# Patient Record
Sex: MALE | Race: OTHER | NOT HISPANIC OR LATINO | URBAN - METROPOLITAN AREA
[De-identification: names, ages, dates, MRNs, and addresses within clinical notes are randomized per-mention and may not be internally consistent; named-entity substitution may affect disease eponyms.]

---

## 2018-04-24 ENCOUNTER — EMERGENCY (EMERGENCY)
Facility: HOSPITAL | Age: 23
LOS: 1 days | Discharge: HOME | End: 2018-04-24
Attending: EMERGENCY MEDICINE
Payer: SELF-PAY

## 2018-04-24 VITALS
HEIGHT: 74 IN | DIASTOLIC BLOOD PRESSURE: 90 MMHG | OXYGEN SATURATION: 98 % | WEIGHT: 164.91 LBS | RESPIRATION RATE: 16 BRPM | TEMPERATURE: 98 F | SYSTOLIC BLOOD PRESSURE: 135 MMHG | HEART RATE: 75 BPM

## 2018-04-24 PROCEDURE — 99283 EMERGENCY DEPT VISIT LOW MDM: CPT

## 2018-04-24 PROCEDURE — 99282 EMERGENCY DEPT VISIT SF MDM: CPT

## 2018-04-24 NOTE — ED ADULT NURSE NOTE - OBJECTIVE STATEMENT
pt from home c/o of penile bleeding since AM pt is alert awake oriented x3 pt states " I had an erection this AM saw blood on underwear"

## 2018-04-24 NOTE — ED PROVIDER NOTE - PHYSICAL EXAMINATION
: Uncircumcised. Small superficial laceration at the superficial  frenulum of the glans penis (base of the head) and small superficial laceration to the inferior part of the frenulum, small oozing of blood easily controlled with pressure. No testicular pain.

## 2018-04-24 NOTE — ED PROVIDER NOTE - OBJECTIVE STATEMENT
21 y/o M pt woke up this morning with an erection went to go use the restroom to urinate after urinating he noticed that there was some blood on his boxers. Pt denies any trauma. Pt does admit to masturbating at times, however cannot confirm or deny if he masturbated last night. No testicular pain, no abd pain, no other complaints.  NKDA.

## 2021-02-22 ENCOUNTER — INPATIENT (INPATIENT)
Facility: HOSPITAL | Age: 26
LOS: 11 days | Discharge: HOME | DRG: 683 | End: 2021-03-06
Attending: INTERNAL MEDICINE | Admitting: INTERNAL MEDICINE
Payer: COMMERCIAL

## 2021-02-22 VITALS
RESPIRATION RATE: 18 BRPM | WEIGHT: 160.94 LBS | DIASTOLIC BLOOD PRESSURE: 113 MMHG | TEMPERATURE: 98 F | HEART RATE: 78 BPM | OXYGEN SATURATION: 100 % | HEIGHT: 74 IN | SYSTOLIC BLOOD PRESSURE: 171 MMHG

## 2021-02-22 DIAGNOSIS — I16.1 HYPERTENSIVE EMERGENCY: ICD-10-CM

## 2021-02-22 DIAGNOSIS — N17.9 ACUTE KIDNEY FAILURE, UNSPECIFIED: ICD-10-CM

## 2021-02-22 DIAGNOSIS — Z29.9 ENCOUNTER FOR PROPHYLACTIC MEASURES, UNSPECIFIED: ICD-10-CM

## 2021-02-22 PROBLEM — J45.909 UNSPECIFIED ASTHMA, UNCOMPLICATED: Chronic | Status: ACTIVE | Noted: 2018-04-24

## 2021-02-22 LAB
ALBUMIN SERPL ELPH-MCNC: 3.3 G/DL — LOW (ref 3.5–5)
ALBUMIN SERPL ELPH-MCNC: 3.5 G/DL — SIGNIFICANT CHANGE UP (ref 3.5–5)
ALP SERPL-CCNC: 94 U/L — SIGNIFICANT CHANGE UP (ref 40–120)
ALP SERPL-CCNC: 95 U/L — SIGNIFICANT CHANGE UP (ref 40–120)
ALT FLD-CCNC: 64 U/L DA — HIGH (ref 10–60)
ALT FLD-CCNC: 66 U/L DA — HIGH (ref 10–60)
AMPHET UR-MCNC: NEGATIVE — SIGNIFICANT CHANGE UP
ANION GAP SERPL CALC-SCNC: 10 MMOL/L — SIGNIFICANT CHANGE UP (ref 5–17)
ANION GAP SERPL CALC-SCNC: 11 MMOL/L — SIGNIFICANT CHANGE UP (ref 5–17)
APPEARANCE UR: CLEAR — SIGNIFICANT CHANGE UP
AST SERPL-CCNC: 73 U/L — HIGH (ref 10–40)
AST SERPL-CCNC: 76 U/L — HIGH (ref 10–40)
BACTERIA # UR AUTO: ABNORMAL /HPF
BARBITURATES UR SCN-MCNC: NEGATIVE — SIGNIFICANT CHANGE UP
BASE EXCESS BLDA CALC-SCNC: -8.1 MMOL/L — LOW (ref -2–2)
BASOPHILS # BLD AUTO: 0.03 K/UL — SIGNIFICANT CHANGE UP (ref 0–0.2)
BASOPHILS NFR BLD AUTO: 0.5 % — SIGNIFICANT CHANGE UP (ref 0–2)
BENZODIAZ UR-MCNC: NEGATIVE — SIGNIFICANT CHANGE UP
BILIRUB SERPL-MCNC: 0.3 MG/DL — SIGNIFICANT CHANGE UP (ref 0.2–1.2)
BILIRUB SERPL-MCNC: 0.4 MG/DL — SIGNIFICANT CHANGE UP (ref 0.2–1.2)
BILIRUB UR-MCNC: NEGATIVE — SIGNIFICANT CHANGE UP
BLOOD GAS COMMENTS ARTERIAL: SIGNIFICANT CHANGE UP
BUN SERPL-MCNC: 64 MG/DL — HIGH (ref 7–18)
BUN SERPL-MCNC: 67 MG/DL — HIGH (ref 7–18)
CALCIUM SERPL-MCNC: 8.7 MG/DL — SIGNIFICANT CHANGE UP (ref 8.4–10.5)
CALCIUM SERPL-MCNC: 9 MG/DL — SIGNIFICANT CHANGE UP (ref 8.4–10.5)
CHLORIDE SERPL-SCNC: 111 MMOL/L — HIGH (ref 96–108)
CHLORIDE SERPL-SCNC: 113 MMOL/L — HIGH (ref 96–108)
CHLORIDE UR-SCNC: 58 MMOL/L — SIGNIFICANT CHANGE UP
CK MB BLD-MCNC: 0.1 % — SIGNIFICANT CHANGE UP (ref 0–3.5)
CK MB CFR SERPL CALC: 1.2 NG/ML — SIGNIFICANT CHANGE UP (ref 0–3.6)
CK SERPL-CCNC: 1141 U/L — HIGH (ref 35–232)
CO2 SERPL-SCNC: 17 MMOL/L — LOW (ref 22–31)
CO2 SERPL-SCNC: 19 MMOL/L — LOW (ref 22–31)
COCAINE METAB.OTHER UR-MCNC: NEGATIVE — SIGNIFICANT CHANGE UP
COLOR SPEC: YELLOW — SIGNIFICANT CHANGE UP
COMMENT - URINE: SIGNIFICANT CHANGE UP
CREAT ?TM UR-MCNC: 79 MG/DL — SIGNIFICANT CHANGE UP
CREAT SERPL-MCNC: 5.8 MG/DL — HIGH (ref 0.5–1.3)
CREAT SERPL-MCNC: 6.17 MG/DL — HIGH (ref 0.5–1.3)
DIFF PNL FLD: ABNORMAL
EOSINOPHIL # BLD AUTO: 0.24 K/UL — SIGNIFICANT CHANGE UP (ref 0–0.5)
EOSINOPHIL NFR BLD AUTO: 3.6 % — SIGNIFICANT CHANGE UP (ref 0–6)
EPI CELLS # UR: ABNORMAL /HPF
GLUCOSE SERPL-MCNC: 83 MG/DL — SIGNIFICANT CHANGE UP (ref 70–99)
GLUCOSE SERPL-MCNC: 93 MG/DL — SIGNIFICANT CHANGE UP (ref 70–99)
GLUCOSE UR QL: NEGATIVE — SIGNIFICANT CHANGE UP
HCO3 BLDA-SCNC: 16 MMOL/L — LOW (ref 23–27)
HCT VFR BLD CALC: 32.8 % — LOW (ref 39–50)
HGB BLD-MCNC: 11 G/DL — LOW (ref 13–17)
HIV 1 & 2 AB SERPL IA.RAPID: SIGNIFICANT CHANGE UP
HOROWITZ INDEX BLDA+IHG-RTO: 21 — SIGNIFICANT CHANGE UP
IMM GRANULOCYTES NFR BLD AUTO: 0.2 % — SIGNIFICANT CHANGE UP (ref 0–1.5)
KETONES UR-MCNC: NEGATIVE — SIGNIFICANT CHANGE UP
LEUKOCYTE ESTERASE UR-ACNC: NEGATIVE — SIGNIFICANT CHANGE UP
LYMPHOCYTES # BLD AUTO: 1.98 K/UL — SIGNIFICANT CHANGE UP (ref 1–3.3)
LYMPHOCYTES # BLD AUTO: 30 % — SIGNIFICANT CHANGE UP (ref 13–44)
MCHC RBC-ENTMCNC: 28.4 PG — SIGNIFICANT CHANGE UP (ref 27–34)
MCHC RBC-ENTMCNC: 33.5 GM/DL — SIGNIFICANT CHANGE UP (ref 32–36)
MCV RBC AUTO: 84.8 FL — SIGNIFICANT CHANGE UP (ref 80–100)
METHADONE UR-MCNC: NEGATIVE — SIGNIFICANT CHANGE UP
MONOCYTES # BLD AUTO: 0.33 K/UL — SIGNIFICANT CHANGE UP (ref 0–0.9)
MONOCYTES NFR BLD AUTO: 5 % — SIGNIFICANT CHANGE UP (ref 2–14)
NEUTROPHILS # BLD AUTO: 4 K/UL — SIGNIFICANT CHANGE UP (ref 1.8–7.4)
NEUTROPHILS NFR BLD AUTO: 60.7 % — SIGNIFICANT CHANGE UP (ref 43–77)
NITRITE UR-MCNC: NEGATIVE — SIGNIFICANT CHANGE UP
NRBC # BLD: 0 /100 WBCS — SIGNIFICANT CHANGE UP (ref 0–0)
OPIATES UR-MCNC: NEGATIVE — SIGNIFICANT CHANGE UP
OSMOLALITY UR: 337 MOS/KG — SIGNIFICANT CHANGE UP (ref 50–1200)
PCO2 BLDA: 29 MMHG — LOW (ref 32–46)
PCP SPEC-MCNC: SIGNIFICANT CHANGE UP
PCP UR-MCNC: NEGATIVE — SIGNIFICANT CHANGE UP
PH BLDA: 7.36 — SIGNIFICANT CHANGE UP (ref 7.35–7.45)
PH UR: 5 — SIGNIFICANT CHANGE UP (ref 5–8)
PLATELET # BLD AUTO: 179 K/UL — SIGNIFICANT CHANGE UP (ref 150–400)
PO2 BLDA: 100 MMHG — SIGNIFICANT CHANGE UP (ref 74–108)
POTASSIUM SERPL-MCNC: 4.5 MMOL/L — SIGNIFICANT CHANGE UP (ref 3.5–5.3)
POTASSIUM SERPL-MCNC: 5.1 MMOL/L — SIGNIFICANT CHANGE UP (ref 3.5–5.3)
POTASSIUM SERPL-SCNC: 4.5 MMOL/L — SIGNIFICANT CHANGE UP (ref 3.5–5.3)
POTASSIUM SERPL-SCNC: 5.1 MMOL/L — SIGNIFICANT CHANGE UP (ref 3.5–5.3)
PROT ?TM UR-MCNC: 261 MG/DL — HIGH (ref 0–12)
PROT SERPL-MCNC: 7 G/DL — SIGNIFICANT CHANGE UP (ref 6–8.3)
PROT SERPL-MCNC: 7 G/DL — SIGNIFICANT CHANGE UP (ref 6–8.3)
PROT UR-MCNC: 500 MG/DL
RBC # BLD: 3.87 M/UL — LOW (ref 4.2–5.8)
RBC # FLD: 12.1 % — SIGNIFICANT CHANGE UP (ref 10.3–14.5)
RBC CASTS # UR COMP ASSIST: ABNORMAL /HPF (ref 0–2)
SAO2 % BLDA: 98 % — HIGH (ref 92–96)
SARS-COV-2 RNA SPEC QL NAA+PROBE: SIGNIFICANT CHANGE UP
SODIUM SERPL-SCNC: 140 MMOL/L — SIGNIFICANT CHANGE UP (ref 135–145)
SODIUM SERPL-SCNC: 141 MMOL/L — SIGNIFICANT CHANGE UP (ref 135–145)
SODIUM UR-SCNC: 63 MMOL/L — SIGNIFICANT CHANGE UP
SP GR SPEC: 1.02 — SIGNIFICANT CHANGE UP (ref 1.01–1.02)
THC UR QL: NEGATIVE — SIGNIFICANT CHANGE UP
TROPONIN I SERPL-MCNC: <0.015 NG/ML — SIGNIFICANT CHANGE UP (ref 0–0.04)
UROBILINOGEN FLD QL: NEGATIVE — SIGNIFICANT CHANGE UP
WBC # BLD: 6.59 K/UL — SIGNIFICANT CHANGE UP (ref 3.8–10.5)
WBC # FLD AUTO: 6.59 K/UL — SIGNIFICANT CHANGE UP (ref 3.8–10.5)
WBC UR QL: SIGNIFICANT CHANGE UP /HPF (ref 0–5)

## 2021-02-22 PROCEDURE — 99285 EMERGENCY DEPT VISIT HI MDM: CPT

## 2021-02-22 PROCEDURE — 71045 X-RAY EXAM CHEST 1 VIEW: CPT | Mod: 26

## 2021-02-22 PROCEDURE — 70450 CT HEAD/BRAIN W/O DYE: CPT | Mod: 26

## 2021-02-22 RX ORDER — ACETAMINOPHEN 500 MG
650 TABLET ORAL EVERY 6 HOURS
Refills: 0 | Status: DISCONTINUED | OUTPATIENT
Start: 2021-02-22 | End: 2021-03-06

## 2021-02-22 RX ORDER — ALBUTEROL 90 UG/1
2 AEROSOL, METERED ORAL EVERY 6 HOURS
Refills: 0 | Status: DISCONTINUED | OUTPATIENT
Start: 2021-02-22 | End: 2021-03-06

## 2021-02-22 RX ORDER — HYDRALAZINE HCL 50 MG
25 TABLET ORAL ONCE
Refills: 0 | Status: DISCONTINUED | OUTPATIENT
Start: 2021-02-22 | End: 2021-02-22

## 2021-02-22 RX ORDER — BUDESONIDE AND FORMOTEROL FUMARATE DIHYDRATE 160; 4.5 UG/1; UG/1
2 AEROSOL RESPIRATORY (INHALATION)
Refills: 0 | Status: DISCONTINUED | OUTPATIENT
Start: 2021-02-22 | End: 2021-03-06

## 2021-02-22 RX ORDER — SODIUM CHLORIDE 9 MG/ML
1000 INJECTION, SOLUTION INTRAVENOUS
Refills: 0 | Status: DISCONTINUED | OUTPATIENT
Start: 2021-02-22 | End: 2021-02-25

## 2021-02-22 RX ORDER — ONDANSETRON 8 MG/1
5 TABLET, FILM COATED ORAL EVERY 6 HOURS
Refills: 0 | Status: DISCONTINUED | OUTPATIENT
Start: 2021-02-22 | End: 2021-03-06

## 2021-02-22 RX ORDER — HYDRALAZINE HCL 50 MG
5 TABLET ORAL ONCE
Refills: 0 | Status: COMPLETED | OUTPATIENT
Start: 2021-02-22 | End: 2021-02-22

## 2021-02-22 RX ORDER — AMLODIPINE BESYLATE 2.5 MG/1
5 TABLET ORAL ONCE
Refills: 0 | Status: COMPLETED | OUTPATIENT
Start: 2021-02-22 | End: 2021-02-22

## 2021-02-22 RX ORDER — AMLODIPINE BESYLATE 2.5 MG/1
5 TABLET ORAL DAILY
Refills: 0 | Status: DISCONTINUED | OUTPATIENT
Start: 2021-02-22 | End: 2021-02-24

## 2021-02-22 RX ADMIN — Medication 650 MILLIGRAM(S): at 23:23

## 2021-02-22 RX ADMIN — AMLODIPINE BESYLATE 5 MILLIGRAM(S): 2.5 TABLET ORAL at 17:34

## 2021-02-22 RX ADMIN — ONDANSETRON 5 MILLIGRAM(S): 8 TABLET, FILM COATED ORAL at 23:20

## 2021-02-22 RX ADMIN — Medication 5 MILLIGRAM(S): at 19:36

## 2021-02-22 RX ADMIN — AMLODIPINE BESYLATE 5 MILLIGRAM(S): 2.5 TABLET ORAL at 23:20

## 2021-02-22 RX ADMIN — SODIUM CHLORIDE 60 MILLILITER(S): 9 INJECTION, SOLUTION INTRAVENOUS at 22:49

## 2021-02-22 NOTE — H&P ADULT - PROBLEM SELECTOR PLAN 2
fu urine lytes  fu urine tox   will consult Dr. Massey presented with CIARRA and Cr 6  no presvious bs creatinine   will start on gentle hydration with 60 cc/hr   fu urine lytes  fu urine tox   will consult Dr. Massey presented with CIARRA and Cr 6  no previous bs creatinine   will start on gentle hydration with 60 cc/hr   fu urine lytes  fu urine tox   will consult Dr. Massey

## 2021-02-22 NOTE — ED PROVIDER NOTE - CARE PLAN
Principal Discharge DX:	CIARRA (acute kidney injury)  Secondary Diagnosis:	HTN (hypertension)  Secondary Diagnosis:	Hypertensive emergency

## 2021-02-22 NOTE — H&P ADULT - HISTORY OF PRESENT ILLNESS
24 yo male with PMH of asthma presented to his PCP with complaints of headache, nausea and vomiting. Pt was noted to have elevated sBP of >170 and was advised to come to emergency department. Patient states he started having headaches 5 days ago associated with nausea and NBNB vomiting. Pt denies head trauma, any prior episodes of similar headaches.   Denies diarrhea, abdominal pain, cough, wheezing, joint pain or swelling, fever, chills.   24 yo male with PMH of asthma presented to his PCP with complaints of headache, nausea and vomiting x 5 days. Pt was noted to have elevated sBP of >170 and was advised to come to emergency department. Patient states he started having headaches 5 days ago associated with nausea and 1 day hx of NBNB vomiting. Pt endorses episode of dysuria few days ago which has now resolved. Pt denies head trauma, any prior episodes of similar headaches. No family hx of Polycystic kidney disease, CVA, HTN. Denies diarrhea, abdominal pain, fever, chills, recent viral infection, no blood in urine, no flank pain, no urinary rentention

## 2021-02-22 NOTE — CONSULT NOTE ADULT - SUBJECTIVE AND OBJECTIVE BOX
Time of visit:    CHIEF COMPLAINT: Patient is a 25y old  Male who presents with a chief complaint of headache, nausea/ vomiting (22 Feb 2021 20:11)      HPI:  24 yo male with PMH of asthma presented to his PCP with complaints of headache, nausea and vomiting. Pt was noted to have elevated sBP of >170 and was advised to come to emergency department. Patient states he started having headaches 5 days ago associated with nausea and NBNB vomiting. Pt denies head trauma, any prior episodes of similar headaches.   Denies diarrhea, abdominal pain, cough, wheezing, joint pain or swelling, fever, chills.   (22 Feb 2021 20:11)   Patient seen and examined.     PAST MEDICAL & SURGICAL HISTORY:  Asthma    No significant past surgical history        Allergies    Kiwi (Swelling)  No Known Drug Allergies    Intolerances        MEDICATIONS  (STANDING):  amLODIPine   Tablet 5 milliGRAM(s) Oral daily      MEDICATIONS  (PRN):  acetaminophen   Tablet .. 650 milliGRAM(s) Oral every 6 hours PRN Temp greater or equal to 38C (100.4F), Mild Pain (1 - 3)  ondansetron Injectable 5 milliGRAM(s) IV Push every 6 hours PRN Nausea and/or Vomiting   Medications up to date at time of exam.    Medications up to date at time of exam.    FAMILY HISTORY:      SOCIAL HISTORY  Smoking History: [   ] smoking/smoke exposure, [   ] former smoker  Living Condition: [   ] apartment, [   ] private house  Work History:   Travel History: denies recent travel  Illicit Substance Use: denies  Alcohol Use: denies    REVIEW OF SYSTEMS:    CONSTITUTIONAL:  denies fevers, chills, sweats, weight loss    HEENT:  denies diplopia or blurred vision, sore throat or runny nose.    CARDIOVASCULAR:  denies pressure, squeezing, tightness, or heaviness about the chest; no palpitations.    RESPIRATORY:  denies SOB, cough, ADKINS, wheezing.    GASTROINTESTINAL:  denies abdominal pain, nausea, vomiting or diarrhea.    GENITOURINARY: denies dysuria, frequency or urgency.    NEUROLOGIC:  denies numbness, tingling, seizures or weakness.    PSYCHIATRIC:  denies disorder of thought or mood.    MSK: denies swelling, redness      PHYSICAL EXAMINATION:    GENERAL: The patient is a well-developed, well-nourished, in no apparent distress.     Vital Signs Last 24 Hrs  T(C): 36.7 (22 Feb 2021 20:45), Max: 36.8 (22 Feb 2021 19:31)  T(F): 98 (22 Feb 2021 20:45), Max: 98.3 (22 Feb 2021 19:31)  HR: 90 (22 Feb 2021 20:45) (73 - 90)  BP: 162/105 (22 Feb 2021 20:45) (162/105 - 185/114)  BP(mean): --  RR: 18 (22 Feb 2021 20:45) (18 - 20)  SpO2: 98% (22 Feb 2021 20:45) (98% - 100%)   (if applicable)    Chest Tube (if applicable)    HEENT: Head is normocephalic and atraumatic. Extraocular muscles are intact. Mucous membranes are moist.     NECK: Supple, no palpable adenopathy.    LUNGS: Clear to auscultation, no wheezing, rales, or rhonchi.    HEART: Regular rate and rhythm without murmur.    ABDOMEN: Soft, nontender, and nondistended.  No hepatosplenomegaly is noted.    RENAL: No difficulty voiding, no pelvic pain    EXTREMITIES: Without any cyanosis, clubbing, rash, lesions or edema.    NEUROLOGIC: Awake, alert, oriented, grossly intact    SKIN: Warm, dry, good turgor.      LABS:                        11.0   6.59  )-----------( 179      ( 22 Feb 2021 16:39 )             32.8     02-22    140  |  111<H>  |  67<H>  ----------------------------<  93  5.1   |  19<L>  |  6.17<H>    Ca    9.0      22 Feb 2021 16:39    TPro  7.0  /  Alb  3.3<L>  /  TBili  0.3  /  DBili  x   /  AST  76<H>  /  ALT  66<H>  /  AlkPhos  94  02-22        ABG - ( 22 Feb 2021 21:13 )  pH, Arterial: 7.36  pH, Blood: x     /  pCO2: 29    /  pO2: 100   / HCO3: 16    / Base Excess: -8.1  /  SaO2: 98                CARDIAC MARKERS ( 22 Feb 2021 19:24 )  <0.015 ng/mL / x     / x     / x     / x      CARDIAC MARKERS ( 22 Feb 2021 16:39 )  <0.015 ng/mL / x     / x     / x     / x                    MICROBIOLOGY: (if applicable)    RADIOLOGY & ADDITIONAL STUDIES:  EKG:   CXR:  ECHO:    IMPRESSION: 25y Male PAST MEDICAL & SURGICAL HISTORY:  Asthma    No significant past surgical history     p/w                   RECOMMENDATIONS:   Time of visit:    CHIEF COMPLAINT: Patient is a 25y old  Male who presents with a chief complaint of headache, nausea/ vomiting (22 Feb 2021 20:11)      HPI:  26 yo male with PMH of asthma presented to his PCP with complaints of headache, nausea and vomiting. Pt was noted to have elevated sBP of >170 and was advised to come to emergency department. Patient states he started having headaches 5 days ago associated with nausea and NBNB vomiting. Pt denies head trauma, any prior episodes of similar headaches.   Denies diarrhea, abdominal pain, cough, wheezing, joint pain or swelling, fever, chills.   (22 Feb 2021 20:11)   Patient seen and examined.     PAST MEDICAL & SURGICAL HISTORY:  Asthma    No significant past surgical history        Allergies    Kiwi (Swelling)  No Known Drug Allergies    Intolerances        MEDICATIONS  (STANDING):  amLODIPine   Tablet 5 milliGRAM(s) Oral daily      MEDICATIONS  (PRN):  acetaminophen   Tablet .. 650 milliGRAM(s) Oral every 6 hours PRN Temp greater or equal to 38C (100.4F), Mild Pain (1 - 3)  ondansetron Injectable 5 milliGRAM(s) IV Push every 6 hours PRN Nausea and/or Vomiting   Medications up to date at time of exam.    Medications up to date at time of exam.    FAMILY HISTORY:      SOCIAL HISTORY  Smoking History: [ x  ]  non smoking/smoke exposure, [   ] former smoker  Living Condition: [   ] apartment, [   ] private house  Work History:  import and export   Travel History: denies recent travel  Illicit Substance Use: denies  Alcohol Use: denies    REVIEW OF SYSTEMS:    CONSTITUTIONAL:  denies fevers, chills, sweats, weight loss    HEENT:  denies diplopia or blurred vision, sore throat or runny nose.    CARDIOVASCULAR:  denies pressure, squeezing, tightness, or heaviness about the chest; no palpitations.    RESPIRATORY:  denies SOB, cough, ADKINS, wheezing.    GASTROINTESTINAL:  denies abdominal pain, nausea, vomiting or diarrhea.    GENITOURINARY: denies dysuria, frequency or urgency.    NEUROLOGIC:  denies numbness, tingling, seizures or weakness.    PSYCHIATRIC:  denies disorder of thought or mood.    MSK: denies swelling, redness      PHYSICAL EXAMINATION:    GENERAL: The patient is a well-developed, well-nourished, in no apparent distress.     Vital Signs Last 24 Hrs  T(C): 36.7 (22 Feb 2021 20:45), Max: 36.8 (22 Feb 2021 19:31)  T(F): 98 (22 Feb 2021 20:45), Max: 98.3 (22 Feb 2021 19:31)  HR: 90 (22 Feb 2021 20:45) (73 - 90)  BP: 162/105 (22 Feb 2021 20:45) (162/105 - 185/114)  BP(mean): --  RR: 18 (22 Feb 2021 20:45) (18 - 20)  SpO2: 98% (22 Feb 2021 20:45) (98% - 100%)   (if applicable)    Chest Tube (if applicable)    HEENT: Head is normocephalic and atraumatic. Extraocular muscles are intact. Mucous membranes are moist.     NECK: Supple, no palpable adenopathy.    LUNGS: Clear to auscultation, no wheezing, rales, or rhonchi.    HEART: Regular rate and rhythm without murmur.    ABDOMEN: Soft, nontender, and nondistended.  No hepatosplenomegaly is noted.    RENAL: No difficulty voiding, no pelvic pain    EXTREMITIES: Without any cyanosis, clubbing, rash, lesions or edema.    NEUROLOGIC: Awake, alert, oriented, grossly intact    SKIN: Warm, dry, good turgor.      LABS:                        11.0   6.59  )-----------( 179      ( 22 Feb 2021 16:39 )             32.8     02-22    140  |  111<H>  |  67<H>  ----------------------------<  93  5.1   |  19<L>  |  6.17<H>    Ca    9.0      22 Feb 2021 16:39    TPro  7.0  /  Alb  3.3<L>  /  TBili  0.3  /  DBili  x   /  AST  76<H>  /  ALT  66<H>  /  AlkPhos  94  02-22        ABG - ( 22 Feb 2021 21:13 )  pH, Arterial: 7.36  pH, Blood: x     /  pCO2: 29    /  pO2: 100   / HCO3: 16    / Base Excess: -8.1  /  SaO2: 98                CARDIAC MARKERS ( 22 Feb 2021 19:24 )  <0.015 ng/mL / x     / x     / x     / x      CARDIAC MARKERS ( 22 Feb 2021 16:39 )  <0.015 ng/mL / x     / x     / x     / x                    MICROBIOLOGY: (if applicable)    RADIOLOGY & ADDITIONAL STUDIES:  EKG:   CXR:< from: Xray Chest 1 View- PORTABLE-Routine (Xray Chest 1 View- PORTABLE-Routine in AM.) (02.22.21 @ 23:42) >    EXAM:  XR CHEST PORTABLE ROUTINE 1V                            PROCEDURE DATE:  02/22/2021          INTERPRETATION:  CLINICAL STATEMENT: Chest pain.    TECHNIQUE: AP view of the chest.    COMPARISON: None available.    FINDINGS/  IMPRESSION:  No consolidation or infiltrate.  No pleural effusion.    Heart size within normal limits.              MYRIAM CHAVARRIA MD; Attending Radiologist  This document has been electronically signed. Feb 23 2021  2:15PM    < end of copied text >    CT head :< from: CT Head No Cont (02.22.21 @ 18:52) >    EXAM:  CT BRAIN                            PROCEDURE DATE:  02/22/2021          INTERPRETATION:  CLINICAL INDICATION: Headache. Hypertension.    Technique:    Multiple contiguous axial images were acquired from the skullbase to the vertex without the administration of intravenous contrast.  Coronal and sagittal reformations were made.    COMPARISON: None available at this time.    FINDINGS:    The ventricles and sulci are within normal limits.  There are no areas of abnormal attenuation within the brain parenchyma.  There is no evidence of acute transcortical territorial infarction.  There is no intraparenchymal hematoma, mass effect or midline shift.  No abnormal extra-axial fluid collections are present.    The calvarium is intact.  The visualized intraorbital compartments are unremarkable.  Visualized paranasal sinuses appear free of acute disease.  Mastoid air cells are clear.    IMPRESSION:    No acute hemorrhage or midline shift. If symptoms persist, consider follow-up head CT or MRI, if no contraindications.            JAMIN GUZMAN MD, Attending Radiologist  This document has been electronically signed. Feb 22 2021  6:57PM    < end of copied text >  Ct abd/pelvis:< from: CT Abdomen and Pelvis No Cont (02.23.21 @ 00:23) >    EXAM:  CT ABDOMEN AND PELVIS                            PROCEDURE DATE:  02/23/2021          INTERPRETATION:  CLINICAL INFORMATION: Acute renal failure.    COMPARISON: None.    PROCEDURE:  CT of the Abdomen and Pelvis was performed without intravenous contrast.  Intravenous contrast: None.  Oral contrast: None.  Sagittal and coronal reformats were performed.    FINDINGS:  LOWER CHEST: Within normal limits.    LIVER: Within normal limits.  BILE DUCTS: Normal caliber.  GALLBLADDER: Within normal limits.  SPLEEN: Within normal limits.  PANCREAS: Within normal limits.  ADRENALS: Within normal limits.  KIDNEYS/URETERS: Kidneys are symmetric in size. No right or left hydroureteronephrosis or obstructing urinary tract calculus. No nephrolithiasis.No asymmetric perinephric stranding.    BLADDER: Normally distended.  REPRODUCTIVE ORGANS: Prostate gland and seminal vesicles are unremarkable.    BOWEL: No bowel obstruction. Appendix is normal.  PERITONEUM: No ascites or pneumoperitoneum. No mesenteric lymphadenopathy.  VESSELS: Within normal limits.  RETROPERITONEUM/LYMPH NODES: No lymphadenopathy.  ABDOMINAL WALL: Small fat-containing umbilical hernia.  BONES: Within normal limits.    IMPRESSION:  No evidence of obstructive uropathy.              LARISSA JIMENEZ DO; Attending Radiologist  This document has been electronically signed. Feb 23 2021  2:16AM    < end of copied text >    ECHO:    IMPRESSION: 25y Male PAST MEDICAL & SURGICAL HISTORY:  Asthma    No significant past surgical history     p/w       IMP: This is a 25 yr with asthma ,non smoker presented headaches due to hypertensive crisis and CIARRA. Metabolic acidosis due to CIARRA . Asthma stable       Sugg;  -continue symbicort 80/4.5 bid  -metanephrines  -neph eval pending   -monitor BP  -continue Norvasc  -CIARRA work up

## 2021-02-22 NOTE — H&P ADULT - PROBLEM SELECTOR PLAN 3
IMPROVE VTE Individual Risk Assessment  RISK                                                         Points  [  ] Previous VTE                                      3  [  ] Thrombophilia                                   2  [  ] Lower limb paralysis                         2 (unable to hold up >15 seconds)    [  ] Current Cancer                                  2       (within 6 months)  [  ] Immobilization > 24 hrs                    1  [  ] ICU/CCU stay > 24 hrs                         1  [  ] Age > 60                                              1  started on lovenox for dvt ppx IMPROVE VTE Individual Risk Assessment  RISK                                                         Points  [  ] Previous VTE                                      3  [  ] Thrombophilia                                   2  [  ] Lower limb paralysis                         2 (unable to hold up >15 seconds)    [  ] Current Cancer                                  2       (within 6 months)  [  ] Immobilization > 24 hrs                    1  [  ] ICU/CCU stay > 24 hrs                         1  [  ] Age > 60                                              1  will hold chemo dvt ppx for now Pt noted to have non anion gap metabolic acidosis with respiratory compensation   bicarb 19-> 17   will hold off on bicarb replacement for now   metabolic acidosis likely in the setting of ARF   fu Nephro recommendation

## 2021-02-22 NOTE — H&P ADULT - NSHPSOCIALHISTORY_GEN_ALL_CORE
Alcohol: Denied  Smoking: Denied  Illicit drugs: Denied Alcohol: occasional   Smoking: Denied  Illicit drugs: Denied

## 2021-02-22 NOTE — H&P ADULT - PROBLEM SELECTOR PLAN 1
pt presented with Headaches,N/V pt presented with Headaches,N/V  Pt noted to habe sbp >180 on admission  s/p amlodipine 5 mg and hydralazine in ED   cw amlodipine   will send work up for renin/ aldosterone.   Fu urine studies   fu CT abdomen to r/o any obstructive pathology vs Polycystic kidney disease vs mass  monitor Bp closely pt presented with Headaches,N/V  CTH negative   Ddx primary vs secondary HTN   Pt noted to have sbp >180 on admission  s/p amlodipine 5 mg and hydralazine in ED   cw amlodipine for now, increase dose as needed   will send work up for renin/ aldosterone in am   fu 5 HIAA, metanephrines in urine   Fu urine studies   fu CT abdomen to r/o any obstructive pathology vs Polycystic kidney disease vs mass  monitor Bp closely  Nephro Dr. Massey pt presented with Headaches,N/V  CTH negative   Ddx primary vs secondary HTN   Pt noted to have sbp >180 on admission  s/p amlodipine 5 mg and hydralazine in ED   cw amlodipine for now, increase dose as needed   will send work up for renin/ aldosterone in am   fu 5 HIAA, VMA, metanephrines in urine   fu Anca studies, christopher to r/o vasculitis   Fu urine studies, urine eosinphils   fu CT abdomen to r/o any obstructive pathology vs Polycystic kidney disease vs mass  monitor Bp closely  Nephro Dr. Massey

## 2021-02-22 NOTE — ED ADULT NURSE NOTE - NS PRO PASSIVE SMOKE EXP
Detail Level: Detailed Quality 431: Preventive Care And Screening: Unhealthy Alcohol Use - Screening: Patient screened for unhealthy alcohol use using a single question and scores less than 2 times per year Quality 130: Documentation Of Current Medications In The Medical Record: Current Medications Documented Quality 226: Preventive Care And Screening: Tobacco Use: Screening And Cessation Intervention: Patient screened for tobacco use and is an ex/non-smoker No

## 2021-02-22 NOTE — ED PROVIDER NOTE - NS ED ROS FT
Pt denies fevers, chills  chest pain, palpitations  shortness of breath, orthopnea  abdominal pain, melena,   dysuria, hematuria   numbness, weakness, saddle anesthesia  rash  enlarged lymph nodes

## 2021-02-22 NOTE — ED PROVIDER NOTE - OBJECTIVE STATEMENT
26 yo male with PMH of asthma presented to his PCP with headache, nausea and vomiting x 5 days. Pt was noted to have elevated sBP of >170 and was sent to the ER.  He denies No headache confusion, amnesia, coumadin, aspirin, plavix, trauma, dysphagia, diplopia, dysarthria, weakness, numbness, tingling, prior episodes of similar headaches. No family hx of Polycystic kidney disease, CVA, HTN. Denies diarrhea, abdominal pain, fever, chills, recent viral infection, no blood in urine, no flank pain, no urinary rentention

## 2021-02-22 NOTE — H&P ADULT - NSHPPHYSICALEXAM_GEN_ALL_CORE
Vital Signs Last 24 Hrs  T(C): 36.8 (22 Feb 2021 19:31), Max: 36.8 (22 Feb 2021 19:31)  T(F): 98.3 (22 Feb 2021 19:31), Max: 98.3 (22 Feb 2021 19:31)  HR: 74 (22 Feb 2021 19:31) (73 - 78)  BP: 169/112 (22 Feb 2021 19:31) (163/110 - 185/114)  BP(mean): --  RR: 20 (22 Feb 2021 19:31) (18 - 20)  SpO2: 100% (22 Feb 2021 19:31) (100% - 100%)    GENERAL: NAD, lying in bed comfortably  HEAD:  Atraumatic, Normocephalic  EYES: EOMI, PERRLA, conjunctiva and sclera clear  ENT: Moist mucous membranes  NECK: Supple, No JVD  CHEST/LUNG: Clear to auscultation bilaterally; No rales, rhonchi, wheezing, or rubs.  HEART: Regular rate and rhythm; S1+ S2+  ABDOMEN: Bowel sounds present; Soft, Nontender, Nondistended. No hepatomegaly  EXTREMITIES:  2+ Peripheral Pulses, brisk capillary refill. No clubbing, cyanosis, or edema  NERVOUS SYSTEM:  Alert & Oriented , speech clear. No deficits   MSK: FROM all 4 extremities, full and equal strength  SKIN: No rashes or lesions

## 2021-02-22 NOTE — ED PROVIDER NOTE - PHYSICAL EXAMINATION
Gabo:   Vitals normal   mild distress  Awake Alert oriented to person, place, situation,   Normocephalic, atraumatic, neck supple   lungs clear bilaterally  heart s1s rrr,  Abdomen soft, nontender, nondistended  No LE swelling    No rash  Neuro exam grossly intact, no weakness, numbness,

## 2021-02-22 NOTE — ED ADULT NURSE NOTE - OBJECTIVE STATEMENT
arrived ambulatory with c/o high blood pressure, headache dizziness x days, no chest pain, SOB, extremities weakness or numbness, noted

## 2021-02-22 NOTE — H&P ADULT - ASSESSMENT
24 yo male with PMH of asthma presented to his PCP with complaints of headache, nausea and vomiting. Pt was noted to have elevated sBP of >170 and was advised to come to emergency department.

## 2021-02-22 NOTE — H&P ADULT - PROBLEM SELECTOR PLAN 6
IMPROVE VTE Individual Risk Assessment  RISK                                                         Points  [  ] Previous VTE                                      3  [  ] Thrombophilia                                   2  [  ] Lower limb paralysis                         2 (unable to hold up >15 seconds)    [  ] Current Cancer                                  2       (within 6 months)  [  ] Immobilization > 24 hrs                    1  [  ] ICU/CCU stay > 24 hrs                         1  [  ] Age > 60                                              1  will hold chemo dvt ppx for now

## 2021-02-23 DIAGNOSIS — J45.909 UNSPECIFIED ASTHMA, UNCOMPLICATED: ICD-10-CM

## 2021-02-23 DIAGNOSIS — R79.89 OTHER SPECIFIED ABNORMAL FINDINGS OF BLOOD CHEMISTRY: ICD-10-CM

## 2021-02-23 DIAGNOSIS — E87.2 ACIDOSIS: ICD-10-CM

## 2021-02-23 LAB
A1C WITH ESTIMATED AVERAGE GLUCOSE RESULT: 5.4 % — SIGNIFICANT CHANGE UP (ref 4–5.6)
ALBUMIN SERPL ELPH-MCNC: 3.4 G/DL — LOW (ref 3.5–5)
ALP SERPL-CCNC: 92 U/L — SIGNIFICANT CHANGE UP (ref 40–120)
ALT FLD-CCNC: 60 U/L DA — SIGNIFICANT CHANGE UP (ref 10–60)
ANA TITR SER: NEGATIVE — SIGNIFICANT CHANGE UP
ANION GAP SERPL CALC-SCNC: 11 MMOL/L — SIGNIFICANT CHANGE UP (ref 5–17)
APTT BLD: 34.3 SEC — SIGNIFICANT CHANGE UP (ref 27.5–35.5)
AST SERPL-CCNC: 56 U/L — HIGH (ref 10–40)
BILIRUB SERPL-MCNC: 0.5 MG/DL — SIGNIFICANT CHANGE UP (ref 0.2–1.2)
BUN SERPL-MCNC: 61 MG/DL — HIGH (ref 7–18)
CALCIUM SERPL-MCNC: 9.3 MG/DL — SIGNIFICANT CHANGE UP (ref 8.4–10.5)
CHLORIDE SERPL-SCNC: 106 MMOL/L — SIGNIFICANT CHANGE UP (ref 96–108)
CHOLEST SERPL-MCNC: 320 MG/DL — HIGH
CK SERPL-CCNC: 683 U/L — HIGH (ref 35–232)
CO2 SERPL-SCNC: 19 MMOL/L — LOW (ref 22–31)
CREAT SERPL-MCNC: 6 MG/DL — HIGH (ref 0.5–1.3)
EOSINOPHIL NFR URNS MANUAL: NEGATIVE — SIGNIFICANT CHANGE UP
ERYTHROCYTE [SEDIMENTATION RATE] IN BLOOD: 25 MM/HR — HIGH (ref 0–15)
ESTIMATED AVERAGE GLUCOSE: 108 MG/DL — SIGNIFICANT CHANGE UP (ref 68–114)
FERRITIN SERPL-MCNC: 489 NG/ML — HIGH (ref 30–400)
FOLATE SERPL-MCNC: 8 NG/ML — SIGNIFICANT CHANGE UP
GLUCOSE SERPL-MCNC: 94 MG/DL — SIGNIFICANT CHANGE UP (ref 70–99)
HAV IGM SER-ACNC: SIGNIFICANT CHANGE UP
HBV CORE IGM SER-ACNC: SIGNIFICANT CHANGE UP
HBV SURFACE AG SER-ACNC: SIGNIFICANT CHANGE UP
HCT VFR BLD CALC: 32.8 % — LOW (ref 39–50)
HCV AB S/CO SERPL IA: 0.1 S/CO — SIGNIFICANT CHANGE UP (ref 0–0.99)
HCV AB SERPL-IMP: SIGNIFICANT CHANGE UP
HDLC SERPL-MCNC: 54 MG/DL — SIGNIFICANT CHANGE UP
HGB BLD-MCNC: 11.2 G/DL — LOW (ref 13–17)
INR BLD: 1.1 RATIO — SIGNIFICANT CHANGE UP (ref 0.88–1.16)
IRON SATN MFR SERPL: 30 % — SIGNIFICANT CHANGE UP (ref 20–55)
IRON SATN MFR SERPL: 76 UG/DL — SIGNIFICANT CHANGE UP (ref 65–170)
LIPID PNL WITH DIRECT LDL SERPL: 239 MG/DL — HIGH
MAGNESIUM SERPL-MCNC: 2.3 MG/DL — SIGNIFICANT CHANGE UP (ref 1.6–2.6)
MCHC RBC-ENTMCNC: 29 PG — SIGNIFICANT CHANGE UP (ref 27–34)
MCHC RBC-ENTMCNC: 34.1 GM/DL — SIGNIFICANT CHANGE UP (ref 32–36)
MCV RBC AUTO: 85 FL — SIGNIFICANT CHANGE UP (ref 80–100)
NON HDL CHOLESTEROL: 266 MG/DL — HIGH
NRBC # BLD: 0 /100 WBCS — SIGNIFICANT CHANGE UP (ref 0–0)
PHOSPHATE SERPL-MCNC: 4.7 MG/DL — HIGH (ref 2.5–4.5)
PLATELET # BLD AUTO: 205 K/UL — SIGNIFICANT CHANGE UP (ref 150–400)
POTASSIUM SERPL-MCNC: 5 MMOL/L — SIGNIFICANT CHANGE UP (ref 3.5–5.3)
POTASSIUM SERPL-SCNC: 5 MMOL/L — SIGNIFICANT CHANGE UP (ref 3.5–5.3)
PROT SERPL-MCNC: 7 G/DL — SIGNIFICANT CHANGE UP (ref 6–8.3)
PROTHROM AB SERPL-ACNC: 13 SEC — SIGNIFICANT CHANGE UP (ref 10.6–13.6)
RBC # BLD: 3.86 M/UL — LOW (ref 4.2–5.8)
RBC # BLD: 3.86 M/UL — LOW (ref 4.2–5.8)
RBC # FLD: 12.4 % — SIGNIFICANT CHANGE UP (ref 10.3–14.5)
RETICS #: 45.9 K/UL — SIGNIFICANT CHANGE UP (ref 25–125)
RETICS/RBC NFR: 1.2 % — SIGNIFICANT CHANGE UP (ref 0.5–2.5)
SARS-COV-2 IGG SERPL QL IA: NEGATIVE — SIGNIFICANT CHANGE UP
SARS-COV-2 IGM SERPL IA-ACNC: 0.16 INDEX — SIGNIFICANT CHANGE UP
SODIUM SERPL-SCNC: 136 MMOL/L — SIGNIFICANT CHANGE UP (ref 135–145)
TIBC SERPL-MCNC: 255 UG/DL — SIGNIFICANT CHANGE UP (ref 250–450)
TRANSFERRIN SERPL-MCNC: 198 MG/DL — LOW (ref 200–360)
TRIGL SERPL-MCNC: 134 MG/DL — SIGNIFICANT CHANGE UP
TROPONIN I SERPL-MCNC: <0.015 NG/ML — SIGNIFICANT CHANGE UP (ref 0–0.04)
TSH SERPL-MCNC: 2.62 UU/ML — SIGNIFICANT CHANGE UP (ref 0.34–4.82)
UIBC SERPL-MCNC: 179 UG/DL — SIGNIFICANT CHANGE UP (ref 110–370)
VIT B12 SERPL-MCNC: 469 PG/ML — SIGNIFICANT CHANGE UP (ref 232–1245)
WBC # BLD: 7.54 K/UL — SIGNIFICANT CHANGE UP (ref 3.8–10.5)
WBC # FLD AUTO: 7.54 K/UL — SIGNIFICANT CHANGE UP (ref 3.8–10.5)

## 2021-02-23 PROCEDURE — 74176 CT ABD & PELVIS W/O CONTRAST: CPT | Mod: 26

## 2021-02-23 RX ORDER — LIDOCAINE 4 G/100G
5 CREAM TOPICAL ONCE
Refills: 0 | Status: DISCONTINUED | OUTPATIENT
Start: 2021-02-23 | End: 2021-02-23

## 2021-02-23 RX ORDER — ACETAMINOPHEN 500 MG
1000 TABLET ORAL ONCE
Refills: 0 | Status: COMPLETED | OUTPATIENT
Start: 2021-02-23 | End: 2021-02-23

## 2021-02-23 RX ORDER — ATORVASTATIN CALCIUM 80 MG/1
40 TABLET, FILM COATED ORAL AT BEDTIME
Refills: 0 | Status: DISCONTINUED | OUTPATIENT
Start: 2021-02-23 | End: 2021-03-06

## 2021-02-23 RX ADMIN — ONDANSETRON 5 MILLIGRAM(S): 8 TABLET, FILM COATED ORAL at 11:40

## 2021-02-23 RX ADMIN — BUDESONIDE AND FORMOTEROL FUMARATE DIHYDRATE 2 PUFF(S): 160; 4.5 AEROSOL RESPIRATORY (INHALATION) at 22:08

## 2021-02-23 RX ADMIN — AMLODIPINE BESYLATE 5 MILLIGRAM(S): 2.5 TABLET ORAL at 05:47

## 2021-02-23 RX ADMIN — ATORVASTATIN CALCIUM 40 MILLIGRAM(S): 80 TABLET, FILM COATED ORAL at 22:10

## 2021-02-23 RX ADMIN — Medication 400 MILLIGRAM(S): at 04:06

## 2021-02-23 RX ADMIN — BUDESONIDE AND FORMOTEROL FUMARATE DIHYDRATE 2 PUFF(S): 160; 4.5 AEROSOL RESPIRATORY (INHALATION) at 11:41

## 2021-02-23 RX ADMIN — SODIUM CHLORIDE 60 MILLILITER(S): 9 INJECTION, SOLUTION INTRAVENOUS at 12:28

## 2021-02-23 RX ADMIN — SODIUM CHLORIDE 60 MILLILITER(S): 9 INJECTION, SOLUTION INTRAVENOUS at 18:04

## 2021-02-23 NOTE — PROGRESS NOTE ADULT - SUBJECTIVE AND OBJECTIVE BOX
Time of Visit:  Patient seen and examined.     MEDICATIONS  (STANDING):  amLODIPine   Tablet 5 milliGRAM(s) Oral daily  atorvastatin 40 milliGRAM(s) Oral at bedtime  budesonide  80 MICROgram(s)/formoterol 4.5 MICROgram(s) Inhaler 2 Puff(s) Inhalation two times a day  lactated ringers. 1000 milliLiter(s) (60 mL/Hr) IV Continuous <Continuous>      MEDICATIONS  (PRN):  acetaminophen   Tablet .. 650 milliGRAM(s) Oral every 6 hours PRN Temp greater or equal to 38C (100.4F), Mild Pain (1 - 3)  ALBUTerol    90 MICROgram(s) HFA Inhaler 2 Puff(s) Inhalation every 6 hours PRN Bronchospasm  ondansetron Injectable 5 milliGRAM(s) IV Push every 6 hours PRN Nausea and/or Vomiting       Medications up to date at time of exam.      PHYSICAL EXAMINATION:  Patient has no new complaints.  GENERAL: The patient is a well-developed, well-nourished, in no apparent distress.     Vital Signs Last 24 Hrs  T(C): 36.9 (2021 15:28), Max: 37.2 (2021 04:30)  T(F): 98.4 (2021 15:28), Max: 98.9 (2021 04:30)  HR: 83 (2021 15:28) (60 - 95)  BP: 148/87 (2021 15:28) (130/63 - 185/114)  BP(mean): --  RR: 18 (2021 15:28) (16 - 20)  SpO2: 100% (2021 15:28) (96% - 100%)   (if applicable)    Chest Tube (if applicable)    HEENT: Head is normocephalic and atraumatic. Extraocular muscles are intact. Mucous membranes are moist.     NECK: Supple, no palpable adenopathy.    LUNGS: Clear to auscultation, no wheezing, rales, or rhonchi.    HEART: Regular rate and rhythm without murmur.    ABDOMEN: Soft, nontender, and nondistended.  No hepatosplenomegaly is noted.    : No painful voiding, no pelvic pain    EXTREMITIES: Without any cyanosis, clubbing, rash, lesions or edema.    NEUROLOGIC: Awake, alert, oriented, grossly intact    SKIN: Warm, dry, good turgor.      LABS:                        11.2   7.54  )-----------( 205      ( 2021 09:13 )             32.8         136  |  106  |  61<H>  ----------------------------<  94  5.0   |  19<L>  |  6.00<H>    Ca    9.3      2021 09:13  Phos  4.7       Mg     2.3         TPro  7.0  /  Alb  3.4<L>  /  TBili  0.5  /  DBili  x   /  AST  56<H>  /  ALT  60  /  AlkPhos  92      PT/INR - ( 2021 09:13 )   PT: 13.0 sec;   INR: 1.10 ratio         PTT - ( 2021 09:13 )  PTT:34.3 sec  Urinalysis Basic - ( 2021 23:27 )    Color: Yellow / Appearance: Clear / S.020 / pH: x  Gluc: x / Ketone: Negative  / Bili: Negative / Urobili: Negative   Blood: x / Protein: 500 mg/dL / Nitrite: Negative   Leuk Esterase: Negative / RBC: 5-10 /HPF / WBC 0-2 /HPF   Sq Epi: x / Non Sq Epi: Occasional /HPF / Bacteria: Trace /HPF      ABG - ( 2021 21:13 )  pH, Arterial: 7.36  pH, Blood: x     /  pCO2: 29    /  pO2: 100   / HCO3: 16    / Base Excess: -8.1  /  SaO2: 98                CARDIAC MARKERS ( 2021 09:13 )  <0.015 ng/mL / x     / 683 U/L / x     / x      CARDIAC MARKERS ( 2021 21:45 )  <0.015 ng/mL / x     / 1141 U/L / x     / 1.2 ng/mL  CARDIAC MARKERS ( 2021 19:24 )  <0.015 ng/mL / x     / x     / x     / x      CARDIAC MARKERS ( 2021 16:39 )  <0.015 ng/mL / x     / x     / x     / x                    MICROBIOLOGY: (if applicable)    RADIOLOGY & ADDITIONAL STUDIES:  EKG:   CXR:  ECHO:    IMPRESSION: 25y Male PAST MEDICAL & SURGICAL HISTORY:  Asthma    No significant past surgical history     p/w         IMP: This is a 25 yr with asthma ,non smoker presented headaches due to hypertensive crisis and CIARRA. Metabolic acidosis due to CIARRA . Asthma stable       Sugg;  -continue symbicort 80/4.5 bid  -metanephrines/ VMA   -neph eval  -Renal sonso  -monitor BP, better   -continue Norvasc  -CIARRA work up  DVT/ GI prophy    d/c with atttend

## 2021-02-23 NOTE — PROGRESS NOTE ADULT - ASSESSMENT
26 yo male with PMH of asthma presented to his PCP with complaints of headache, nausea and vomiting. Pt was noted to have elevated sBP of >170 and was advised to come to emergency department.

## 2021-02-23 NOTE — CONSULT NOTE ADULT - SUBJECTIVE AND OBJECTIVE BOX
PATIENT SEEN AND EXAMINED; FULL NOTE TO FOLLOW [facility] NEPHROLOGY- CONSULTATION NOTE    Patient is a 24yo Male with Asthma on Dupixent injections q 2kweeks p/w HA, n/v x 5 days. Pt sent in by his PMD due to elevated SBP >170. Pt was a/w CIARRA and hypertensive emergency. Nephrology consulted for Elevated serum creatinine.    Pt denies any h/o kidney disease. Pt states he saw his PMD, Dr. Li last year and was never told about kidney disease or elevated BP. Pt is unsure if he had blood work performed at that time. Pt denies any family h/o kidney disease. Pt states he took Advil 2 tabs yesterday but denies any h/o chronic NSAID use.   Patient denies any  recent CT with contrast, hepatitis or blood transfusions. Pt c/o dysuria over the weekend but has now resolved. He denies any hematuria but c/o foamy urine x 1 year. Pt denies smoking or illicit drug use. Pt drinks ETOH socially. Pt denies any herbal medications. Only recent changes in medications is starting Dupixent in Nov q 2weeks for Asthma. Pt states he saw a Dentist in  and was prescribed antibiotics in  x 3 days (unsure of name of abx). Pt denies any rashes or weight loss. Pt c/o of intermittent headaches since  but denies any h/o chronic HA or diaphoretic episodes. Pt states his HA was associated with nasuea and only yesterday did he have 2 episodes of vomiting. Denies any diarrhea or abd pain.  Pt denies any recent illness.       PAST MEDICAL & SURGICAL HISTORY:  Asthma    No significant past surgical history      Kiwi (Swelling)  No Known Drug Allergies    Home Medications Reviewed  Hospital Medications:   MEDICATIONS  (STANDING):  amLODIPine   Tablet 5 milliGRAM(s) Oral daily  atorvastatin 40 milliGRAM(s) Oral at bedtime  budesonide  80 MICROgram(s)/formoterol 4.5 MICROgram(s) Inhaler 2 Puff(s) Inhalation two times a day  lactated ringers. 1000 milliLiter(s) (60 mL/Hr) IV Continuous <Continuous>    SOCIAL HISTORY:  Denies drug use, or ,Smoking, +social ETOH use  FAMILY HISTORY:      REVIEW OF SYSTEMS:  Gen: no changes in weight +HA  HEENT: no rhinorrhea  Neck: no sore throat  Cards: no chest pain  Resp: no dyspnea  GI: +nausea with vomiting; no resolved. Denies diarrhea  : no dysuria or hematuria +foamy urine  Vascular: no LE edema  Derm: no rashes  Neuro: no numbness/tingling  All other review of systems is negative unless indicated above.    VITALS:  T(F): 98.4 (21 @ 15:28), Max: 98.9 (21 @ 04:30)  HR: 83 (21 @ 15:28)  BP: 148/87 (21 @ 15:28)  RR: 18 (21 @ 15:28)  SpO2: 100% (21 @ 15:28)  Wt(kg): --     @ 07:01  -   @ 07:00  --------------------------------------------------------  IN: 480 mL / OUT: 350 mL / NET: 130 mL     @ 07:01  -   @ 20:04  --------------------------------------------------------  IN: 720 mL / OUT: 0 mL / NET: 720 mL      Height (cm): 188 ( @ 16:07)  Weight (kg): 73 ( @ 16:07)  BMI (kg/m2): 20.7 ( @ 16:07)  BSA (m2): 1.98 ( @ 16:07)    PHYSICAL EXAM:  Gen: NAD, calm  HEENT: MMM  Neck: no JVD  Cards: RRR, +S1/S2, no M/G/R  Resp: CTA B/L  GI: soft, NT/ND, NABS  : no CVA tenderness  Extremities: no LE edema B/L  Derm: no rashes  Neuro: non-focal    LABS:      136  |  106  |  61<H>  ----------------------------<  94  5.0   |  19<L>  |  6.00<H>    Ca    9.3      2021 09:13  Phos  4.7       Mg     2.3         TPro  7.0  /  Alb  3.4<L>  /  TBili  0.5  /  DBili      /  AST  56<H>  /  ALT  60  /  AlkPhos  92      Creatinine Trend: 6.00 <--, 5.80 <--, 6.17 <--                        11.2   7.54  )-----------( 205      ( 2021 09:13 )             32.8     Urine Studies:  Urinalysis Basic - ( 2021 23:27 )    Color: Yellow / Appearance: Clear / S.020 / pH:   Gluc:  / Ketone: Negative  / Bili: Negative / Urobili: Negative   Blood:  / Protein: 500 mg/dL / Nitrite: Negative   Leuk Esterase: Negative / RBC: 5-10 /HPF / WBC 0-2 /HPF   Sq Epi:  / Non Sq Epi: Occasional /HPF / Bacteria: Trace /HPF      Chloride, Random Urine: 58 mmol/L ( @ 21:47)  Sodium, Random Urine: 63 mmol/L ( @ 21:47)  Osmolality, Random Urine: 337 mos/kg ( @ 21:47)  Creatinine, Random Urine: 79 mg/dL ( @ 21:47)    RADIOLOGY & ADDITIONAL STUDIES:        < from: CT Head No Cont (21 @ 18:52) >    EXAM:  CT BRAIN                            PROCEDURE DATE:  2021        < end of copied text >    < from: CT Head No Cont (21 @ 18:52) >    IMPRESSION:    No acute hemorrhage or midline shift. If symptoms persist, consider follow-up head CT or MRI, if no contraindications.        < end of copied text >    < from: CT Abdomen and Pelvis No Cont (21 @ 00:23) >    EXAM:  CT ABDOMEN AND PELVIS                            PROCEDURE DATE:  2021          < end of copied text >    < from: CT Abdomen and Pelvis No Cont (21 @ 00:23) >  KIDNEYS/URETERS: Kidneys are symmetric in size. No right or left hydroureteronephrosis or obstructing urinary tract calculus. No nephrolithiasis.No asymmetric perinephric stranding.    BLADDER: Normally distended.    < end of copied text >    < from: CT Abdomen and Pelvis No Cont (21 @ 00:23) >  IMPRESSION:  No evidence of obstructive uropathy.    < end of copied text >

## 2021-02-23 NOTE — PROGRESS NOTE ADULT - PROBLEM SELECTOR PLAN 1
pt presented with Headaches, Pt noted to have sbp >180 on admission  s/p amlodipine 5 mg and hydralazine in ED   CTH negative   f/u 5 HIAA, VMA, metanephrines in urine, renin/ aldosterone  f/u ANCA studies, ELMA to r/o vasculitis   f/u urine studies, urine eosinophils   CT abdomen showed no findings of obstructive uropathy or other findings.  monitor BP closely  Nephro Dr. Massey followed up

## 2021-02-23 NOTE — PROGRESS NOTE ADULT - PROBLEM SELECTOR PLAN 3
Pt noted to have non anion gap metabolic acidosis with respiratory compensation   bicarb 19-> 17   will hold off on bicarb replacement for now   metabolic acidosis likely in the setting of ARF   fu Nephro recommendation

## 2021-02-23 NOTE — CONSULT NOTE ADULT - ASSESSMENT
Patient is a 24yo Male with Asthma on Dupixent injections q 2kweeks p/w HA, n/v x 5 days. Pt sent in by his PMD due to elevated SBP >170. Pt was a/w CIARRA and hypertensive emergency. Nephrology consulted for Elevated serum creatinine.    1. CIARRA  2. CKD unspecified  3. Hypertensive emergency  4. Proteinuria  5. Metabolic Acidosis.  Patient is a 24yo Male with Asthma on Dupixent injections q 2kweeks p/w HA, n/v x 5 days. Pt sent in by his PMD due to elevated SBP >170. Pt was a/w CIARRA and hypertensive emergency. Nephrology consulted for Elevated serum creatinine.    1. CIARRA- ?GN vs. uncontrolled HTN. UA with 500 protein and mod blood; rbc 5-10. Pt on Dupixent- no urinary eos, peripheral eos or rash. Spot UPr/Cr 3.3- nephrotic range proteinuria without nephrotic syndrome (albumin 3.4 with no edema). Recc to repeat spot UPr/Cr in 2-3 days once BP better controlled.   CK 1141, should not cause significant CIARRA. Will send remaining serological w/u for completeness (thus far neg HIV, neg HepBsAg, and neg Hep C ab).   Discussed the need for kidney biopsy to determine etiology of kidney disease if serological w/u neg. Discussed risk/ benefits/ alt of kidney biopsy. Pt undecided at this time. Strict I/Os. Avoid nephrotoxins/ NSAIDs/ RCA. Monitor BMP.    2. CKD unspecified- previous SCr 1.29 in 2018 with proteinuria. Will send serological w/u. Check PTHi in am. Avoid nephrotoxins    3. Hypertensive emergency- BP in /114; CT head neg. Avoid rapid reduction in BP. Allow -160s. c/w Amlodipine 5mg PO qd and low salt diet.   Secondary w/u pending: UTox neg. Sebas and Renin level pending.   Low suspicion of pheo- CT with normal adrenals. w/u pending.   Check TTE. Pt will need Renal US with dopplers as an outpt. Monitor BP    4. Metabolic Acidosis-low serum CO2 in the setting of renal failure. Check serum lactate and VBG ph. Will consider sodium bicarb tabs based on results. Monitor ph/Co2

## 2021-02-23 NOTE — PROGRESS NOTE ADULT - PROBLEM SELECTOR PLAN 2
presented with CIARRA and Cr 6  baseline creatinine is 1.8  c/w gentle hydration @ 60 cc/hr   f/u urine lytes  Urine tox was negative  Nephro Dr. Massey followed up

## 2021-02-23 NOTE — PROGRESS NOTE ADULT - SUBJECTIVE AND OBJECTIVE BOX
PGY-1 Progress Note discussed with attending    PAGER #: [[phone]] TILL 5:00 PM  PLEASE CONTACT ON CALL TEAM:  - On Call Team (Please refer to Prasanna) FROM 5:00 PM - 8:30PM  - Nightfloat Team FROM 8:30 -7:30 AM    INTERVAL HPI/OVERNIGHT EVENTS: No adverse events overnight. Blood pressures controlled. CTAP showed no obstructive uropathy. Nephro Dr. Massey consulted.    REVIEW OF SYSTEMS:  CONSTITUTIONAL: No fever, weight loss, or fatigue  RESPIRATORY: No cough, wheezing, chills or hemoptysis; No shortness of breath  CARDIOVASCULAR: No chest pain, palpitations, dizziness, or leg swelling  GASTROINTESTINAL: No abdominal pain. No nausea, vomiting, or hematemesis; No diarrhea or constipation. No melena or hematochezia.  GENITOURINARY: No dysuria or hematuria, urinary frequency  NEUROLOGICAL: No headaches, memory loss, loss of strength, numbness, or tremors  SKIN: No itching, burning, rashes, or lesions     Vital Signs Last 24 Hrs  T(C): 36.4 (23 Feb 2021 11:44), Max: 37.2 (23 Feb 2021 04:30)  T(F): 97.6 (23 Feb 2021 11:44), Max: 98.9 (23 Feb 2021 04:30)  HR: 79 (23 Feb 2021 11:44) (60 - 95)  BP: 156/85 (23 Feb 2021 11:44) (130/63 - 185/114)  BP(mean): --  RR: 18 (23 Feb 2021 11:44) (16 - 20)  SpO2: 100% (23 Feb 2021 11:44) (96% - 100%)    PHYSICAL EXAMINATION:  GENERAL: NAD, well built  HEAD:  Atraumatic, Normocephalic  EYES:  conjunctiva and sclera clear  NECK: Supple, No JVD, Normal thyroid  CHEST/LUNG: Clear to auscultation. Clear to percussion bilaterally; No rales, rhonchi, wheezing, or rubs  HEART: Regular rate and rhythm; No murmurs, rubs, or gallops  ABDOMEN: Soft, Nontender, Nondistended; Bowel sounds present  NERVOUS SYSTEM:  Alert & Oriented X3,    EXTREMITIES:  2+ Peripheral Pulses, No clubbing, cyanosis, or edema  SKIN: warm dry                          11.2   7.54  )-----------( 205      ( 23 Feb 2021 09:13 )             32.8     02-23    136  |  106  |  61<H>  ----------------------------<  94  5.0   |  19<L>  |  6.00<H>    Ca    9.3      23 Feb 2021 09:13  Phos  4.7     02-23  Mg     2.3     02-23    TPro  7.0  /  Alb  3.4<L>  /  TBili  0.5  /  DBili  x   /  AST  56<H>  /  ALT  60  /  AlkPhos  92  02-23    LIVER FUNCTIONS - ( 23 Feb 2021 09:13 )  Alb: 3.4 g/dL / Pro: 7.0 g/dL / ALK PHOS: 92 U/L / ALT: 60 U/L DA / AST: 56 U/L / GGT: x           CARDIAC MARKERS ( 23 Feb 2021 09:13 )  <0.015 ng/mL / x     / 683 U/L / x     / x      CARDIAC MARKERS ( 22 Feb 2021 21:45 )  <0.015 ng/mL / x     / 1141 U/L / x     / 1.2 ng/mL  CARDIAC MARKERS ( 22 Feb 2021 19:24 )  <0.015 ng/mL / x     / x     / x     / x      CARDIAC MARKERS ( 22 Feb 2021 16:39 )  <0.015 ng/mL / x     / x     / x     / x          PT/INR - ( 23 Feb 2021 09:13 )   PT: 13.0 sec;   INR: 1.10 ratio         PTT - ( 23 Feb 2021 09:13 )  PTT:34.3 sec    CAPILLARY BLOOD GLUCOSE      RADIOLOGY & ADDITIONAL TESTS:

## 2021-02-24 LAB
ALBUMIN SERPL ELPH-MCNC: 2.9 G/DL — LOW (ref 3.5–5)
ALDOST SERPL-MCNC: 27.2 NG/DL — HIGH
ALP SERPL-CCNC: 79 U/L — SIGNIFICANT CHANGE UP (ref 40–120)
ALT FLD-CCNC: 44 U/L DA — SIGNIFICANT CHANGE UP (ref 10–60)
ANION GAP SERPL CALC-SCNC: 8 MMOL/L — SIGNIFICANT CHANGE UP (ref 5–17)
ASO AB SER QL: 42 IU/ML — SIGNIFICANT CHANGE UP (ref 0–199)
AST SERPL-CCNC: 31 U/L — SIGNIFICANT CHANGE UP (ref 10–40)
AUTO DIFF PNL BLD: NEGATIVE — SIGNIFICANT CHANGE UP
BASE EXCESS BLDV CALC-SCNC: -5.4 MMOL/L — LOW (ref -2–2)
BILIRUB SERPL-MCNC: 0.4 MG/DL — SIGNIFICANT CHANGE UP (ref 0.2–1.2)
BLOOD GAS COMMENTS, VENOUS: SIGNIFICANT CHANGE UP
BUN SERPL-MCNC: 56 MG/DL — HIGH (ref 7–18)
C-ANCA SER-ACNC: NEGATIVE — SIGNIFICANT CHANGE UP
CALCIUM SERPL-MCNC: 8.8 MG/DL — SIGNIFICANT CHANGE UP (ref 8.4–10.5)
CALCIUM SERPL-MCNC: 8.9 MG/DL — SIGNIFICANT CHANGE UP (ref 8.4–10.5)
CHLORIDE SERPL-SCNC: 108 MMOL/L — SIGNIFICANT CHANGE UP (ref 96–108)
CO2 SERPL-SCNC: 22 MMOL/L — SIGNIFICANT CHANGE UP (ref 22–31)
CREAT SERPL-MCNC: 5.82 MG/DL — HIGH (ref 0.5–1.3)
CULTURE RESULTS: SIGNIFICANT CHANGE UP
DSDNA AB SER-ACNC: <12 IU/ML — SIGNIFICANT CHANGE UP
GLUCOSE SERPL-MCNC: 90 MG/DL — SIGNIFICANT CHANGE UP (ref 70–99)
HCO3 BLDV-SCNC: 20 MMOL/L — LOW (ref 21–29)
HCT VFR BLD CALC: 29.3 % — LOW (ref 39–50)
HGB BLD-MCNC: 9.9 G/DL — LOW (ref 13–17)
HOROWITZ INDEX BLDV+IHG-RTO: 21 — SIGNIFICANT CHANGE UP
INTERPRETATION SERPL IFE-IMP: SIGNIFICANT CHANGE UP
LACTATE SERPL-SCNC: 0.3 MMOL/L — LOW (ref 0.7–2)
MAGNESIUM SERPL-MCNC: 2.2 MG/DL — SIGNIFICANT CHANGE UP (ref 1.6–2.6)
MCHC RBC-ENTMCNC: 28.2 PG — SIGNIFICANT CHANGE UP (ref 27–34)
MCHC RBC-ENTMCNC: 33.8 GM/DL — SIGNIFICANT CHANGE UP (ref 32–36)
MCV RBC AUTO: 83.5 FL — SIGNIFICANT CHANGE UP (ref 80–100)
MPO AB + PR3 PNL SER: SIGNIFICANT CHANGE UP
NRBC # BLD: 0 /100 WBCS — SIGNIFICANT CHANGE UP (ref 0–0)
P-ANCA SER-ACNC: NEGATIVE — SIGNIFICANT CHANGE UP
PCO2 BLDV: 42 MMHG — SIGNIFICANT CHANGE UP (ref 35–50)
PH BLDV: 7.31 — LOW (ref 7.35–7.45)
PHOSPHATE SERPL-MCNC: 5.3 MG/DL — HIGH (ref 2.5–4.5)
PLATELET # BLD AUTO: 179 K/UL — SIGNIFICANT CHANGE UP (ref 150–400)
PO2 BLDV: 38 MMHG — SIGNIFICANT CHANGE UP (ref 25–45)
POTASSIUM SERPL-MCNC: 4.6 MMOL/L — SIGNIFICANT CHANGE UP (ref 3.5–5.3)
POTASSIUM SERPL-SCNC: 4.6 MMOL/L — SIGNIFICANT CHANGE UP (ref 3.5–5.3)
PROT SERPL-MCNC: 6 G/DL — SIGNIFICANT CHANGE UP (ref 6–8.3)
PTH-INTACT FLD-MCNC: 99 PG/ML — HIGH (ref 15–65)
RBC # BLD: 3.51 M/UL — LOW (ref 4.2–5.8)
RBC # FLD: 12 % — SIGNIFICANT CHANGE UP (ref 10.3–14.5)
RENIN DIRECT, PLASMA: 4.9 PG/ML — SIGNIFICANT CHANGE UP
SAO2 % BLDV: 68 % — SIGNIFICANT CHANGE UP (ref 67–88)
SODIUM SERPL-SCNC: 138 MMOL/L — SIGNIFICANT CHANGE UP (ref 135–145)
SPECIMEN SOURCE: SIGNIFICANT CHANGE UP
T PALLIDUM AB TITR SER: NEGATIVE — SIGNIFICANT CHANGE UP
WBC # BLD: 6.57 K/UL — SIGNIFICANT CHANGE UP (ref 3.8–10.5)
WBC # FLD AUTO: 6.57 K/UL — SIGNIFICANT CHANGE UP (ref 3.8–10.5)

## 2021-02-24 PROCEDURE — 76775 US EXAM ABDO BACK WALL LIM: CPT | Mod: 26

## 2021-02-24 RX ORDER — AMLODIPINE BESYLATE 2.5 MG/1
10 TABLET ORAL DAILY
Refills: 0 | Status: DISCONTINUED | OUTPATIENT
Start: 2021-02-25 | End: 2021-03-02

## 2021-02-24 RX ORDER — AMLODIPINE BESYLATE 2.5 MG/1
5 TABLET ORAL ONCE
Refills: 0 | Status: COMPLETED | OUTPATIENT
Start: 2021-02-24 | End: 2021-02-24

## 2021-02-24 RX ORDER — LIDOCAINE 4 G/100G
1 CREAM TOPICAL DAILY
Refills: 0 | Status: DISCONTINUED | OUTPATIENT
Start: 2021-02-24 | End: 2021-03-06

## 2021-02-24 RX ORDER — HYDRALAZINE HCL 50 MG
2.5 TABLET ORAL ONCE
Refills: 0 | Status: COMPLETED | OUTPATIENT
Start: 2021-02-24 | End: 2021-02-24

## 2021-02-24 RX ADMIN — BUDESONIDE AND FORMOTEROL FUMARATE DIHYDRATE 2 PUFF(S): 160; 4.5 AEROSOL RESPIRATORY (INHALATION) at 20:49

## 2021-02-24 RX ADMIN — Medication 650 MILLIGRAM(S): at 20:47

## 2021-02-24 RX ADMIN — AMLODIPINE BESYLATE 5 MILLIGRAM(S): 2.5 TABLET ORAL at 15:11

## 2021-02-24 RX ADMIN — Medication 2.5 MILLIGRAM(S): at 22:00

## 2021-02-24 RX ADMIN — LIDOCAINE 1 PATCH: 4 CREAM TOPICAL at 20:48

## 2021-02-24 RX ADMIN — ATORVASTATIN CALCIUM 40 MILLIGRAM(S): 80 TABLET, FILM COATED ORAL at 20:47

## 2021-02-24 RX ADMIN — BUDESONIDE AND FORMOTEROL FUMARATE DIHYDRATE 2 PUFF(S): 160; 4.5 AEROSOL RESPIRATORY (INHALATION) at 11:46

## 2021-02-24 RX ADMIN — AMLODIPINE BESYLATE 5 MILLIGRAM(S): 2.5 TABLET ORAL at 05:36

## 2021-02-24 NOTE — PROGRESS NOTE ADULT - SUBJECTIVE AND OBJECTIVE BOX
Time of Visit:  Patient seen and examined.     MEDICATIONS  (STANDING):  atorvastatin 40 milliGRAM(s) Oral at bedtime  budesonide  80 MICROgram(s)/formoterol 4.5 MICROgram(s) Inhaler 2 Puff(s) Inhalation two times a day  lactated ringers. 1000 milliLiter(s) (60 mL/Hr) IV Continuous <Continuous>  lidocaine   Patch 1 Patch Transdermal daily      MEDICATIONS  (PRN):  acetaminophen   Tablet .. 650 milliGRAM(s) Oral every 6 hours PRN Temp greater or equal to 38C (100.4F), Mild Pain (1 - 3)  ALBUTerol    90 MICROgram(s) HFA Inhaler 2 Puff(s) Inhalation every 6 hours PRN Bronchospasm  ondansetron Injectable 5 milliGRAM(s) IV Push every 6 hours PRN Nausea and/or Vomiting       Medications up to date at time of exam.      PHYSICAL EXAMINATION:  Patient has no new complaints.  GENERAL: The patient is a well-developed, well-nourished, in no apparent distress.     Vital Signs Last 24 Hrs  T(C): 36.9 (2021 20:15), Max: 37 (2021 11:12)  T(F): 98.4 (2021 20:15), Max: 98.6 (2021 11:12)  HR: 82 (2021 20:15) (70 - 85)  BP: 164/101 (2021 20:15) (143/79 - 164/101)  BP(mean): --  RR: 19 (2021 20:15) (16 - 19)  SpO2: 100% (2021 20:15) (100% - 100%)   (if applicable)    Chest Tube (if applicable)    HEENT: Head is normocephalic and atraumatic. Extraocular muscles are intact. Mucous membranes are moist.     NECK: Supple, no palpable adenopathy.    LUNGS: Clear to auscultation, no wheezing, rales, or rhonchi.    HEART: Regular rate and rhythm without murmur.    ABDOMEN: Soft, nontender, and nondistended.  No hepatosplenomegaly is noted.    : No painful voiding, no pelvic pain    EXTREMITIES: Without any cyanosis, clubbing, rash, lesions or edema.    NEUROLOGIC: Awake, alert, oriented, grossly intact    SKIN: Warm, dry, good turgor.      LABS:                        9.9    6.57  )-----------( 179      ( 2021 08:18 )             29.3         138  |  108  |  56<H>  ----------------------------<  90  4.6   |  22  |  5.82<H>    Ca    8.8      2021 08:18  Phos  5.3       Mg     2.2         TPro  6.0  /  Alb  2.9<L>  /  TBili  0.4  /  DBili  x   /  AST  31  /  ALT  44  /  AlkPhos  79      PT/INR - ( 2021 09:13 )   PT: 13.0 sec;   INR: 1.10 ratio         PTT - ( 2021 09:13 )  PTT:34.3 sec  Urinalysis Basic - ( 2021 23:27 )    Color: Yellow / Appearance: Clear / S.020 / pH: x  Gluc: x / Ketone: Negative  / Bili: Negative / Urobili: Negative   Blood: x / Protein: 500 mg/dL / Nitrite: Negative   Leuk Esterase: Negative / RBC: 5-10 /HPF / WBC 0-2 /HPF   Sq Epi: x / Non Sq Epi: Occasional /HPF / Bacteria: Trace /HPF        CARDIAC MARKERS ( 2021 09:13 )  <0.015 ng/mL / x     / 683 U/L / x     / x              Lactate, Blood: 0.3 mmol/L (21 @ 08:18)        MICROBIOLOGY: (if applicable)    RADIOLOGY & ADDITIONAL STUDIES:  EKG:   CXR:  ECHO:    IMPRESSION: 25y Male PAST MEDICAL & SURGICAL HISTORY:  Asthma    No significant past surgical history     p/w           IMP: This is a 25 yr with asthma ,non smoker presented headaches due to hypertensive crisis and CIARRA. Metabolic acidosis due to CIARRA . Asthma stable       Sugg;  -continue symbicort 80/4.5 bid  -metanephrines/ VMA   -neph noted   -Renal sonso  -will need kidney bx  -f/u all serological makers   -monitor BP, better   -continue Norvasc  -CIARRA work up  DVT/ GI prophy    d/c with Neph attend     d/c with atttend

## 2021-02-24 NOTE — PROGRESS NOTE ADULT - SUBJECTIVE AND OBJECTIVE BOX
PGY-1 Progress Note discussed with attending    PAGER #: [[phone]] TILL 5:00 PM  PLEASE CONTACT ON CALL TEAM:  - On Call Team (Please refer to Prasanna) FROM 5:00 PM - 8:30PM  - Nightfloat Team FROM 8:30 -7:30 AM    CHIEF COMPLAINT & BRIEF HOSPITAL COURSE:  26 yo male with PMH of asthma presented to his PCP with complaints of headache, nausea and vomiting x 5 days. Pt was noted to have elevated sBP of >170 and was advised to come to emergency department. Patient states he started having headaches 5 days ago associated with nausea and 1 day hx of NBNB vomiting. Pt endorses episode of dysuria few days ago which has now resolved. Pt denies head trauma, any prior episodes of similar headaches. No family hx of Polycystic kidney disease, CVA, HTN. Denies diarrhea, abdominal pain, fever, chills, recent viral infection, no blood in urine, no flank pain, no urinary rentention    INTERVAL HPI/OVERNIGHT EVENTS: No acute events overnight. The patient states his headache is improving.     MEDICATIONS:  acetaminophen   Tablet .. 650 milliGRAM(s) Oral every 6 hours PRN  ALBUTerol    90 MICROgram(s) HFA Inhaler 2 Puff(s) Inhalation every 6 hours PRN  amLODIPine   Tablet 5 milliGRAM(s) Oral daily  atorvastatin 40 milliGRAM(s) Oral at bedtime  budesonide  80 MICROgram(s)/formoterol 4.5 MICROgram(s) Inhaler 2 Puff(s) Inhalation two times a day  lactated ringers. 1000 milliLiter(s) IV Continuous <Continuous>  ondansetron Injectable 5 milliGRAM(s) IV Push every 6 hours PRN      REVIEW OF SYSTEMS:  CONSTITUTIONAL: No fever, weight loss, or fatigue  RESPIRATORY: No cough, wheezing, chills or hemoptysis; No shortness of breath  CARDIOVASCULAR: No chest pain, palpitations, dizziness, or leg swelling  GASTROINTESTINAL: No abdominal pain. No nausea, vomiting, or hematemesis; No diarrhea or constipation. No melena or hematochezia.  GENITOURINARY: No dysuria or hematuria, urinary frequency  NEUROLOGICAL: No headaches, memory loss, loss of strength, numbness, or tremors  SKIN: No itching, burning, rashes, or lesions     Vital Signs Last 24 Hrs  T(C): 37 (24 Feb 2021 11:12), Max: 37 (24 Feb 2021 11:12)  T(F): 98.6 (24 Feb 2021 11:12), Max: 98.6 (24 Feb 2021 11:12)  HR: 71 (24 Feb 2021 11:12) (70 - 83)  BP: 151/93 (24 Feb 2021 11:12) (143/79 - 165/93)  BP(mean): --  RR: 17 (24 Feb 2021 11:12) (16 - 18)  SpO2: 100% (24 Feb 2021 11:12) (100% - 100%)    PHYSICAL EXAMINATION:  GENERAL: NAD, well built  HEAD:  Atraumatic, Normocephalic  EYES:  conjunctiva and sclera clear  NECK: Supple, No JVD, Normal thyroid  CHEST/LUNG: Clear to auscultation. Clear to percussion bilaterally; No rales, rhonchi, wheezing, or rubs  HEART: Regular rate and rhythm; No murmurs, rubs, or gallops  ABDOMEN: Soft, Nontender, Nondistended; Bowel sounds present  NERVOUS SYSTEM:  Alert & Oriented X3,    EXTREMITIES:  2+ Peripheral Pulses, No clubbing, cyanosis, or edema  SKIN: warm dry                          9.9    6.57  )-----------( 179      ( 24 Feb 2021 08:18 )             29.3     02-24    138  |  108  |  56<H>  ----------------------------<  90  4.6   |  22  |  5.82<H>    Ca    8.8      24 Feb 2021 08:18  Phos  5.3     02-24  Mg     2.2     02-24    TPro  6.0  /  Alb  2.9<L>  /  TBili  0.4  /  DBili  x   /  AST  31  /  ALT  44  /  AlkPhos  79  02-24    LIVER FUNCTIONS - ( 24 Feb 2021 08:18 )  Alb: 2.9 g/dL / Pro: 6.0 g/dL / ALK PHOS: 79 U/L / ALT: 44 U/L DA / AST: 31 U/L / GGT: x           CARDIAC MARKERS ( 23 Feb 2021 09:13 )  <0.015 ng/mL / x     / 683 U/L / x     / x      CARDIAC MARKERS ( 22 Feb 2021 21:45 )  <0.015 ng/mL / x     / 1141 U/L / x     / 1.2 ng/mL  CARDIAC MARKERS ( 22 Feb 2021 19:24 )  <0.015 ng/mL / x     / x     / x     / x      CARDIAC MARKERS ( 22 Feb 2021 16:39 )  <0.015 ng/mL / x     / x     / x     / x          PT/INR - ( 23 Feb 2021 09:13 )   PT: 13.0 sec;   INR: 1.10 ratio         PTT - ( 23 Feb 2021 09:13 )  PTT:34.3 sec    CAPILLARY BLOOD GLUCOSE      RADIOLOGY & ADDITIONAL TESTS:                   PGY-1 Progress Note discussed with attending    PAGER #: [[phone]] TILL 5:00 PM  PLEASE CONTACT ON CALL TEAM:  - On Call Team (Please refer to Prasanna) FROM 5:00 PM - 8:30PM  - Nightfloat Team FROM 8:30 -7:30 AM    CHIEF COMPLAINT & BRIEF HOSPITAL COURSE:  24 yo male with PMH of asthma presented to his PCP with complaints of headache, nausea and vomiting x 5 days. Pt was noted to have elevated sBP of >170 and was advised to come to emergency department. Patient states he started having headaches 5 days ago associated with nausea and 1 day hx of NBNB vomiting. Pt endorses episode of dysuria few days ago which has now resolved. Pt denies head trauma, any prior episodes of similar headaches. No family hx of Polycystic kidney disease, CVA, HTN. Denies diarrhea, abdominal pain, fever, chills, recent viral infection, no blood in urine, no flank pain, no urinary rentention. In the ED the patients BP was 185/114. He was given amlodipine and hydralazine. He was noted to have a creatinine of 6.    INTERVAL HPI/OVERNIGHT EVENTS: No acute events overnight. The patient states his headache is improving.     MEDICATIONS:  acetaminophen   Tablet .. 650 milliGRAM(s) Oral every 6 hours PRN  ALBUTerol    90 MICROgram(s) HFA Inhaler 2 Puff(s) Inhalation every 6 hours PRN  amLODIPine   Tablet 5 milliGRAM(s) Oral daily  atorvastatin 40 milliGRAM(s) Oral at bedtime  budesonide  80 MICROgram(s)/formoterol 4.5 MICROgram(s) Inhaler 2 Puff(s) Inhalation two times a day  lactated ringers. 1000 milliLiter(s) IV Continuous <Continuous>  ondansetron Injectable 5 milliGRAM(s) IV Push every 6 hours PRN      REVIEW OF SYSTEMS:  CONSTITUTIONAL: No fever, weight loss, or fatigue  RESPIRATORY: No cough, wheezing, chills or hemoptysis; No shortness of breath  CARDIOVASCULAR: No chest pain, palpitations, dizziness, or leg swelling  GASTROINTESTINAL: No abdominal pain. No nausea, vomiting, or hematemesis; No diarrhea or constipation. No melena or hematochezia.  GENITOURINARY: No dysuria or hematuria, urinary frequency  NEUROLOGICAL: No memory loss, loss of strength, numbness, or tremors. Positive headaches  SKIN: No itching, burning, rashes, or lesions     Vital Signs Last 24 Hrs  T(C): 37 (24 Feb 2021 11:12), Max: 37 (24 Feb 2021 11:12)  T(F): 98.6 (24 Feb 2021 11:12), Max: 98.6 (24 Feb 2021 11:12)  HR: 71 (24 Feb 2021 11:12) (70 - 83)  BP: 151/93 (24 Feb 2021 11:12) (143/79 - 165/93)  BP(mean): --  RR: 17 (24 Feb 2021 11:12) (16 - 18)  SpO2: 100% (24 Feb 2021 11:12) (100% - 100%)    PHYSICAL EXAMINATION:  GENERAL: NAD, well built  HEAD:  Atraumatic, Normocephalic  EYES:  conjunctiva and sclera clear  NECK: Supple, No JVD, Normal thyroid  CHEST/LUNG: Clear to auscultation. Clear to percussion bilaterally; No rales, rhonchi, wheezing, or rubs  HEART: Regular rate and rhythm; No murmurs, rubs, or gallops  ABDOMEN: Soft, Nontender, Nondistended; Bowel sounds present  NERVOUS SYSTEM:  Alert & Oriented X3,    EXTREMITIES:  2+ Peripheral Pulses, No clubbing, cyanosis, or edema  SKIN: warm dry                          9.9    6.57  )-----------( 179      ( 24 Feb 2021 08:18 )             29.3     02-24    138  |  108  |  56<H>  ----------------------------<  90  4.6   |  22  |  5.82<H>    Ca    8.8      24 Feb 2021 08:18  Phos  5.3     02-24  Mg     2.2     02-24    TPro  6.0  /  Alb  2.9<L>  /  TBili  0.4  /  DBili  x   /  AST  31  /  ALT  44  /  AlkPhos  79  02-24    LIVER FUNCTIONS - ( 24 Feb 2021 08:18 )  Alb: 2.9 g/dL / Pro: 6.0 g/dL / ALK PHOS: 79 U/L / ALT: 44 U/L DA / AST: 31 U/L / GGT: x           CARDIAC MARKERS ( 23 Feb 2021 09:13 )  <0.015 ng/mL / x     / 683 U/L / x     / x      CARDIAC MARKERS ( 22 Feb 2021 21:45 )  <0.015 ng/mL / x     / 1141 U/L / x     / 1.2 ng/mL  CARDIAC MARKERS ( 22 Feb 2021 19:24 )  <0.015 ng/mL / x     / x     / x     / x      CARDIAC MARKERS ( 22 Feb 2021 16:39 )  <0.015 ng/mL / x     / x     / x     / x          PT/INR - ( 23 Feb 2021 09:13 )   PT: 13.0 sec;   INR: 1.10 ratio         PTT - ( 23 Feb 2021 09:13 )  PTT:34.3 sec    CAPILLARY BLOOD GLUCOSE      RADIOLOGY & ADDITIONAL TESTS:

## 2021-02-24 NOTE — PROGRESS NOTE ADULT - PROBLEM SELECTOR PLAN 1
Pt presented with Headaches, Pt noted to have sbp >180 on admission  S/p amlodipine 5 mg and hydralazine in ED   CTH negative   C/w Amlodipine 5mg PO qd and low salt diet.   Monitor BP closely  Nephro Dr. Massey following, appreciate recommendations

## 2021-02-24 NOTE — PROGRESS NOTE ADULT - PROBLEM SELECTOR PLAN 3
Pt noted to have non anion gap metabolic acidosis with respiratory compensation   bicarb 19-> 17   Will hold off on bicarb replacement for now   Metabolic acidosis likely in the setting of ARF   Lactate: 0.3  pH on VB.31  Follow up Nephro recommendation

## 2021-02-24 NOTE — PROGRESS NOTE ADULT - PROBLEM SELECTOR PLAN 2
Presented with CIARRA and Cr 6  Baseline creatinine is 1.8  C/w gentle hydration @ 60 cc/hr   Urine protein/creatinine: 3.3, nephrotic range proteinuria without nephrotic syndrome, will repeat spot UPro/Cre once BP stabilizes   CT abdomen showed no findings of obstructive uropathy or other findings.  FeNa: 3.3%  Urine tox was negative  Urine eosinophils negative  F/u 5 HIAA, VMA, metanephrines in urine, renin/ aldosterone  F/u ANCA studies r/o vasculitis   ELMA and dsDNA negative  Nephro Dr. Massey following

## 2021-02-24 NOTE — PROGRESS NOTE ADULT - SUBJECTIVE AND OBJECTIVE BOX
Patient denies chest pain or shortness of breath.   Review of systems otherwise (-)  	  MEDICATIONS:  MEDICATIONS  (STANDING):  amLODIPine   Tablet 5 milliGRAM(s) Oral daily  atorvastatin 40 milliGRAM(s) Oral at bedtime  budesonide  80 MICROgram(s)/formoterol 4.5 MICROgram(s) Inhaler 2 Puff(s) Inhalation two times a day  lactated ringers. 1000 milliLiter(s) (60 mL/Hr) IV Continuous <Continuous>      LABS:	 	    CARDIAC MARKERS:  CARDIAC MARKERS ( 23 Feb 2021 09:13 )  <0.015 ng/mL / x     / 683 U/L / x     / x      CARDIAC MARKERS ( 22 Feb 2021 21:45 )  <0.015 ng/mL / x     / 1141 U/L / x     / 1.2 ng/mL  CARDIAC MARKERS ( 22 Feb 2021 19:24 )  <0.015 ng/mL / x     / x     / x     / x      CARDIAC MARKERS ( 22 Feb 2021 16:39 )  <0.015 ng/mL / x     / x     / x     / x                                    9.9    6.57  )-----------( 179      ( 24 Feb 2021 08:18 )             29.3     Hemoglobin: 9.9 g/dL (02-24 @ 08:18)  Hemoglobin: 11.2 g/dL (02-23 @ 09:13)  Hemoglobin: 11.0 g/dL (02-22 @ 16:39)      02-24    138  |  108  |  56<H>  ----------------------------<  90  4.6   |  22  |  5.82<H>    Ca    8.8      24 Feb 2021 08:18  Phos  5.3     02-24  Mg     2.2     02-24    TPro  6.0  /  Alb  2.9<L>  /  TBili  0.4  /  DBili  x   /  AST  31  /  ALT  44  /  AlkPhos  79  02-24    Creatinine Trend: 5.82<--, 6.00<--, 5.80<--, 6.17<--      PHYSICAL EXAM:  T(C): 37 (02-24-21 @ 11:12), Max: 37 (02-24-21 @ 11:12)  HR: 71 (02-24-21 @ 11:12) (70 - 83)  BP: 151/93 (02-24-21 @ 11:12) (143/79 - 165/93)  RR: 17 (02-24-21 @ 11:12) (16 - 18)  SpO2: 100% (02-24-21 @ 11:12) (100% - 100%)  Wt(kg): --  I&O's Summary    23 Feb 2021 07:01  -  24 Feb 2021 07:00  --------------------------------------------------------  IN: 720 mL / OUT: 0 mL / NET: 720 mL      Height (cm): 188 (02-23 @ 16:07)  Weight (kg): 73 (02-23 @ 16:07)  BMI (kg/m2): 20.7 (02-23 @ 16:07)  BSA (m2): 1.98 (02-23 @ 16:07)    HEENT:  (-)icterus (-)pallor  CV: N S1 S2 1/6 SHAGUFTA (+)2 Pulses B/l  Resp:  Clear to ausculatation B/L, normal effort  GI: (+) BS Soft, NT, ND  Lymph:  (-)Edema, (-)obvious lymphadenopathy  Skin: Warm to touch, Normal turgor  Psych: Appropriate mood and affect      TELEMETRY: 	  sinus        ASSESSMENT/PLAN: 	25y  Male PMH of asthma presented to his PCP with complaints of headache, nausea and vomiting x 5 days found with hypertnesive urgency and CIARRA.    - Started on Norvasc.  Unclear if the increased BP is a result of the renal failure or the cause  -  Check Serum renin and tim levels  - 24 hour urine collection for urine catecholamines and metanephrines  - TSH within normal Limits  - echo  - renal eval appreicated    Yehuda Peres MD, [facility]  BEEPER [phone]

## 2021-02-24 NOTE — PROGRESS NOTE ADULT - ATTENDING COMMENTS
[facility] NEPHROLOGY  Justin Mckenzie M.D.  Chad Reich D.O.  Beth Massey M.D.  Shayy Navarro, MSN, ANP-C  [phone]    [address] [place] [address]

## 2021-02-24 NOTE — PROGRESS NOTE ADULT - SUBJECTIVE AND OBJECTIVE BOX
[facility] NEPHROLOGY- PROGRESS NOTE    Patient is a 24yo Male with Asthma on Dupixent injections q 2kweeks p/w HA, n/v x 5 days. Pt sent in by his PMD due to elevated SBP >170. Pt was a/w CIARRA and hypertensive emergency. Nephrology consulted for Elevated serum creatinine.    Hospital Medications: Medications reviewed.  REVIEW OF SYSTEMS:  CONSTITUTIONAL: No fevers or chills or HA  RESPIRATORY: No shortness of breath  CARDIOVASCULAR: No chest pain.  GASTROINTESTINAL: No nausea, vomiting, diarrhea or abdominal pain.   VASCULAR: No bilateral lower extremity edema.     VITALS:  T(F): 98.6 (21 @ 11:12), Max: 98.6 (21 @ 11:12)  HR: 71 (21 @ 11:12)  BP: 151/93 (21 @ 11:12)  RR: 17 (21 @ 11:12)  SpO2: 100% (21 @ 11:12)  Wt(kg): --  Height (cm): 188 ( @ 16:07)  Weight (kg): 73 ( @ 16:07)  BMI (kg/m2): 20.7 ( @ 16:07)  BSA (m2): 1.98 ( @ 16:07)     @ 07:01  -   @ 07:00  --------------------------------------------------------  IN: 720 mL / OUT: 0 mL / NET: 720 mL      PHYSICAL EXAM:  Constitutional: NAD  Neurological: Awake and Alert  HEENT: anicteric sclera,   Respiratory: CTAB, no wheezes, rales or rhonchi  Cardiovascular: S1, S2, RRR  Gastrointestinal: BS+, soft, NT/ND  Extremities: No peripheral edema    LABS:      138  |  108  |  56<H>  ----------------------------<  90  4.6   |  22  |  5.82<H>    Ca    8.8      2021 08:18  Phos  5.3     02-  Mg     2.2     -24    TPro  6.0  /  Alb  2.9<L>  /  TBili  0.4  /  DBili      /  AST  31  /  ALT  44  /  AlkPhos  79  -    Creatinine Trend: 5.82 <--, 6.00 <--, 5.80 <--, 6.17 <--                        9.9    6.57  )-----------( 179      ( 2021 08:18 )             29.3     Urine Studies:  Urinalysis Basic - ( 2021 23:27 )    Color: Yellow / Appearance: Clear / S.020 / pH:   Gluc:  / Ketone: Negative  / Bili: Negative / Urobili: Negative   Blood:  / Protein: 500 mg/dL / Nitrite: Negative   Leuk Esterase: Negative / RBC: 5-10 /HPF / WBC 0-2 /HPF   Sq Epi:  / Non Sq Epi: Occasional /HPF / Bacteria: Trace /HPF      Chloride, Random Urine: 58 mmol/L ( @ 21:47)  Sodium, Random Urine: 63 mmol/L ( @ 21:47)  Osmolality, Random Urine: 337 mos/kg ( @ 21:47)  Creatinine, Random Urine: 79 mg/dL ( @ 21:47)    RADIOLOGY & ADDITIONAL STUDIES:    < from: US Renal (21 @ 11:46) >    EXAM:  US KIDNEY(S)                            PROCEDURE DATE:  2021          < end of copied text >  < from: US Renal (21 @ 11:46) >  FINDINGS:    Rightkidney: 9.0 cm. No renal mass, hydronephrosis or calculi. The right kidney is hyperechoic.    Left kidney: 10.3 cm. No renal mass, hydronephrosis or calculi. The left kidney is hyperechoic.    Urinary bladder: Underdistended.    IMPRESSION:    No hydronephrosis.    Both kidneys are hyperechoic which clinical correlation with intrinsic medical renal disease is recommended.      < end of copied text >

## 2021-02-24 NOTE — PROGRESS NOTE ADULT - ASSESSMENT
Patient is a 24yo Male with Asthma on Dupixent injections q 2kweeks p/w HA, n/v x 5 days. Pt sent in by his PMD due to elevated SBP >170. Pt was a/w CIARRA and hypertensive emergency. Nephrology consulted for Elevated serum creatinine.    1. CIARRA- ?GN vs. uncontrolled HTN. UA with 500 protein and mod blood; rbc 5-10. Pt on Dupixent- no urinary eos, peripheral eos or rash. Spot UPr/Cr 3.3- nephrotic range proteinuria without nephrotic syndrome (albumin 3.4 with no edema). Recc to repeat spot UPr/Cr in 1-2 days once BP better controlled. Renal function stable- no need for dialysis at this time.   CK 1141, should not cause significant CIARRA. Serological w/u pending; will f/u (thus far Neg ASO, neg ANCA, neg ELMA, neg dsDNA, neg HIV, neg HepBsAg, and neg Hep C ab).   Discussed the need for kidney biopsy to determine etiology of kidney disease if serological w/u neg. Discussed risk/ benefits/ alt of kidney biopsy- pt agreeable. Strict I/Os. Avoid nephrotoxins/ NSAIDs/ RCA. Monitor BMP.    2. CKD unspecified- previous SCr 1.29 in 2018 with proteinuria. See above.  PTHi pending Avoid nephrotoxins    3. Hypertensive emergency- BP in /114; CT head neg. Avoid rapid reduction in BP. BP remains elevated; will increase to Amlodipine 10mg PO qd. c/w low salt diet.   Secondary w/u pending: UTox neg. Sebas 27.2 ng/dl; Renin 4.9 pg/ml (0.49ng/dl): Sebas/ Renin 55.5; recc Endo consult to r/o primary hyperaldo  Low suspicion of pheo- CT with normal adrenals. w/u pending.   Check TTE. Pt will need Renal US with dopplers as an outpt. Monitor BP    4. Metabolic Acidosis-in the setting of renal failure. Serum CO2 wnl. Serum lactate wnl and VBG ph 7.31. No need for sodium bicarb tabs at this time. Monitor ph/Co2

## 2021-02-25 ENCOUNTER — TRANSCRIPTION ENCOUNTER (OUTPATIENT)
Age: 26
End: 2021-02-25

## 2021-02-25 LAB
ALBUMIN SERPL ELPH-MCNC: 3.1 G/DL — LOW (ref 3.5–5)
ALP SERPL-CCNC: 83 U/L — SIGNIFICANT CHANGE UP (ref 40–120)
ALT FLD-CCNC: 42 U/L DA — SIGNIFICANT CHANGE UP (ref 10–60)
ANION GAP SERPL CALC-SCNC: 7 MMOL/L — SIGNIFICANT CHANGE UP (ref 5–17)
APPEARANCE UR: CLEAR — SIGNIFICANT CHANGE UP
AST SERPL-CCNC: 22 U/L — SIGNIFICANT CHANGE UP (ref 10–40)
BACTERIA # UR AUTO: ABNORMAL /HPF
BILIRUB SERPL-MCNC: 0.4 MG/DL — SIGNIFICANT CHANGE UP (ref 0.2–1.2)
BILIRUB UR-MCNC: NEGATIVE — SIGNIFICANT CHANGE UP
BUN SERPL-MCNC: 50 MG/DL — HIGH (ref 7–18)
C3 SERPL-MCNC: 87 MG/DL — SIGNIFICANT CHANGE UP (ref 81–157)
C4 SERPL-MCNC: 34 MG/DL — SIGNIFICANT CHANGE UP (ref 13–39)
CALCIUM SERPL-MCNC: 8.7 MG/DL — SIGNIFICANT CHANGE UP (ref 8.4–10.5)
CHLORIDE SERPL-SCNC: 108 MMOL/L — SIGNIFICANT CHANGE UP (ref 96–108)
CK SERPL-CCNC: 155 U/L — SIGNIFICANT CHANGE UP (ref 35–232)
CO2 SERPL-SCNC: 23 MMOL/L — SIGNIFICANT CHANGE UP (ref 22–31)
COLOR SPEC: YELLOW — SIGNIFICANT CHANGE UP
CREAT SERPL-MCNC: 5.54 MG/DL — HIGH (ref 0.5–1.3)
DIFF PNL FLD: ABNORMAL
EPI CELLS # UR: SIGNIFICANT CHANGE UP /HPF
GLUCOSE SERPL-MCNC: 117 MG/DL — HIGH (ref 70–99)
GLUCOSE UR QL: NEGATIVE — SIGNIFICANT CHANGE UP
HCT VFR BLD CALC: 32.2 % — LOW (ref 39–50)
HGB BLD-MCNC: 10.9 G/DL — LOW (ref 13–17)
HYALINE CASTS # UR AUTO: ABNORMAL /LPF
KAPPA LC SER QL IFE: 7.38 MG/DL — HIGH (ref 0.33–1.94)
KAPPA/LAMBDA FREE LIGHT CHAIN RATIO, SERUM: 1.7 RATIO — HIGH (ref 0.26–1.65)
KETONES UR-MCNC: NEGATIVE — SIGNIFICANT CHANGE UP
LAMBDA LC SER QL IFE: 4.33 MG/DL — HIGH (ref 0.57–2.63)
LEUKOCYTE ESTERASE UR-ACNC: NEGATIVE — SIGNIFICANT CHANGE UP
MAGNESIUM SERPL-MCNC: 1.9 MG/DL — SIGNIFICANT CHANGE UP (ref 1.6–2.6)
MCHC RBC-ENTMCNC: 28.6 PG — SIGNIFICANT CHANGE UP (ref 27–34)
MCHC RBC-ENTMCNC: 33.9 GM/DL — SIGNIFICANT CHANGE UP (ref 32–36)
MCV RBC AUTO: 84.5 FL — SIGNIFICANT CHANGE UP (ref 80–100)
NITRITE UR-MCNC: NEGATIVE — SIGNIFICANT CHANGE UP
NRBC # BLD: 0 /100 WBCS — SIGNIFICANT CHANGE UP (ref 0–0)
PH UR: 5 — SIGNIFICANT CHANGE UP (ref 5–8)
PHOSPHATE SERPL-MCNC: 4.9 MG/DL — HIGH (ref 2.5–4.5)
PLATELET # BLD AUTO: 188 K/UL — SIGNIFICANT CHANGE UP (ref 150–400)
POTASSIUM SERPL-MCNC: 4.4 MMOL/L — SIGNIFICANT CHANGE UP (ref 3.5–5.3)
POTASSIUM SERPL-SCNC: 4.4 MMOL/L — SIGNIFICANT CHANGE UP (ref 3.5–5.3)
PROT SERPL-MCNC: 6.4 G/DL — SIGNIFICANT CHANGE UP (ref 6–8.3)
PROT UR-MCNC: 500 MG/DL
RBC # BLD: 3.81 M/UL — LOW (ref 4.2–5.8)
RBC # FLD: 12.2 % — SIGNIFICANT CHANGE UP (ref 10.3–14.5)
RBC CASTS # UR COMP ASSIST: ABNORMAL /HPF (ref 0–2)
SODIUM SERPL-SCNC: 138 MMOL/L — SIGNIFICANT CHANGE UP (ref 135–145)
SP GR SPEC: 1.01 — SIGNIFICANT CHANGE UP (ref 1.01–1.02)
UROBILINOGEN FLD QL: NEGATIVE — SIGNIFICANT CHANGE UP
WBC # BLD: 5.8 K/UL — SIGNIFICANT CHANGE UP (ref 3.8–10.5)
WBC # FLD AUTO: 5.8 K/UL — SIGNIFICANT CHANGE UP (ref 3.8–10.5)
WBC UR QL: SIGNIFICANT CHANGE UP /HPF (ref 0–5)

## 2021-02-25 RX ORDER — ACETAMINOPHEN 500 MG
650 TABLET ORAL ONCE
Refills: 0 | Status: DISCONTINUED | OUTPATIENT
Start: 2021-02-25 | End: 2021-02-25

## 2021-02-25 RX ORDER — SODIUM CHLORIDE 9 MG/ML
1000 INJECTION INTRAMUSCULAR; INTRAVENOUS; SUBCUTANEOUS
Refills: 0 | Status: DISCONTINUED | OUTPATIENT
Start: 2021-02-25 | End: 2021-02-28

## 2021-02-25 RX ORDER — HYDRALAZINE HCL 50 MG
10 TABLET ORAL THREE TIMES A DAY
Refills: 0 | Status: DISCONTINUED | OUTPATIENT
Start: 2021-02-25 | End: 2021-02-26

## 2021-02-25 RX ORDER — ACETAMINOPHEN 500 MG
1000 TABLET ORAL ONCE
Refills: 0 | Status: COMPLETED | OUTPATIENT
Start: 2021-02-25 | End: 2021-02-25

## 2021-02-25 RX ADMIN — AMLODIPINE BESYLATE 10 MILLIGRAM(S): 2.5 TABLET ORAL at 06:06

## 2021-02-25 RX ADMIN — Medication 10 MILLIGRAM(S): at 13:59

## 2021-02-25 RX ADMIN — Medication 400 MILLIGRAM(S): at 17:43

## 2021-02-25 RX ADMIN — ATORVASTATIN CALCIUM 40 MILLIGRAM(S): 80 TABLET, FILM COATED ORAL at 20:50

## 2021-02-25 RX ADMIN — Medication 10 MILLIGRAM(S): at 20:50

## 2021-02-25 RX ADMIN — Medication 650 MILLIGRAM(S): at 11:48

## 2021-02-25 RX ADMIN — BUDESONIDE AND FORMOTEROL FUMARATE DIHYDRATE 2 PUFF(S): 160; 4.5 AEROSOL RESPIRATORY (INHALATION) at 20:50

## 2021-02-25 RX ADMIN — SODIUM CHLORIDE 85 MILLILITER(S): 9 INJECTION INTRAMUSCULAR; INTRAVENOUS; SUBCUTANEOUS at 17:43

## 2021-02-25 RX ADMIN — BUDESONIDE AND FORMOTEROL FUMARATE DIHYDRATE 2 PUFF(S): 160; 4.5 AEROSOL RESPIRATORY (INHALATION) at 11:48

## 2021-02-25 RX ADMIN — LIDOCAINE 1 PATCH: 4 CREAM TOPICAL at 11:48

## 2021-02-25 NOTE — PROGRESS NOTE ADULT - PROBLEM SELECTOR PLAN 4
mildly elevated LFTs   hepatitis panel negative  LFTs trending down, monitor  CT abdomen and pelvis showed no acute findings.

## 2021-02-25 NOTE — PROGRESS NOTE ADULT - ASSESSMENT
26 yo male with PMH of asthma presented to his PCP with complaints of headache, nausea and vomiting. Pt was noted to have elevated sBP of >170 and was advised to come to emergency department. The patient was admitted for acute hypertensive emergency and CIARRA in the setting of Cr 6.

## 2021-02-25 NOTE — PROGRESS NOTE ADULT - PROBLEM SELECTOR PLAN 1
Pt presented with Headaches, Pt noted to have sbp >180 on admission  S/p amlodipine 5 mg and hydralazine in ED   CTH negative   C/w Amlodipine 5mg PO qd and low salt diet.   Monitor BP closely  /93 this AM   Sebas elevated 27.2 ng/dl; Renin 4.9 pg/ml (0.49ng/dl): Sebas/ Renin 55.5  Nephro Dr. Massey following, appreciate recommendations Pt presented with Headaches, Pt noted to have sbp >180 on admission  S/p amlodipine 5 mg and hydralazine in ED   CTH negative   C/w Amlodipine 5mg PO qd and low salt diet.   Monitor BP closely  /93 this AM   Sebas elevated 27.2 ng/dl; Renin 4.9 pg/ml (0.49ng/dl): Sebas/ Renin 55.5  Endocrine consulted: repeat renin and aldosterone for aldosterone renin ratio  CT adrenal glands to r/o adrenal pathology  Nephro Dr. Massey following, appreciate recommendations

## 2021-02-25 NOTE — PROGRESS NOTE ADULT - SUBJECTIVE AND OBJECTIVE BOX
[facility] NEPHROLOGY- PROGRESS NOTE    Patient is a 26yo Male with Asthma on Dupixent injections q 2kweeks p/w HA, n/v x 5 days. Pt sent in by his PMD due to elevated SBP >170. Pt was a/w CIARRA and hypertensive emergency. Nephrology consulted for Elevated serum creatinine.    Hospital Medications: Medications reviewed.  REVIEW OF SYSTEMS:  CONSTITUTIONAL: No fevers or chills or HA  RESPIRATORY: No shortness of breath  CARDIOVASCULAR: No chest pain.  GASTROINTESTINAL: No nausea, vomiting, diarrhea or abdominal pain.   VASCULAR: No bilateral lower extremity edema.     VITALS:  T(F): 97.4 (21 @ 11:11), Max: 98.4 (21 @ 20:15)  HR: 74 (21 @ 11:11)  BP: 152/80 (21 @ 11:11)  RR: 16 (21 @ 11:11)  SpO2: 100% (21 @ 11:11)  Wt(kg): --    PHYSICAL EXAM:  Constitutional: NAD  Neurological: Awake and Alert  HEENT: anicteric sclera,   Respiratory: CTAB, no wheezes, rales or rhonchi  Cardiovascular: S1, S2, RRR  Gastrointestinal: BS+, soft, NT/ND  Extremities: No peripheral edema      LABS:      138  |  108  |  50<H>  ----------------------------<  117<H>  4.4   |  23  |  5.54<H>    Ca    8.7      2021 09:16  Phos  4.9       Mg     1.9         TPro  6.4  /  Alb  3.1<L>  /  TBili  0.4  /  DBili      /  AST  22  /  ALT  42  /  AlkPhos  83      Creatinine Trend: 5.54 <--, 5.82 <--, 6.00 <--, 5.80 <--, 6.17 <--                        10.9   5.80  )-----------( 188      ( 2021 09:16 )             32.2     Urine Studies:  Urinalysis Basic - ( 2021 23:27 )    Color: Yellow / Appearance: Clear / S.020 / pH:   Gluc:  / Ketone: Negative  / Bili: Negative / Urobili: Negative   Blood:  / Protein: 500 mg/dL / Nitrite: Negative   Leuk Esterase: Negative / RBC: 5-10 /HPF / WBC 0-2 /HPF   Sq Epi:  / Non Sq Epi: Occasional /HPF / Bacteria: Trace /HPF      Chloride, Random Urine: 58 mmol/L ( @ 21:47)  Sodium, Random Urine: 63 mmol/L ( @ 21:47)  Osmolality, Random Urine: 337 mos/kg ( @ 21:47)  Creatinine, Random Urine: 79 mg/dL ( @ 21:47)

## 2021-02-25 NOTE — PROGRESS NOTE ADULT - SUBJECTIVE AND OBJECTIVE BOX
Patient denies chest pain or shortness of breath.   Review of systems otherwise (-)  	  acetaminophen   Tablet .. 650 milliGRAM(s) Oral every 6 hours PRN  ALBUTerol    90 MICROgram(s) HFA Inhaler 2 Puff(s) Inhalation every 6 hours PRN  amLODIPine   Tablet 10 milliGRAM(s) Oral daily  atorvastatin 40 milliGRAM(s) Oral at bedtime  budesonide  80 MICROgram(s)/formoterol 4.5 MICROgram(s) Inhaler 2 Puff(s) Inhalation two times a day  hydrALAZINE 10 milliGRAM(s) Oral three times a day  lactated ringers. 1000 milliLiter(s) IV Continuous <Continuous>  lidocaine   Patch 1 Patch Transdermal daily  ondansetron Injectable 5 milliGRAM(s) IV Push every 6 hours PRN                            10.9   5.80  )-----------( 188      ( 25 Feb 2021 09:16 )             32.2       Hemoglobin: 10.9 g/dL (02-25 @ 09:16)  Hemoglobin: 9.9 g/dL (02-24 @ 08:18)  Hemoglobin: 11.2 g/dL (02-23 @ 09:13)  Hemoglobin: 11.0 g/dL (02-22 @ 16:39)      02-25    138  |  108  |  50<H>  ----------------------------<  117<H>  4.4   |  23  |  5.54<H>    Ca    8.7      25 Feb 2021 09:16  Phos  4.9     02-25  Mg     1.9     02-25    TPro  6.4  /  Alb  3.1<L>  /  TBili  0.4  /  DBili  x   /  AST  22  /  ALT  42  /  AlkPhos  83  02-25    Creatinine Trend: 5.54<--, 5.82<--, 6.00<--, 5.80<--, 6.17<--    COAGS:     CARDIAC MARKERS ( 25 Feb 2021 09:16 )  x     / x     / 155 U/L / x     / x      CARDIAC MARKERS ( 23 Feb 2021 09:13 )  <0.015 ng/mL / x     / 683 U/L / x     / x      CARDIAC MARKERS ( 22 Feb 2021 21:45 )  <0.015 ng/mL / x     / 1141 U/L / x     / 1.2 ng/mL  CARDIAC MARKERS ( 22 Feb 2021 19:24 )  <0.015 ng/mL / x     / x     / x     / x      CARDIAC MARKERS ( 22 Feb 2021 16:39 )  <0.015 ng/mL / x     / x     / x     / x            T(C): 36.3 (02-25-21 @ 11:11), Max: 36.9 (02-24-21 @ 15:14)  HR: 74 (02-25-21 @ 11:11) (70 - 85)  BP: 152/80 (02-25-21 @ 11:11) (137/93 - 168/89)  RR: 16 (02-25-21 @ 11:11) (16 - 19)  SpO2: 100% (02-25-21 @ 11:11) (97% - 100%)  Wt(kg): --    I&O's Summary        Height (cm): 188 (02-23 @ 16:07)  Weight (kg): 73 (02-23 @ 16:07)  BMI (kg/m2): 20.7 (02-23 @ 16:07)  BSA (m2): 1.98 (02-23 @ 16:07)    HEENT:  (-)icterus (-)pallor  CV: N S1 S2 1/6 SHAGUFTA (+)2 Pulses B/l  Resp:  Clear to ausculatation B/L, normal effort  GI: (+) BS Soft, NT, ND  Lymph:  (-)Edema, (-)obvious lymphadenopathy  Skin: Warm to touch, Normal turgor  Psych: Appropriate mood and affect      TELEMETRY: 	  sinus, sinus tach, ?PAT        ASSESSMENT/PLAN: 	25y  Male PMH of asthma presented to his PCP with complaints of headache, nausea and vomiting x 5 days found with hypertnesive urgency and CIARRA.    - Started on Norvasc.  Suspect renal disease causing the hypertension  -  Check Serum renin and tim levels  - 24 hour urine collection for urine catecholamines and metanephrine  - TSH within normal Limits  - echo with no significant structural heart disease  - renal f/u may need renal biopsy      Yehuda Peres MD, [facility]  BEEPER [phone]       Patient denies chest pain or shortness of breath.   Review of systems otherwise (-)  	  acetaminophen   Tablet .. 650 milliGRAM(s) Oral every 6 hours PRN  ALBUTerol    90 MICROgram(s) HFA Inhaler 2 Puff(s) Inhalation every 6 hours PRN  amLODIPine   Tablet 10 milliGRAM(s) Oral daily  atorvastatin 40 milliGRAM(s) Oral at bedtime  budesonide  80 MICROgram(s)/formoterol 4.5 MICROgram(s) Inhaler 2 Puff(s) Inhalation two times a day  hydrALAZINE 10 milliGRAM(s) Oral three times a day  lactated ringers. 1000 milliLiter(s) IV Continuous <Continuous>  lidocaine   Patch 1 Patch Transdermal daily  ondansetron Injectable 5 milliGRAM(s) IV Push every 6 hours PRN                            10.9   5.80  )-----------( 188      ( 25 Feb 2021 09:16 )             32.2       Hemoglobin: 10.9 g/dL (02-25 @ 09:16)  Hemoglobin: 9.9 g/dL (02-24 @ 08:18)  Hemoglobin: 11.2 g/dL (02-23 @ 09:13)  Hemoglobin: 11.0 g/dL (02-22 @ 16:39)      02-25    138  |  108  |  50<H>  ----------------------------<  117<H>  4.4   |  23  |  5.54<H>    Ca    8.7      25 Feb 2021 09:16  Phos  4.9     02-25  Mg     1.9     02-25    TPro  6.4  /  Alb  3.1<L>  /  TBili  0.4  /  DBili  x   /  AST  22  /  ALT  42  /  AlkPhos  83  02-25    Creatinine Trend: 5.54<--, 5.82<--, 6.00<--, 5.80<--, 6.17<--    COAGS:     CARDIAC MARKERS ( 25 Feb 2021 09:16 )  x     / x     / 155 U/L / x     / x      CARDIAC MARKERS ( 23 Feb 2021 09:13 )  <0.015 ng/mL / x     / 683 U/L / x     / x      CARDIAC MARKERS ( 22 Feb 2021 21:45 )  <0.015 ng/mL / x     / 1141 U/L / x     / 1.2 ng/mL  CARDIAC MARKERS ( 22 Feb 2021 19:24 )  <0.015 ng/mL / x     / x     / x     / x      CARDIAC MARKERS ( 22 Feb 2021 16:39 )  <0.015 ng/mL / x     / x     / x     / x            T(C): 36.3 (02-25-21 @ 11:11), Max: 36.9 (02-24-21 @ 15:14)  HR: 74 (02-25-21 @ 11:11) (70 - 85)  BP: 152/80 (02-25-21 @ 11:11) (137/93 - 168/89)  RR: 16 (02-25-21 @ 11:11) (16 - 19)  SpO2: 100% (02-25-21 @ 11:11) (97% - 100%)  Wt(kg): --    I&O's Summary        Height (cm): 188 (02-23 @ 16:07)  Weight (kg): 73 (02-23 @ 16:07)  BMI (kg/m2): 20.7 (02-23 @ 16:07)  BSA (m2): 1.98 (02-23 @ 16:07)    HEENT:  (-)icterus (-)pallor  CV: N S1 S2 1/6 SHAGUFTA (+)2 Pulses B/l  Resp:  Clear to ausculatation B/L, normal effort  GI: (+) BS Soft, NT, ND  Lymph:  (-)Edema, (-)obvious lymphadenopathy  Skin: Warm to touch, Normal turgor  Psych: Appropriate mood and affect      TELEMETRY: 	  sinus, sinus tach, ?PAT        ASSESSMENT/PLAN: 	25y  Male PMH of asthma presented to his PCP with complaints of headache, nausea and vomiting x 5 days found with hypertnesive urgency and CIARRA.    - Started on Norvasc.  Suspect renal disease causing the hypertension  -  Check Serum renin and tim levels  - 24 hour urine collection for urine catecholamines and metanephrine  - TSH within normal Limits  - echo with no significant structural heart disease  - renal f/u may need renal biopsy  - ? episodes of PAT, EP eval Dr. Swan called      Yehuad Peres MD, [facility]  BEEPER [phone]

## 2021-02-25 NOTE — CONSULT NOTE ADULT - ASSESSMENT
26 yo male with h/o asthma, admitted with headaches, nausea, vomiting x 5 days, also noted to have elevated SBP >170 and sent to ED    endocrinology consulted for uncontrolled HTN,  ? secondary HTN    HTN  uncontrolled  serum metanephrines pending  aldosterone- 27 (ULN 23 ng/dl) direct renin: 4.9 pg/ml  TSH wnl   urine tox neg  urine metanephrine pending  CT abdomen/pelvis: without evident adrenal abnormality    recommendations:  - repeat aldosterone and obtain plasma renin activity to calculate ARR (aldosterone/renin ratio)  aldosterone: direct renin ratio: 5.55 (tim in ng/dl, direct renin in ng/L)  can obtain CT adrenal gland to look for adrenal pathology   follow up urine metanephrines, VMA, 5HIAA  can obtain serum metanephrine      CIARRA  serum creatinine 5.82  urine tox neg  hyperechoic kidneys on renal ultrasound  nephrology on board  may need kidney biopsy  immunologic work up pending    elevated LFTs  hep panel neg  downtrending, improved      Discussed with primary team  Please call Endocrine- [phone]- Dr Lexy Mclain as needed         26 yo male with h/o asthma, admitted with headaches, nausea, vomiting x 5 days, also noted to have elevated SBP >170 and sent to ED    endocrinology consulted for uncontrolled HTN,  ? secondary HTN    HTN  uncontrolled  serum metanephrines pending  aldosterone- 27 (ULN 23 ng/dl) direct renin: 4.9 pg/ml  TSH wnl   urine tox neg  urine metanephrine pending  CT abdomen/pelvis: without evident adrenal abnormality    recommendations:  - repeat aldosterone and obtain plasma renin activity to calculate ARR (aldosterone/renin ratio)  aldosterone: direct renin ratio: 5.55 (tim in ng/dl, direct renin in ng/L)  can obtain CT adrenal gland to look for adrenal pathology   follow up urine metanephrines  however since renin level is not suppressed/low, less likely from primary aldosteronoma  can obtain serum metanephrine  nephrology on board    CIARRA  serum creatinine 6>>5.8>5.5  urine tox neg  hyperechoic kidneys on renal ultrasound  nephrology on board  may need kidney biopsy  immunologic work up pending    elevated LFTs  hep panel neg  downtrending, improved      Discussed with primary team  Please call Endocrine- [phone]- Dr Lexy Mclain as needed         26 yo male with h/o asthma, admitted with headaches, nausea, vomiting x 5 days, also noted to have elevated SBP >170 and sent to ED    endocrinology consulted for uncontrolled HTN,  ? secondary HTN    HTN  uncontrolled  serum metanephrines pending  aldosterone- 27 (ULN 23 ng/dl) direct renin: 4.9 pg/ml  TSH wnl   urine tox neg  urine metanephrine pending  CT abdomen/pelvis: without evident adrenal abnormality    recommendations:  - repeat aldosterone and obtain plasma renin activity to calculate ARR (aldosterone/renin ratio)  aldosterone: direct renin ratio: 5.55 (tim in ng/dl, direct renin in ng/L)  can obtain CT adrenal gland to look for adrenal pathology   follow up urine metanephrines  however since renin level is not suppressed/low, less likely from primary aldosteronoma  nephrology on board    CIARRA  serum creatinine 6>>5.8>5.5  urine tox neg  hyperechoic kidneys on renal ultrasound  nephrology on board  may need kidney biopsy  immunologic work up pending    elevated LFTs  hep panel neg  downtrending, improved      Discussed with primary team  Please call Endocrine- - Dr Lexy Mclain as needed         26 yo male with h/o asthma, admitted with headaches, nausea, vomiting x 5 days, also noted to have elevated SBP >170 and sent to ED    endocrinology consulted for uncontrolled HTN,  ? secondary HTN    HTN  uncontrolled  serum metanephrines pending  aldosterone- 27 (ULN 23 ng/dl) direct renin: 4.9 pg/ml  TSH wnl   urine tox neg  urine metanephrine pending  CT abdomen/pelvis: without evident adrenal abnormality    recommendations:  - repeat aldosterone and obtain plasma renin activity to calculate ARR (aldosterone/renin ratio)  aldosterone: direct renin ratio: 5.55 (tim in ng/dl, direct renin in ng/L)  can obtain CT adrenal gland to look for adrenal pathology  follow up urine metanephrines  however since renin level is not suppressed/low, without hypokalemia, less likely from primary aldosteronoma  nephrology on board    CIARRA  serum creatinine 6>>5.8>5.5  urine tox neg  hyperechoic kidneys on renal ultrasound  nephrology on board  may need kidney biopsy  immunologic work up pending    elevated LFTs  hep panel neg  downtrending, improved      Discussed with patient and primary team  Please call Endocrine- - Dr Lexy Mclain as needed         24 yo male with h/o asthma, admitted with headaches, nausea, vomiting x 5 days, also noted to have elevated SBP >170 and sent to ED    endocrinology consulted for uncontrolled HTN,  ? secondary HTN    HTN  uncontrolled  aldosterone- 27 (ULN 23 ng/dl) direct renin: 4.9 pg/ml  TSH wnl   urine tox neg  urine metanephrine pending  CT abdomen/pelvis: without evident adrenal abnormality    recommendations:  - repeat aldosterone and obtain plasma renin activity to calculate ARR (aldosterone/renin ratio)  aldosterone: direct renin ratio: 5.55 (tim in ng/dl, direct renin in ng/L)  can obtain CT adrenal gland to look for adrenal pathology  follow up urine metanephrines  however since renin level is not suppressed/low, without hypokalemia, less likely from primary aldosteronoma  nephrology on board    CIARRA  serum creatinine 6>>5.8>5.5  urine tox neg  hyperechoic kidneys on renal ultrasound  nephrology on board  may need kidney biopsy  immunologic work up pending    elevated LFTs  hep panel neg  downtrending, improved      Discussed with patient and primary team  Please call Endocrine- - Dr Lexy Mclain as needed

## 2021-02-25 NOTE — CONSULT NOTE ADULT - SUBJECTIVE AND OBJECTIVE BOX
Patient is a 25y old  Male who presents with a chief complaint of headache, nausea/ vomiting (25 Feb 2021 07:31)      HPI:  24 yo male with PMH of asthma presented to his PCP with complaints of headache, nausea and vomiting x 5 days. Pt was noted to have elevated sBP of >170 and was advised to come to emergency department. Patient states he started having headaches 5 days ago associated with nausea and 1 day hx of NBNB vomiting. Pt endorses episode of dysuria few days ago which has now resolved. Pt denies head trauma, any prior episodes of similar headaches. No family hx of Polycystic kidney disease, CVA, HTN. Denies diarrhea, abdominal pain, fever, chills, recent viral infection, no blood in urine, no flank pain, no urinary rentention   Endocrinology consulted for uncontrolled HTN, ?secondary HTN,   no h/o episodic palpitations, abdominal pain, headaches  current symptoms since 1-2 weeks prior to admit  no family h/o kidney disease      PAST MEDICAL & SURGICAL HISTORY:  Asthma    No significant past surgical history           MEDICATIONS  (STANDING):  amLODIPine   Tablet 10 milliGRAM(s) Oral daily  atorvastatin 40 milliGRAM(s) Oral at bedtime  budesonide  80 MICROgram(s)/formoterol 4.5 MICROgram(s) Inhaler 2 Puff(s) Inhalation two times a day  lactated ringers. 1000 milliLiter(s) (60 mL/Hr) IV Continuous <Continuous>  lidocaine   Patch 1 Patch Transdermal daily    MEDICATIONS  (PRN):  acetaminophen   Tablet .. 650 milliGRAM(s) Oral every 6 hours PRN Temp greater or equal to 38C (100.4F), Mild Pain (1 - 3)  ALBUTerol    90 MICROgram(s) HFA Inhaler 2 Puff(s) Inhalation every 6 hours PRN Bronchospasm  ondansetron Injectable 5 milliGRAM(s) IV Push every 6 hours PRN Nausea and/or Vomiting      FAMILY HISTORY:      SOCIAL HISTORY:      REVIEW OF SYSTEMS:  CONSTITUTIONAL: No fever, weight loss, or fatigue  EYES: No eye pain, visual disturbances, or discharge  ENT:  No difficulty hearing, tinnitus, vertigo; No sinus or throat pain  NECK: No pain or stiffness  RESPIRATORY: No cough, wheezing, chills or hemoptysis; No Shortness of Breath  CARDIOVASCULAR: No chest pain, palpitations, passing out, dizziness, or leg swelling  GASTROINTESTINAL: No abdominal or epigastric pain. No nausea, vomiting, or hematemesis; No diarrhea or constipation. No melena or hematochezia.  GENITOURINARY: No dysuria, frequency, hematuria, or incontinence  NEUROLOGICAL: No headaches, memory loss, loss of strength, numbness, or tremors  SKIN: No itching, burning, rashes, or lesions   LYMPH Nodes: No enlarged glands  ENDOCRINE: No heat or cold intolerance; No hair loss  MUSCULOSKELETAL: No joint pain or swelling; No muscle, back, or extremity pain  PSYCHIATRIC: No depression, anxiety, mood swings, or difficulty sleeping  HEME/LYMPH: No easy bruising, or bleeding gums  ALLERGY AND IMMUNOLOGIC: No hives or eczema	        Vital Signs Last 24 Hrs  T(C): 36.3 (25 Feb 2021 07:33), Max: 37 (24 Feb 2021 11:12)  T(F): 97.3 (25 Feb 2021 07:33), Max: 98.6 (24 Feb 2021 11:12)  HR: 73 (25 Feb 2021 07:33) (70 - 85)  BP: 147/102 (25 Feb 2021 07:33) (137/93 - 168/89)  BP(mean): --  RR: 16 (25 Feb 2021 07:33) (16 - 19)  SpO2: 100% (25 Feb 2021 07:33) (97% - 100%)      Constitutional:    NC/AT:    HEENT:    Neck:  No JVD    Respiratory:  Clear without rales or rhonchi    Cardiovascular:  RR without murmur    Gastrointestinal: Soft     Extremities: without cyanosis    Neurological:  non focal        LABS:                        9.9    6.57  )-----------( 179      ( 24 Feb 2021 08:18 )             29.3     02-24    138  |  108  |  56<H>  ----------------------------<  90  4.6   |  22  |  5.82<H>    Ca    8.8      24 Feb 2021 08:18  Phos  5.3     02-24  Mg     2.2     02-24    TPro  6.0  /  Alb  2.9<L>  /  TBili  0.4  /  DBili  x   /  AST  31  /  ALT  44  /  AlkPhos  79  02-24    CARDIAC MARKERS ( 23 Feb 2021 09:13 )  <0.015 ng/mL / x     / 683 U/L / x     / x          PT/INR - ( 23 Feb 2021 09:13 )   PT: 13.0 sec;   INR: 1.10 ratio         PTT - ( 23 Feb 2021 09:13 )  PTT:34.3 sec    CAPILLARY BLOOD GLUCOSE          RADIOLOGY & ADDITIONAL STUDIES:    EXAM: CT ABDOMEN AND PELVIS      PROCEDURE DATE: 02/23/2021        INTERPRETATION: CLINICAL INFORMATION: Acute renal failure.    COMPARISON: None.    PROCEDURE:  CT of the Abdomen and Pelvis was performed without intravenous contrast.  Intravenous contrast: None.  Oral contrast: None.  Sagittal and coronal reformats were performed.    FINDINGS:  LOWER CHEST: Within normal limits.    LIVER: Within normal limits.  BILE DUCTS: Normal caliber.  GALLBLADDER: Within normal limits.  SPLEEN: Within normal limits.  PANCREAS: Within normal limits.  ADRENALS: Within normal limits.  KIDNEYS/URETERS: Kidneys are symmetric in size. No right or left hydroureteronephrosis or obstructing urinary tract calculus. No nephrolithiasis. No asymmetric perinephric stranding.    BLADDER: Normally distended.  REPRODUCTIVE ORGANS: Prostate gland and seminal vesicles are unremarkable.    BOWEL: No bowel obstruction. Appendix is normal.  PERITONEUM: No ascites or pneumoperitoneum. No mesenteric lymphadenopathy.  VESSELS: Within normal limits.  RETROPERITONEUM/LYMPH NODES: No lymphadenopathy.  ABDOMINAL WALL: Small fat-containing umbilical hernia.  BONES: Within normal limits.    IMPRESSION:  No evidence of obstructive uropathy.

## 2021-02-25 NOTE — PROGRESS NOTE ADULT - SUBJECTIVE AND OBJECTIVE BOX
PGY-1 Progress Note discussed with attending    PAGER #: [[phone]] TILL 5:00 PM  PLEASE CONTACT ON CALL TEAM:  - On Call Team (Please refer to Prasanna) FROM 5:00 PM - 8:30PM  - Nightfloat Team FROM 8:30 -7:30 AM    CHIEF COMPLAINT & BRIEF HOSPITAL COURSE:  26 yo male with PMH of asthma presented to his PCP with complaints of headache, nausea and vomiting x 5 days. Pt was noted to have elevated sBP of >170 and was advised to come to emergency department. Patient states he started having headaches 5 days ago associated with nausea and 1 day hx of NBNB vomiting. Pt endorses episode of dysuria few days ago which has now resolved. Pt denies head trauma, any prior episodes of similar headaches. No family hx of Polycystic kidney disease, CVA, HTN. Denies diarrhea, abdominal pain, fever, chills, recent viral infection, no blood in urine, no flank pain, no urinary rentention. In the ED the patients BP was 185/114. He was given amlodipine and hydralazine. He was continued on amlodipine and his BP was better controlled. He was noted to have a creatinine of 6. UA showed moderate blood, 500 protein. FeNa: 3.3%. CT A/P was negative for any obstructive uropathy or acute findings.     INTERVAL HPI/OVERNIGHT EVENTS: No acute events overnight. Continuing with renal workup. Thus far ELMA, ANCA, dsDNA, ASO, Treponema Pallidum ab and Hepatitis panel all negative. Immunofixation negative for monoclonal bands. Sebas/renin 55.5. F/u urine metanephrines/VMA. Renal US showed bilateral hyperechoic kidneys. Nephro Dr. Massey following.     MEDICATIONS:  acetaminophen   Tablet .. 650 milliGRAM(s) Oral every 6 hours PRN  ALBUTerol    90 MICROgram(s) HFA Inhaler 2 Puff(s) Inhalation every 6 hours PRN  amLODIPine   Tablet 10 milliGRAM(s) Oral daily  atorvastatin 40 milliGRAM(s) Oral at bedtime  budesonide  80 MICROgram(s)/formoterol 4.5 MICROgram(s) Inhaler 2 Puff(s) Inhalation two times a day  lactated ringers. 1000 milliLiter(s) IV Continuous <Continuous>  lidocaine   Patch 1 Patch Transdermal daily  ondansetron Injectable 5 milliGRAM(s) IV Push every 6 hours PRN      REVIEW OF SYSTEMS:  CONSTITUTIONAL: No fever, weight loss, or fatigue  RESPIRATORY: No cough, wheezing, chills or hemoptysis; No shortness of breath  CARDIOVASCULAR: No chest pain, palpitations, dizziness, or leg swelling  GASTROINTESTINAL: No abdominal pain. No nausea, vomiting, or hematemesis; No diarrhea or constipation. No melena or hematochezia.  GENITOURINARY: No dysuria or hematuria, urinary frequency  NEUROLOGICAL: No memory loss, loss of strength, numbness, or tremors. positive headaches.  SKIN: No itching, burning, rashes, or lesions     Vital Signs Last 24 Hrs  T(C): 36.2 (25 Feb 2021 04:29), Max: 37 (24 Feb 2021 11:12)  T(F): 97.1 (25 Feb 2021 04:29), Max: 98.6 (24 Feb 2021 11:12)  HR: 73 (25 Feb 2021 04:29) (70 - 85)  BP: 137/93 (25 Feb 2021 04:29) (137/93 - 164/101)  BP(mean): --  RR: 17 (25 Feb 2021 04:29) (17 - 19)  SpO2: 97% (25 Feb 2021 04:29) (97% - 100%)    PHYSICAL EXAMINATION:  GENERAL: NAD, well built  HEAD:  Atraumatic, Normocephalic  EYES:  conjunctiva and sclera clear  NECK: Supple, No JVD, Normal thyroid  CHEST/LUNG: Clear to auscultation. Clear to percussion bilaterally; No rales, rhonchi, wheezing, or rubs  HEART: Regular rate and rhythm; No murmurs, rubs, or gallops  ABDOMEN: Soft, Nontender, Nondistended; Bowel sounds present  NERVOUS SYSTEM:  Alert & Oriented X3,    EXTREMITIES:  2+ Peripheral Pulses, No clubbing, cyanosis, or edema  SKIN: warm dry                          9.9    6.57  )-----------( 179      ( 24 Feb 2021 08:18 )             29.3     02-24    138  |  108  |  56<H>  ----------------------------<  90  4.6   |  22  |  5.82<H>    Ca    8.8      24 Feb 2021 08:18  Phos  5.3     02-24  Mg     2.2     02-24    TPro  6.0  /  Alb  2.9<L>  /  TBili  0.4  /  DBili  x   /  AST  31  /  ALT  44  /  AlkPhos  79  02-24    LIVER FUNCTIONS - ( 24 Feb 2021 08:18 )  Alb: 2.9 g/dL / Pro: 6.0 g/dL / ALK PHOS: 79 U/L / ALT: 44 U/L DA / AST: 31 U/L / GGT: x           CARDIAC MARKERS ( 23 Feb 2021 09:13 )  <0.015 ng/mL / x     / 683 U/L / x     / x          PT/INR - ( 23 Feb 2021 09:13 )   PT: 13.0 sec;   INR: 1.10 ratio         PTT - ( 23 Feb 2021 09:13 )  PTT:34.3 sec    CAPILLARY BLOOD GLUCOSE      RADIOLOGY & ADDITIONAL TESTS:                   PGY-1 Progress Note discussed with attending    PAGER #: [[phone]] TILL 5:00 PM  PLEASE CONTACT ON CALL TEAM:  - On Call Team (Please refer to Prasanna) FROM 5:00 PM - 8:30PM  - Nightfloat Team FROM 8:30 -7:30 AM    CHIEF COMPLAINT & BRIEF HOSPITAL COURSE:  24 yo male with PMH of asthma presented to his PCP with complaints of headache, nausea and vomiting x 5 days. Pt was noted to have elevated sBP of >170 and was advised to come to emergency department. Patient states he started having headaches 5 days ago associated with nausea and 1 day hx of NBNB vomiting. Pt endorses episode of dysuria few days ago which has now resolved. Pt denies head trauma, any prior episodes of similar headaches. No family hx of Polycystic kidney disease, CVA, HTN. Denies diarrhea, abdominal pain, fever, chills, recent viral infection, no blood in urine, no flank pain, no urinary rentention. In the ED the patients BP was 185/114. He was given amlodipine and hydralazine. He was continued on amlodipine and his BP was better controlled. He was noted to have a creatinine of 6. UA showed moderate blood, 500 protein. FeNa: 3.3%. CT A/P was negative for any obstructive uropathy or acute findings.     INTERVAL HPI/OVERNIGHT EVENTS: No acute events overnight. Continuing with renal workup. Thus far ELMA, ANCA, dsDNA, ASO, Treponema Pallidum ab and Hepatitis panel all negative. Immunofixation negative for monoclonal bands. Sebas/renin 55.5. F/u urine metanephrines/VMA. Renal US showed bilateral hyperechoic kidneys. Nephro Dr. Massey following.     MEDICATIONS:  acetaminophen   Tablet .. 650 milliGRAM(s) Oral every 6 hours PRN  ALBUTerol    90 MICROgram(s) HFA Inhaler 2 Puff(s) Inhalation every 6 hours PRN  amLODIPine   Tablet 10 milliGRAM(s) Oral daily  atorvastatin 40 milliGRAM(s) Oral at bedtime  budesonide  80 MICROgram(s)/formoterol 4.5 MICROgram(s) Inhaler 2 Puff(s) Inhalation two times a day  lactated ringers. 1000 milliLiter(s) IV Continuous <Continuous>  lidocaine   Patch 1 Patch Transdermal daily  ondansetron Injectable 5 milliGRAM(s) IV Push every 6 hours PRN      REVIEW OF SYSTEMS:  CONSTITUTIONAL: No fever, weight loss, or fatigue  RESPIRATORY: No cough, wheezing, chills or hemoptysis; No shortness of breath  CARDIOVASCULAR: No chest pain, palpitations, dizziness, or leg swelling  GASTROINTESTINAL: No abdominal pain. No nausea, vomiting, or hematemesis; No diarrhea or constipation. No melena or hematochezia.  GENITOURINARY: No dysuria or hematuria, urinary frequency  NEUROLOGICAL: No memory loss, loss of strength, numbness, or tremors. Positive headaches.  SKIN: No itching, burning, rashes, or lesions     Vital Signs Last 24 Hrs  T(C): 36.2 (25 Feb 2021 04:29), Max: 37 (24 Feb 2021 11:12)  T(F): 97.1 (25 Feb 2021 04:29), Max: 98.6 (24 Feb 2021 11:12)  HR: 73 (25 Feb 2021 04:29) (70 - 85)  BP: 137/93 (25 Feb 2021 04:29) (137/93 - 164/101)  BP(mean): --  RR: 17 (25 Feb 2021 04:29) (17 - 19)  SpO2: 97% (25 Feb 2021 04:29) (97% - 100%)    PHYSICAL EXAMINATION:  GENERAL: NAD, well built  HEAD:  Atraumatic, Normocephalic  EYES:  conjunctiva and sclera clear  NECK: Supple, No JVD, Normal thyroid  CHEST/LUNG: Clear to auscultation. Clear to percussion bilaterally; No rales, rhonchi, wheezing, or rubs  HEART: Regular rate and rhythm; No murmurs, rubs, or gallops  ABDOMEN: Soft, Nontender, Nondistended; Bowel sounds present  NERVOUS SYSTEM:  Alert & Oriented X3,    EXTREMITIES:  2+ Peripheral Pulses, No clubbing, cyanosis, or edema  SKIN: warm dry                          9.9    6.57  )-----------( 179      ( 24 Feb 2021 08:18 )             29.3     02-24    138  |  108  |  56<H>  ----------------------------<  90  4.6   |  22  |  5.82<H>    Ca    8.8      24 Feb 2021 08:18  Phos  5.3     02-24  Mg     2.2     02-24    TPro  6.0  /  Alb  2.9<L>  /  TBili  0.4  /  DBili  x   /  AST  31  /  ALT  44  /  AlkPhos  79  02-24    LIVER FUNCTIONS - ( 24 Feb 2021 08:18 )  Alb: 2.9 g/dL / Pro: 6.0 g/dL / ALK PHOS: 79 U/L / ALT: 44 U/L DA / AST: 31 U/L / GGT: x           CARDIAC MARKERS ( 23 Feb 2021 09:13 )  <0.015 ng/mL / x     / 683 U/L / x     / x          PT/INR - ( 23 Feb 2021 09:13 )   PT: 13.0 sec;   INR: 1.10 ratio         PTT - ( 23 Feb 2021 09:13 )  PTT:34.3 sec    CAPILLARY BLOOD GLUCOSE      RADIOLOGY & ADDITIONAL TESTS:

## 2021-02-25 NOTE — PROGRESS NOTE ADULT - PROBLEM SELECTOR PLAN 2
Presented with CIARRA and Cr 6  Baseline creatinine is 1.8  C/w gentle hydration @ 60 cc/hr   Urine protein/creatinine: 3.3, nephrotic range proteinuria without nephrotic syndrome, will repeat spot UPro/Cre once BP stabilizes   CT abdomen showed no findings of obstructive uropathy or other findings.  Renal US showed hyperechoic kidneys bilaterally   FeNa: 3.3%  Urine tox was negative  Urine eosinophils negative  F/u 5 HIAA, VMA, metanephrines in urine to r/o pheochromocytoma  ELMA, dsDNA, ANCA, ELMA, ASO, Treponema Pallidum ab negative  Immunofixation negative for monoclonal bands   Nephro Dr. Massey following, will f/u recommendations

## 2021-02-25 NOTE — DISCHARGE NOTE NURSING/CASE MANAGEMENT/SOCIAL WORK - PATIENT PORTAL LINK FT
You can access the FollowMyHealth Patient Portal offered by [facility] by registering at the following website: [email or web address]. By joining [employer]’s FollowMyHealth portal, you will also be able to view your health information using other applications (apps) compatible with our system.

## 2021-02-25 NOTE — PROGRESS NOTE ADULT - ASSESSMENT
Patient is a 26yo Male with Asthma on Dupixent injections q 2kweeks p/w HA, n/v x 5 days. Pt sent in by his PMD due to elevated SBP >170. Pt was a/w CIARRA and hypertensive emergency. Nephrology consulted for Elevated serum creatinine.    1. CIARRA- ?GN vs. CIARRA on CKD due to uncontrolled HTN. UA with 500 protein and mod blood; rbc 5-10. Pt on Dupixent- no urinary eos, peripheral eos or rash. Spot UPr/Cr 3.3- nephrotic range proteinuria without nephrotic syndrome (albumin 3.4 with no edema). Recc to repeat spot UPr/Cr once BP better controlled. Renal function slowly improving- no need for dialysis at this time.   CK 1141, should not cause significant CIARRA. Serological w/u pending; will f/u (thus far Normal C3, C4, SIFE neg, K/L 1.7, Syphillis neg,  Neg ASO, neg ANCA, neg ELMA, neg dsDNA, neg HIV, neg HepBsAg, and neg Hep C ab).   Discussed the need for kidney biopsy to determine etiology of kidney disease if serological w/u neg. Discussed risk/ benefits/ alt of kidney biopsy- pt agreeable; will plan for possible kidney biopsy on 3/1 (if renal function does not continue to improve); will discuss with IR.  Strict I/Os. Avoid nephrotoxins/ NSAIDs/ RCA. Monitor BMP.    2. CKD unspecified- previous SCr 1.29 in 2018 with proteinuria. See above.  PTHi pending Avoid nephrotoxins    3. Hypertensive emergency- BP in /114; CT head neg. BP remains elevated. c/w Amlodipine 10mg PO qd. Will add Hydralazine 10mg PO tid, titrate as needed. c/w low salt diet.   Secondary w/u pending: UTox neg. Sebas 27.2 ng/dl; Renin 4.9 pg/ml (0.49ng/dl) Endo following. Low suspicion of pheo- CT with normal adrenals. w/u pending. TTE: mild MR, mild LVH.  Pt will need Renal US with dopplers as an outpt. Monitor BP  4. Metabolic Acidosis-in the setting of renal failure. Serum CO2 wnl. Serum lactate wnl and VBG ph 7.31. No need for sodium bicarb tabs at this time. Monitor ph/Co2

## 2021-02-25 NOTE — CONSULT NOTE ADULT - SUBJECTIVE AND OBJECTIVE BOX
EP Attending    HISTORY OF PRESENT ILLNESS: HPI:  24 yo male with PMH of asthma presented to his PCP with complaints of headache, nausea and vomiting x 5 days. Pt was noted to have elevated sBP of >170 and was advised to come to emergency department. Patient states he started having headaches 5 days ago associated with nausea and 1 day hx of NBNB vomiting. Pt endorses episode of dysuria few days ago which has now resolved. Pt denies head trauma, any prior episodes of similar headaches. No family hx of Polycystic kidney disease, CVA, HTN. Denies diarrhea, abdominal pain, fever, chills, recent viral infection, no blood in urine, no flank pain, no urinary rentention (22 Feb 2021 20:11)    States he has prior history of lightheadedness when getting up too quickly, and has fainted in a hot kitchen before, but no symptoms related to exercise intolerance.  He is not aware of his heartbeat intermittently going faster in the bed and is not experiencing palpitations or chest discomfort.  No personal or family history of arrhythmia.  Has Asthma, diagnosed by a pulmonologist > 1 yr ago, and has been better/stable on Symbicort.  A 10 pt ROS is otherwise negative.      PAST MEDICAL & SURGICAL HISTORY:  Asthma    No significant past surgical history    MEDICATIONS  (STANDING):  amLODIPine   Tablet 10 milliGRAM(s) Oral daily  atorvastatin 40 milliGRAM(s) Oral at bedtime  budesonide  80 MICROgram(s)/formoterol 4.5 MICROgram(s) Inhaler 2 Puff(s) Inhalation two times a day  hydrALAZINE 10 milliGRAM(s) Oral three times a day  lactated ringers. 1000 milliLiter(s) (60 mL/Hr) IV Continuous <Continuous>  lidocaine   Patch 1 Patch Transdermal daily    Allergies    Kiwi (Swelling)  No Known Drug Allergies    Intolerances    FAMILY HISTORY:    Non-contributary for premature coronary disease or sudden cardiac death    SOCIAL HISTORY:    [x ] Non-smoker  [ ] Smoker  [ ] Alcohol    PHYSICAL EXAM:  T(C): 36.3 (02-25-21 @ 11:11), Max: 36.9 (02-24-21 @ 15:14)  HR: 74 (02-25-21 @ 11:11) (70 - 85)  BP: 152/80 (02-25-21 @ 11:11) (137/93 - 168/89)  RR: 16 (02-25-21 @ 11:11) (16 - 19)  SpO2: 100% (02-25-21 @ 11:11) (97% - 100%)  Wt(kg): --    General: Well nourished, no acute distress, alert and oriented x 3  Head: normocephalic, no trauma  Neck: no JVD, no bruit, supple, not enlarged  CV: S1S2, no S3, regular rate, rhythm is SINUS, no murmurs.    Lungs: clear BL, no rales or wheezes  Abdomen: bowel sounds +, soft, nontender, nondistended  Extremities: no clubbing, cyanosis or edema  Neuro: Moves all 4 extremities, sensation intact x 4 extremities  Skin: warm and moist, normal turgor  Psych: Mood and affect are appropriate for circumstances  MSK: normal range of motion and strength x4 extremities.      TELEMETRY: NSR, sinus tachy, episodes of PAT up to 130bpm	    ECG: NSR  Echo: EF 55%, mild LVH	  	  LABS:	 	                          10.9   5.80  )-----------( 188      ( 25 Feb 2021 09:16 )             32.2     02-25    138  |  108  |  50<H>  ----------------------------<  117<H>  4.4   |  23  |  5.54<H>    Ca    8.7      25 Feb 2021 09:16  Phos  4.9     02-25  Mg     1.9     02-25    TPro  6.4  /  Alb  3.1<L>  /  TBili  0.4  /  DBili  x   /  AST  22  /  ALT  42  /  AlkPhos  83  02-25    ASSESSMENT/PLAN: 	25y Male with hypertensive urgency, nephrotic-range proteinuria and newly diagnosed acute renal failure.  Having asymptomatic episodes of SVT on telemetry that are of short duration and self-terminating.      Maintain K 4-4.5, Mg 2.  Monitor on telemetry.  Does not need beta blockers at this point, and I would generally avoid these in a young man due to potential for sexual dysfunction at significant doses.  If he has more frequent episodes, consider starting Diltiazem (instead of Amlodipine) as an antihypertensive and AV tana blocker to suppress SVT.  Will follow.  If no sustained arrhythmia on telemetry, recommend ambulatory cardiac monitoring.    Enzo Swan M.D.  Cardiac Electrophysiology    office [phone]  pager [phone]

## 2021-02-25 NOTE — PROGRESS NOTE ADULT - ATTENDING COMMENTS
[facility] NEPHROLOGY  Justin Mckenzie M.D.  Chad Reich D.O.  Beth Massey M.D.  Shayy Navarro, MSN, ANP-C  [phone]    [address] [place] [address]

## 2021-02-25 NOTE — PROGRESS NOTE ADULT - PROBLEM SELECTOR PLAN 3
Pt noted to have non anion gap metabolic acidosis with respiratory compensation   bicarb 19-> 17 on admission   Metabolic acidosis likely in the setting of ARF   Lactate: 0.3  pH on VB.31 ()  Will hold off on bicarb replacement for now   Continue to monitor pH/CO2  Follow up Nephro recommendation

## 2021-02-25 NOTE — PROGRESS NOTE ADULT - SUBJECTIVE AND OBJECTIVE BOX
Time of Visit:  Patient seen and examined.     MEDICATIONS  (STANDING):  amLODIPine   Tablet 10 milliGRAM(s) Oral daily  atorvastatin 40 milliGRAM(s) Oral at bedtime  budesonide  80 MICROgram(s)/formoterol 4.5 MICROgram(s) Inhaler 2 Puff(s) Inhalation two times a day  hydrALAZINE 10 milliGRAM(s) Oral three times a day  lidocaine   Patch 1 Patch Transdermal daily  sodium chloride 0.9%. 1000 milliLiter(s) (85 mL/Hr) IV Continuous <Continuous>      MEDICATIONS  (PRN):  acetaminophen   Tablet .. 650 milliGRAM(s) Oral every 6 hours PRN Temp greater or equal to 38C (100.4F), Mild Pain (1 - 3)  ALBUTerol    90 MICROgram(s) HFA Inhaler 2 Puff(s) Inhalation every 6 hours PRN Bronchospasm  ondansetron Injectable 5 milliGRAM(s) IV Push every 6 hours PRN Nausea and/or Vomiting       Medications up to date at time of exam.      PHYSICAL EXAMINATION:  Patient has no new complaints.  GENERAL: The patient is a well-developed, well-nourished, in no apparent distress.     Vital Signs Last 24 Hrs  T(C): 36.4 (2021 19:35), Max: 36.5 (2021 15:32)  T(F): 97.6 (2021 19:35), Max: 97.7 (2021 15:32)  HR: 74 (2021 19:35) (70 - 89)  BP: 155/97 (2021 19:35) (137/93 - 168/89)  BP(mean): --  RR: 17 (2021 19:35) (16 - 18)  SpO2: 100% (2021 19:35) (97% - 100%)   (if applicable)    Chest Tube (if applicable)    HEENT: Head is normocephalic and atraumatic. Extraocular muscles are intact. Mucous membranes are moist.     NECK: Supple, no palpable adenopathy.    LUNGS: Clear to auscultation, no wheezing, rales, or rhonchi.    HEART: Regular rate and rhythm without murmur.    ABDOMEN: Soft, nontender, and nondistended.  No hepatosplenomegaly is noted.    : No painful voiding, no pelvic pain    EXTREMITIES: Without any cyanosis, clubbing, rash, lesions or edema.    NEUROLOGIC: Awake, alert, oriented, grossly intact    SKIN: Warm, dry, good turgor.      LABS:                        10.9   5.80  )-----------( 188      ( 2021 09:16 )             32.2         138  |  108  |  50<H>  ----------------------------<  117<H>  4.4   |  23  |  5.54<H>    Ca    8.7      2021 09:16  Phos  4.9       Mg     1.9         TPro  6.4  /  Alb  3.1<L>  /  TBili  0.4  /  DBili  x   /  AST  22  /  ALT  42  /  AlkPhos  83        Urinalysis Basic - ( 2021 20:34 )    Color: Yellow / Appearance: Clear / S.010 / pH: x  Gluc: x / Ketone: Negative  / Bili: Negative / Urobili: Negative   Blood: x / Protein: 500 mg/dL / Nitrite: Negative   Leuk Esterase: Negative / RBC: x / WBC x   Sq Epi: x / Non Sq Epi: x / Bacteria: x        CARDIAC MARKERS ( 2021 09:16 )  x     / x     / 155 U/L / x     / x                    MICROBIOLOGY: (if applicable)    RADIOLOGY & ADDITIONAL STUDIES:  EKG:   CXR:  ECHO:< from: US Renal (21 @ 11:46) >    EXAM:  US KIDNEY(S)                            PROCEDURE DATE:  2021          INTERPRETATION:  CLINICAL INFORMATION: Acute kidney injury    COMPARISON: None available.    TECHNIQUE: Sonography of the kidneys and bladder.    FINDINGS:    Rightkidney: 9.0 cm. No renal mass, hydronephrosis or calculi. The right kidney is hyperechoic.    Left kidney: 10.3 cm. No renal mass, hydronephrosis or calculi. The left kidney is hyperechoic.    Urinary bladder: Underdistended.    IMPRESSION:    No hydronephrosis.    Both kidneys are hyperechoic which clinical correlation with intrinsic medical renal disease is recommended.                  ANTWON LINARES MD; Attending Radiologist  This document has been electronically signed. 2021  2:09PM    < end of copied text >      IMPRESSION: 25y Male PAST MEDICAL & SURGICAL HISTORY:  Asthma    No significant past surgical history     p/w       IMP: This is a 25 yr with asthma ,non smoker presented headaches due to hypertensive crisis and CIARRA. Metabolic acidosis due to CIARRA . Asthma stable       Sugg;  -continue symbicort 80/4.5 bid  -metanephrines/ VMA pending  -neph noted   -Renal sono noted as above   -will need kidney bx  -f/u all serological makers   -monitor BP, better   -continue Norvasc  -CIARRA work up  DVT/ GI prophy

## 2021-02-26 DIAGNOSIS — I47.1 SUPRAVENTRICULAR TACHYCARDIA: ICD-10-CM

## 2021-02-26 LAB
5OH-INDOLEACETATE UR-MCNC: 2.2 MG/G CREAT — SIGNIFICANT CHANGE UP
ALBUMIN SERPL ELPH-MCNC: 3.2 G/DL — LOW (ref 3.5–5)
ALDOST SERPL-MCNC: 11.3 NG/DL — SIGNIFICANT CHANGE UP
ALP SERPL-CCNC: 92 U/L — SIGNIFICANT CHANGE UP (ref 40–120)
ALT FLD-CCNC: 43 U/L DA — SIGNIFICANT CHANGE UP (ref 10–60)
ANION GAP SERPL CALC-SCNC: 9 MMOL/L — SIGNIFICANT CHANGE UP (ref 5–17)
AST SERPL-CCNC: 23 U/L — SIGNIFICANT CHANGE UP (ref 10–40)
BILIRUB SERPL-MCNC: 0.4 MG/DL — SIGNIFICANT CHANGE UP (ref 0.2–1.2)
BUN SERPL-MCNC: 48 MG/DL — HIGH (ref 7–18)
CALCIUM SERPL-MCNC: 9.5 MG/DL — SIGNIFICANT CHANGE UP (ref 8.4–10.5)
CHLORIDE SERPL-SCNC: 108 MMOL/L — SIGNIFICANT CHANGE UP (ref 96–108)
CO2 SERPL-SCNC: 22 MMOL/L — SIGNIFICANT CHANGE UP (ref 22–31)
CREAT ?TM UR-MCNC: 57 MG/DL — SIGNIFICANT CHANGE UP
CREAT SERPL-MCNC: 5.48 MG/DL — HIGH (ref 0.5–1.3)
CREATININE, RNDM UR: 78 MG/DL — SIGNIFICANT CHANGE UP (ref 20–320)
GBM IGG SER-ACNC: 5 — SIGNIFICANT CHANGE UP (ref 0–20)
GLUCOSE SERPL-MCNC: 91 MG/DL — SIGNIFICANT CHANGE UP (ref 70–99)
HCT VFR BLD CALC: 36.8 % — LOW (ref 39–50)
HGB BLD-MCNC: 12.1 G/DL — LOW (ref 13–17)
MAGNESIUM SERPL-MCNC: 2.1 MG/DL — SIGNIFICANT CHANGE UP (ref 1.6–2.6)
MCHC RBC-ENTMCNC: 28.3 PG — SIGNIFICANT CHANGE UP (ref 27–34)
MCHC RBC-ENTMCNC: 32.9 GM/DL — SIGNIFICANT CHANGE UP (ref 32–36)
MCV RBC AUTO: 86 FL — SIGNIFICANT CHANGE UP (ref 80–100)
NRBC # BLD: 0 /100 WBCS — SIGNIFICANT CHANGE UP (ref 0–0)
PHOSPHATE SERPL-MCNC: 5 MG/DL — HIGH (ref 2.5–4.5)
PLATELET # BLD AUTO: 255 K/UL — SIGNIFICANT CHANGE UP (ref 150–400)
POTASSIUM SERPL-MCNC: 4.7 MMOL/L — SIGNIFICANT CHANGE UP (ref 3.5–5.3)
POTASSIUM SERPL-SCNC: 4.7 MMOL/L — SIGNIFICANT CHANGE UP (ref 3.5–5.3)
PROT ?TM UR-MCNC: 218 MG/DL — HIGH (ref 0–12)
PROT SERPL-MCNC: 7 G/DL — SIGNIFICANT CHANGE UP (ref 6–8.3)
RBC # BLD: 4.28 M/UL — SIGNIFICANT CHANGE UP (ref 4.2–5.8)
RBC # FLD: 12.2 % — SIGNIFICANT CHANGE UP (ref 10.3–14.5)
SODIUM SERPL-SCNC: 139 MMOL/L — SIGNIFICANT CHANGE UP (ref 135–145)
WBC # BLD: 8.65 K/UL — SIGNIFICANT CHANGE UP (ref 3.8–10.5)
WBC # FLD AUTO: 8.65 K/UL — SIGNIFICANT CHANGE UP (ref 3.8–10.5)

## 2021-02-26 RX ORDER — HYDRALAZINE HCL 50 MG
25 TABLET ORAL THREE TIMES A DAY
Refills: 0 | Status: DISCONTINUED | OUTPATIENT
Start: 2021-02-26 | End: 2021-03-06

## 2021-02-26 RX ORDER — HYDRALAZINE HCL 50 MG
2.5 TABLET ORAL ONCE
Refills: 0 | Status: COMPLETED | OUTPATIENT
Start: 2021-02-26 | End: 2021-02-26

## 2021-02-26 RX ADMIN — LIDOCAINE 1 PATCH: 4 CREAM TOPICAL at 00:35

## 2021-02-26 RX ADMIN — BUDESONIDE AND FORMOTEROL FUMARATE DIHYDRATE 2 PUFF(S): 160; 4.5 AEROSOL RESPIRATORY (INHALATION) at 21:11

## 2021-02-26 RX ADMIN — LIDOCAINE 1 PATCH: 4 CREAM TOPICAL at 00:36

## 2021-02-26 RX ADMIN — Medication 25 MILLIGRAM(S): at 21:11

## 2021-02-26 RX ADMIN — LIDOCAINE 1 PATCH: 4 CREAM TOPICAL at 12:43

## 2021-02-26 RX ADMIN — Medication 25 MILLIGRAM(S): at 17:27

## 2021-02-26 RX ADMIN — Medication 650 MILLIGRAM(S): at 12:43

## 2021-02-26 RX ADMIN — AMLODIPINE BESYLATE 10 MILLIGRAM(S): 2.5 TABLET ORAL at 06:26

## 2021-02-26 RX ADMIN — ATORVASTATIN CALCIUM 40 MILLIGRAM(S): 80 TABLET, FILM COATED ORAL at 21:11

## 2021-02-26 RX ADMIN — Medication 650 MILLIGRAM(S): at 22:40

## 2021-02-26 RX ADMIN — Medication 2.5 MILLIGRAM(S): at 00:40

## 2021-02-26 RX ADMIN — Medication 10 MILLIGRAM(S): at 06:26

## 2021-02-26 NOTE — PROGRESS NOTE ADULT - ATTENDING COMMENTS
[facility] NEPHROLOGY  Justin Mckenzie M.D.  Chad Reich D.O.  Beth Massey M.D.  Shayy Navarro, MSN, ANP-C  [phone]    [address] [place] [address]

## 2021-02-26 NOTE — PROGRESS NOTE ADULT - SUBJECTIVE AND OBJECTIVE BOX
[facility] NEPHROLOGY- PROGRESS NOTE    Patient is a 24yo Male with Asthma on Dupixent injections q 2kweeks p/w HA, n/v x 5 days. Pt sent in by his PMD due to elevated SBP >170. Pt was a/w CIARRA and hypertensive emergency. Nephrology consulted for Elevated serum creatinine.    Hospital Medications: Medications reviewed.  REVIEW OF SYSTEMS:  CONSTITUTIONAL: No fevers or chills +HA  RESPIRATORY: No shortness of breath  CARDIOVASCULAR: No chest pain.  GASTROINTESTINAL: No nausea, vomiting, diarrhea or abdominal pain.   VASCULAR: No bilateral lower extremity edema.     VITALS:  T(F): 97.7 (21 @ 11:28), Max: 97.9 (21 @ 23:20)  HR: 86 (21 @ 11:28)  BP: 140/80 (21 @ 11:28)  RR: 18 (21 @ 11:28)  SpO2: 100% (21 @ 11:28)  Wt(kg): --     @ 07:01  -   @ 12:56  --------------------------------------------------------  IN: 250 mL / OUT: 0 mL / NET: 250 mL      PHYSICAL EXAM:  Constitutional: NAD  Neurological: Awake and Alert  HEENT: anicteric sclera,   Respiratory: CTAB, no wheezes, rales or rhonchi  Cardiovascular: S1, S2, RRR  Gastrointestinal: BS+, soft, NT/ND  Extremities: No peripheral edema      LABS:      139  |  108  |  48<H>  ----------------------------<  91  4.7   |  22  |  5.48<H>    Ca    9.5      2021 08:51  Phos  5.0       Mg     2.1         TPro  7.0  /  Alb  3.2<L>  /  TBili  0.4  /  DBili      /  AST  23  /  ALT  43  /  AlkPhos  92      Creatinine Trend: 5.48 <--, 5.54 <--, 5.82 <--, 6.00 <--, 5.80 <--, 6.17 <--                        12.1   8.65  )-----------( 255      ( 2021 08:51 )             36.8     Urine Studies:  Urinalysis Basic - ( 2021 20:34 )    Color: Yellow / Appearance: Clear / S.010 / pH:   Gluc:  / Ketone: Negative  / Bili: Negative / Urobili: Negative   Blood:  / Protein: 500 mg/dL / Nitrite: Negative   Leuk Esterase: Negative / RBC: 2-5 /HPF / WBC 0-2 /HPF   Sq Epi:  / Non Sq Epi: Few /HPF / Bacteria: Few /HPF      Chloride, Random Urine: 58 mmol/L ( @ 21:47)  Sodium, Random Urine: 63 mmol/L ( @ 21:47)  Osmolality, Random Urine: 337 mos/kg ( @ 21:47)  Creatinine, Random Urine: 79 mg/dL ( @ 21:47)     [facility] NEPHROLOGY- PROGRESS NOTE    Patient is a 24yo Male with Asthma on Dupixent injections q 2kweeks p/w HA, n/v x 5 days. Pt sent in by his PMD due to elevated SBP >170. Pt was a/w CIARRA and hypertensive emergency. Nephrology consulted for Elevated serum creatinine.    Hospital Medications: Medications reviewed.  REVIEW OF SYSTEMS:  CONSTITUTIONAL: No fevers or chills +HA, +dysuria o/n but now resolved  RESPIRATORY: No shortness of breath  CARDIOVASCULAR: No chest pain.  GASTROINTESTINAL: No nausea, vomiting, diarrhea or abdominal pain.   VASCULAR: No bilateral lower extremity edema.     VITALS:  T(F): 97.7 (21 @ 11:28), Max: 97.9 (21 @ 23:20)  HR: 86 (21 @ 11:28)  BP: 140/80 (21 @ 11:28)  RR: 18 (21 @ 11:28)  SpO2: 100% (21 @ 11:28)  Wt(kg): --     @ 07:01  -   @ 12:56  --------------------------------------------------------  IN: 250 mL / OUT: 0 mL / NET: 250 mL      PHYSICAL EXAM:  Constitutional: NAD  Neurological: Awake and Alert  HEENT: anicteric sclera,   Respiratory: CTAB, no wheezes, rales or rhonchi  Cardiovascular: S1, S2, RRR  Gastrointestinal: BS+, soft, NT/ND  Extremities: No peripheral edema      LABS:      139  |  108  |  48<H>  ----------------------------<  91  4.7   |  22  |  5.48<H>    Ca    9.5      2021 08:51  Phos  5.0       Mg     2.1         TPro  7.0  /  Alb  3.2<L>  /  TBili  0.4  /  DBili      /  AST  23  /  ALT  43  /  AlkPhos  92      Creatinine Trend: 5.48 <--, 5.54 <--, 5.82 <--, 6.00 <--, 5.80 <--, 6.17 <--                        12.1   8.65  )-----------( 255      ( 2021 08:51 )             36.8     Urine Studies:  Urinalysis Basic - ( 2021 20:34 )    Color: Yellow / Appearance: Clear / S.010 / pH:   Gluc:  / Ketone: Negative  / Bili: Negative / Urobili: Negative   Blood:  / Protein: 500 mg/dL / Nitrite: Negative   Leuk Esterase: Negative / RBC: 2-5 /HPF / WBC 0-2 /HPF   Sq Epi:  / Non Sq Epi: Few /HPF / Bacteria: Few /HPF      Chloride, Random Urine: 58 mmol/L ( @ 21:47)  Sodium, Random Urine: 63 mmol/L ( @ 21:47)  Osmolality, Random Urine: 337 mos/kg ( @ 21:47)  Creatinine, Random Urine: 79 mg/dL ( @ 21:47)

## 2021-02-26 NOTE — PROGRESS NOTE ADULT - SUBJECTIVE AND OBJECTIVE BOX
Time of Visit:  Patient seen and examined.     MEDICATIONS  (STANDING):  amLODIPine   Tablet 10 milliGRAM(s) Oral daily  atorvastatin 40 milliGRAM(s) Oral at bedtime  budesonide  80 MICROgram(s)/formoterol 4.5 MICROgram(s) Inhaler 2 Puff(s) Inhalation two times a day  hydrALAZINE 25 milliGRAM(s) Oral three times a day  lidocaine   Patch 1 Patch Transdermal daily  sodium chloride 0.9%. 1000 milliLiter(s) (85 mL/Hr) IV Continuous <Continuous>      MEDICATIONS  (PRN):  acetaminophen   Tablet .. 650 milliGRAM(s) Oral every 6 hours PRN Temp greater or equal to 38C (100.4F), Mild Pain (1 - 3)  ALBUTerol    90 MICROgram(s) HFA Inhaler 2 Puff(s) Inhalation every 6 hours PRN Bronchospasm  ondansetron Injectable 5 milliGRAM(s) IV Push every 6 hours PRN Nausea and/or Vomiting       Medications up to date at time of exam.      PHYSICAL EXAMINATION:  Patient has no new complaints.  GENERAL: The patient is a well-developed, well-nourished, in no apparent distress.     Vital Signs Last 24 Hrs  T(C): 36.3 (2021 19:45), Max: 36.6 (2021 23:20)  T(F): 97.4 (2021 19:45), Max: 97.9 (2021 23:20)  HR: 85 (2021 19:45) (67 - 86)  BP: 148/99 (2021 19:45) (137/94 - 159/107)  BP(mean): --  RR: 17 (2021 19:45) (16 - 18)  SpO2: 100% (2021 19:45) (99% - 100%)   (if applicable)    Chest Tube (if applicable)    HEENT: Head is normocephalic and atraumatic. Extraocular muscles are intact. Mucous membranes are moist.     NECK: Supple, no palpable adenopathy.    LUNGS: Clear to auscultation, no wheezing, rales, or rhonchi.    HEART: Regular rate and rhythm without murmur.    ABDOMEN: Soft, nontender, and nondistended.  No hepatosplenomegaly is noted.    : No painful voiding, no pelvic pain    EXTREMITIES: Without any cyanosis, clubbing, rash, lesions or edema.    NEUROLOGIC: Awake, alert, oriented, grossly intact    SKIN: Warm, dry, good turgor.      LABS:                        12.1   8.65  )-----------( 255      ( 2021 08:51 )             36.8         139  |  108  |  48<H>  ----------------------------<  91  4.7   |  22  |  5.48<H>    Ca    9.5      2021 08:51  Phos  5.0       Mg     2.1         TPro  7.0  /  Alb  3.2<L>  /  TBili  0.4  /  DBili  x   /  AST  23  /  ALT  43  /  AlkPhos  92        Urinalysis Basic - ( 2021 20:34 )    Color: Yellow / Appearance: Clear / S.010 / pH: x  Gluc: x / Ketone: Negative  / Bili: Negative / Urobili: Negative   Blood: x / Protein: 500 mg/dL / Nitrite: Negative   Leuk Esterase: Negative / RBC: 2-5 /HPF / WBC 0-2 /HPF   Sq Epi: x / Non Sq Epi: Few /HPF / Bacteria: Few /HPF        CARDIAC MARKERS ( 2021 09:16 )  x     / x     / 155 U/L / x     / x                    MICROBIOLOGY: (if applicable)    RADIOLOGY & ADDITIONAL STUDIES:  EKG:   CXR:  ECHO:    IMPRESSION: 25y Male PAST MEDICAL & SURGICAL HISTORY:  Asthma    No significant past surgical history     p/w             IMP: This is a 25 yr with asthma ,non smoker presented headaches due to hypertensive crisis and CIARRA. Metabolic acidosis due to CIARRA . Asthma stable       Sugg;  -BP still uncontrol   -adjust BP meds  -for kidney bx 3/1  -continue symbicort 80/4.5 bid  -metanephrines/ VMA pending  -neph noted   -Renal sono noted as above   -will need kidney bx  -f/u all serological makers   -monitor BP, better   -continue Norvasc  -CIARRA work up  DVT/ GI prophy

## 2021-02-26 NOTE — PROGRESS NOTE ADULT - PROBLEM SELECTOR PLAN 4
mildly elevated LFTs   hepatitis panel negative  LFTs trending down, monitor  CT abdomen and pelvis showed no acute findings. Mildly elevated LFTs   Hepatitis panel negative  LFTs normalized AST/ALT (23/43)  CT abdomen and pelvis showed no acute findings.

## 2021-02-26 NOTE — PROGRESS NOTE ADULT - ASSESSMENT
Patient is a 26yo Male with Asthma on Dupixent injections q 2kweeks p/w HA, n/v x 5 days. Pt sent in by his PMD due to elevated SBP >170. Pt was a/w CIARRA and hypertensive emergency. Nephrology consulted for Elevated serum creatinine.    1. CIARRA- ?GN vs. CIARRA on CKD due to uncontrolled HTN. UA with 500 protein and mod blood; rbc 5-10. Pt on Dupixent- no urinary eos, peripheral eos or rash. Spot UPr/Cr 3.3- nephrotic range proteinuria without nephrotic syndrome (albumin 3.4 with no edema). Recc to repeat spot UPr/Cr. Renal function slowly improving- no need for dialysis at this time.   CK 1141, should not cause significant CIARRA. Serological w/u pending; will f/u (thus far Normal C3, C4, SIFE neg, K/L 1.7, Syphillis neg,  Neg ASO, neg ANCA, neg ELMA, neg dsDNA, neg HIV, neg HepBsAg, and neg Hep C ab).   Discussed the need for kidney biopsy to determine etiology of kidney disease if serological w/u neg. Discussed risk/ benefits/ alt of kidney biopsy- pt agreeable; will plan for possible kidney biopsy on 3/1 (discussed with IR Lise LAU). Will keep NPO after midnight on 2/28. Check Coags on 3/1 and give DDAVP 22 mcg IV x1 30 mins prior to IR procedure. Strict I/Os. Avoid nephrotoxins/ NSAIDs/ RCA. Monitor BMP.    2. CKD unspecified- previous SCr 1.29 in 2018 with proteinuria. See above.  PTHi 99 elevated. Recc low phos diet. Avoid nephrotoxins    3. Hypertensive emergency- BP in /114; CT head neg. BP remains elevated. c/w Amlodipine 10mg PO qd. Will increase Hydralazine to 25mg PO tid, titrate as needed. c/w low salt diet.   Secondary w/u pending: UTox neg. Sebas 27.2 ng/dl; Renin 4.9 pg/ml (0.49ng/dl) Endo following. Low suspicion of pheo- CT with normal adrenals. w/u pending. TTE: mild MR, mild LVH.  Pt will need Renal US with dopplers as an outpt. Monitor BP  4. Metabolic Acidosis-in the setting of renal failure. Serum CO2 wnl. Serum lactate wnl and VBG ph 7.31. No need for sodium bicarb tabs at this time. Monitor ph/Co2 Patient is a 26yo Male with Asthma on Dupixent injections q 2kweeks p/w HA, n/v x 5 days. Pt sent in by his PMD due to elevated SBP >170. Pt was a/w CIARRA and hypertensive emergency. Nephrology consulted for Elevated serum creatinine.    1. CIARRA- ?GN vs. CIARRA on CKD due to uncontrolled HTN. UA with 500 protein and mod blood; rbc 5-10. Pt on Dupixent- no urinary eos, peripheral eos or rash. Spot UPr/Cr 3.3- nephrotic range proteinuria without nephrotic syndrome (albumin 3.4 with no edema). Recc to repeat spot UPr/Cr. Renal function slowly improving- no need for dialysis at this time.   CK 1141, should not cause significant CIARRA. Serological w/u neg except PLAR-2 pending will f/u (thus far Normal C3, C4, SIFE neg, K/L 1.7, Syphillis neg,  Neg ASO, neg ANCA, neg ELMA, neg dsDNA, neg HIV, neg HepBsAg, and neg Hep C ab and neg antiGBM).   Discussed the need for kidney biopsy to determine etiology of kidney disease if serological w/u neg. Discussed risk/ benefits/ alt of kidney biopsy- pt agreeable; will plan for possible kidney biopsy on 3/1 (discussed with IR Lise LAU). Will keep NPO after midnight on 2/28. Check Coags on 3/1 and give DDAVP 22 mcg IV x1 30 mins prior to IR procedure. Strict I/Os. Avoid nephrotoxins/ NSAIDs/ RCA. Monitor BMP.    2. CKD unspecified- previous SCr 1.29 in 2018 with proteinuria. See above.  PTHi 99 elevated. Recc low phos diet. Avoid nephrotoxins    3. Hypertensive emergency- BP in /114; CT head neg. BP remains elevated. c/w Amlodipine 10mg PO qd. Will increase Hydralazine to 25mg PO tid, titrate as needed. c/w low salt diet.   Secondary w/u pending: UTox neg. Sebas 27.2 ng/dl; Renin 4.9 pg/ml (0.49ng/dl) Endo following. Low suspicion of pheo- CT with normal adrenals. w/u pending. TTE: mild MR, mild LVH.  Pt will need Renal US with dopplers as an outpt. Monitor BP  4. Metabolic Acidosis-in the setting of renal failure. Serum CO2 wnl. Serum lactate wnl and VBG ph 7.31. No need for sodium bicarb tabs at this time. Monitor ph/Co2

## 2021-02-26 NOTE — PROGRESS NOTE ADULT - PROBLEM SELECTOR PLAN 6
IMPROVE VTE Individual Risk Assessment  RISK                                                         Points  [  ] Previous VTE                                      3  [  ] Thrombophilia                                   2  [  ] Lower limb paralysis                         2 (unable to hold up >15 seconds)    [  ] Current Cancer                                  2       (within 6 months)  [  ] Immobilization > 24 hrs                    1  [  ] ICU/CCU stay > 24 hrs                         1  [  ] Age > 60                                              1  will hold chemo dvt ppx for now Continue with home medications

## 2021-02-26 NOTE — PROGRESS NOTE ADULT - PROBLEM SELECTOR PLAN 1
Pt presented with Headaches, Pt noted to have sbp >180 on admission  S/p amlodipine 5 mg and hydralazine in ED   CTH negative   C/w Amlodipine 5mg PO qd and low salt diet.   Started Hydralazine 10mg PO tid, titrate as needed.  Monitor BP closely  /96 this AM   Sebas elevated 27.2 ng/dl; Renin 4.9 pg/ml (0.49ng/dl): Sebas/ Renin 55.5  Endocrine consulted: repeat renin and aldosterone for aldosterone renin ratio  Nephro Dr. Massey following, appreciate recommendations Pt presented with Headaches, Pt noted to have sbp >180 on admission  S/p amlodipine 5 mg and hydralazine in ED   CTH negative   C/w Amlodipine 10mg PO qd and low salt diet.   Started Hydralazine 10mg PO tid, titrate as needed.  Monitor BP closely  /95 this AM   Sebas elevated 27.2 ng/dl; Renin 4.9 pg/ml (0.49ng/dl): Sebas/ Renin 55.5  Endocrine consulted  Nephro Dr. Massey following, appreciate recommendations Pt presented with Headaches, Pt noted to have sbp >180 on admission  S/p amlodipine 5 mg and hydralazine in ED   CTH negative   C/w Amlodipine 10mg PO qd and low salt diet.   Started Hydralazine 25mg PO tid, titrate as needed.  Monitor BP closely  /95 this AM   Sebas elevated 27.2 ng/dl; Renin 4.9 pg/ml (0.49ng/dl): Sebas/ Renin 55.5  TTE: mild MR, mild LVH   Endocrine consulted  Nephro Dr. Massey following, appreciate recommendations

## 2021-02-26 NOTE — PROGRESS NOTE ADULT - SUBJECTIVE AND OBJECTIVE BOX
Patient denies chest pain or shortness of breath.   Review of systems otherwise (-)  	  acetaminophen   Tablet .. 650 milliGRAM(s) Oral every 6 hours PRN  ALBUTerol    90 MICROgram(s) HFA Inhaler 2 Puff(s) Inhalation every 6 hours PRN  amLODIPine   Tablet 10 milliGRAM(s) Oral daily  atorvastatin 40 milliGRAM(s) Oral at bedtime  budesonide  80 MICROgram(s)/formoterol 4.5 MICROgram(s) Inhaler 2 Puff(s) Inhalation two times a day  hydrALAZINE 25 milliGRAM(s) Oral three times a day  lidocaine   Patch 1 Patch Transdermal daily  ondansetron Injectable 5 milliGRAM(s) IV Push every 6 hours PRN  sodium chloride 0.9%. 1000 milliLiter(s) IV Continuous <Continuous>                            12.1   8.65  )-----------( 255      ( 26 Feb 2021 08:51 )             36.8       Hemoglobin: 12.1 g/dL (02-26 @ 08:51)  Hemoglobin: 10.9 g/dL (02-25 @ 09:16)  Hemoglobin: 9.9 g/dL (02-24 @ 08:18)  Hemoglobin: 11.2 g/dL (02-23 @ 09:13)  Hemoglobin: 11.0 g/dL (02-22 @ 16:39)      02-26    139  |  108  |  48<H>  ----------------------------<  91  4.7   |  22  |  5.48<H>    Ca    9.5      26 Feb 2021 08:51  Phos  5.0     02-26  Mg     2.1     02-26    TPro  7.0  /  Alb  3.2<L>  /  TBili  0.4  /  DBili  x   /  AST  23  /  ALT  43  /  AlkPhos  92  02-26    Creatinine Trend: 5.48<--, 5.54<--, 5.82<--, 6.00<--, 5.80<--, 6.17<--    COAGS:     CARDIAC MARKERS ( 25 Feb 2021 09:16 )  x     / x     / 155 U/L / x     / x            T(C): 36.3 (02-26-21 @ 07:47), Max: 36.6 (02-25-21 @ 23:20)  HR: 85 (02-26-21 @ 07:47) (67 - 89)  BP: 153/95 (02-26-21 @ 07:47) (142/95 - 159/107)  RR: 18 (02-26-21 @ 07:47) (16 - 18)  SpO2: 100% (02-26-21 @ 07:47) (99% - 100%)  Wt(kg): --    I&O's Summary      HEENT:  (-)icterus (-)pallor  CV: N S1 S2 1/6 SHAGUFTA (+)2 Pulses B/l  Resp:  Clear to ausculatation B/L, normal effort  GI: (+) BS Soft, NT, ND  Lymph:  (-)Edema, (-)obvious lymphadenopathy  Skin: Warm to touch, Normal turgor  Psych: Appropriate mood and affect      TELEMETRY: 	  sinus, sinus tach, brief PAT        ASSESSMENT/PLAN: 	25y  Male PMH of asthma presented to his PCP with complaints of headache, nausea and vomiting x 5 days found with hypertnesive urgency and CIARRA.    - Started on Norvasc.    -  Check Serum renin and tim levels  - 24 hour urine collection for urine catecholamines and metanephrine  - TSH within normal Limits  - echo with no significant structural heart disease  - renal f/u for biopsy next week  - EP ofelia mclain will consider switching to moon Peres MD, [facility]  BEEPER [phone]

## 2021-02-26 NOTE — PROGRESS NOTE ADULT - SUBJECTIVE AND OBJECTIVE BOX
PGY-1 Progress Note discussed with attending    PAGER #: [[phone]] TILL 5:00 PM  PLEASE CONTACT ON CALL TEAM:  - On Call Team (Please refer to Prasanna) FROM 5:00 PM - 8:30PM  - Nightfloat Team FROM 8:30 -7:30 AM    CHIEF COMPLAINT & BRIEF HOSPITAL COURSE:  26 yo male with PMH of asthma presented to his PCP with complaints of headache, nausea and vomiting x 5 days. Pt was noted to have elevated sBP of >170 and was advised to come to emergency department. Patient states he started having headaches 5 days ago associated with nausea and 1 day hx of NBNB vomiting. Pt endorses episode of dysuria few days ago which has now resolved. Pt denies head trauma, any prior episodes of similar headaches. No family hx of Polycystic kidney disease, CVA, HTN. Denies diarrhea, abdominal pain, fever, chills, recent viral infection, no blood in urine, no flank pain, no urinary rentention. In the ED the patients BP was 185/114. He was given amlodipine and hydralazine. He was continued on amlodipine and his BP was better controlled. He was noted to have a creatinine of 6. UA showed moderate blood, 500 protein. FeNa: 3.3%. ELMA, ANCA, dsDNA, ASO, Treponema Pallidum ab and Hepatitis panel all negative. Immunofixation negative for monoclonal bands. CT A/P was negative for any obstructive uropathy or acute findings.  Renal US showed bilateral hyperechoic kidneys.    INTERVAL HPI/OVERNIGHT EVENTS: No acute events overnight. Continuing with renal workup. Thus far ELMA, ANCA, dsDNA, ASO, Treponema Pallidum ab and Hepatitis panel all negative. Immunofixation negative for monoclonal bands. Sebas/renin 55.5. F/u urine metanephrines/VMA. Nephro Dr. Massey following.     MEDICATIONS:  acetaminophen   Tablet .. 650 milliGRAM(s) Oral every 6 hours PRN  ALBUTerol    90 MICROgram(s) HFA Inhaler 2 Puff(s) Inhalation every 6 hours PRN  amLODIPine   Tablet 10 milliGRAM(s) Oral daily  atorvastatin 40 milliGRAM(s) Oral at bedtime  budesonide  80 MICROgram(s)/formoterol 4.5 MICROgram(s) Inhaler 2 Puff(s) Inhalation two times a day  hydrALAZINE 10 milliGRAM(s) Oral three times a day  lidocaine   Patch 1 Patch Transdermal daily  ondansetron Injectable 5 milliGRAM(s) IV Push every 6 hours PRN  sodium chloride 0.9%. 1000 milliLiter(s) IV Continuous <Continuous>      REVIEW OF SYSTEMS:  CONSTITUTIONAL: No fever, weight loss, or fatigue  RESPIRATORY: No cough, wheezing, chills or hemoptysis; No shortness of breath  CARDIOVASCULAR: No chest pain, palpitations, dizziness, or leg swelling  GASTROINTESTINAL: No abdominal pain. No nausea, vomiting, or hematemesis; No diarrhea or constipation. No melena or hematochezia.  GENITOURINARY: No dysuria or hematuria, urinary frequency  NEUROLOGICAL: No headaches, memory loss, loss of strength, numbness, or tremors  SKIN: No itching, burning, rashes, or lesions     Vital Signs Last 24 Hrs  T(C): 36.3 (26 Feb 2021 05:02), Max: 36.6 (25 Feb 2021 23:20)  T(F): 97.4 (26 Feb 2021 05:02), Max: 97.9 (25 Feb 2021 23:20)  HR: 77 (26 Feb 2021 05:02) (67 - 89)  BP: 144/96 (26 Feb 2021 05:02) (142/95 - 159/107)  BP(mean): --  RR: 16 (26 Feb 2021 05:02) (16 - 18)  SpO2: 100% (26 Feb 2021 05:02) (99% - 100%)    PHYSICAL EXAMINATION:  GENERAL: NAD, well built  HEAD:  Atraumatic, Normocephalic  EYES:  conjunctiva and sclera clear  NECK: Supple, No JVD, Normal thyroid  CHEST/LUNG: Clear to auscultation. Clear to percussion bilaterally; No rales, rhonchi, wheezing, or rubs  HEART: Regular rate and rhythm; No murmurs, rubs, or gallops  ABDOMEN: Soft, Nontender, Nondistended; Bowel sounds present  NERVOUS SYSTEM:  Alert & Oriented X3,    EXTREMITIES:  2+ Peripheral Pulses, No clubbing, cyanosis, or edema  SKIN: warm dry                          10.9   5.80  )-----------( 188      ( 25 Feb 2021 09:16 )             32.2     02-25    138  |  108  |  50<H>  ----------------------------<  117<H>  4.4   |  23  |  5.54<H>    Ca    8.7      25 Feb 2021 09:16  Phos  4.9     02-25  Mg     1.9     02-25    TPro  6.4  /  Alb  3.1<L>  /  TBili  0.4  /  DBili  x   /  AST  22  /  ALT  42  /  AlkPhos  83  02-25    LIVER FUNCTIONS - ( 25 Feb 2021 09:16 )  Alb: 3.1 g/dL / Pro: 6.4 g/dL / ALK PHOS: 83 U/L / ALT: 42 U/L DA / AST: 22 U/L / GGT: x           CARDIAC MARKERS ( 25 Feb 2021 09:16 )  x     / x     / 155 U/L / x     / x              CAPILLARY BLOOD GLUCOSE      RADIOLOGY & ADDITIONAL TESTS:                   PGY-1 Progress Note discussed with attending    PAGER #: [[phone]] TILL 5:00 PM  PLEASE CONTACT ON CALL TEAM:  - On Call Team (Please refer to Prasanna) FROM 5:00 PM - 8:30PM  - Nightfloat Team FROM 8:30 -7:30 AM    CHIEF COMPLAINT & BRIEF HOSPITAL COURSE:  26 yo male with PMH of asthma presented to his PCP with complaints of headache, nausea and vomiting x 5 days. Pt was noted to have elevated sBP of >170 and was advised to come to emergency department. Patient states he started having headaches 5 days ago associated with nausea and 1 day hx of NBNB vomiting. Pt endorses episode of dysuria few days ago which has now resolved. Pt denies head trauma, any prior episodes of similar headaches. No family hx of Polycystic kidney disease, CVA, HTN. Denies diarrhea, abdominal pain, fever, chills, recent viral infection, no blood in urine, no flank pain, no urinary rentention. In the ED the patients BP was 185/114. He was given amlodipine and hydralazine. He was continued on amlodipine and his BP was better controlled. He was noted to have a creatinine of 6. UA showed moderate blood, 500 protein. FeNa: 3.3%. Normal C3, C4, SIFE neg, K/L 1.7, ELMA, ANCA, dsDNA, ASO, Treponema Pallidum ab and Hepatitis panel all negative. Immunofixation negative for monoclonal bands. CT A/P was negative for any obstructive uropathy or acute findings.  Renal US showed bilateral hyperechoic kidneys.    INTERVAL HPI/OVERNIGHT EVENTS: No acute events overnight. Thus far, normal C3, C4, SIFE neg, K/L 1.7, Syphillis neg, neg ASO, neg ANCA, neg ELMA, neg dsDNA, neg HIV, neg HepBsAg, and neg Hep C ab. Nephro Dr. Massey following.     MEDICATIONS:  acetaminophen   Tablet .. 650 milliGRAM(s) Oral every 6 hours PRN  ALBUTerol    90 MICROgram(s) HFA Inhaler 2 Puff(s) Inhalation every 6 hours PRN  amLODIPine   Tablet 10 milliGRAM(s) Oral daily  atorvastatin 40 milliGRAM(s) Oral at bedtime  budesonide  80 MICROgram(s)/formoterol 4.5 MICROgram(s) Inhaler 2 Puff(s) Inhalation two times a day  hydrALAZINE 10 milliGRAM(s) Oral three times a day  lidocaine   Patch 1 Patch Transdermal daily  ondansetron Injectable 5 milliGRAM(s) IV Push every 6 hours PRN  sodium chloride 0.9%. 1000 milliLiter(s) IV Continuous <Continuous>      REVIEW OF SYSTEMS:  CONSTITUTIONAL: No fever, weight loss, or fatigue  RESPIRATORY: No cough, wheezing, chills or hemoptysis; No shortness of breath  CARDIOVASCULAR: No chest pain, palpitations, dizziness, or leg swelling  GASTROINTESTINAL: No abdominal pain. No nausea, vomiting, or hematemesis; No diarrhea or constipation. No melena or hematochezia.  GENITOURINARY: No dysuria or hematuria, urinary frequency  NEUROLOGICAL: No headaches, memory loss, loss of strength, numbness, or tremors  SKIN: No itching, burning, rashes, or lesions     Vital Signs Last 24 Hrs  T(C): 36.3 (26 Feb 2021 05:02), Max: 36.6 (25 Feb 2021 23:20)  T(F): 97.4 (26 Feb 2021 05:02), Max: 97.9 (25 Feb 2021 23:20)  HR: 77 (26 Feb 2021 05:02) (67 - 89)  BP: 144/96 (26 Feb 2021 05:02) (142/95 - 159/107)  BP(mean): --  RR: 16 (26 Feb 2021 05:02) (16 - 18)  SpO2: 100% (26 Feb 2021 05:02) (99% - 100%)    PHYSICAL EXAMINATION:  GENERAL: NAD, well built  HEAD:  Atraumatic, Normocephalic  EYES:  conjunctiva and sclera clear  NECK: Supple, No JVD, Normal thyroid  CHEST/LUNG: Clear to auscultation. Clear to percussion bilaterally; No rales, rhonchi, wheezing, or rubs  HEART: Regular rate and rhythm; No murmurs, rubs, or gallops  ABDOMEN: Soft, Nontender, Nondistended; Bowel sounds present  NERVOUS SYSTEM:  Alert & Oriented X3,    EXTREMITIES:  2+ Peripheral Pulses, No clubbing, cyanosis, or edema  SKIN: warm dry                          10.9   5.80  )-----------( 188      ( 25 Feb 2021 09:16 )             32.2     02-25    138  |  108  |  50<H>  ----------------------------<  117<H>  4.4   |  23  |  5.54<H>    Ca    8.7      25 Feb 2021 09:16  Phos  4.9     02-25  Mg     1.9     02-25    TPro  6.4  /  Alb  3.1<L>  /  TBili  0.4  /  DBili  x   /  AST  22  /  ALT  42  /  AlkPhos  83  02-25    LIVER FUNCTIONS - ( 25 Feb 2021 09:16 )  Alb: 3.1 g/dL / Pro: 6.4 g/dL / ALK PHOS: 83 U/L / ALT: 42 U/L DA / AST: 22 U/L / GGT: x           CARDIAC MARKERS ( 25 Feb 2021 09:16 )  x     / x     / 155 U/L / x     / x              CAPILLARY BLOOD GLUCOSE      RADIOLOGY & ADDITIONAL TESTS:                   PGY-1 Progress Note discussed with attending    PAGER #: [[phone]] TILL 5:00 PM  PLEASE CONTACT ON CALL TEAM:  - On Call Team (Please refer to Prasanna) FROM 5:00 PM - 8:30PM  - Nightfloat Team FROM 8:30 -7:30 AM    CHIEF COMPLAINT & BRIEF HOSPITAL COURSE:  24 yo male with PMH of asthma presented to his PCP with complaints of headache, nausea and vomiting x 5 days. Pt was noted to have elevated sBP of >170 and was advised to come to emergency department. Patient states he started having headaches 5 days ago associated with nausea and 1 day hx of NBNB vomiting. Pt endorses episode of dysuria few days ago which has now resolved. Pt denies head trauma, any prior episodes of similar headaches. No family hx of Polycystic kidney disease, CVA, HTN. Denies diarrhea, abdominal pain, fever, chills, recent viral infection, no blood in urine, no flank pain, no urinary rentention. In the ED the patients BP was 185/114. He was given amlodipine and hydralazine. He was continued on amlodipine and his BP was better controlled. He was noted to have a creatinine of 6. UA showed moderate blood, 500 protein. FeNa: 3.3%. Normal C3, C4, SIFE neg, K/L 1.7, ELMA, ANCA, dsDNA, ASO, Treponema Pallidum ab and Hepatitis panel all negative. Immunofixation negative for monoclonal bands. CT A/P was negative for any obstructive uropathy or acute findings.  Renal US showed bilateral hyperechoic kidneys.    INTERVAL HPI/OVERNIGHT EVENTS: No acute events overnight. Patient complaining of headache this morning which was improved with tylenol. Thus far, normal C3, C4, SIFE neg, K/L 1.7, Syphillis neg, neg ASO, neg ANCA, neg ELMA, neg dsDNA, neg HIV, neg HepBsAg, and neg Hep C ab. Nephro Dr. Massey following.     MEDICATIONS:  acetaminophen   Tablet .. 650 milliGRAM(s) Oral every 6 hours PRN  ALBUTerol    90 MICROgram(s) HFA Inhaler 2 Puff(s) Inhalation every 6 hours PRN  amLODIPine   Tablet 10 milliGRAM(s) Oral daily  atorvastatin 40 milliGRAM(s) Oral at bedtime  budesonide  80 MICROgram(s)/formoterol 4.5 MICROgram(s) Inhaler 2 Puff(s) Inhalation two times a day  hydrALAZINE 10 milliGRAM(s) Oral three times a day  lidocaine   Patch 1 Patch Transdermal daily  ondansetron Injectable 5 milliGRAM(s) IV Push every 6 hours PRN  sodium chloride 0.9%. 1000 milliLiter(s) IV Continuous <Continuous>      REVIEW OF SYSTEMS:  CONSTITUTIONAL: No fever, weight loss, or fatigue  RESPIRATORY: No cough, wheezing, chills or hemoptysis; No shortness of breath  CARDIOVASCULAR: No chest pain, palpitations, dizziness, or leg swelling  GASTROINTESTINAL: No abdominal pain. No nausea, vomiting, or hematemesis; No diarrhea or constipation. No melena or hematochezia.  GENITOURINARY: No dysuria or hematuria, urinary frequency  NEUROLOGICAL: No memory loss, loss of strength, numbness, or tremors. Positive headaches  SKIN: No itching, burning, rashes, or lesions     Vital Signs Last 24 Hrs  T(C): 36.3 (26 Feb 2021 05:02), Max: 36.6 (25 Feb 2021 23:20)  T(F): 97.4 (26 Feb 2021 05:02), Max: 97.9 (25 Feb 2021 23:20)  HR: 77 (26 Feb 2021 05:02) (67 - 89)  BP: 144/96 (26 Feb 2021 05:02) (142/95 - 159/107)  BP(mean): --  RR: 16 (26 Feb 2021 05:02) (16 - 18)  SpO2: 100% (26 Feb 2021 05:02) (99% - 100%)    PHYSICAL EXAMINATION:  GENERAL: NAD, well built  HEAD:  Atraumatic, Normocephalic  EYES:  conjunctiva and sclera clear  NECK: Supple, No JVD, Normal thyroid  CHEST/LUNG: Clear to auscultation. Clear to percussion bilaterally; No rales, rhonchi, wheezing, or rubs  HEART: Regular rate and rhythm; No murmurs, rubs, or gallops  ABDOMEN: Soft, Nontender, Nondistended; Bowel sounds present  NERVOUS SYSTEM:  Alert & Oriented X3,    EXTREMITIES:  2+ Peripheral Pulses, No clubbing, cyanosis, or edema  SKIN: warm dry                          10.9   5.80  )-----------( 188      ( 25 Feb 2021 09:16 )             32.2     02-25    138  |  108  |  50<H>  ----------------------------<  117<H>  4.4   |  23  |  5.54<H>    Ca    8.7      25 Feb 2021 09:16  Phos  4.9     02-25  Mg     1.9     02-25    TPro  6.4  /  Alb  3.1<L>  /  TBili  0.4  /  DBili  x   /  AST  22  /  ALT  42  /  AlkPhos  83  02-25    LIVER FUNCTIONS - ( 25 Feb 2021 09:16 )  Alb: 3.1 g/dL / Pro: 6.4 g/dL / ALK PHOS: 83 U/L / ALT: 42 U/L DA / AST: 22 U/L / GGT: x           CARDIAC MARKERS ( 25 Feb 2021 09:16 )  x     / x     / 155 U/L / x     / x              CAPILLARY BLOOD GLUCOSE      RADIOLOGY & ADDITIONAL TESTS:

## 2021-02-26 NOTE — PROGRESS NOTE ADULT - SUBJECTIVE AND OBJECTIVE BOX
EP Attending    HISTORY OF PRESENT ILLNESS: HPI:  24 yo male with PMH of asthma presented to his PCP with complaints of headache, nausea and vomiting x 5 days. Pt was noted to have elevated sBP of >170 and was advised to come to emergency department. Patient states he started having headaches 5 days ago associated with nausea and 1 day hx of NBNB vomiting. Pt endorses episode of dysuria few days ago which has now resolved. Pt denies head trauma, any prior episodes of similar headaches. No family hx of Polycystic kidney disease, CVA, HTN. Denies diarrhea, abdominal pain, fever, chills, recent viral infection, no blood in urine, no flank pain, no urinary rentention (22 Feb 2021 20:11)    States he has prior history of lightheadedness when getting up too quickly, and has fainted in a hot kitchen before, but no symptoms related to exercise intolerance.  He is not aware of his heartbeat intermittently going faster in the bed and is not experiencing palpitations or chest discomfort.  No personal or family history of arrhythmia.  Has Asthma, diagnosed by a pulmonologist > 1 yr ago, and has been better/stable on Symbicort.  A 10 pt ROS is otherwise negative.    2/26- uneventful overight.  remains short of breath on exertion.  this morning, HR got up to ~150bpm while brushing his teeth and walking around the hospital room.  no lightheadednes or palpitations.      acetaminophen   Tablet .. 650 milliGRAM(s) Oral every 6 hours PRN  ALBUTerol    90 MICROgram(s) HFA Inhaler 2 Puff(s) Inhalation every 6 hours PRN  amLODIPine   Tablet 10 milliGRAM(s) Oral daily  atorvastatin 40 milliGRAM(s) Oral at bedtime  budesonide  80 MICROgram(s)/formoterol 4.5 MICROgram(s) Inhaler 2 Puff(s) Inhalation two times a day  hydrALAZINE 25 milliGRAM(s) Oral three times a day  lidocaine   Patch 1 Patch Transdermal daily  ondansetron Injectable 5 milliGRAM(s) IV Push every 6 hours PRN  sodium chloride 0.9%. 1000 milliLiter(s) IV Continuous <Continuous>                            12.1   8.65  )-----------( 255      ( 26 Feb 2021 08:51 )             36.8       02-26    139  |  108  |  48<H>  ----------------------------<  91  4.7   |  22  |  5.48<H>    Ca    9.5      26 Feb 2021 08:51  Phos  5.0     02-26  Mg     2.1     02-26    TPro  7.0  /  Alb  3.2<L>  /  TBili  0.4  /  DBili  x   /  AST  23  /  ALT  43  /  AlkPhos  92  02-26      CARDIAC MARKERS ( 25 Feb 2021 09:16 )  x     / x     / 155 U/L / x     / x        T(C): 36.5 (02-26-21 @ 11:28), Max: 36.6 (02-25-21 @ 23:20)  HR: 86 (02-26-21 @ 11:28) (67 - 89)  BP: 140/80 (02-26-21 @ 11:28) (140/80 - 159/107)  RR: 18 (02-26-21 @ 11:28) (16 - 18)  SpO2: 100% (02-26-21 @ 11:28) (99% - 100%)  Wt(kg): --    I&O's Summary      General: Well nourished, no acute distress, alert and oriented x 3  Head: normocephalic, no trauma  Neck: no JVD, no bruit, supple, not enlarged  CV: S1S2, no S3, regular rate, rhythm is SINUS, no murmurs.    Lungs: clear BL, no rales or wheezes  Abdomen: bowel sounds +, soft, nontender, nondistended  Extremities: no clubbing, cyanosis or edema  Neuro: Moves all 4 extremities, sensation intact x 4 extremities  Skin: warm and moist, normal turgor  Psych: Mood and affect are appropriate for circumstances  MSK: normal range of motion and strength x4 extremities.      TELEMETRY: NSR, sinus tachy, episodes of PAT up to 130bpm.  2/26-  sinus tachy going up to 150bpm with light activity in his room.  no AT.    ECG: NSR  Echo: EF 55%, mild LVH	  	  ASSESSMENT/PLAN: 	25y Male with hypertensive urgency, nephrotic-range proteinuria and newly diagnosed acute renal failure.  Having asymptomatic episodes of SVT on telemetry that are of short duration and self-terminating.      Maintain K 4-4.5, Mg 2.  Monitor on telemetry.  Does not need beta blockers at this point, and I would generally avoid these in a young man due to potential for sexual dysfunction at significant doses.  If he has more frequent episodes, consider starting Diltiazem (instead of Amlodipine) as an antihypertensive and AV tana blocker to suppress SVT.  Will follow.  If no sustained arrhythmia on telemetry, recommend ambulatory cardiac monitoring.    Enzo Swan M.D.  Cardiac Electrophysiology    office [phone]  pager [phone]

## 2021-02-26 NOTE — PROGRESS NOTE ADULT - PROBLEM SELECTOR PLAN 5
continue with home medications Continue with home medications Patient was noted to have short episodes of self terminating SVT on telemetry   Cardiology Dr. Peres and Electrophysiology Dr. Swan consulted  Do not recommend beta blocker at this time due possible side effects  If SVT persists will consider switching amlodipine to Diltiazem.  Pt is asymptomatic

## 2021-02-26 NOTE — PROGRESS NOTE ADULT - PROBLEM SELECTOR PLAN 2
Presented with CIARRA and Cr 6  Baseline creatinine is 1.8  C/w gentle hydration @ 60 cc/hr   Urine protein/creatinine: 3.3, nephrotic range proteinuria without nephrotic syndrome, will repeat spot UPro/Cre once BP stabilizes   CT abdomen showed no findings of obstructive uropathy or other findings.  Renal US showed hyperechoic kidneys bilaterally   FeNa: 3.3%  Urine tox was negative  Urine eosinophils negative  F/u 5 VMA, metanephrines in urine to r/o pheochromocytoma  Normal C3, C4, SIFE neg, K/L 1.7, Syphillis neg, neg ASO, neg ANCA, neg ELMA, neg dsDNA, neg HIV, neg HepBsAg, and neg Hep C ab  Immunofixation negative for monoclonal bands   Renal function slowly improving- no need for dialysis at this time.  Per Dr. Massey, plan for possible kidney biopsy on 3/1  Nephro Dr. Massey following, will f/u recommendations Presented with CIARRA and Cr 6  Baseline creatinine is 1.8  C/w gentle hydration   Urine protein/creatinine: 3.3, nephrotic range proteinuria without nephrotic syndrome, will repeat spot UPro/Cre once BP stabilizes   CT abdomen showed no findings of obstructive uropathy or other findings.  Renal US showed hyperechoic kidneys bilaterally   FeNa: 3.3%  Urine tox was negative  Urine eosinophils negative  F/u 5 VMA, metanephrines in urine to r/o pheochromocytoma  Normal C3, C4, SIFE neg, K/L 1.7, Syphillis neg, neg ASO, neg ANCA, neg ELMA, neg dsDNA, neg HIV, neg HepBsAg, and neg Hep C ab  Immunofixation negative for monoclonal bands   Renal function slowly improving- no need for dialysis at this time.  Per Dr. Massey, plan for possible kidney biopsy on 3/1  Nephro Dr. Massey following, will f/u recommendations Presented with CIARRA and Cr 6  Baseline creatinine is 1.8  C/w gentle hydration   Urine protein/creatinine: 3.3, nephrotic range proteinuria without nephrotic syndrome, will repeat spot UPro/Cre once BP stabilizes   CT abdomen showed no findings of obstructive uropathy or other findings.  Renal US showed hyperechoic kidneys bilaterally   FeNa: 3.3%  Urine tox was negative  Urine eosinophils negative  F/u 5 VMA, metanephrines in urine to r/o pheochromocytoma  Normal C3, C4, SIFE neg, K/L 1.7, Syphillis neg, neg ASO, neg ANCA, neg ELMA, neg dsDNA, neg HIV, neg HepBsAg, and neg Hep C ab  Immunofixation negative for monoclonal bands   YASH Kappa 7.38 (elevated) YASH Lambda 4.33 (elevated)  Renal function slowly improving- no need for dialysis at this time.  Plan for kidney biopsy on 3/1. (Will keep NPO after midnight on 2/28. Check Coags on 3/1 and give DDAVP 22 mcg IV x1 30 mins prior to IR procedure).  Nephro Dr. Massey following, appreciate recommendations

## 2021-02-26 NOTE — PROGRESS NOTE ADULT - PROBLEM SELECTOR PLAN 3
Pt noted to have non anion gap metabolic acidosis with respiratory compensation   bicarb 19-> 17 on admission   Metabolic acidosis likely in the setting of ARF   Lactate: 0.3  pH on VB.31 ()  Will hold off on bicarb replacement for now   Continue to monitor pH/CO2  Follow up Nephro recommendation Pt noted to have non anion gap metabolic acidosis with respiratory compensation   bicarb 19-> 17 on admission   Bicarb now 22 (2/)  Metabolic acidosis likely in the setting of ARF   Lactate: 0.3  pH on VB.31 (2)  Will hold off on bicarb replacement for now   Continue to monitor pH/CO2  Follow up Nephro recommendation

## 2021-02-27 LAB
ANION GAP SERPL CALC-SCNC: 11 MMOL/L — SIGNIFICANT CHANGE UP (ref 5–17)
BUN SERPL-MCNC: 49 MG/DL — HIGH (ref 7–18)
CALCIUM SERPL-MCNC: 9.6 MG/DL — SIGNIFICANT CHANGE UP (ref 8.4–10.5)
CHLORIDE SERPL-SCNC: 107 MMOL/L — SIGNIFICANT CHANGE UP (ref 96–108)
CO2 SERPL-SCNC: 19 MMOL/L — LOW (ref 22–31)
CREAT SERPL-MCNC: 5.35 MG/DL — HIGH (ref 0.5–1.3)
CULTURE RESULTS: SIGNIFICANT CHANGE UP
GLUCOSE SERPL-MCNC: 104 MG/DL — HIGH (ref 70–99)
HCT VFR BLD CALC: 33.8 % — LOW (ref 39–50)
HGB BLD-MCNC: 11.3 G/DL — LOW (ref 13–17)
MAGNESIUM SERPL-MCNC: 1.9 MG/DL — SIGNIFICANT CHANGE UP (ref 1.6–2.6)
MCHC RBC-ENTMCNC: 28 PG — SIGNIFICANT CHANGE UP (ref 27–34)
MCHC RBC-ENTMCNC: 33.4 GM/DL — SIGNIFICANT CHANGE UP (ref 32–36)
MCV RBC AUTO: 83.7 FL — SIGNIFICANT CHANGE UP (ref 80–100)
METANEPH UR-MCNC: SIGNIFICANT CHANGE UP
METANEPH UR-MCNC: SIGNIFICANT CHANGE UP
MPO AB + PR3 PNL SER: SIGNIFICANT CHANGE UP
NRBC # BLD: 0 /100 WBCS — SIGNIFICANT CHANGE UP (ref 0–0)
PHOSPHATE SERPL-MCNC: 4.9 MG/DL — HIGH (ref 2.5–4.5)
PLATELET # BLD AUTO: 223 K/UL — SIGNIFICANT CHANGE UP (ref 150–400)
POTASSIUM SERPL-MCNC: 5.1 MMOL/L — SIGNIFICANT CHANGE UP (ref 3.5–5.3)
POTASSIUM SERPL-SCNC: 5.1 MMOL/L — SIGNIFICANT CHANGE UP (ref 3.5–5.3)
RBC # BLD: 4.04 M/UL — LOW (ref 4.2–5.8)
RBC # FLD: 12 % — SIGNIFICANT CHANGE UP (ref 10.3–14.5)
SODIUM SERPL-SCNC: 137 MMOL/L — SIGNIFICANT CHANGE UP (ref 135–145)
SPECIMEN SOURCE: SIGNIFICANT CHANGE UP
WBC # BLD: 8.76 K/UL — SIGNIFICANT CHANGE UP (ref 3.8–10.5)
WBC # FLD AUTO: 8.76 K/UL — SIGNIFICANT CHANGE UP (ref 3.8–10.5)

## 2021-02-27 RX ORDER — OXYCODONE AND ACETAMINOPHEN 5; 325 MG/1; MG/1
2 TABLET ORAL ONCE
Refills: 0 | Status: DISCONTINUED | OUTPATIENT
Start: 2021-02-27 | End: 2021-02-27

## 2021-02-27 RX ORDER — ONDANSETRON 8 MG/1
4 TABLET, FILM COATED ORAL ONCE
Refills: 0 | Status: COMPLETED | OUTPATIENT
Start: 2021-02-27 | End: 2021-02-27

## 2021-02-27 RX ADMIN — ATORVASTATIN CALCIUM 40 MILLIGRAM(S): 80 TABLET, FILM COATED ORAL at 21:16

## 2021-02-27 RX ADMIN — ONDANSETRON 5 MILLIGRAM(S): 8 TABLET, FILM COATED ORAL at 21:34

## 2021-02-27 RX ADMIN — Medication 650 MILLIGRAM(S): at 06:16

## 2021-02-27 RX ADMIN — Medication 25 MILLIGRAM(S): at 14:07

## 2021-02-27 RX ADMIN — BUDESONIDE AND FORMOTEROL FUMARATE DIHYDRATE 2 PUFF(S): 160; 4.5 AEROSOL RESPIRATORY (INHALATION) at 11:40

## 2021-02-27 RX ADMIN — ONDANSETRON 5 MILLIGRAM(S): 8 TABLET, FILM COATED ORAL at 06:31

## 2021-02-27 RX ADMIN — LIDOCAINE 1 PATCH: 4 CREAM TOPICAL at 11:41

## 2021-02-27 RX ADMIN — LIDOCAINE 1 PATCH: 4 CREAM TOPICAL at 23:05

## 2021-02-27 RX ADMIN — OXYCODONE AND ACETAMINOPHEN 2 TABLET(S): 5; 325 TABLET ORAL at 01:54

## 2021-02-27 RX ADMIN — Medication 650 MILLIGRAM(S): at 21:16

## 2021-02-27 RX ADMIN — Medication 25 MILLIGRAM(S): at 06:11

## 2021-02-27 RX ADMIN — LIDOCAINE 1 PATCH: 4 CREAM TOPICAL at 19:11

## 2021-02-27 RX ADMIN — LIDOCAINE 1 PATCH: 4 CREAM TOPICAL at 01:53

## 2021-02-27 RX ADMIN — AMLODIPINE BESYLATE 10 MILLIGRAM(S): 2.5 TABLET ORAL at 06:11

## 2021-02-27 RX ADMIN — ONDANSETRON 4 MILLIGRAM(S): 8 TABLET, FILM COATED ORAL at 02:03

## 2021-02-27 RX ADMIN — Medication 25 MILLIGRAM(S): at 21:16

## 2021-02-27 RX ADMIN — BUDESONIDE AND FORMOTEROL FUMARATE DIHYDRATE 2 PUFF(S): 160; 4.5 AEROSOL RESPIRATORY (INHALATION) at 21:16

## 2021-02-27 NOTE — PROGRESS NOTE ADULT - SUBJECTIVE AND OBJECTIVE BOX
EP     HISTORY OF PRESENT ILLNESS: HPI:  24 yo male with PMH of asthma presented to his PCP with complaints of headache, nausea and vomiting x 5 days. Pt was noted to have elevated sBP of >170 and was advised to come to emergency department. Patient states he started having headaches 5 days ago associated with nausea and 1 day hx of NBNB vomiting. Pt endorses episode of dysuria few days ago which has now resolved. Pt denies head trauma, any prior episodes of similar headaches. No family hx of Polycystic kidney disease, CVA, HTN. Denies diarrhea, abdominal pain, fever, chills, recent viral infection, no blood in urine, no flank pain, no urinary rentention (22 Feb 2021 20:11)    States he has prior history of lightheadedness when getting up too quickly, and has fainted in a hot kitchen before, but no symptoms related to exercise intolerance.  He is not aware of his heartbeat intermittently going faster in the bed and is not experiencing palpitations or chest discomfort.  No personal or family history of arrhythmia.  Has Asthma, diagnosed by a pulmonologist > 1 yr ago, and has been better/stable on Symbicort.  A 10 pt ROS is otherwise negative.    2/26- uneventful overight.  remains short of breath on exertion.  this morning, HR got up to ~150bpm while brushing his teeth and walking around the hospital room.  no lightheadednes or palpitations.      2/27 Tele with -130 , no chest pain or palpitation            acetaminophen   Tablet .. 650 milliGRAM(s) Oral every 6 hours PRN  ALBUTerol    90 MICROgram(s) HFA Inhaler 2 Puff(s) Inhalation every 6 hours PRN  amLODIPine   Tablet 10 milliGRAM(s) Oral daily  atorvastatin 40 milliGRAM(s) Oral at bedtime  budesonide  80 MICROgram(s)/formoterol 4.5 MICROgram(s) Inhaler 2 Puff(s) Inhalation two times a day  hydrALAZINE 25 milliGRAM(s) Oral three times a day  lidocaine   Patch 1 Patch Transdermal daily  ondansetron Injectable 5 milliGRAM(s) IV Push every 6 hours PRN  sodium chloride 0.9%. 1000 milliLiter(s) IV Continuous <Continuous>                            12.1   8.65  )-----------( 255      ( 26 Feb 2021 08:51 )             36.8       Hemoglobin: 12.1 g/dL (02-26 @ 08:51)  Hemoglobin: 10.9 g/dL (02-25 @ 09:16)  Hemoglobin: 9.9 g/dL (02-24 @ 08:18)  Hemoglobin: 11.2 g/dL (02-23 @ 09:13)  Hemoglobin: 11.0 g/dL (02-22 @ 16:39)      02-26    139  |  108  |  48<H>  ----------------------------<  91  4.7   |  22  |  5.48<H>    Ca    9.5      26 Feb 2021 08:51  Phos  5.0     02-26  Mg     2.1     02-26    TPro  7.0  /  Alb  3.2<L>  /  TBili  0.4  /  DBili  x   /  AST  23  /  ALT  43  /  AlkPhos  92  02-26    Creatinine Trend: 5.48<--, 5.54<--, 5.82<--, 6.00<--, 5.80<--, 6.17<--    COAGS:     CARDIAC MARKERS ( 25 Feb 2021 09:16 )  x     / x     / 155 U/L / x     / x            T(C): 36.3 (02-27-21 @ 04:30), Max: 36.5 (02-26-21 @ 11:28)  HR: 100 (02-27-21 @ 04:30) (81 - 101)  BP: 123/68 (02-27-21 @ 04:30) (123/68 - 155/85)  RR: 18 (02-27-21 @ 04:30) (17 - 18)  SpO2: 99% (02-27-21 @ 04:30) (99% - 100%)  Wt(kg): --    I&O's Summary    26 Feb 2021 07:01  -  27 Feb 2021 07:00  --------------------------------------------------------  IN: 750 mL / OUT: 870 mL / NET: -120 mL        General: Well nourished, no acute distress, alert and oriented x 3  Head: normocephalic, no trauma  Neck: no JVD, no bruit, supple, not enlarged  CV: S1S2, no S3, regular rate, rhythm is SINUS, no murmurs.    Lungs: clear BL, no rales or wheezes  Abdomen: bowel sounds +, soft, nontender, nondistended  Extremities: no clubbing, cyanosis or edema  Neuro: Moves all 4 extremities, sensation intact x 4 extremities  Skin: warm and moist, normal turgor  Psych: Mood and affect are appropriate for circumstances  MSK: normal range of motion and strength x4 extremities.      TELEMETRY: NSR, sinus tachy, episodes of PAT up to 130bpm.  2/26-  sinus tachy going up to 150bpm with light activity in his room.  no AT.    ECG: NSR  Echo: EF 55%, mild LVH	  	  ASSESSMENT/PLAN: 	25y Male with hypertensive urgency, nephrotic-range proteinuria and newly diagnosed acute renal failure.  Having asymptomatic episodes of SVT on telemetry that are of short duration and self-terminating.      Maintain K 4-4.5, Mg 2.  Monitor on telemetry.  Does not need beta blockers at this point, and I would generally avoid these in a young man due to potential for sexual dysfunction at significant doses.  If he has more frequent episodes, consider starting Diltiazem (instead of Amlodipine) as an antihypertensive and AV tana blocker to suppress SVT.  Will follow.  If no sustained arrhythmia on telemetry, recommend ambulatory cardiac monitoring.    EP     HISTORY OF PRESENT ILLNESS: HPI:  26 yo male with PMH of asthma presented to his PCP with complaints of headache, nausea and vomiting x 5 days. Pt was noted to have elevated sBP of >170 and was advised to come to emergency department. Patient states he started having headaches 5 days ago associated with nausea and 1 day hx of NBNB vomiting. Pt endorses episode of dysuria few days ago which has now resolved. Pt denies head trauma, any prior episodes of similar headaches. No family hx of Polycystic kidney disease, CVA, HTN. Denies diarrhea, abdominal pain, fever, chills, recent viral infection, no blood in urine, no flank pain, no urinary rentention (22 Feb 2021 20:11)    States he has prior history of lightheadedness when getting up too quickly, and has fainted in a hot kitchen before, but no symptoms related to exercise intolerance.  He is not aware of his heartbeat intermittently going faster in the bed and is not experiencing palpitations or chest discomfort.  No personal or family history of arrhythmia.  Has Asthma, diagnosed by a pulmonologist > 1 yr ago, and has been better/stable on Symbicort.  A 10 pt ROS is otherwise negative.    2/26- uneventful overight.  remains short of breath on exertion.  this morning, HR got up to ~150bpm while brushing his teeth and walking around the hospital room.  no lightheadednes or palpitations.      2/27 Tele with -130 , no chest pain or palpitation   , 3 episode of vomiting overnight          acetaminophen   Tablet .. 650 milliGRAM(s) Oral every 6 hours PRN  ALBUTerol    90 MICROgram(s) HFA Inhaler 2 Puff(s) Inhalation every 6 hours PRN  amLODIPine   Tablet 10 milliGRAM(s) Oral daily  atorvastatin 40 milliGRAM(s) Oral at bedtime  budesonide  80 MICROgram(s)/formoterol 4.5 MICROgram(s) Inhaler 2 Puff(s) Inhalation two times a day  hydrALAZINE 25 milliGRAM(s) Oral three times a day  lidocaine   Patch 1 Patch Transdermal daily  ondansetron Injectable 5 milliGRAM(s) IV Push every 6 hours PRN  sodium chloride 0.9%. 1000 milliLiter(s) IV Continuous <Continuous>                            12.1   8.65  )-----------( 255      ( 26 Feb 2021 08:51 )             36.8       Hemoglobin: 12.1 g/dL (02-26 @ 08:51)  Hemoglobin: 10.9 g/dL (02-25 @ 09:16)  Hemoglobin: 9.9 g/dL (02-24 @ 08:18)  Hemoglobin: 11.2 g/dL (02-23 @ 09:13)  Hemoglobin: 11.0 g/dL (02-22 @ 16:39)      02-26    139  |  108  |  48<H>  ----------------------------<  91  4.7   |  22  |  5.48<H>    Ca    9.5      26 Feb 2021 08:51  Phos  5.0     02-26  Mg     2.1     02-26    TPro  7.0  /  Alb  3.2<L>  /  TBili  0.4  /  DBili  x   /  AST  23  /  ALT  43  /  AlkPhos  92  02-26    Creatinine Trend: 5.48<--, 5.54<--, 5.82<--, 6.00<--, 5.80<--, 6.17<--    COAGS:     CARDIAC MARKERS ( 25 Feb 2021 09:16 )  x     / x     / 155 U/L / x     / x            T(C): 36.3 (02-27-21 @ 04:30), Max: 36.5 (02-26-21 @ 11:28)  HR: 100 (02-27-21 @ 04:30) (81 - 101)  BP: 123/68 (02-27-21 @ 04:30) (123/68 - 155/85)  RR: 18 (02-27-21 @ 04:30) (17 - 18)  SpO2: 99% (02-27-21 @ 04:30) (99% - 100%)  Wt(kg): --    I&O's Summary    26 Feb 2021 07:01  -  27 Feb 2021 07:00  --------------------------------------------------------  IN: 750 mL / OUT: 870 mL / NET: -120 mL        General: Well nourished, no acute distress, alert and oriented x 3  Head: normocephalic, no trauma  Neck: no JVD, no bruit, supple, not enlarged  CV: S1S2, no S3, regular rate, rhythm is SINUS, no murmurs.    Lungs: clear BL, no rales or wheezes  Abdomen: bowel sounds +, soft, nontender, nondistended  Extremities: no clubbing, cyanosis or edema  Neuro: Moves all 4 extremities, sensation intact x 4 extremities  Skin: warm and moist, normal turgor  Psych: Mood and affect are appropriate for circumstances  MSK: normal range of motion and strength x4 extremities.      TELEMETRY: NSR, sinus tachy, episodes of PAT up to 130bpm.  2/26-  sinus tachy going up to 150bpm with light activity in his room.  no AT.    ECG: NSR  Echo: EF 55%, mild LVH	  	  ASSESSMENT/PLAN: 	25y Male with hypertensive urgency, nephrotic-range proteinuria and newly diagnosed acute renal failure.  Having asymptomatic episodes of SVT on telemetry that are of short duration and self-terminating.      Maintain K 4-4.5, Mg 2.  Monitor on telemetry.  Does not need beta blockers at this point, and I would generally avoid these in a young man due to potential for sexual dysfunction at significant doses.  If he has more frequent episodes, consider starting Diltiazem (instead of Amlodipine) as an antihypertensive and AV tana blocker to suppress SVT.  Will follow.  If no sustained arrhythmia on telemetry, recommend ambulatory cardiac monitoring.

## 2021-02-27 NOTE — PROGRESS NOTE ADULT - ASSESSMENT
24 yo male with PMH of asthma presented to his PCP with complaints of headache, nausea and vomiting. Pt was noted to have elevated sBP of >170 and was advised to come to emergency department. The patient was admitted for acute hypertensive emergency and CIARRA in the setting of Cr 6.

## 2021-02-27 NOTE — PROGRESS NOTE ADULT - ATTENDING COMMENTS
[facility] NEPHROLOGY  Justin Mckenzie M.D.  Chad Reich D.O.  Beth Massey M.D.  Shayy Navarro, MSN, ANP-C  [phone]    [address] [place] [address]

## 2021-02-27 NOTE — PROGRESS NOTE ADULT - PROBLEM SELECTOR PLAN 2
Presented with CIARRA and Cr 6  Baseline creatinine is 1.8  C/w gentle hydration   Urine protein/creatinine: 3.3, nephrotic range proteinuria without nephrotic syndrome, will repeat spot UPro/Cre once BP stabilizes   CT abdomen showed no findings of obstructive uropathy or other findings.  Renal US showed hyperechoic kidneys bilaterally   FeNa: 3.3%  Urine tox was negative  Urine eosinophils negative  F/u 5 VMA, metanephrines in urine to r/o pheochromocytoma  Normal C3, C4, SIFE neg, K/L 1.7, Syphillis neg, neg ASO, neg ANCA, neg ELMA, neg dsDNA, neg HIV, neg HepBsAg, and neg Hep C ab  Immunofixation negative for monoclonal bands   YASH Kappa 7.38 (elevated) YASH Lambda 4.33 (elevated)  Renal function slowly improving- no need for dialysis at this time.  Plan for kidney biopsy on 3/1. (Will keep NPO after midnight on 2/28. Check Coags on 3/1 and give DDAVP 22 mcg IV x1 30 mins prior to IR procedure).  Nephro Dr. Massey following, appreciate recommendations

## 2021-02-27 NOTE — PROGRESS NOTE ADULT - SUBJECTIVE AND OBJECTIVE BOX
MARTHA MARTINEZ  MR# [id number]  25yMale        Patient is a 25y old  Male who presents with a chief complaint of headache, nausea/ vomiting (27 Feb 2021 17:48)      INTERVAL HPI/OVERNIGHT EVENTS:  Patient seen and examined at bedside. No notations of chest pain, palpitation, SOB, orthopnea, nausea, vomiting or abdominal pain.    ALLERGIES  Kiwi (Swelling)  No Known Drug Allergies      MEDICATIONS  acetaminophen   Tablet .. 650 milliGRAM(s) Oral every 6 hours PRN Temp greater or equal to 38C (100.4F), Mild Pain (1 - 3)  ALBUTerol    90 MICROgram(s) HFA Inhaler 2 Puff(s) Inhalation every 6 hours PRN Bronchospasm  amLODIPine   Tablet 10 milliGRAM(s) Oral daily  atorvastatin 40 milliGRAM(s) Oral at bedtime  budesonide  80 MICROgram(s)/formoterol 4.5 MICROgram(s) Inhaler 2 Puff(s) Inhalation two times a day  hydrALAZINE 25 milliGRAM(s) Oral three times a day  lidocaine   Patch 1 Patch Transdermal daily  ondansetron Injectable 5 milliGRAM(s) IV Push every 6 hours PRN Nausea and/or Vomiting  sodium chloride 0.9%. 1000 milliLiter(s) IV Continuous <Continuous>              REVIEW OF SYSTEMS:  CONSTITUTIONAL: No fever, weight loss, or fatigue  EYES: No eye pain, visual disturbances, or discharge  ENT:  No difficulty hearing, tinnitus, vertigo; No sinus or throat pain  NECK: No pain or stiffness  RESPIRATORY: No cough, wheezing, chills or hemoptysis; No Shortness of Breath  CARDIOVASCULAR: No chest pain, palpitations, passing out, dizziness, or leg swelling  GASTROINTESTINAL: No abdominal or epigastric pain. No nausea, vomiting, or hematemesis; No diarrhea or constipation. No melena or hematochezia.  GENITOURINARY: No dysuria, frequency, hematuria, or incontinence  NEUROLOGICAL: No headaches, memory loss, loss of strength, numbness, or tremors  SKIN: No itching, burning, rashes, or lesions   LYMPH Nodes: No enlarged glands  ENDOCRINE: No heat or cold intolerance; No hair loss  MUSCULOSKELETAL: No joint pain or swelling; No muscle, back, or extremity pain  PSYCHIATRIC: No depression, anxiety, mood swings, or difficulty sleeping  HEME/LYMPH: No easy bruising, or bleeding gums  ALLERGY AND IMMUNOLOGIC: No hives or eczema	    [ ] All others negative	  [ ] Unable to obtain      T(C): 37 (02-27-21 @ 19:38), Max: 37 (02-27-21 @ 19:38)  T(F): 98.6 (02-27-21 @ 19:38), Max: 98.6 (02-27-21 @ 19:38)  HR: 94 (02-27-21 @ 19:38) (80 - 101)  BP: 155/96 (02-27-21 @ 19:38) (123/68 - 155/96)  RR: 18 (02-27-21 @ 19:38) (17 - 18)  SpO2: 99% (02-27-21 @ 19:38) (99% - 100%)  Wt(kg): --    I&O's Summary    26 Feb 2021 07:01  -  27 Feb 2021 07:00  --------------------------------------------------------  IN: 750 mL / OUT: 870 mL / NET: -120 mL    27 Feb 2021 07:01  -  27 Feb 2021 20:54  --------------------------------------------------------  IN: 0 mL / OUT: 1250 mL / NET: -1250 mL          PHYSICAL EXAM:  A X O x  HEAD:  Atraumatic, Normocephalic  EYES: EOMI, PERRLA, conjunctiva and sclera clear  NECK: Supple, No JVD, Normal thyroid  Resp: CTAB, No crackles, wheezing,   CVS: Regular rate and rhythm; No discernable murmurs, rubs, or gallops  ABD: Soft, Nontender, Nondistended; Bowel sounds present  EXTREMITIES:  2+ Peripheral Pulses, No edema  LYMPH: No dicernable lymphadenopathy noted  GENERAL: NAD, well-groomed, well-developed      LABS:                        11.3   8.76  )-----------( 223      ( 27 Feb 2021 08:21 )             33.8     02-27    137  |  107  |  49<H>  ----------------------------<  104<H>  5.1   |  19<L>  |  5.35<H>    Ca    9.6      27 Feb 2021 08:21  Phos  4.9     02-27  Mg     1.9     02-27    TPro  7.0  /  Alb  3.2<L>  /  TBili  0.4  /  DBili  x   /  AST  23  /  ALT  43  /  AlkPhos  92  02-26        CAPILLARY BLOOD GLUCOSE          Troponins:  ProBNP:  Lipid Profile:   HgA1c:  TSH:           RADIOLOGY & ADDITIONAL TESTS:    Imaging Personally Reviewed:  [ ] YES  [ ] NO      Consultant(s) Notes Reviewed:  [x ] YES  [ ] NO    Care Discussed with Consultants/Other Providers [ x] YES  [ ] NO          PAST MEDICAL & SURGICAL HISTORY:  Asthma    No significant past surgical history          Acute renal failure    Handoff    MEWS Score    Asthma    CIARRA (acute kidney injury)    SVT (supraventricular tachycardia)    Asthma    Elevated LFTs    Metabolic acidemia, unspecified    Prophylactic measure    Acute renal failure (ARF)    Hypertensive emergency    No significant past surgical history    No significant past surgical history    W-SENT FROM [facility]/HIGH BP    90+    Hypertensive emergency    HTN (hypertension)    [id number]

## 2021-02-27 NOTE — PROGRESS NOTE ADULT - ATTENDING COMMENTS
EP ATTENDING    Agree with above. Recommend outpatient event monitoring. No further inpatient EP workup expected.

## 2021-02-27 NOTE — PROGRESS NOTE ADULT - PROBLEM SELECTOR PLAN 3
Pt noted to have non anion gap metabolic acidosis with respiratory compensation   bicarb 19-> 17 on admission   Bicarb now 22 (2/)  Metabolic acidosis likely in the setting of ARF   Lactate: 0.3  pH on VB.31 (2)  Will hold off on bicarb replacement for now   Continue to monitor pH/CO2  Follow up Nephro recommendation

## 2021-02-27 NOTE — PROGRESS NOTE ADULT - ASSESSMENT
Patient is a 26yo Male with Asthma on Dupixent injections q 2kweeks p/w HA, n/v x 5 days. Pt sent in by his PMD due to elevated SBP >170. Pt was a/w CIARRA and hypertensive emergency. Nephrology consulted for Elevated serum creatinine.    1. CIARRA- ?GN (?IgA nephropathy) vs. CIARRA on CKD due to uncontrolled HTN. UA with 500 protein and mod blood; rbc 5-10. Pt on Dupixent- no urinary eos, peripheral eos or rash. Spot UPr/Cr 3.3/ repeated 3.82- nephrotic range proteinuria without nephrotic syndrome (albumin 3.4 with no edema). Renal function slowly improving- no need for dialysis at this time.   CK 1141, should not cause significant CIARRA. Serological w/u neg except PLAR-2 pending will f/u (thus far Normal C3, C4, SIFE neg, K/L 1.7, Syphillis neg,  Neg ASO, neg ANCA, neg ELMA, neg dsDNA, neg HIV, neg HepBsAg, and neg Hep C ab and neg antiGBM).   Discussed the need for kidney biopsy to determine etiology of kidney disease if serological w/u neg. Discussed risk/ benefits/ alt of kidney biopsy- pt agreeable; will plan for possible kidney biopsy on 3/3 (discussed with IR Lise LAU- unable to perform 3/1 due to schedule). Will keep NPO after midnight on 3/2 Check Coags on 3/3 and give DDAVP 22 mcg IV x1 30 mins prior to IR procedure. Strict I/Os. Avoid nephrotoxins/ NSAIDs/ RCA. Monitor BMP.    2. CKD unspecified- previous SCr 1.29 in 2018 with proteinuria. See above.  PTHi 99 elevated. Recc low phos diet. Avoid nephrotoxins    3. Hypertensive emergency- BP in /114; CT head neg. BP improving. c/w Amlodipine 10mg PO qd. c/w Hydralazine 25mg PO tid, titrate as needed. c/w low salt diet.   Secondary w/u pending: UTox neg. Sebas 27.2 ng/dl; Renin 4.9 pg/ml (0.49ng/dl) Endo following. No pheo- CT with normal adrenals. w/u neg. TTE: mild MR, mild LVH.  Pt will need Renal US with dopplers as an outpt. Monitor BP  4. Metabolic Acidosis-in the setting of renal failure. Serum CO2 mildly decreased; if remains low will start sodium bicarb tabs. Serum lactate wnl and VBG ph 7.31.  Monitor ph/Co2

## 2021-02-27 NOTE — PROGRESS NOTE ADULT - PROBLEM SELECTOR PLAN 4
Mildly elevated LFTs   Hepatitis panel negative  LFTs normalized AST/ALT (23/43)  CT abdomen and pelvis showed no acute findings.

## 2021-02-27 NOTE — PROGRESS NOTE ADULT - SUBJECTIVE AND OBJECTIVE BOX
Interval Events:    headache+  BP elevated, improving  creat 6>>5.35  repeat aldosterone 2/25 11.3, previous 27  plasma renin activity pending    Allergies    Kiwi (Swelling)  No Known Drug Allergies    Intolerances      Endocrine/Metabolic Medications:  atorvastatin 40 milliGRAM(s) Oral at bedtime      Vital Signs Last 24 Hrs  T(C): 36.8 (27 Feb 2021 08:19), Max: 36.8 (27 Feb 2021 08:19)  T(F): 98.3 (27 Feb 2021 08:19), Max: 98.3 (27 Feb 2021 08:19)  HR: 86 (27 Feb 2021 08:19) (81 - 101)  BP: 141/78 (27 Feb 2021 08:19) (123/68 - 155/85)  BP(mean): --  RR: 18 (27 Feb 2021 08:19) (17 - 18)  SpO2: 99% (27 Feb 2021 08:19) (99% - 100%)      PHYSICAL EXAM  Constitutional:    NC/AT:    HEENT:    Neck:  No JVD    Respiratory:  Clear without rales or rhonchi    Cardiovascular:  RR without murmur    Gastrointestinal: Soft     Extremities: without cyanosis    Neurological:  non focal      LABS                        11.3   8.76  )-----------( 223      ( 27 Feb 2021 08:21 )             33.8                               137    |  107    |  49                  Calcium: 9.6   / iCa: x      (02-27 @ 08:21)    ----------------------------<  104       Magnesium: 1.9                              5.1     |  19     |  5.35             Phosphorous: 4.9        CAPILLARY BLOOD GLUCOSE            Assesment/plan        26 yo male with h/o asthma, admitted with headaches, nausea, vomiting x 5 days, also noted to have elevated SBP >170 and sent to ED    endocrinology consulted for uncontrolled HTN,  ? secondary HTN    HTN  uncontrolled  aldosterone- 27 (ULN 23 ng/dl) direct renin: 4.9 pg/ml  TSH wnl   urine tox neg  urine metanephrine pending  CT abdomen/pelvis: without evident adrenal abnormality    recommendations:  - plasma renin activity pending  - can send serum metanephrines  f/u 24 urine catecholamines  aldosterone 27.2 nd/dl (ULN 23) on 2/23, direct renin 4.9 pg/ml  repeat aldosterone 11.3 2/25- ?postural changes while inpatient- repeat outpatient  would repeat aldosterone/renin, metanephrines as outpatient  CT adrenal pending above test results    however since renin level is not suppressed/low, without hypokalemia, less likely from primary aldosteronoma  nephrology on board    CIARRA  serum creatinine 6>>5.8>5.5> 5.3  urine tox neg  hyperechoic kidneys on renal ultrasound  nephrology on board  may need kidney biopsy  immunologic work up pending    elevated LFTs  hep panel neg  downtrending, improved      Discussed with patient and primary team  Please call Endocrine- [phone]- Dr Lexy Mclain as needed     Interval Events:    headache+  BP elevated, improving  creat 6>>5.35  repeat aldosterone 2/25 11.3, previous 27  plasma renin activity pending    Allergies    Kiwi (Swelling)  No Known Drug Allergies    Intolerances      Endocrine/Metabolic Medications:  atorvastatin 40 milliGRAM(s) Oral at bedtime      Vital Signs Last 24 Hrs  T(C): 36.8 (27 Feb 2021 08:19), Max: 36.8 (27 Feb 2021 08:19)  T(F): 98.3 (27 Feb 2021 08:19), Max: 98.3 (27 Feb 2021 08:19)  HR: 86 (27 Feb 2021 08:19) (81 - 101)  BP: 141/78 (27 Feb 2021 08:19) (123/68 - 155/85)  BP(mean): --  RR: 18 (27 Feb 2021 08:19) (17 - 18)  SpO2: 99% (27 Feb 2021 08:19) (99% - 100%)      PHYSICAL EXAM  Constitutional:    NC/AT:    HEENT:    Neck:  No JVD    Respiratory:  Clear without rales or rhonchi    Cardiovascular:  RR without murmur    Gastrointestinal: Soft     Extremities: without cyanosis    Neurological:  non focal      LABS                        11.3   8.76  )-----------( 223      ( 27 Feb 2021 08:21 )             33.8                               137    |  107    |  49                  Calcium: 9.6   / iCa: x      (02-27 @ 08:21)    ----------------------------<  104       Magnesium: 1.9                              5.1     |  19     |  5.35             Phosphorous: 4.9        CAPILLARY BLOOD GLUCOSE            Assesment/plan        24 yo male with h/o asthma, admitted with headaches, nausea, vomiting x 5 days, also noted to have elevated SBP >170 and sent to ED    endocrinology consulted for uncontrolled HTN,  ? secondary HTN    HTN  uncontrolled  aldosterone- 27 (ULN 23 ng/dl) direct renin: 4.9 pg/ml  TSH wnl   urine tox neg  urine metanephrine pending  CT abdomen/pelvis: without evident adrenal abnormality    recommendations:  - plasma renin activity pending  - can send serum metanephrines  f/u 24 urine catecholamines  aldosterone 27.2 nd/dl (ULN 23) on 2/23, direct renin 4.9 pg/ml  repeat aldosterone 11.3 2/25- ?postural changes while inpatient- repeat outpatient  would repeat aldosterone/renin, metanephrines as outpatient  - recommend avoiding spironolactone/epleronone , doxazosin till outpatient workup is complete  can use alternate anti HTN- hydralazine, nifedipine, labetalol  CT adrenal pending above test results    however since renin level is not suppressed/low, without hypokalemia, less likely from primary aldosteronoma  nephrology on board    CIARRA  serum creatinine 6>>5.8>5.5> 5.3  urine tox neg  hyperechoic kidneys on renal ultrasound  nephrology on board  may need kidney biopsy  immunologic work up pending    elevated LFTs  hep panel neg  downtrending, improved      Discussed with patient and primary team  Please call Endocrine- [phone]- Dr Lexy Mclain as needed     Interval Events:    headache+  vomiting+  BP elevated, improving  creat 6>>5.35  repeat aldosterone 2/25 11.3, previous 27 (ULN 23)  plasma renin activity pending    Allergies    Kiwi (Swelling)  No Known Drug Allergies    Intolerances      Endocrine/Metabolic Medications:  atorvastatin 40 milliGRAM(s) Oral at bedtime      Vital Signs Last 24 Hrs  T(C): 36.8 (27 Feb 2021 08:19), Max: 36.8 (27 Feb 2021 08:19)  T(F): 98.3 (27 Feb 2021 08:19), Max: 98.3 (27 Feb 2021 08:19)  HR: 86 (27 Feb 2021 08:19) (81 - 101)  BP: 141/78 (27 Feb 2021 08:19) (123/68 - 155/85)  BP(mean): --  RR: 18 (27 Feb 2021 08:19) (17 - 18)  SpO2: 99% (27 Feb 2021 08:19) (99% - 100%)      PHYSICAL EXAM  Constitutional:    NC/AT:    HEENT:    Neck:  No JVD    Respiratory:  Clear without rales or rhonchi    Cardiovascular:  RR without murmur    Gastrointestinal: Soft     Extremities: without cyanosis    Neurological:  non focal      LABS                        11.3   8.76  )-----------( 223      ( 27 Feb 2021 08:21 )             33.8                               137    |  107    |  49                  Calcium: 9.6   / iCa: x      (02-27 @ 08:21)    ----------------------------<  104       Magnesium: 1.9                              5.1     |  19     |  5.35             Phosphorous: 4.9        CAPILLARY BLOOD GLUCOSE            Assesment/plan        24 yo male with h/o asthma, admitted with headaches, nausea, vomiting x 5 days, also noted to have elevated SBP >170 and sent to ED    endocrinology consulted for uncontrolled HTN,  ? secondary HTN    HTN  uncontrolled  aldosterone- 27 (ULN 23 ng/dl) direct renin: 4.9 pg/ml  TSH wnl   urine tox neg  urine metanephrine pending  CT abdomen/pelvis: without evident adrenal abnormality    recommendations:  - plasma renin activity pending  - can send serum metanephrines  f/u 24 urine catecholamines  aldosterone 27.2 nd/dl (ULN 23) on 2/23, direct renin 4.9 pg/ml  repeat aldosterone 11.3 2/25- ?postural changes while inpatient- repeat outpatient  would repeat aldosterone/renin, metanephrines as outpatient  - recommend avoiding spironolactone/epleronone , doxazosin till outpatient workup is complete  can use alternate anti HTN- hydralazine, nifedipine, labetalol  CT adrenal pending above test results    however since renin level is not suppressed/low, without hypokalemia, less likely from primary aldosteronoma  nephrology on board    CIARRA  serum creatinine 6>>5.8>5.5> 5.3  urine tox neg  hyperechoic kidneys on renal ultrasound  nephrology on board  may need kidney biopsy  immunologic work up pending    elevated LFTs  hep panel neg  downtrending, improved      Discussed with patient and primary team  Please call Endocrine- [phone]- Dr Lexy Mclain as needed

## 2021-02-27 NOTE — PROGRESS NOTE ADULT - SUBJECTIVE AND OBJECTIVE BOX
[facility] NEPHROLOGY- PROGRESS NOTE    Patient is a 24yo Male with Asthma on Dupixent injections q 2kweeks p/w HA, n/v x 5 days. Pt sent in by his PMD due to elevated SBP >170. Pt was a/w CIARRA and hypertensive emergency. Nephrology consulted for Elevated serum creatinine.    Hospital Medications: Medications reviewed.  REVIEW OF SYSTEMS:  CONSTITUTIONAL: No fevers or chills +HA with light and sound sensitivity  RESPIRATORY: No shortness of breath  CARDIOVASCULAR: No chest pain.  GASTROINTESTINAL: No diarrhea or abdominal pain. Pt c/w nausea & vomiting,   VASCULAR: No bilateral lower extremity edema.     VITALS:  T(F): 98.1 (21 @ 15:10), Max: 98.4 (21 @ 11:30)  HR: 87 (21 @ 15:10)  BP: 150/89 (21 @ 15:10)  RR: 18 (21 @ 15:10)  SpO2: 100% (21 @ 15:10)  Wt(kg): --     @ 07:01  -   @ 07:00  --------------------------------------------------------  IN: 750 mL / OUT: 870 mL / NET: -120 mL     @ 07:01  -   @ 17:48  --------------------------------------------------------  IN: 0 mL / OUT: 1250 mL / NET: -1250 mL        PHYSICAL EXAM:  Constitutional: NAD  Neurological: Awake and Alert  HEENT: anicteric sclera,   Respiratory: CTAB, no wheezes, rales or rhonchi  Cardiovascular: S1, S2, RRR  Gastrointestinal: BS+, soft, NT/ND  Extremities: No peripheral edema    LABS:      137  |  107  |  49<H>  ----------------------------<  104<H>  5.1   |  19<L>  |  5.35<H>    Ca    9.6      2021 08:21  Phos  4.9       Mg     1.9         TPro  7.0  /  Alb  3.2<L>  /  TBili  0.4  /  DBili      /  AST  23  /  ALT  43  /  AlkPhos  92      Creatinine Trend: 5.35 <--, 5.48 <--, 5.54 <--, 5.82 <--, 6.00 <--, 5.80 <--, 6.17 <--                        11.3   8.76  )-----------( 223      ( 2021 08:21 )             33.8     Urine Studies:  Urinalysis Basic - ( 2021 20:34 )    Color: Yellow / Appearance: Clear / S.010 / pH:   Gluc:  / Ketone: Negative  / Bili: Negative / Urobili: Negative   Blood:  / Protein: 500 mg/dL / Nitrite: Negative   Leuk Esterase: Negative / RBC: 2-5 /HPF / WBC 0-2 /HPF   Sq Epi:  / Non Sq Epi: Few /HPF / Bacteria: Few /HPF      Creatinine, Random Urine: 57 mg/dL ( @ 17:46)  Chloride, Random Urine: 58 mmol/L ( @ 21:47)  Sodium, Random Urine: 63 mmol/L ( @ 21:47)  Osmolality, Random Urine: 337 mos/kg ( @ 21:47)  Creatinine, Random Urine: 79 mg/dL ( @ 21:47)

## 2021-02-27 NOTE — PROGRESS NOTE ADULT - PROBLEM SELECTOR PLAN 5
Patient was noted to have short episodes of self terminating SVT on telemetry   Cardiology Dr. Peres and Electrophysiology Dr. Swan consulted  Do not recommend beta blocker at this time due possible side effects  If SVT persists will consider switching amlodipine to Diltiazem.  Pt is asymptomatic

## 2021-02-27 NOTE — PROGRESS NOTE ADULT - SUBJECTIVE AND OBJECTIVE BOX
Patient denies chest pain or shortness of breath. Complained of HA and vomiting yesterday;  Review of systems otherwise (-)  	    acetaminophen   Tablet .. 650 milliGRAM(s) Oral every 6 hours PRN  ALBUTerol    90 MICROgram(s) HFA Inhaler 2 Puff(s) Inhalation every 6 hours PRN  amLODIPine   Tablet 10 milliGRAM(s) Oral daily  atorvastatin 40 milliGRAM(s) Oral at bedtime  budesonide  80 MICROgram(s)/formoterol 4.5 MICROgram(s) Inhaler 2 Puff(s) Inhalation two times a day  hydrALAZINE 25 milliGRAM(s) Oral three times a day  lidocaine   Patch 1 Patch Transdermal daily  ondansetron Injectable 5 milliGRAM(s) IV Push every 6 hours PRN  sodium chloride 0.9%. 1000 milliLiter(s) IV Continuous <Continuous>                            11.3   8.76  )-----------( 223      ( 27 Feb 2021 08:21 )             33.8       02-27    137  |  107  |  49<H>  ----------------------------<  104<H>  5.1   |  19<L>  |  5.35<H>    Ca    9.6      27 Feb 2021 08:21  Phos  4.9     02-27  Mg     1.9     02-27    TPro  7.0  /  Alb  3.2<L>  /  TBili  0.4  /  DBili  x   /  AST  23  /  ALT  43  /  AlkPhos  92  02-26      CARDIAC MARKERS ( 25 Feb 2021 09:16 )  x     / x     / 155 U/L / x     / x            T(C): 36.8 (02-27-21 @ 08:19), Max: 36.8 (02-27-21 @ 08:19)  HR: 86 (02-27-21 @ 08:19) (81 - 101)  BP: 141/78 (02-27-21 @ 08:19) (123/68 - 155/85)  RR: 18 (02-27-21 @ 08:19) (17 - 18)  SpO2: 99% (02-27-21 @ 08:19) (99% - 100%)  Wt(kg): --    I&O's Summary    26 Feb 2021 07:01  -  27 Feb 2021 07:00  --------------------------------------------------------  IN: 750 mL / OUT: 870 mL / NET: -120 mL    27 Feb 2021 07:01  -  27 Feb 2021 10:55  --------------------------------------------------------  IN: 0 mL / OUT: 900 mL / NET: -900 mL        HEENT:  (-)icterus (-)pallor  CV: N S1 S2 1/6 SHAGUFTA (+)2 Pulses B/l  Resp:  Clear to ausculatation B/L, normal effort  GI: (+) BS Soft, NT, ND  Lymph:  (-)Edema, (-)obvious lymphadenopathy  Skin: Warm to touch, Normal turgor  Psych: Appropriate mood and affect      TELEMETRY: ST    TTE: < from: Transthoracic Echocardiogram (02.24.21 @ 18:49) >  1. Mild mitral regurgitation.  2. Mild concentric left ventricular hypertrophy.  3. Normal left ventricular systolic function.  4. Normal right ventricular size and function.    < end of copied text >          ASSESSMENT/PLAN: 	25y  Male PMH of asthma presented to his PCP with complaints of headache, nausea and vomiting x 5 days found with hypertnesive urgency and CIARRA.    - Started on Norvasc.  BP stable this morning   -  Check Serum renin and tim levels  - 24 hour urine collection for urine catecholamines and metanephrine  - TSH within normal Limits  - echo with no significant structural heart disease and normal LVEF  - renal f/u for biopsy next week  - EP eval appreciated   - workup of prior HA per primary team - primary team notified       Cookie Frederick MD

## 2021-02-27 NOTE — PROGRESS NOTE ADULT - PROBLEM SELECTOR PROBLEM 7
Gera Salazar is a 65 y.o. female is being seen for consultation today at the request of Lucy Art, *  Had episode of bright red spotting that lasted one day.  Prior hysterectomy with BS&O (abd) for endometriosis    Vaginal Bleeding   The patient's primary symptoms include vaginal bleeding. The patient's pertinent negatives include no genital itching, genital lesions, genital odor, genital rash, missed menses, pelvic pain or vaginal discharge. This is a new problem. The current episode started 1 to 4 weeks ago. The problem occurs rarely. The problem has been resolved. The patient is experiencing no pain. She is not pregnant. Associated symptoms include painful intercourse. Pertinent negatives include no abdominal pain, anorexia, back pain, chills, constipation, diarrhea, discolored urine, dysuria, fever, flank pain, frequency, headaches, hematuria, joint pain, joint swelling, nausea, rash, sore throat, urgency or vomiting. The vaginal discharge was normal. The vaginal bleeding is spotting. She has not been passing clots. She has not been passing tissue. Nothing aggravates the symptoms. She has tried nothing for the symptoms. The treatment provided significant relief. She is sexually active. No, her partner does not have an STD. She uses hysterectomy for contraception. She is postmenopausal. Her past medical history is significant for an abdominal surgery, endometriosis and a gynecological surgery. There is no history of a  section, an ectopic pregnancy, herpes simplex, menorrhagia, metrorrhagia, miscarriage, ovarian cysts, perineal abscess, PID, an STD, a terminated pregnancy or vaginosis.       The following portions of the patient's history were reviewed and updated as appropriate: allergies, current medications, past family history, past medical history, past social history, past surgical history and problem list.    Review of Systems   Constitutional: Negative for chills and  fever.   HENT: Negative for sore throat.    Gastrointestinal: Negative for abdominal pain, anorexia, constipation, diarrhea, nausea and vomiting.   Genitourinary: Positive for vaginal bleeding. Negative for dysuria, flank pain, frequency, hematuria, menorrhagia, missed menses, pelvic pain, urgency and vaginal discharge.   Musculoskeletal: Negative for back pain and joint pain.   Skin: Negative for rash.   Neurological: Negative for headaches.   All other systems reviewed and are negative.      Objective   Physical Exam   Constitutional: She is oriented to person, place, and time. She appears well-developed and well-nourished.   HENT:   Head: Normocephalic and atraumatic.   Abdominal: Hernia confirmed negative in the right inguinal area and confirmed negative in the left inguinal area.   Genitourinary: Pelvic exam was performed with patient supine. No labial fusion. There is no rash, tenderness, lesion or injury on the right labia. There is no rash, tenderness, lesion or injury on the left labia.       Lymphadenopathy:        Right: No inguinal adenopathy present.        Left: No inguinal adenopathy present.   Neurological: She is alert and oriented to person, place, and time.   Skin: Skin is warm and dry.   Psychiatric: She has a normal mood and affect. Her behavior is normal. Judgment and thought content normal.   Nursing note and vitals reviewed.        Assessment/Plan   Earline was seen today for transitional care management.    Diagnoses and all orders for this visit:    Vagina bleeding  -     conjugated estrogens (PREMARIN) 0.625 MG/GM vaginal cream; Insert  into the vagina 3 (Three) Times a Week.    Vaginal dryness, menopausal  -     conjugated estrogens (PREMARIN) 0.625 MG/GM vaginal cream; Insert  into the vagina 3 (Three) Times a Week.    Non-smoker        Aldo Cabral MD            Prophylactic measure

## 2021-02-27 NOTE — PROGRESS NOTE ADULT - PROBLEM SELECTOR PLAN 1
Pt presented with Headaches, Pt noted to have sbp >180 on admission  S/p amlodipine 5 mg and hydralazine in ED   CTH negative   C/w Amlodipine 10mg PO qd and low salt diet.   Started Hydralazine 25mg PO tid, titrate as needed.  Monitor BP closely  /95 this AM   Sebas elevated 27.2 ng/dl; Renin 4.9 pg/ml (0.49ng/dl): Sebas/ Renin 55.5  TTE: mild MR, mild LVH   Endocrine consulted  Nephro Dr. Massey following, appreciate recommendations

## 2021-02-27 NOTE — PROGRESS NOTE ADULT - ATTENDING COMMENTS
Going for renal bx on Monday.  Cont on gentle iv hydration until then.  GI/DVT PPX Going for renal bx on 3/3, as serological w/u has been negative thus far.  Cont on gentle iv hydration until then.  GI/DVT PPX

## 2021-02-28 DIAGNOSIS — R51.9 HEADACHE, UNSPECIFIED: ICD-10-CM

## 2021-02-28 DIAGNOSIS — N18.9 CHRONIC KIDNEY DISEASE, UNSPECIFIED: ICD-10-CM

## 2021-02-28 DIAGNOSIS — E78.5 HYPERLIPIDEMIA, UNSPECIFIED: ICD-10-CM

## 2021-02-28 LAB
ANION GAP SERPL CALC-SCNC: 9 MMOL/L — SIGNIFICANT CHANGE UP (ref 5–17)
BUN SERPL-MCNC: 49 MG/DL — HIGH (ref 7–18)
CALCIUM SERPL-MCNC: 9 MG/DL — SIGNIFICANT CHANGE UP (ref 8.4–10.5)
CHLORIDE SERPL-SCNC: 108 MMOL/L — SIGNIFICANT CHANGE UP (ref 96–108)
CO2 SERPL-SCNC: 20 MMOL/L — LOW (ref 22–31)
CREAT SERPL-MCNC: 5.87 MG/DL — HIGH (ref 0.5–1.3)
GLUCOSE SERPL-MCNC: 95 MG/DL — SIGNIFICANT CHANGE UP (ref 70–99)
HCT VFR BLD CALC: 32.8 % — LOW (ref 39–50)
HGB BLD-MCNC: 11.2 G/DL — LOW (ref 13–17)
MAGNESIUM SERPL-MCNC: 2 MG/DL — SIGNIFICANT CHANGE UP (ref 1.6–2.6)
MCHC RBC-ENTMCNC: 28.4 PG — SIGNIFICANT CHANGE UP (ref 27–34)
MCHC RBC-ENTMCNC: 34.1 GM/DL — SIGNIFICANT CHANGE UP (ref 32–36)
MCV RBC AUTO: 83 FL — SIGNIFICANT CHANGE UP (ref 80–100)
NRBC # BLD: 0 /100 WBCS — SIGNIFICANT CHANGE UP (ref 0–0)
PHOSPHATE SERPL-MCNC: 5 MG/DL — HIGH (ref 2.5–4.5)
PLATELET # BLD AUTO: 220 K/UL — SIGNIFICANT CHANGE UP (ref 150–400)
POTASSIUM SERPL-MCNC: 5.3 MMOL/L — SIGNIFICANT CHANGE UP (ref 3.5–5.3)
POTASSIUM SERPL-SCNC: 5.3 MMOL/L — SIGNIFICANT CHANGE UP (ref 3.5–5.3)
RBC # BLD: 3.95 M/UL — LOW (ref 4.2–5.8)
RBC # FLD: 12 % — SIGNIFICANT CHANGE UP (ref 10.3–14.5)
SODIUM SERPL-SCNC: 137 MMOL/L — SIGNIFICANT CHANGE UP (ref 135–145)
WBC # BLD: 8.43 K/UL — SIGNIFICANT CHANGE UP (ref 3.8–10.5)
WBC # FLD AUTO: 8.43 K/UL — SIGNIFICANT CHANGE UP (ref 3.8–10.5)

## 2021-02-28 PROCEDURE — 70450 CT HEAD/BRAIN W/O DYE: CPT | Mod: 26

## 2021-02-28 RX ORDER — HYDROMORPHONE HYDROCHLORIDE 2 MG/ML
0.1 INJECTION INTRAMUSCULAR; INTRAVENOUS; SUBCUTANEOUS EVERY 8 HOURS
Refills: 0 | Status: DISCONTINUED | OUTPATIENT
Start: 2021-02-28 | End: 2021-03-01

## 2021-02-28 RX ORDER — METOCLOPRAMIDE HCL 10 MG
10 TABLET ORAL ONCE
Refills: 0 | Status: COMPLETED | OUTPATIENT
Start: 2021-02-28 | End: 2021-02-28

## 2021-02-28 RX ORDER — SODIUM CHLORIDE 9 MG/ML
1000 INJECTION INTRAMUSCULAR; INTRAVENOUS; SUBCUTANEOUS
Refills: 0 | Status: DISCONTINUED | OUTPATIENT
Start: 2021-02-28 | End: 2021-03-01

## 2021-02-28 RX ORDER — HYDROMORPHONE HYDROCHLORIDE 2 MG/ML
0.1 INJECTION INTRAMUSCULAR; INTRAVENOUS; SUBCUTANEOUS ONCE
Refills: 0 | Status: DISCONTINUED | OUTPATIENT
Start: 2021-02-28 | End: 2021-02-28

## 2021-02-28 RX ORDER — OXYCODONE AND ACETAMINOPHEN 5; 325 MG/1; MG/1
1 TABLET ORAL ONCE
Refills: 0 | Status: DISCONTINUED | OUTPATIENT
Start: 2021-02-28 | End: 2021-02-28

## 2021-02-28 RX ORDER — LANOLIN ALCOHOL/MO/W.PET/CERES
3 CREAM (GRAM) TOPICAL AT BEDTIME
Refills: 0 | Status: COMPLETED | OUTPATIENT
Start: 2021-02-28 | End: 2021-02-28

## 2021-02-28 RX ADMIN — SODIUM CHLORIDE 75 MILLILITER(S): 9 INJECTION INTRAMUSCULAR; INTRAVENOUS; SUBCUTANEOUS at 16:12

## 2021-02-28 RX ADMIN — Medication 10 MILLIGRAM(S): at 12:57

## 2021-02-28 RX ADMIN — Medication 650 MILLIGRAM(S): at 08:30

## 2021-02-28 RX ADMIN — BUDESONIDE AND FORMOTEROL FUMARATE DIHYDRATE 2 PUFF(S): 160; 4.5 AEROSOL RESPIRATORY (INHALATION) at 13:25

## 2021-02-28 RX ADMIN — Medication 25 MILLIGRAM(S): at 20:40

## 2021-02-28 RX ADMIN — ONDANSETRON 5 MILLIGRAM(S): 8 TABLET, FILM COATED ORAL at 05:39

## 2021-02-28 RX ADMIN — Medication 25 MILLIGRAM(S): at 12:57

## 2021-02-28 RX ADMIN — AMLODIPINE BESYLATE 10 MILLIGRAM(S): 2.5 TABLET ORAL at 05:39

## 2021-02-28 RX ADMIN — Medication 3 MILLIGRAM(S): at 21:55

## 2021-02-28 RX ADMIN — OXYCODONE AND ACETAMINOPHEN 1 TABLET(S): 5; 325 TABLET ORAL at 01:10

## 2021-02-28 RX ADMIN — HYDROMORPHONE HYDROCHLORIDE 0.1 MILLIGRAM(S): 2 INJECTION INTRAMUSCULAR; INTRAVENOUS; SUBCUTANEOUS at 09:29

## 2021-02-28 RX ADMIN — BUDESONIDE AND FORMOTEROL FUMARATE DIHYDRATE 2 PUFF(S): 160; 4.5 AEROSOL RESPIRATORY (INHALATION) at 20:41

## 2021-02-28 RX ADMIN — Medication 650 MILLIGRAM(S): at 20:40

## 2021-02-28 RX ADMIN — Medication 25 MILLIGRAM(S): at 05:39

## 2021-02-28 RX ADMIN — ATORVASTATIN CALCIUM 40 MILLIGRAM(S): 80 TABLET, FILM COATED ORAL at 20:40

## 2021-02-28 NOTE — PROGRESS NOTE ADULT - PROBLEM SELECTOR PLAN 6
Continue with home medications Patient was noted to have short episodes of self terminating SVT on telemetry   Cardiology Dr. Peres and Electrophysiology Dr. Swan consulted  Do not recommend beta blocker at this time due possible side effects  If SVT persists will consider switching amlodipine to Diltiazem.  Pt is asymptomatic Mildly elevated LFTs   Hepatitis panel negative  LFTs normalized AST/ALT (23/43)  CT abdomen and pelvis showed no acute findings. Mildly elevated LFTs   Hepatitis panel negative  LFTs normalized  CT abdomen and pelvis showed no acute findings.

## 2021-02-28 NOTE — PROGRESS NOTE ADULT - PROBLEM SELECTOR PLAN 4
Mildly elevated LFTs   Hepatitis panel negative  LFTs normalized AST/ALT (23/43)  CT abdomen and pelvis showed no acute findings. Pt noted to have non anion gap metabolic acidosis with respiratory compensation   bicarb 19-> 17 on admission   Metabolic acidosis likely in the setting of ARF   Lactate: 0.3  pH on VB.31 ()  Bicarb 20 () from 19 (), monitor   Will hold off on bicarb replacement for now   Continue to monitor pH/CO2  Nephro Dr. Massey following appreciate recommendations Patient is complaining of a severe 8-9/10 frontal and bitemporal headache which is causing him to have multiple bouts of NBNB emesis   CTH 2/22 negative   Overnight the patient was given percocet 325mg x 1 and this morning the patient was given Dilaudid 0.1mg x 1.   Was given Zofran overnight without improvement   Give Reglan for nausea  Give Dilaudid 0.1mg PRN for pain control  Obtain repeat CTH for acute worsening of headache despite improved BP control Patient is complaining of a severe 8-9/10 frontal and bitemporal headache which is causing him to have multiple bouts of NBNB emesis   CTH 2/22 negative   Overnight the patient was given percocet 325mg x 1 and this morning the patient was given Dilaudid 0.1mg x 1.   Was given Zofran overnight without improvement   Give Reglan for nausea  Give Dilaudid 0.1mg PRN for pain control  Start IVF 50cc /hr - patient is vomiting due to pain  Obtain repeat CTH for acute worsening of headache despite improved BP control

## 2021-02-28 NOTE — PROGRESS NOTE ADULT - SUBJECTIVE AND OBJECTIVE BOX
Patient denies chest pain or shortness of breath. complains of HA and vomiting;  Review of systems otherwise (-)  	    acetaminophen   Tablet .. 650 milliGRAM(s) Oral every 6 hours PRN  ALBUTerol    90 MICROgram(s) HFA Inhaler 2 Puff(s) Inhalation every 6 hours PRN  amLODIPine   Tablet 10 milliGRAM(s) Oral daily  atorvastatin 40 milliGRAM(s) Oral at bedtime  budesonide  80 MICROgram(s)/formoterol 4.5 MICROgram(s) Inhaler 2 Puff(s) Inhalation two times a day  hydrALAZINE 25 milliGRAM(s) Oral three times a day  lidocaine   Patch 1 Patch Transdermal daily  ondansetron Injectable 5 milliGRAM(s) IV Push every 6 hours PRN  sodium chloride 0.9%. 1000 milliLiter(s) IV Continuous <Continuous>                            11.2   8.43  )-----------( 220      ( 28 Feb 2021 07:48 )             32.8       02-28    137  |  108  |  49<H>  ----------------------------<  95  5.3   |  20<L>  |  5.87<H>    Ca    9.0      28 Feb 2021 07:48  Phos  5.0     02-28  Mg     2.0     02-28              T(C): 36.6 (02-28-21 @ 08:05), Max: 37 (02-27-21 @ 19:38)  HR: 94 (02-28-21 @ 08:05) (82 - 103)  BP: 125/66 (02-28-21 @ 08:05) (125/66 - 161/92)  RR: 18 (02-28-21 @ 08:05) (17 - 18)  SpO2: 97% (02-28-21 @ 08:05) (97% - 100%)  Wt(kg): --    I&O's Summary    27 Feb 2021 07:01  -  28 Feb 2021 07:00  --------------------------------------------------------  IN: 0 mL / OUT: 1600 mL / NET: -1600 mL          HEENT:  (-)icterus (-)pallor  CV: N S1 S2 1/6 SHAGUFTA (+)2 Pulses B/l  Resp:  Clear to ausculatation B/L, normal effort  GI: (+) BS Soft, NT, ND  Lymph:  (-)Edema, (-)obvious lymphadenopathy  Skin: Warm to touch, Normal turgor  Psych: Appropriate mood and affect      TELEMETRY: ST    TTE: < from: Transthoracic Echocardiogram (02.24.21 @ 18:49) >  1. Mild mitral regurgitation.  2. Mild concentric left ventricular hypertrophy.  3. Normal left ventricular systolic function.  4. Normal right ventricular size and function.    < end of copied text >          ASSESSMENT/PLAN: 	25y  Male PMH of asthma presented to his PCP with complaints of headache, nausea and vomiting x 5 days found with hypertnesive urgency and CIARRA.    - Started on Norvasc.  - BP has improved and patient normotensive today   - Check Serum renin and tim levels  - 24 hour urine collection for urine catecholamines and metanephrine  - TSH within normal Limits  - echo with no significant structural heart disease and normal LVEF  - renal f/u for biopsy next week  - EP eval appreciated   - workup of prior HA per primary team - discussed with primary covering resident at length       Cookie Frederick MD

## 2021-02-28 NOTE — CHART NOTE - NSCHARTNOTEFT_GEN_A_CORE
Provider notified for severe frontal, non-radiating, 8/10, headache, that is not responsive to tylenol. No FND noted, and recent CTH showed no signs of acute pathology. Percocet given.

## 2021-02-28 NOTE — PROGRESS NOTE ADULT - PROBLEM SELECTOR PLAN 5
Patient was noted to have short episodes of self terminating SVT on telemetry   Cardiology Dr. Peres and Electrophysiology Dr. Swan consulted  Do not recommend beta blocker at this time due possible side effects  If SVT persists will consider switching amlodipine to Diltiazem.  Pt is asymptomatic Mildly elevated LFTs   Hepatitis panel negative  LFTs normalized AST/ALT (23/43)  CT abdomen and pelvis showed no acute findings. Pt noted to have non anion gap metabolic acidosis with respiratory compensation   bicarb 19-> 17 on admission   Metabolic acidosis likely in the setting of ARF   Lactate: 0.3  pH on VB.31 ()  Bicarb 20 () from 19 (), monitor   Will hold off on bicarb replacement for now   Continue to monitor pH/CO2  Nephro Dr. Massey following appreciate recommendations

## 2021-02-28 NOTE — PROGRESS NOTE ADULT - PROBLEM SELECTOR PLAN 3
Pt noted to have non anion gap metabolic acidosis with respiratory compensation   bicarb 19-> 17 on admission   Bicarb now 22 (2/)  Metabolic acidosis likely in the setting of ARF   Lactate: 0.3  pH on VB.31 (2)  Will hold off on bicarb replacement for now   Continue to monitor pH/CO2  Follow up Nephro recommendation Baseline creatinine 1.29 (2/18) with proteinuria   PTHi elevated 99  Low phos diet

## 2021-02-28 NOTE — PROGRESS NOTE ADULT - ASSESSMENT
Patient is a 26yo Male with Asthma on Dupixent injections q 2kweeks p/w HA, n/v x 5 days. Pt sent in by his PMD due to elevated SBP >170. Pt was a/w CIARRA and hypertensive emergency. Nephrology consulted for Elevated serum creatinine.    1. CIARRA- ?GN (?IgA nephropathy) vs. CIARRA on CKD due to uncontrolled HTN. UA with 500 protein and mod blood; rbc 5-10. Pt on Dupixent- no urinary eos, peripheral eos or rash. Spot UPr/Cr 3.3/ repeated 3.82- nephrotic range proteinuria without nephrotic syndrome (albumin 3.4 with no edema). Renal function remains elevated; pt not eating due to n/v with HA. [facility] consult. Will give short course of IVF.   No need for dialysis at this time.   CK 1141, should not cause significant CIARRA. Serological w/u neg except PLAR-2 pending will f/u (thus far Normal C3, C4, SIFE neg, K/L 1.7, Syphillis neg,  Neg ASO, neg ANCA, neg ELMA, neg dsDNA, neg HIV, neg HepBsAg, and neg Hep C ab and neg antiGBM).   Discussed the need for kidney biopsy to determine etiology of kidney disease if serological w/u neg. Discussed risk/ benefits/ alt of kidney biopsy- pt agreeable; will plan for possible kidney biopsy on 3/3 (discussed with IR Lise LAU- unable to perform 3/1 due to schedule). Will keep NPO after midnight on 3/2 Check Coags on 3/3 and give DDAVP 22 mcg IV x1 30 mins prior to IR procedure. Strict I/Os. Avoid nephrotoxins/ NSAIDs/ RCA. Monitor BMP.    2. CKD unspecified- previous SCr 1.29 in 2018 with proteinuria. See above.  PTHi 99 elevated. Recc Renal diet. Avoid nephrotoxins    3. Hypertensive emergency- BP in /114; CT head neg. BP acceptable. c/w Amlodipine 10mg PO qd. c/w Hydralazine 25mg PO tid, titrate as needed. c/w low salt diet.   Secondary w/u pending: UTox neg. Sebas 27.2 ng/dl; Renin 4.9 pg/ml (0.49ng/dl) Endo following. No pheo- CT with normal adrenals. w/u neg. TTE: mild MR, mild LVH.  Pt will need Renal US with dopplers as an outpt. Monitor BP  4. Metabolic Acidosis-in the setting of renal failure. Serum CO2 mildly decreased; if remains low will start sodium bicarb tabs. Serum lactate wnl and VBG ph 7.31.  Monitor ph/Co2    Pt with persistent HA and now BP well controlled. [facility] consult.   Plan discussed with patient and his brother Jesus via phone.

## 2021-02-28 NOTE — PROGRESS NOTE ADULT - PROBLEM SELECTOR PLAN 1
Pt presented with Headaches, Pt noted to have sbp >180 on admission  S/p amlodipine 5 mg and hydralazine in ED   CTH negative   C/w Amlodipine 10mg PO qd and low salt diet.   Started Hydralazine 25mg PO tid, titrate as needed.  Monitor BP closely  /95 this AM   Sebas elevated 27.2 ng/dl; Renin 4.9 pg/ml (0.49ng/dl): Sebas/ Renin 55.5  TTE: mild MR, mild LVH   Endocrine consulted  Nephro Dr. Massey following, appreciate recommendations Pt presented with Headaches, Pt noted to have sbp >180 on admission  S/p amlodipine 5 mg and hydralazine in ED   CTH negative   C/w Amlodipine 10mg PO qd and low salt diet.   C/w Hydralazine 25mg PO tid, titrate as needed.  Monitor BP closely  /66 this AM   TTE: mild MR, mild LVH   Sebas elevated 27.2 ng/dl; Renin 4.9 pg/ml (0.49ng/dl): Sebas/ Renin 55.5 (2/23)  Repeat Sebas (2/25) 11.3  Endocrine following  Nephro Dr. Massey following, appreciate recommendations Pt presented with Headaches, Pt noted to have sbp >180 on admission  S/p amlodipine 5 mg and hydralazine in ED   CTH (2/22) negative   C/w Amlodipine 10mg PO qd and low salt diet.   C/w Hydralazine 25mg PO tid, titrate as needed.  Monitor BP closely  /66 this AM   TTE: mild MR, mild LVH   Sebas elevated 27.2 ng/dl; Renin 4.9 pg/ml (0.49ng/dl): Sebas/ Renin 55.5 (2/23)  Repeat Sebas (2/25) 11.3  Endocrine following  Nephro Dr. Massey following, appreciate recommendations

## 2021-02-28 NOTE — PROGRESS NOTE ADULT - PROBLEM SELECTOR PLAN 7
IMPROVE VTE Individual Risk Assessment  RISK                                                         Points  [  ] Previous VTE                                      3  [  ] Thrombophilia                                   2  [  ] Lower limb paralysis                         2 (unable to hold up >15 seconds)    [  ] Current Cancer                                  2       (within 6 months)  [  ] Immobilization > 24 hrs                    1  [  ] ICU/CCU stay > 24 hrs                         1  [  ] Age > 60                                              1  will hold chemo dvt ppx for now   Continue with statin Patient was noted to have short episodes of self terminating SVT on telemetry   Cardiology Dr. Peres and Electrophysiology Dr. Swan consulted  Do not recommend beta blocker at this time due possible side effects  If SVT persists will consider switching amlodipine to Diltiazem.  Pt is asymptomatic

## 2021-02-28 NOTE — PROGRESS NOTE ADULT - ATTENDING COMMENTS
[facility] NEPHROLOGY  Justin Mckenzie M.D.  Chad Reich D.O.  Beth Massey M.D.  Shayy Navarro, MSN, ANP-C  [phone]    [address] [place] [address]

## 2021-02-28 NOTE — PROGRESS NOTE ADULT - SUBJECTIVE AND OBJECTIVE BOX
PGY-1 Progress Note discussed with attending    PAGER #: [[phone]] TILL 5:00 PM  PLEASE CONTACT ON CALL TEAM:  - On Call Team (Please refer to Prasanna) FROM 5:00 PM - 8:30PM  - Nightfloat Team FROM 8:30 -7:30 AM    CHIEF COMPLAINT & BRIEF HOSPITAL COURSE:    INTERVAL HPI/OVERNIGHT EVENTS:     REVIEW OF SYSTEMS:  CONSTITUTIONAL: No fever, weight loss, or fatigue  RESPIRATORY: No cough, wheezing, chills or hemoptysis; No shortness of breath  CARDIOVASCULAR: No chest pain, palpitations, dizziness, or leg swelling  GASTROINTESTINAL: No abdominal pain. No nausea, vomiting, or hematemesis; No diarrhea or constipation. No melena or hematochezia.  GENITOURINARY: No dysuria or hematuria, urinary frequency  NEUROLOGICAL: No headaches, memory loss, loss of strength, numbness, or tremors  SKIN: No itching, burning, rashes, or lesions     Vital Signs Last 24 Hrs  T(C): 36.6 (28 Feb 2021 08:05), Max: 37 (27 Feb 2021 19:38)  T(F): 97.8 (28 Feb 2021 08:05), Max: 98.6 (27 Feb 2021 19:38)  HR: 94 (28 Feb 2021 08:05) (80 - 103)  BP: 125/66 (28 Feb 2021 08:05) (125/66 - 161/92)  BP(mean): --  RR: 18 (28 Feb 2021 08:05) (17 - 18)  SpO2: 97% (28 Feb 2021 08:05) (97% - 100%)    PHYSICAL EXAMINATION:  GENERAL: NAD, well built  HEAD:  Atraumatic, Normocephalic  EYES:  conjunctiva and sclera clear  NECK: Supple, No JVD, Normal thyroid  CHEST/LUNG: Clear to auscultation. Clear to percussion bilaterally; No rales, rhonchi, wheezing, or rubs  HEART: Regular rate and rhythm; No murmurs, rubs, or gallops  ABDOMEN: Soft, Nontender, Nondistended; Bowel sounds present  NERVOUS SYSTEM:  Alert & Oriented X3,    EXTREMITIES:  2+ Peripheral Pulses, No clubbing, cyanosis, or edema  SKIN: warm dry                          11.2   8.43  )-----------( 220      ( 28 Feb 2021 07:48 )             32.8     02-27    137  |  107  |  49<H>  ----------------------------<  104<H>  5.1   |  19<L>  |  5.35<H>    Ca    9.6      27 Feb 2021 08:21  Phos  4.9     02-27  Mg     1.9     02-27    TPro  7.0  /  Alb  3.2<L>  /  TBili  0.4  /  DBili  x   /  AST  23  /  ALT  43  /  AlkPhos  92  02-26    LIVER FUNCTIONS - ( 26 Feb 2021 08:51 )  Alb: 3.2 g/dL / Pro: 7.0 g/dL / ALK PHOS: 92 U/L / ALT: 43 U/L DA / AST: 23 U/L / GGT: x             CAPILLARY BLOOD GLUCOSE      RADIOLOGY & ADDITIONAL TESTS: PGY-1 Progress Note discussed with attending    PAGER #: [[phone]] TILL 5:00 PM  PLEASE CONTACT ON CALL TEAM:  - On Call Team (Please refer to Prasanna) FROM 5:00 PM - 8:30PM  - Nightfloat Team FROM 8:30 -7:30 AM    CHIEF COMPLAINT & BRIEF HOSPITAL COURSE:  24 yo male with PMH of asthma presented to his PCP with complaints of headache, nausea and vomiting x 5 days. Pt was noted to have elevated sBP of >170 and was advised to come to emergency department. Patient states he started having headaches 5 days ago associated with nausea and 1 day hx of NBNB vomiting. Pt endorses episode of dysuria few days ago which has now resolved. Pt denies head trauma, any prior episodes of similar headaches. No family hx of Polycystic kidney disease, CVA, HTN. Denies diarrhea, abdominal pain, fever, chills, recent viral infection, no blood in urine, no flank pain, no urinary rentention. In the ED the patients BP was 185/114. He was given amlodipine and hydralazine. He was continued on amlodipine and his BP was better controlled. He was noted to have a creatinine of 6. UA showed moderate blood, 500 protein. FeNa: 3.3%. Normal C3, C4, SIFE neg, K/L 1.7, ELMA, ANCA, dsDNA, ASO, Treponema Pallidum ab and Hepatitis panel all negative. Immunofixation negative for monoclonal bands. CT A/P was negative for any obstructive uropathy or acute findings.  Renal US showed bilateral hyperechoic kidneys.    INTERVAL HPI/OVERNIGHT EVENTS:  This morning patient had a severe frontal 8/10, headache, that was not responsive to tylenol. No FND were noted, and recent CTH showed no signs of acute pathology. Percocet was given. plan for renal biopsy on 3/3.    REVIEW OF SYSTEMS:  CONSTITUTIONAL: No fever, weight loss, or fatigue  RESPIRATORY: No cough, wheezing, chills or hemoptysis; No shortness of breath  CARDIOVASCULAR: No chest pain, palpitations, dizziness, or leg swelling  GASTROINTESTINAL: No abdominal pain. No nausea, vomiting, or hematemesis; No diarrhea or constipation. No melena or hematochezia.  GENITOURINARY: No dysuria or hematuria, urinary frequency  NEUROLOGICAL: No memory loss, loss of strength, numbness, or tremors, positive headaches   SKIN: No itching, burning, rashes, or lesions     Vital Signs Last 24 Hrs  T(C): 36.6 (28 Feb 2021 08:05), Max: 37 (27 Feb 2021 19:38)  T(F): 97.8 (28 Feb 2021 08:05), Max: 98.6 (27 Feb 2021 19:38)  HR: 94 (28 Feb 2021 08:05) (80 - 103)  BP: 125/66 (28 Feb 2021 08:05) (125/66 - 161/92)  BP(mean): --  RR: 18 (28 Feb 2021 08:05) (17 - 18)  SpO2: 97% (28 Feb 2021 08:05) (97% - 100%)    PHYSICAL EXAMINATION:  GENERAL: NAD, well built  HEAD:  Atraumatic, Normocephalic  EYES:  conjunctiva and sclera clear  NECK: Supple, No JVD, Normal thyroid  CHEST/LUNG: Clear to auscultation. Clear to percussion bilaterally; No rales, rhonchi, wheezing, or rubs  HEART: Regular rate and rhythm; No murmurs, rubs, or gallops  ABDOMEN: Soft, Nontender, Nondistended; Bowel sounds present  NERVOUS SYSTEM:  Alert & Oriented X3,    EXTREMITIES:  2+ Peripheral Pulses, No clubbing, cyanosis, or edema  SKIN: warm dry                          11.2   8.43  )-----------( 220      ( 28 Feb 2021 07:48 )             32.8     02-27    137  |  107  |  49<H>  ----------------------------<  104<H>  5.1   |  19<L>  |  5.35<H>    Ca    9.6      27 Feb 2021 08:21  Phos  4.9     02-27  Mg     1.9     02-27    TPro  7.0  /  Alb  3.2<L>  /  TBili  0.4  /  DBili  x   /  AST  23  /  ALT  43  /  AlkPhos  92  02-26    LIVER FUNCTIONS - ( 26 Feb 2021 08:51 )  Alb: 3.2 g/dL / Pro: 7.0 g/dL / ALK PHOS: 92 U/L / ALT: 43 U/L DA / AST: 23 U/L / GGT: x             CAPILLARY BLOOD GLUCOSE      RADIOLOGY & ADDITIONAL TESTS: PGY-1 Progress Note discussed with attending    PAGER #: [[phone]] TILL 5:00 PM  PLEASE CONTACT ON CALL TEAM:  - On Call Team (Please refer to Prasanna) FROM 5:00 PM - 8:30PM  - Nightfloat Team FROM 8:30 -7:30 AM    CHIEF COMPLAINT & BRIEF HOSPITAL COURSE:  24 yo male with PMH of asthma presented to his PCP with complaints of headache, nausea and vomiting x 5 days. Pt was noted to have elevated sBP of >170 and was advised to come to emergency department. Patient states he started having headaches 5 days ago associated with nausea and 1 day hx of NBNB vomiting. Pt endorses episode of dysuria few days ago which has now resolved. Pt denies head trauma, any prior episodes of similar headaches. No family hx of Polycystic kidney disease, CVA, HTN. Denies diarrhea, abdominal pain, fever, chills, recent viral infection, no blood in urine, no flank pain, no urinary rentention. In the ED the patients BP was 185/114. He was given amlodipine and hydralazine. He was continued on amlodipine and his BP was better controlled. He was noted to have a creatinine of 6. UA showed moderate blood, 500 protein. FeNa: 3.3%. Normal C3, C4, SIFE neg, K/L 1.7, ELMA, ANCA, dsDNA, ASO, Treponema Pallidum ab and Hepatitis panel all negative. Immunofixation negative for monoclonal bands. CT A/P was negative for any obstructive uropathy or acute findings.  Renal US showed bilateral hyperechoic kidneys.    INTERVAL HPI/OVERNIGHT EVENTS:  Overnight the patient had a severe frontal 8/10, headache, that was not responsive to tylenol. No FND were noted, and recent CTH showed no signs of acute pathology. Percocet was given.  This morning he states he did have some relief following percocet overnight and was able to sleep for about 4 hours, however the headache had returned and is now a 8-9/10. He also states he had 2-3 episodes of NBNB vomiting this morning from the pain. Patient was given Dilaudid 0.1mg IV x1.     REVIEW OF SYSTEMS:  CONSTITUTIONAL: No fever, weight loss, or fatigue  RESPIRATORY: No cough, wheezing, chills or hemoptysis; No shortness of breath  CARDIOVASCULAR: No chest pain, palpitations, dizziness, or leg swelling  GASTROINTESTINAL: No abdominal pain. No hematemesis; No diarrhea or constipation. No melena or hematochezia. Positive nausea and vomiting from HA  GENITOURINARY: No dysuria or hematuria, urinary frequency  NEUROLOGICAL: No memory loss, loss of strength, numbness, or tremors. Positive headaches   SKIN: No itching, burning, rashes, or lesions     Vital Signs Last 24 Hrs  T(C): 36.6 (28 Feb 2021 08:05), Max: 37 (27 Feb 2021 19:38)  T(F): 97.8 (28 Feb 2021 08:05), Max: 98.6 (27 Feb 2021 19:38)  HR: 94 (28 Feb 2021 08:05) (80 - 103)  BP: 125/66 (28 Feb 2021 08:05) (125/66 - 161/92)  BP(mean): --  RR: 18 (28 Feb 2021 08:05) (17 - 18)  SpO2: 97% (28 Feb 2021 08:05) (97% - 100%)    PHYSICAL EXAMINATION:  GENERAL: NAD, well built  HEAD:  Atraumatic, Normocephalic  EYES:  conjunctiva and sclera clear  NECK: Supple, No JVD, Normal thyroid  CHEST/LUNG: Clear to auscultation. Clear to percussion bilaterally; No rales, rhonchi, wheezing, or rubs  HEART: Regular rate and rhythm; No murmurs, rubs, or gallops  ABDOMEN: Soft, Nontender, Nondistended; Bowel sounds present  NERVOUS SYSTEM:  Alert & Oriented X3,    EXTREMITIES:  2+ Peripheral Pulses, No clubbing, cyanosis, or edema  SKIN: warm dry                          11.2   8.43  )-----------( 220      ( 28 Feb 2021 07:48 )             32.8     02-27    137  |  107  |  49<H>  ----------------------------<  104<H>  5.1   |  19<L>  |  5.35<H>    Ca    9.6      27 Feb 2021 08:21  Phos  4.9     02-27  Mg     1.9     02-27    TPro  7.0  /  Alb  3.2<L>  /  TBili  0.4  /  DBili  x   /  AST  23  /  ALT  43  /  AlkPhos  92  02-26    LIVER FUNCTIONS - ( 26 Feb 2021 08:51 )  Alb: 3.2 g/dL / Pro: 7.0 g/dL / ALK PHOS: 92 U/L / ALT: 43 U/L DA / AST: 23 U/L / GGT: x             CAPILLARY BLOOD GLUCOSE      RADIOLOGY & ADDITIONAL TESTS:

## 2021-02-28 NOTE — PROGRESS NOTE ADULT - PROBLEM SELECTOR PLAN 2
Presented with CIARRA and Cr 6  Baseline creatinine is 1.8  C/w gentle hydration   Urine protein/creatinine: 3.3, nephrotic range proteinuria without nephrotic syndrome, will repeat spot UPro/Cre once BP stabilizes   CT abdomen showed no findings of obstructive uropathy or other findings.  Renal US showed hyperechoic kidneys bilaterally   FeNa: 3.3%  Urine tox was negative  Urine eosinophils negative  F/u 5 VMA, metanephrines in urine to r/o pheochromocytoma  Normal C3, C4, SIFE neg, K/L 1.7, Syphillis neg, neg ASO, neg ANCA, neg ELMA, neg dsDNA, neg HIV, neg HepBsAg, and neg Hep C ab  Immunofixation negative for monoclonal bands   YASH Kappa 7.38 (elevated) YASH Lambda 4.33 (elevated)  Renal function slowly improving- no need for dialysis at this time.  Plan for kidney biopsy on 3/1. (Will keep NPO after midnight on 2/28. Check Coags on 3/1 and give DDAVP 22 mcg IV x1 30 mins prior to IR procedure).  Nephro Dr. Massey following, appreciate recommendations Presented with CIARAR and Cr 6  Baseline creatinine is 1.8  C/w gentle hydration   Urine protein/creatinine: 3.3, nephrotic range proteinuria without nephrotic syndrome, will repeat spot UPro/Cre once BP stabilizes   CT abdomen showed no findings of obstructive uropathy or other findings.  Renal US showed hyperechoic kidneys bilaterally   FeNa: 3.3%  Urine tox was negative  Urine eosinophils negative  No pheochromocytoma- CT a/p showed normal adrenals, w/u negative  Normal C3, C4, SIFE neg, K/L 1.7, Syphillis neg, neg ASO, neg ANCA, neg ELMA, neg dsDNA, neg HIV, neg HepBsAg, and neg Hep C ab  Immunofixation negative for monoclonal bands   YASH Kappa 7.38 (elevated) YASH Lambda 4.33 (elevated)  Pending PLA2R antibody   Creatinine increased from 5.35 > 5.87  Plan for kidney biopsy on 3/3. (Will keep NPO after midnight on 3/2. Check Coags on 3/3 and give DDAVP 22 mcg IV x1 30 mins prior to IR procedure).  Nephro Dr. Massey following, appreciate recommendations Presented with CIARRA and Cr 6  Baseline creatinine is 1.8  Urine protein/creatinine: 3.3, nephrotic range proteinuria without nephrotic syndrome, will repeat spot UPro/Cre once BP stabilizes   CT abdomen showed no findings of obstructive uropathy or other findings.  Renal US showed hyperechoic kidneys bilaterally   FeNa: 3.3%  Urine tox was negative  Urine eosinophils negative  No pheochromocytoma- CT a/p showed normal adrenals, w/u negative  Normal C3, C4, SIFE neg, K/L 1.7, Syphillis neg, neg ASO, neg ANCA, neg ELMA, neg dsDNA, neg HIV, neg HepBsAg, and neg Hep C ab  Immunofixation negative for monoclonal bands   YASH Kappa 7.38 (elevated) YASH Lambda 4.33 (elevated)  Pending PLA2R antibody   Creatinine increased from 5.35 > 5.87  Plan for kidney biopsy on 3/3. (Will keep NPO after midnight on 3/2. Check Coags on 3/3 and give DDAVP 22 mcg IV x1 30 mins prior to IR procedure).  Nephro Dr. Massey following, appreciate recommendations

## 2021-02-28 NOTE — PROGRESS NOTE ADULT - SUBJECTIVE AND OBJECTIVE BOX
[facility] NEPHROLOGY- PROGRESS NOTE    Patient is a 26yo Male with Asthma on Dupixent injections q 2kweeks p/w HA, n/v x 5 days. Pt sent in by his PMD due to elevated SBP >170. Pt was a/w CIARRA and hypertensive emergency. Nephrology consulted for Elevated serum creatinine.    Hospital Medications: Medications reviewed.  REVIEW OF SYSTEMS:  CONSTITUTIONAL: No fevers or chills +HA with light and sound sensitivity  RESPIRATORY: No shortness of breath  CARDIOVASCULAR: No chest pain.  GASTROINTESTINAL: No diarrhea or abdominal pain. Pt c/w nausea & vomiting x 3 episodes; no eating today  VASCULAR: No bilateral lower extremity edema.     VITALS:  T(F): 98.1 (21 @ 13:17), Max: 98.6 (21 @ 19:38)  HR: 95 (21 @ 13:17)  BP: 126/79 (21 @ 13:17)  RR: 18 (21 @ 13:17)  SpO2: 100% (21 @ 13:17)  Wt(kg): --     @ 07:01  -   @ 07:00  --------------------------------------------------------  IN: 0 mL / OUT: 1600 mL / NET: -1600 mL      PHYSICAL EXAM:  Constitutional: NAD  Neurological: Awake and Alert  HEENT: anicteric sclera,   Respiratory: CTAB, no wheezes, rales or rhonchi  Cardiovascular: S1, S2, RRR  Gastrointestinal: BS+, soft, NT/ND  Extremities: No peripheral edema    LABS:      137  |  108  |  49<H>  ----------------------------<  95  5.3   |  20<L>  |  5.87<H>    Ca    9.0      2021 07:48  Phos  5.0       Mg     2.0           Creatinine Trend: 5.87 <--, 5.35 <--, 5.48 <--, 5.54 <--, 5.82 <--, 6.00 <--, 5.80 <--, 6.17 <--                        11.2   8.43  )-----------( 220      ( 2021 07:48 )             32.8     Urine Studies:  Urinalysis Basic - ( 2021 20:34 )    Color: Yellow / Appearance: Clear / S.010 / pH:   Gluc:  / Ketone: Negative  / Bili: Negative / Urobili: Negative   Blood:  / Protein: 500 mg/dL / Nitrite: Negative   Leuk Esterase: Negative / RBC: 2-5 /HPF / WBC 0-2 /HPF   Sq Epi:  / Non Sq Epi: Few /HPF / Bacteria: Few /HPF      Creatinine, Random Urine: 57 mg/dL ( @ 17:46)  Chloride, Random Urine: 58 mmol/L ( @ 21:47)  Sodium, Random Urine: 63 mmol/L ( @ 21:47)  Osmolality, Random Urine: 337 mos/kg ( @ 21:47)  Creatinine, Random Urine: 79 mg/dL ( @ 21:47)

## 2021-03-01 LAB
ANION GAP SERPL CALC-SCNC: 10 MMOL/L — SIGNIFICANT CHANGE UP (ref 5–17)
BUN SERPL-MCNC: 46 MG/DL — HIGH (ref 7–18)
CALCIUM SERPL-MCNC: 9.2 MG/DL — SIGNIFICANT CHANGE UP (ref 8.4–10.5)
CHLORIDE SERPL-SCNC: 107 MMOL/L — SIGNIFICANT CHANGE UP (ref 96–108)
CO2 SERPL-SCNC: 20 MMOL/L — LOW (ref 22–31)
CREAT SERPL-MCNC: 6.14 MG/DL — HIGH (ref 0.5–1.3)
GLUCOSE SERPL-MCNC: 90 MG/DL — SIGNIFICANT CHANGE UP (ref 70–99)
HCT VFR BLD CALC: 32 % — LOW (ref 39–50)
HGB BLD-MCNC: 10.7 G/DL — LOW (ref 13–17)
MAGNESIUM SERPL-MCNC: 2.2 MG/DL — SIGNIFICANT CHANGE UP (ref 1.6–2.6)
MCHC RBC-ENTMCNC: 28.6 PG — SIGNIFICANT CHANGE UP (ref 27–34)
MCHC RBC-ENTMCNC: 33.4 GM/DL — SIGNIFICANT CHANGE UP (ref 32–36)
MCV RBC AUTO: 85.6 FL — SIGNIFICANT CHANGE UP (ref 80–100)
NRBC # BLD: 0 /100 WBCS — SIGNIFICANT CHANGE UP (ref 0–0)
PHOSPHATE SERPL-MCNC: 4.7 MG/DL — HIGH (ref 2.5–4.5)
PLATELET # BLD AUTO: 218 K/UL — SIGNIFICANT CHANGE UP (ref 150–400)
POTASSIUM SERPL-MCNC: 5 MMOL/L — SIGNIFICANT CHANGE UP (ref 3.5–5.3)
POTASSIUM SERPL-SCNC: 5 MMOL/L — SIGNIFICANT CHANGE UP (ref 3.5–5.3)
RBC # BLD: 3.74 M/UL — LOW (ref 4.2–5.8)
RBC # FLD: 12.5 % — SIGNIFICANT CHANGE UP (ref 10.3–14.5)
RENIN PLAS-CCNC: 0.58 NG/ML/HR — SIGNIFICANT CHANGE UP (ref 0.17–5.38)
SODIUM SERPL-SCNC: 137 MMOL/L — SIGNIFICANT CHANGE UP (ref 135–145)
WBC # BLD: 7.83 K/UL — SIGNIFICANT CHANGE UP (ref 3.8–10.5)
WBC # FLD AUTO: 7.83 K/UL — SIGNIFICANT CHANGE UP (ref 3.8–10.5)

## 2021-03-01 PROCEDURE — 99233 SBSQ HOSP IP/OBS HIGH 50: CPT

## 2021-03-01 RX ORDER — SODIUM BICARBONATE 1 MEQ/ML
650 SYRINGE (ML) INTRAVENOUS DAILY
Refills: 0 | Status: DISCONTINUED | OUTPATIENT
Start: 2021-03-01 | End: 2021-03-04

## 2021-03-01 RX ORDER — METOCLOPRAMIDE HCL 10 MG
10 TABLET ORAL EVERY 8 HOURS
Refills: 0 | Status: DISCONTINUED | OUTPATIENT
Start: 2021-03-01 | End: 2021-03-02

## 2021-03-01 RX ORDER — SODIUM CHLORIDE 9 MG/ML
1000 INJECTION INTRAMUSCULAR; INTRAVENOUS; SUBCUTANEOUS
Refills: 0 | Status: DISCONTINUED | OUTPATIENT
Start: 2021-03-01 | End: 2021-03-06

## 2021-03-01 RX ORDER — DIPHENHYDRAMINE HCL 50 MG
25 CAPSULE ORAL EVERY 8 HOURS
Refills: 0 | Status: DISCONTINUED | OUTPATIENT
Start: 2021-03-01 | End: 2021-03-02

## 2021-03-01 RX ORDER — VALPROIC ACID (AS SODIUM SALT) 250 MG/5ML
500 SOLUTION, ORAL ORAL EVERY 8 HOURS
Refills: 0 | Status: DISCONTINUED | OUTPATIENT
Start: 2021-03-01 | End: 2021-03-02

## 2021-03-01 RX ADMIN — Medication 25 MILLIGRAM(S): at 12:25

## 2021-03-01 RX ADMIN — ONDANSETRON 5 MILLIGRAM(S): 8 TABLET, FILM COATED ORAL at 09:32

## 2021-03-01 RX ADMIN — Medication 25 MILLIGRAM(S): at 06:50

## 2021-03-01 RX ADMIN — Medication 27.5 MILLIGRAM(S): at 12:27

## 2021-03-01 RX ADMIN — HYDROMORPHONE HYDROCHLORIDE 0.1 MILLIGRAM(S): 2 INJECTION INTRAMUSCULAR; INTRAVENOUS; SUBCUTANEOUS at 02:24

## 2021-03-01 RX ADMIN — Medication 25 MILLIGRAM(S): at 14:16

## 2021-03-01 RX ADMIN — Medication 10 MILLIGRAM(S): at 23:57

## 2021-03-01 RX ADMIN — Medication 650 MILLIGRAM(S): at 08:42

## 2021-03-01 RX ADMIN — ATORVASTATIN CALCIUM 40 MILLIGRAM(S): 80 TABLET, FILM COATED ORAL at 23:57

## 2021-03-01 RX ADMIN — BUDESONIDE AND FORMOTEROL FUMARATE DIHYDRATE 2 PUFF(S): 160; 4.5 AEROSOL RESPIRATORY (INHALATION) at 23:57

## 2021-03-01 RX ADMIN — Medication 10 MILLIGRAM(S): at 12:27

## 2021-03-01 RX ADMIN — AMLODIPINE BESYLATE 10 MILLIGRAM(S): 2.5 TABLET ORAL at 06:50

## 2021-03-01 RX ADMIN — Medication 25 MILLIGRAM(S): at 23:57

## 2021-03-01 RX ADMIN — Medication 27.5 MILLIGRAM(S): at 23:57

## 2021-03-01 RX ADMIN — BUDESONIDE AND FORMOTEROL FUMARATE DIHYDRATE 2 PUFF(S): 160; 4.5 AEROSOL RESPIRATORY (INHALATION) at 08:42

## 2021-03-01 NOTE — PROGRESS NOTE ADULT - SUBJECTIVE AND OBJECTIVE BOX
Interval Events:    c/o headache  creat uptrending   plasma renin activity pending  planned for renal biopsy 3/3  Allergies    Kiwi (Swelling)  No Known Drug Allergies    Intolerances      Endocrine/Metabolic Medications:  atorvastatin 40 milliGRAM(s) Oral at bedtime      Vital Signs Last 24 Hrs  T(C): 36.7 (01 Mar 2021 11:14), Max: 36.9 (01 Mar 2021 04:30)  T(F): 98.1 (01 Mar 2021 11:14), Max: 98.5 (01 Mar 2021 04:30)  HR: 89 (01 Mar 2021 11:14) (89 - 104)  BP: 129/76 (01 Mar 2021 11:14) (118/75 - 132/69)  BP(mean): --  RR: 16 (01 Mar 2021 11:14) (16 - 18)  SpO2: 98% (01 Mar 2021 11:14) (96% - 99%)      PHYSICAL EXAM    NC/AT:    HEENT:    Neck:  No JVD    Respiratory:  Clear without rales or rhonchi    Cardiovascular:  RR without murmur    Gastrointestinal: Soft     Extremities: without cyanosis    Neurological:  non focal      LABS                        10.7   7.83  )-----------( 218      ( 01 Mar 2021 09:31 )             32.0                               137    |  107    |  46                  Calcium: 9.2   / iCa: x      (03-01 @ 09:31)    ----------------------------<  90        Magnesium: 2.2                              5.0     |  20     |  6.14             Phosphorous: 4.7        CAPILLARY BLOOD GLUCOSE            Assesment/plan        24 yo male with h/o asthma, admitted with headaches, nausea, vomiting x 5 days, also noted to have elevated SBP >170 and sent to ED    endocrinology consulted for uncontrolled HTN,  ? secondary HTN    HTN  uncontrolled  aldosterone- 27 (ULN 23 ng/dl) direct renin: 4.9 pg/ml  TSH wnl   urine tox neg  urine metanephrine pending  CT abdomen/pelvis: without evident adrenal abnormality    recommendations:  - plasma renin activity pending  - serum metanephrines pending  f/u 24 urine catecholamines  aldosterone 27.2 nd/dl (ULN 23) on 2/23, direct renin 4.9 pg/ml  repeat aldosterone 11.3 2/25- ?postural changes while inpatient- repeat outpatient  would repeat aldosterone/renin, metanephrines as outpatient  - recommend avoiding spironolactone/epleronone , doxazosin till outpatient workup is complete  can use alternate anti HTN- hydralazine, nifedipine, labetalol  CT adrenal pending above test results    however since renin level is not suppressed/low, without hypokalemia, less likely from primary aldosteronoma  nephrology on board    CIARRA  serum creatinine uptrending to 6  urine tox neg  hyperechoic kidneys on renal ultrasound  nephrology on board  may need kidney biopsy  immunologic work up pending    elevated LFTs  hep panel neg  downtrending, improved      Discussed with patient and primary team  Please call Endocrine- [phone]- Dr Lexy Mclain as needed

## 2021-03-01 NOTE — CONSULT NOTE ADULT - CONSULT REASON
Elevated serum creatinine.
Tachycardia
asthma
uncontrolled HTN
HTN urgency
HA
Detail Level: Detailed

## 2021-03-01 NOTE — DIETITIAN INITIAL EVALUATION ADULT. - OTHER INFO
pt admitted due to hypertensive emergency, also headaches x5d, vomiting x1d PTA. followed closely by nephro; pt with ARF, no need for dialysis at this time, etiology unclear with kidney biopsy planned for 3/3. on low sodium renal diet. pt visited. reports # with no recent wt loss. pt reports no episode of vomiting today. PO intake variable per pt depending on whether pt exp n/v. flow records 2/24-2/26 % intake noted. states PO <50% over weekend.

## 2021-03-01 NOTE — CONSULT NOTE ADULT - REASON FOR ADMISSION
headache, nausea/ vomiting

## 2021-03-01 NOTE — PROGRESS NOTE ADULT - PROBLEM SELECTOR PLAN 1
Pt presented with Headaches, Pt noted to have sbp >180 on admission  S/p amlodipine 5 mg and hydralazine in ED   CTH (2/22) negative   C/w Amlodipine 10mg PO qd and low salt diet.   C/w Hydralazine 25mg PO tid, titrate as needed.  Monitor BP closely  /77 this AM   TTE: mild MR, mild LVH   Sebas elevated 27.2 ng/dl; Renin 4.9 pg/ml (0.49ng/dl): Sebas/ Renin 55.5 (2/23)  Repeat Sebas (2/25) 11.3  Repeat Renin pending  Endocrine following, recommending serum metanephrine   Nephro Dr. Massey following, appreciate recommendations

## 2021-03-01 NOTE — CONSULT NOTE ADULT - ASSESSMENT
24yo male w/ Migraine  24yo male w/ Migraine     Recommendations:    -Please dc Dilaudid as it may cause rebound HA.       -Please give HA cocktail: Depakote 500mg IV + Reglan 10mg IV + Benadryl 25mg IV all given together q8h.  May repeat this regimen x 3-5d.

## 2021-03-01 NOTE — PROGRESS NOTE ADULT - SUBJECTIVE AND OBJECTIVE BOX
[facility] NEPHROLOGY- PROGRESS NOTE    Patient is a 24yo Male with Asthma on Dupixent injections q 2kweeks p/w HA, n/v x 5 days. Pt sent in by his PMD due to elevated SBP >170. Pt was a/w CIARRA and hypertensive emergency. Nephrology consulted for Elevated serum creatinine.    Hospital Medications: Medications reviewed.  REVIEW OF SYSTEMS:  CONSTITUTIONAL: No fevers or chills; Denies HA today  RESPIRATORY: No shortness of breath  CARDIOVASCULAR: No chest pain.  GASTROINTESTINAL: No diarrhea or abdominal pain or n/v  VASCULAR: No bilateral lower extremity edema.     VITALS:  T(F): 98.2 (21 @ 16:10), Max: 98.5 (21 @ 04:30)  HR: 105 (21 @ 16:10)  BP: 143/74 (21 @ 16:10)  RR: 16 (21 @ 16:10)  SpO2: 100% (21 @ 16:10)  Wt(kg): --     @ 07:  -   @ 07:00  --------------------------------------------------------  IN: 225 mL / OUT: 0 mL / NET: 225 mL     @ 07:01  -   @ 16:55  --------------------------------------------------------  IN: 550 mL / OUT: 1400 mL / NET: -850 mL      Height (cm): 788 ( @ 14:20)  Weight (kg): 63.5 ( @ 14:20)  BMI (kg/m2): 1 ( @ 14:20)  BSA (m2): 5.28 ( @ 14:20)      PHYSICAL EXAM:  Constitutional: NAD  Neurological: Awake and Alert  HEENT: anicteric sclera,   Respiratory: CTAB, no wheezes, rales or rhonchi  Cardiovascular: S1, S2, RRR  Gastrointestinal: BS+, soft, NT/ND  Extremities: No peripheral edema    LABS:      137  |  107  |  46<H>  ----------------------------<  90  5.0   |  20<L>  |  6.14<H>    Ca    9.2      01 Mar 2021 09:31  Phos  4.7       Mg     2.2           Creatinine Trend: 6.14 <--, 5.87 <--, 5.35 <--, 5.48 <--, 5.54 <--, 5.82 <--, 6.00 <--, 5.80 <--                        10.7   7.83  )-----------( 218      ( 01 Mar 2021 09:31 )             32.0     Urine Studies:  Urinalysis Basic - ( 2021 20:34 )    Color: Yellow / Appearance: Clear / S.010 / pH:   Gluc:  / Ketone: Negative  / Bili: Negative / Urobili: Negative   Blood:  / Protein: 500 mg/dL / Nitrite: Negative   Leuk Esterase: Negative / RBC: 2-5 /HPF / WBC 0-2 /HPF   Sq Epi:  / Non Sq Epi: Few /HPF / Bacteria: Few /HPF      Creatinine, Random Urine: 57 mg/dL ( @ 17:46)  Chloride, Random Urine: 58 mmol/L ( @ 21:47)  Sodium, Random Urine: 63 mmol/L ( @ 21:47)  Osmolality, Random Urine: 337 mos/kg ( @ 21:47)  Creatinine, Random Urine: 79 mg/dL ( @ 21:47)

## 2021-03-01 NOTE — PROGRESS NOTE ADULT - SUBJECTIVE AND OBJECTIVE BOX
Patient denies chest pain or shortness of breath. complains of HA and vomiting;  Review of systems otherwise (-)  	    acetaminophen   Tablet .. 650 milliGRAM(s) Oral every 6 hours PRN  ALBUTerol    90 MICROgram(s) HFA Inhaler 2 Puff(s) Inhalation every 6 hours PRN  amLODIPine   Tablet 10 milliGRAM(s) Oral daily  atorvastatin 40 milliGRAM(s) Oral at bedtime  budesonide  80 MICROgram(s)/formoterol 4.5 MICROgram(s) Inhaler 2 Puff(s) Inhalation two times a day  hydrALAZINE 25 milliGRAM(s) Oral three times a day  HYDROmorphone  Injectable 0.1 milliGRAM(s) IV Push every 8 hours PRN  lidocaine   Patch 1 Patch Transdermal daily  ondansetron Injectable 5 milliGRAM(s) IV Push every 6 hours PRN  sodium chloride 0.9%. 1000 milliLiter(s) IV Continuous <Continuous>                            10.7   7.83  )-----------( 218      ( 01 Mar 2021 09:31 )             32.0       Hemoglobin: 10.7 g/dL (03-01 @ 09:31)  Hemoglobin: 11.2 g/dL (02-28 @ 07:48)  Hemoglobin: 11.3 g/dL (02-27 @ 08:21)  Hemoglobin: 12.1 g/dL (02-26 @ 08:51)  Hemoglobin: 10.9 g/dL (02-25 @ 09:16)      03-01    137  |  107  |  46<H>  ----------------------------<  90  5.0   |  20<L>  |  6.14<H>    Ca    9.2      01 Mar 2021 09:31  Phos  4.7     03-01  Mg     2.2     03-01      Creatinine Trend: 6.14<--, 5.87<--, 5.35<--, 5.48<--, 5.54<--, 5.82<--    COAGS:           T(C): 36.8 (03-01-21 @ 07:18), Max: 36.9 (03-01-21 @ 04:30)  HR: 89 (03-01-21 @ 07:18) (89 - 104)  BP: 129/72 (03-01-21 @ 07:18) (118/75 - 132/69)  RR: 16 (03-01-21 @ 07:18) (16 - 18)  SpO2: 98% (03-01-21 @ 07:18) (96% - 100%)  Wt(kg): --    I&O's Summary    28 Feb 2021 07:01  -  01 Mar 2021 07:00  --------------------------------------------------------  IN: 225 mL / OUT: 0 mL / NET: 225 mL    01 Mar 2021 07:01  -  01 Mar 2021 10:52  --------------------------------------------------------  IN: 0 mL / OUT: 1000 mL / NET: -1000 mL    HEENT:  (-)icterus (-)pallor  CV: N S1 S2 1/6 SHAGUFTA (+)2 Pulses B/l  Resp:  Clear to ausculatation B/L, normal effort  GI: (+) BS Soft, NT, ND  Lymph:  (-)Edema, (-)obvious lymphadenopathy  Skin: Warm to touch, Normal turgor  Psych: Appropriate mood and affect      TELEMETRY: ST    TTE: < from: Transthoracic Echocardiogram (02.24.21 @ 18:49) >  1. Mild mitral regurgitation.  2. Mild concentric left ventricular hypertrophy.  3. Normal left ventricular systolic function.  4. Normal right ventricular size and function.    < end of copied text >          ASSESSMENT/PLAN: 	25y  Male PMH of asthma presented to his PCP with complaints of headache, nausea and vomiting x 5 days found with hypertnesive urgency and CIARRA.    - Started on Norvasc.  - BP has improved and patient normotensive today   - Check Serum renin and tim levels  - 24 hour urine collection for urine catecholamines and metanephrine  - TSH within normal Limits  - echo with no significant structural heart disease and normal LVEF  - renal f/u for biopsy wednesday  - EP eval appreciated   - workup of prior HA per primary team     Yehuda Peres MD, FACC  BEEPER [phone]

## 2021-03-01 NOTE — PROGRESS NOTE ADULT - ASSESSMENT
Patient is a 26yo Male with Asthma on Dupixent injections q 2kweeks p/w HA, n/v x 5 days. Pt sent in by his PMD due to elevated SBP >170. Pt was a/w CIARRA and hypertensive emergency. Nephrology consulted for Elevated serum creatinine.    1. CIARRA- ?GN (?IgA nephropathy) vs. CIARRA on CKD due to uncontrolled HTN. UA with 500 protein and mod blood; rbc 5-10. Pt on Dupixent- no urinary eos, peripheral eos or rash. Spot UPr/Cr 3.3/ repeated 3.82- nephrotic range proteinuria without nephrotic syndrome (albumin 3.4 with no edema). Renal function remains elevated. Pt asymptomatic. No need for dialysis at this time. Recc to decrease Reglan to 5mg IV q8hrs.   CK 1141, should not cause significant CIARRA. Serological w/u neg except PLAR-2 pending will f/u (thus far Normal C3, C4, SIFE neg, K/L 1.7, Syphillis neg,  Neg ASO, neg ANCA, neg ELMA, neg dsDNA, neg HIV, neg HepBsAg, and neg Hep C ab and neg antiGBM).   Discussed the need for kidney biopsy to determine etiology of kidney disease if serological w/u neg. Discussed risk/ benefits/ alt of kidney biopsy- pt agreeable; will plan for possible kidney biopsy on 3/3 (discussed with IR Lise LAU- unable to perform 3/1 due to schedule). Will keep NPO after midnight on 3/2 Check Coags on 3/3 and give DDAVP 22 mcg IV x1 30 mins prior to IR procedure. Strict I/Os. Avoid nephrotoxins/ NSAIDs/ RCA. Monitor BMP.    2. CKD unspecified- previous SCr 1.29 in 2018 with proteinuria. See above.  PTHi 99 elevated. c/w Renal diet. Avoid nephrotoxins    3. Hypertensive emergency-resolved. CT head neg. BP acceptable. c/w Amlodipine 10mg PO qd. c/w Hydralazine 25mg PO tid, titrate as needed. c/w low salt diet.   Secondary w/u pending: UTox neg. Sebas 27.2 ng/dl; Renin 4.9 pg/ml (0.49ng/dl) Endo following. No pheo- CT with normal adrenals. w/u neg. TTE: mild MR, mild LVH.  Pt will need Renal US with dopplers as an outpt. Monitor BP  4. Metabolic Acidosis-in the setting of renal failure. Serum CO2 mildly decreased; will add sodium bicarb 650mg PO qd. Serum lactate wnl and VBG ph 7.31.  Monitor ph/Co2

## 2021-03-01 NOTE — PROGRESS NOTE ADULT - PROBLEM SELECTOR PLAN 4
2/27 patient endorsed severe 8-9/10 frontal and bitemporal headache which is causing him to have multiple bouts of NBNB emesis. The patient was given percocet 325mg x 1 and was started on Dilaudid 0.1mg PRN.     CTH 2/22 negative  C/w Dilaudid 0.1mg PRN for pain control  C/w IVF 75cc /hr - patient is vomiting due to pain  Repeat CTH no acute findings  Neurology consulted give Benadryl, Reglan, Depacon

## 2021-03-01 NOTE — PROGRESS NOTE ADULT - ATTENDING COMMENTS
[facility] NEPHROLOGY  Justin Mckenzie M.D.  Chad Reich D.O.  Beth Massey M.D.  Shayy Navarro, MSN, ANP-C  [phone]    [address] [place] [address]

## 2021-03-01 NOTE — PROGRESS NOTE ADULT - PROBLEM SELECTOR PLAN 5
Pt noted to have non anion gap metabolic acidosis with respiratory compensation   bicarb 19-> 17 on admission   Metabolic acidosis likely in the setting of ARF   Lactate: 0.3  pH on VB.31 ()  Bicarb 20 (3/1), monitor   Will hold off on bicarb replacement for now   Continue to monitor pH/CO2  Nephro Dr. Massey following

## 2021-03-01 NOTE — PROGRESS NOTE ADULT - SUBJECTIVE AND OBJECTIVE BOX
PGY-1 Progress Note discussed with attending    PAGER #: [[phone]] TILL 5:00 PM  PLEASE CONTACT ON CALL TEAM:  - On Call Team (Please refer to Prasanna) FROM 5:00 PM - 8:30PM  - Nightfloat Team FROM 8:30 -7:30 AM    CHIEF COMPLAINT & BRIEF HOSPITAL COURSE:  24 yo male with PMH of asthma presented to his PCP with complaints of headache, nausea and vomiting x 5 days. Pt was noted to have elevated sBP of >170 and was advised to come to emergency department. Patient states he started having headaches 5 days ago associated with nausea and 1 day hx of NBNB vomiting. Pt endorses episode of dysuria few days ago which has now resolved. Pt denies head trauma, any prior episodes of similar headaches. No family hx of Polycystic kidney disease, CVA, HTN. Denies diarrhea, abdominal pain, fever, chills, recent viral infection, no blood in urine, no flank pain, no urinary rentention. In the ED the patients BP was 185/114. He was given amlodipine and hydralazine. He was continued on amlodipine and his BP was better controlled. He was noted to have a creatinine of 6. UA showed moderate blood, 500 protein. FeNa: 3.3%. Normal C3, C4, SIFE neg, K/L 1.7, ELMA, ANCA, dsDNA, ASO, Treponema Pallidum ab and Hepatitis panel all negative. Immunofixation negative for monoclonal bands. CT A/P was negative for any obstructive uropathy or acute findings.  Renal US showed bilateral hyperechoic kidneys.     INTERVAL HPI/OVERNIGHT EVENTS: Overnight the patient continued to have headaches for which he received Dilaudid 0.1 mg x 1. This morning the patient continued to complain of a 7/10 frontal and bitemporal headache with an additional episode of NBNB emesis. Neurology was consulted for headache evaluation. Repeat CTH negative.     MEDICATIONS:  acetaminophen   Tablet .. 650 milliGRAM(s) Oral every 6 hours PRN  ALBUTerol    90 MICROgram(s) HFA Inhaler 2 Puff(s) Inhalation every 6 hours PRN  amLODIPine   Tablet 10 milliGRAM(s) Oral daily  atorvastatin 40 milliGRAM(s) Oral at bedtime  budesonide  80 MICROgram(s)/formoterol 4.5 MICROgram(s) Inhaler 2 Puff(s) Inhalation two times a day  hydrALAZINE 25 milliGRAM(s) Oral three times a day  HYDROmorphone  Injectable 0.1 milliGRAM(s) IV Push every 8 hours PRN  lidocaine   Patch 1 Patch Transdermal daily  ondansetron Injectable 5 milliGRAM(s) IV Push every 6 hours PRN  sodium chloride 0.9%. 1000 milliLiter(s) IV Continuous <Continuous>      REVIEW OF SYSTEMS:  CONSTITUTIONAL: No fever, weight loss, or fatigue  RESPIRATORY: No cough, wheezing, chills or hemoptysis; No shortness of breath  CARDIOVASCULAR: No chest pain, palpitations, dizziness, or leg swelling  GASTROINTESTINAL: No abdominal pain. No hematemesis; No diarrhea or constipation. No melena or hematochezia. Positive nausea and vomiting.  GENITOURINARY: No dysuria or hematuria, urinary frequency  NEUROLOGICAL: No memory loss, loss of strength, numbness, or tremors. Positive headaches  SKIN: No itching, burning, rashes, or lesions     Vital Signs Last 24 Hrs  T(C): 36.8 (01 Mar 2021 07:18), Max: 36.9 (01 Mar 2021 04:30)  T(F): 98.3 (01 Mar 2021 07:18), Max: 98.5 (01 Mar 2021 04:30)  HR: 89 (01 Mar 2021 07:18) (89 - 104)  BP: 129/72 (01 Mar 2021 07:18) (118/75 - 132/69)  BP(mean): --  RR: 16 (01 Mar 2021 07:18) (16 - 18)  SpO2: 98% (01 Mar 2021 07:18) (96% - 100%)    PHYSICAL EXAMINATION:  GENERAL: NAD, well built  HEAD:  Atraumatic, Normocephalic  EYES:  conjunctiva and sclera clear  NECK: Supple, No JVD, Normal thyroid  CHEST/LUNG: Clear to auscultation. Clear to percussion bilaterally; No rales, rhonchi, wheezing, or rubs  HEART: Regular rate and rhythm; No murmurs, rubs, or gallops  ABDOMEN: Soft, Nontender, Nondistended; Bowel sounds present  NERVOUS SYSTEM:  Alert & Oriented X3,    EXTREMITIES:  2+ Peripheral Pulses, No clubbing, cyanosis, or edema  SKIN: warm dry                          10.7   7.83  )-----------( 218      ( 01 Mar 2021 09:31 )             32.0     03-01    137  |  107  |  46<H>  ----------------------------<  90  5.0   |  20<L>  |  6.14<H>    Ca    9.2      01 Mar 2021 09:31  Phos  4.7     03-01  Mg     2.2     03-01                CAPILLARY BLOOD GLUCOSE      RADIOLOGY & ADDITIONAL TESTS:

## 2021-03-01 NOTE — DIETITIAN INITIAL EVALUATION ADULT. - PROBLEM SELECTOR PLAN 2
presented with CIARRA and Cr 6  no previous bs creatinine   will start on gentle hydration with 60 cc/hr   fu urine lytes  fu urine tox   will consult Dr. Massey

## 2021-03-01 NOTE — CONSULT NOTE ADULT - ATTENDING COMMENTS
[facility] NEPHROLOGY  Justin Mckenzie M.D.  Chad Reich D.O.  Beth Massey M.D.  Shayy Navarro, MSN, ANP-C  [phone]    [address] [place] [address]
I have seen and examined the patient at the bedside, and I agree with the above history, examination, assessment, and plan. I have counseled the patient about medication course planned to help manage his persistent migraine.

## 2021-03-01 NOTE — PROGRESS NOTE ADULT - SUBJECTIVE AND OBJECTIVE BOX
Time of Visit:  Patient seen and examined.     MEDICATIONS  (STANDING):  amLODIPine   Tablet 10 milliGRAM(s) Oral daily  atorvastatin 40 milliGRAM(s) Oral at bedtime  budesonide  80 MICROgram(s)/formoterol 4.5 MICROgram(s) Inhaler 2 Puff(s) Inhalation two times a day  diphenhydrAMINE   Injectable 25 milliGRAM(s) IV Push every 8 hours  hydrALAZINE 25 milliGRAM(s) Oral three times a day  lidocaine   Patch 1 Patch Transdermal daily  metoclopramide Injectable 10 milliGRAM(s) IV Push every 8 hours  sodium chloride 0.9%. 1000 milliLiter(s) (75 mL/Hr) IV Continuous <Continuous>  valproate sodium IVPB 500 milliGRAM(s) IV Intermittent every 8 hours      MEDICATIONS  (PRN):  acetaminophen   Tablet .. 650 milliGRAM(s) Oral every 6 hours PRN Temp greater or equal to 38C (100.4F), Mild Pain (1 - 3)  ALBUTerol    90 MICROgram(s) HFA Inhaler 2 Puff(s) Inhalation every 6 hours PRN Bronchospasm  ondansetron Injectable 5 milliGRAM(s) IV Push every 6 hours PRN Nausea and/or Vomiting       Medications up to date at time of exam.      PHYSICAL EXAMINATION:  Patient has no new complaints.  GENERAL: The patient is a well-developed, well-nourished, in no apparent distress.     Vital Signs Last 24 Hrs  T(C): 36.8 (01 Mar 2021 16:10), Max: 36.9 (01 Mar 2021 04:30)  T(F): 98.2 (01 Mar 2021 16:10), Max: 98.5 (01 Mar 2021 04:30)  HR: 105 (01 Mar 2021 16:10) (89 - 105)  BP: 143/74 (01 Mar 2021 16:10) (123/73 - 143/74)  BP(mean): --  RR: 16 (01 Mar 2021 16:10) (16 - 18)  SpO2: 100% (01 Mar 2021 16:10) (98% - 100%)   (if applicable)    Chest Tube (if applicable)    HEENT: Head is normocephalic and atraumatic. Extraocular muscles are intact. Mucous membranes are moist.     NECK: Supple, no palpable adenopathy.    LUNGS: Clear to auscultation, no wheezing, rales, or rhonchi.    HEART: Regular rate and rhythm without murmur.    ABDOMEN: Soft, nontender, and nondistended.  No hepatosplenomegaly is noted.    EXTREMITIES: Without any cyanosis, clubbing, rash, lesions or edema.    NEUROLOGIC: Awake, alert, oriented.     SKIN: Warm, dry, good turgor.      LABS:                        10.7   7.83  )-----------( 218      ( 01 Mar 2021 09:31 )             32.0     03-01    137  |  107  |  46<H>  ----------------------------<  90  5.0   |  20<L>  |  6.14<H>    Ca    9.2      01 Mar 2021 09:31  Phos  4.7     03-01  Mg     2.2     03-01    MICROBIOLOGY: (if applicable)    RADIOLOGY & ADDITIONAL STUDIES:  EKG:   CXR:  ECHO:    IMPRESSION: 25y Male PAST MEDICAL & SURGICAL HISTORY:  Asthma    No significant past surgical history     Impression; 26 Y/O Male presented in ED with Acute HTN urgency due to CIARRA. For Pulmonary follow up of his Asthma.    Suggestion:  O2 saturation 98% room air. So far saturating good room air.   Continue PRN Albuterol Q 6 hours.  Continue Budesonide 2 puffs Twice daily.   Pulmonary oral hygiene care.  No Asthma exacerbation on exam.  DVT/ GI prophylactic.    Time of Visit:  Patient seen and examined.     MEDICATIONS  (STANDING):  amLODIPine   Tablet 10 milliGRAM(s) Oral daily  atorvastatin 40 milliGRAM(s) Oral at bedtime  budesonide  80 MICROgram(s)/formoterol 4.5 MICROgram(s) Inhaler 2 Puff(s) Inhalation two times a day  diphenhydrAMINE   Injectable 25 milliGRAM(s) IV Push every 8 hours  hydrALAZINE 25 milliGRAM(s) Oral three times a day  lidocaine   Patch 1 Patch Transdermal daily  metoclopramide Injectable 10 milliGRAM(s) IV Push every 8 hours  sodium chloride 0.9%. 1000 milliLiter(s) (75 mL/Hr) IV Continuous <Continuous>  valproate sodium IVPB 500 milliGRAM(s) IV Intermittent every 8 hours      MEDICATIONS  (PRN):  acetaminophen   Tablet .. 650 milliGRAM(s) Oral every 6 hours PRN Temp greater or equal to 38C (100.4F), Mild Pain (1 - 3)  ALBUTerol    90 MICROgram(s) HFA Inhaler 2 Puff(s) Inhalation every 6 hours PRN Bronchospasm  ondansetron Injectable 5 milliGRAM(s) IV Push every 6 hours PRN Nausea and/or Vomiting       Medications up to date at time of exam.      PHYSICAL EXAMINATION:  Patient has no new complaints.  GENERAL: The patient is a well-developed, well-nourished, in no apparent distress.     Vital Signs Last 24 Hrs  T(C): 36.8 (01 Mar 2021 16:10), Max: 36.9 (01 Mar 2021 04:30)  T(F): 98.2 (01 Mar 2021 16:10), Max: 98.5 (01 Mar 2021 04:30)  HR: 105 (01 Mar 2021 16:10) (89 - 105)  BP: 143/74 (01 Mar 2021 16:10) (123/73 - 143/74)  BP(mean): --  RR: 16 (01 Mar 2021 16:10) (16 - 18)  SpO2: 100% (01 Mar 2021 16:10) (98% - 100%)   (if applicable)    Chest Tube (if applicable)    HEENT: Head is normocephalic and atraumatic. Extraocular muscles are intact. Mucous membranes are moist.     NECK: Supple, no palpable adenopathy.    LUNGS: Clear to auscultation, no wheezing, rales, or rhonchi.    HEART: Regular rate and rhythm without murmur.    ABDOMEN: Soft, nontender, and nondistended.  No hepatosplenomegaly is noted.    EXTREMITIES: Without any cyanosis, clubbing, rash, lesions or edema.    NEUROLOGIC: Awake, alert, oriented.     SKIN: Warm, dry, good turgor.      LABS:                        10.7   7.83  )-----------( 218      ( 01 Mar 2021 09:31 )             32.0     03-01    137  |  107  |  46<H>  ----------------------------<  90  5.0   |  20<L>  |  6.14<H>    Ca    9.2      01 Mar 2021 09:31  Phos  4.7     03-01  Mg     2.2     03-01    MICROBIOLOGY: (if applicable)    RADIOLOGY & ADDITIONAL STUDIES:  EKG:   CXR:  ECHO:    IMPRESSION: 25y Male PAST MEDICAL & SURGICAL HISTORY:  Asthma    No significant past surgical history     Impression; 24 Y/O Male presented in ED with Acute HTN urgency due to CIARRA. For Pulmonary follow up of his Asthma.    Suggestion:  O2 saturation 98% room air. So far saturating good room air.   Continue PRN Albuterol Q 6 hours.  Continue Budesonide 2 puffs Twice daily.   Pulmonary oral hygiene care.  No Asthma exacerbation on exam.  DVT/ GI prophylactic.     Kidney bx pending , now reschedule for 3/3    Agree with above assessment and plan as transcribed.

## 2021-03-01 NOTE — CONSULT NOTE ADULT - SUBJECTIVE AND OBJECTIVE BOX
+++++++++++++++++++++NOTE NOT COMPLETED+++++++++++++++++++++++++++++++++++   +++++++++++++++++++++NOTE NOT COMPLETED+++++++++++++++++++++++++++++++++++    Patient is a 25y old  Male who presents with a chief complaint of headache, nausea/ vomiting (01 Mar 2021 10:50)      HPI:  26 yo male with PMH of asthma presented to his PCP with complaints of headache, nausea and vomiting x 5 days. Pt was noted to have elevated sBP of >170 and was advised to come to emergency department. Patient states he started having headaches 5 days ago associated with nausea and 1 day hx of NBNB vomiting. Pt endorses episode of dysuria few days ago which has now resolved. Pt denies head trauma, any prior episodes of similar headaches. No family hx of Polycystic kidney disease, CVA, HTN. Denies diarrhea, abdominal pain, fever, chills, recent viral infection, no blood in urine, no flank pain, no urinary rentention (22 Feb 2021 20:11)    Reports intermittent, sharp HA.  Reports last night at 9PM, bifrontal HA, throbbing then became sharp, 10/10, not relieved by Tylenol.  Reports he was given "a stronger medication" that made him sleep but as soon as he awoke his HA returned.  Currently has a 6/10, bifrontal HA, and throbbing.  Denies additional symptoms. Reports eating breakfast this AM w/o N&V.      Neurological Review of Systems:  No difficulty with language.  No vision loss or double vision.  No dizziness, vertigo or new hearing loss.  No difficulty with speech or swallowing.  No focal weakness.  No focal sensory changes.  No numbness or tingling in the bilateral lower extremities.  No difficulty with balance.  No difficulty with ambulation.        MEDICATIONS  (STANDING):  amLODIPine   Tablet 10 milliGRAM(s) Oral daily  atorvastatin 40 milliGRAM(s) Oral at bedtime  budesonide  80 MICROgram(s)/formoterol 4.5 MICROgram(s) Inhaler 2 Puff(s) Inhalation two times a day  diphenhydrAMINE   Injectable 25 milliGRAM(s) IV Push every 8 hours  hydrALAZINE 25 milliGRAM(s) Oral three times a day  lidocaine   Patch 1 Patch Transdermal daily  metoclopramide Injectable 10 milliGRAM(s) IV Push every 8 hours  sodium chloride 0.9%. 1000 milliLiter(s) (75 mL/Hr) IV Continuous <Continuous>  valproate sodium IVPB 500 milliGRAM(s) IV Intermittent every 8 hours    MEDICATIONS  (PRN):  acetaminophen   Tablet .. 650 milliGRAM(s) Oral every 6 hours PRN Temp greater or equal to 38C (100.4F), Mild Pain (1 - 3)  ALBUTerol    90 MICROgram(s) HFA Inhaler 2 Puff(s) Inhalation every 6 hours PRN Bronchospasm  HYDROmorphone  Injectable 0.1 milliGRAM(s) IV Push every 8 hours PRN Severe Pain (7 - 10)  ondansetron Injectable 5 milliGRAM(s) IV Push every 6 hours PRN Nausea and/or Vomiting    Allergies    Kiwi (Swelling)  No Known Drug Allergies    Intolerances      PAST MEDICAL & SURGICAL HISTORY:  Asthma    No significant past surgical history      FAMILY HISTORY:    SOCIAL HISTORY: non smoker    Review of Systems:  Constitutional: No generalized weakness. No fevers or chills               Eyes, Ears, Mouth, Throat: No vision loss   Respiratory: No shortness of breath or cough                             Cardiovascular: No chest pain or palpitations  Gastrointestinal: No nausea or vomiting                                         Genitourinary: No urinary incontinence or burning on urination  Musculoskeletal: No joint pain                                                           Dermatologic: No rash  Neurological: as per HPI                                                                      Psychiatric: No behavioral problems  Endocrine: No known hypoglycemia             Hematologic/Lymphatic: No easy bleeding    O:  Vital Signs Last 24 Hrs  T(C): 36.7 (01 Mar 2021 11:14), Max: 36.9 (01 Mar 2021 04:30)  T(F): 98.1 (01 Mar 2021 11:14), Max: 98.5 (01 Mar 2021 04:30)  HR: 89 (01 Mar 2021 11:14) (89 - 104)  BP: 129/76 (01 Mar 2021 11:14) (118/75 - 132/69)  RR: 16 (01 Mar 2021 11:14) (16 - 18)  SpO2: 98% (01 Mar 2021 11:14) (96% - 100%)    General Exam:   General appearance: No acute distress                 Cardiovascular: Pedal dorsalis pulses intact bilaterally    Mental Status: Oriented to self, date and place.  Attention intact.  No dysarthria, aphasia or neglect.  Knowledge intact.  Registration intact.  Short and long term memory grossly intact.      Cranial Nerves: CN I - not tested.  PERRL, EOMI, VFF, no nystagmus or diplopia.  No APD.  Fundi not visualized.  CN V1-3 intact to light touch.  No facial asymmetry.  Hearing intact to finger rub bilaterally.  Tongue, uvula and palate midline.  Sternocleidomastoid and Trapezius intact bilaterally.    Motor:   Tone: Normal                  Strength intact throughout  No pronator drift bilaterally                      No dysmetria on finger-nose-finger or heel-shin-heel  No truncal ataxia.  No resting, postural or action tremor.  No myoclonus.    Sensation: intact to light touch    Deep Tendon Reflexes: 2+ bilateral biceps, triceps, brachioradialis, knee and ankle  Toes flexor bilaterally    Gait: Normal and stable.  Romberg (-).    Other:     LABS:                        10.7   7.83  )-----------( 218      ( 01 Mar 2021 09:31 )             32.0     03-01    137  |  107  |  46<H>  ----------------------------<  90  5.0   |  20<L>  |  6.14<H>    Ca    9.2      01 Mar 2021 09:31  Phos  4.7     03-01  Mg     2.2     03-01              RADIOLOGY & ADDITIONAL STUDIES:  < from: CT Head No Cont (02.28.21 @ 17:02) >    EXAM:  CT BRAIN                            PROCEDURE DATE:  02/28/2021          INTERPRETATION:  CLINICAL INDICATION: Rule out intracranial hemorrhage.    Technique:    Multiple contiguous axial images were acquired from the skullbase to the vertexwithout the administration of intravenous contrast.  Coronal and sagittal reformations were made.    COMPARISON: 2/22/2021 head CT    FINDINGS:    The ventricles and sulci are within normal limits.  There are no areas of abnormal attenuation within the brain parenchyma.  There is no evidence of acute transcortical territorial infarction.  There is no intraparenchymal hematoma, mass effect or midline shift.  No abnormal extra-axial fluid collections are present.    The calvarium is intact.  The visualized intraorbital compartments are unremarkable.  Visualized paranasal sinuses appear free of acute disease.  Mastoid air cells are clear.    IMPRESSION:    No acute hemorrhage or midline shift. If symptoms persist, consider follow-up head CT or MRI, if no contraindications.            JAMIN GUZMAN MD, Attending Radiologist  This document has been electronically signed. Feb 28 2021  5:05PM    < end of copied text >   Patient is a 25y old  Male who presents with a chief complaint of headache, nausea/ vomiting (01 Mar 2021 10:50)      HPI:  26 yo male with PMH of asthma presented to his PCP with complaints of headache, nausea and vomiting x 5 days. Pt was noted to have elevated sBP of >170 and was advised to come to emergency department. Patient states he started having headaches 5 days ago associated with nausea and 1 day hx of NBNB vomiting. Pt endorses episode of dysuria few days ago which has now resolved. Pt denies head trauma, any prior episodes of similar headaches. No family hx of Polycystic kidney disease, CVA, HTN. Denies diarrhea, abdominal pain, fever, chills, recent viral infection, no blood in urine, no flank pain, no urinary rentention (22 Feb 2021 20:11)    Reports last night at 9PM, bifrontal HA, throbbing then became sharp, 10/10, not relieved by Tylenol.  Reports he was given "a stronger medication" that made him sleep but as soon as he awoke his HA returned.  Currently has a, intermittent, 6/10, bifrontal HA, and throbbing.  Denies additional symptoms. Reports eating breakfast this AM w/o N&V.      Neurological Review of Systems:  No difficulty with language.  No vision loss or double vision.  No dizziness, vertigo or new hearing loss.  No difficulty with speech or swallowing.  No focal weakness.  No focal sensory changes.  No numbness or tingling in the bilateral lower extremities.  No difficulty with balance.  No difficulty with ambulation.        MEDICATIONS  (STANDING):  amLODIPine   Tablet 10 milliGRAM(s) Oral daily  atorvastatin 40 milliGRAM(s) Oral at bedtime  budesonide  80 MICROgram(s)/formoterol 4.5 MICROgram(s) Inhaler 2 Puff(s) Inhalation two times a day  diphenhydrAMINE   Injectable 25 milliGRAM(s) IV Push every 8 hours  hydrALAZINE 25 milliGRAM(s) Oral three times a day  lidocaine   Patch 1 Patch Transdermal daily  metoclopramide Injectable 10 milliGRAM(s) IV Push every 8 hours  sodium chloride 0.9%. 1000 milliLiter(s) (75 mL/Hr) IV Continuous <Continuous>  valproate sodium IVPB 500 milliGRAM(s) IV Intermittent every 8 hours    MEDICATIONS  (PRN):  acetaminophen   Tablet .. 650 milliGRAM(s) Oral every 6 hours PRN Temp greater or equal to 38C (100.4F), Mild Pain (1 - 3)  ALBUTerol    90 MICROgram(s) HFA Inhaler 2 Puff(s) Inhalation every 6 hours PRN Bronchospasm  HYDROmorphone  Injectable 0.1 milliGRAM(s) IV Push every 8 hours PRN Severe Pain (7 - 10)  ondansetron Injectable 5 milliGRAM(s) IV Push every 6 hours PRN Nausea and/or Vomiting    Allergies    Kiwi (Swelling)  No Known Drug Allergies    Intolerances      PAST MEDICAL & SURGICAL HISTORY:  Asthma    No significant past surgical history      FAMILY HISTORY:    SOCIAL HISTORY: non smoker    Review of Systems:  Constitutional: No generalized weakness. No fevers or chills               Eyes, Ears, Mouth, Throat: No vision loss   Respiratory: No shortness of breath or cough                             Cardiovascular: No chest pain or palpitations  Gastrointestinal: No nausea or vomiting                                         Genitourinary: No urinary incontinence or burning on urination  Musculoskeletal: No joint pain                                                           Dermatologic: No rash  Neurological: as per HPI                                                                      Psychiatric: No behavioral problems  Endocrine: No known hypoglycemia             Hematologic/Lymphatic: No easy bleeding    O:  Vital Signs Last 24 Hrs  T(C): 36.7 (01 Mar 2021 11:14), Max: 36.9 (01 Mar 2021 04:30)  T(F): 98.1 (01 Mar 2021 11:14), Max: 98.5 (01 Mar 2021 04:30)  HR: 89 (01 Mar 2021 11:14) (89 - 104)  BP: 129/76 (01 Mar 2021 11:14) (118/75 - 132/69)  RR: 16 (01 Mar 2021 11:14) (16 - 18)  SpO2: 98% (01 Mar 2021 11:14) (96% - 100%)    General Exam:   General appearance: No acute distress                 Cardiovascular: Pedal dorsalis pulses intact bilaterally    Mental Status: Oriented to self, date and place.  Attention intact.  No dysarthria, aphasia or neglect.  Knowledge intact.  Registration intact.  Short and long term memory grossly intact.      Cranial Nerves: CN I - not tested.  PERRL, EOMI, VFF, no nystagmus or diplopia.  No APD.  Fundi not visualized.  CN V1-3 intact to light touch.  No facial asymmetry.  Hearing intact to finger rub bilaterally.  Tongue, uvula and palate midline.  Sternocleidomastoid and Trapezius intact bilaterally.    Motor:   Tone: Normal                  Strength intact throughout  No pronator drift bilaterally                      No dysmetria on finger-nose-finger or heel-shin-heel  No truncal ataxia.  No resting, postural or action tremor.  No myoclonus.    Sensation: intact to light touch    Deep Tendon Reflexes: 2+ bilateral biceps, triceps, brachioradialis, knee and ankle  Toes flexor bilaterally    Gait: Normal and stable.  Romberg (-).    Other:     LABS:                        10.7   7.83  )-----------( 218      ( 01 Mar 2021 09:31 )             32.0     03-01    137  |  107  |  46<H>  ----------------------------<  90  5.0   |  20<L>  |  6.14<H>    Ca    9.2      01 Mar 2021 09:31  Phos  4.7     03-01  Mg     2.2     03-01              RADIOLOGY & ADDITIONAL STUDIES:  < from: CT Head No Cont (02.28.21 @ 17:02) >    EXAM:  CT BRAIN                            PROCEDURE DATE:  02/28/2021          INTERPRETATION:  CLINICAL INDICATION: Rule out intracranial hemorrhage.    Technique:    Multiple contiguous axial images were acquired from the skullbase to the vertexwithout the administration of intravenous contrast.  Coronal and sagittal reformations were made.    COMPARISON: 2/22/2021 head CT    FINDINGS:    The ventricles and sulci are within normal limits.  There are no areas of abnormal attenuation within the brain parenchyma.  There is no evidence of acute transcortical territorial infarction.  There is no intraparenchymal hematoma, mass effect or midline shift.  No abnormal extra-axial fluid collections are present.    The calvarium is intact.  The visualized intraorbital compartments are unremarkable.  Visualized paranasal sinuses appear free of acute disease.  Mastoid air cells are clear.    IMPRESSION:    No acute hemorrhage or midline shift. If symptoms persist, consider follow-up head CT or MRI, if no contraindications.            JAMIN GUZMAN MD, Attending Radiologist  This document has been electronically signed. Feb 28 2021  5:05PM    < end of copied text >   Patient is a 25y old  Male who presents with a chief complaint of headache, nausea/ vomiting (01 Mar 2021 10:50)      HPI:  26 yo male with PMH of asthma presented to his PCP with complaints of headache, nausea and vomiting x 5 days. Pt was noted to have elevated sBP of >170 and was advised to come to emergency department. Patient states he started having headaches 5 days ago associated with nausea and 1 day hx of NBNB vomiting. Pt endorses episode of dysuria few days ago which has now resolved. Pt denies head trauma, any prior episodes of similar headaches. No family hx of Polycystic kidney disease, CVA, HTN. Denies diarrhea, abdominal pain, fever, chills, recent viral infection, no blood in urine, no flank pain, no urinary rentention (22 Feb 2021 20:11)    Reports last night at 9PM, bifrontal HA, throbbing then became sharp, 10/10, not relieved by Tylenol.  Reports he was given "a stronger medication" that made him sleep but as soon as he awoke his HA returned.  Currently has a, intermittent, 6/10, bifrontal HA, and throbbing.  Denies additional symptoms. Reports eating breakfast this AM w/o N&V.      Neurological Review of Systems:  No difficulty with language.  No vision loss or double vision.  No dizziness, vertigo or new hearing loss.  No difficulty with speech or swallowing.  No focal weakness.  No focal sensory changes.  No numbness or tingling in the bilateral lower extremities.  No difficulty with balance.  No difficulty with ambulation.        MEDICATIONS  (STANDING):  amLODIPine   Tablet 10 milliGRAM(s) Oral daily  atorvastatin 40 milliGRAM(s) Oral at bedtime  budesonide  80 MICROgram(s)/formoterol 4.5 MICROgram(s) Inhaler 2 Puff(s) Inhalation two times a day  diphenhydrAMINE   Injectable 25 milliGRAM(s) IV Push every 8 hours  hydrALAZINE 25 milliGRAM(s) Oral three times a day  lidocaine   Patch 1 Patch Transdermal daily  metoclopramide Injectable 10 milliGRAM(s) IV Push every 8 hours  sodium chloride 0.9%. 1000 milliLiter(s) (75 mL/Hr) IV Continuous <Continuous>  valproate sodium IVPB 500 milliGRAM(s) IV Intermittent every 8 hours    MEDICATIONS  (PRN):  acetaminophen   Tablet .. 650 milliGRAM(s) Oral every 6 hours PRN Temp greater or equal to 38C (100.4F), Mild Pain (1 - 3)  ALBUTerol    90 MICROgram(s) HFA Inhaler 2 Puff(s) Inhalation every 6 hours PRN Bronchospasm  HYDROmorphone  Injectable 0.1 milliGRAM(s) IV Push every 8 hours PRN Severe Pain (7 - 10)  ondansetron Injectable 5 milliGRAM(s) IV Push every 6 hours PRN Nausea and/or Vomiting    Allergies    Kiwi (Swelling)  No Known Drug Allergies    Intolerances      PAST MEDICAL & SURGICAL HISTORY:  Asthma    No significant past surgical history      FAMILY HISTORY:    SOCIAL HISTORY: non smoker    Review of Systems:  Constitutional: No generalized weakness. No fevers or chills               Eyes, Ears, Mouth, Throat: No vision loss   Respiratory: No shortness of breath or cough                             Cardiovascular: No chest pain or palpitations  Gastrointestinal: No nausea or vomiting                                         Genitourinary: No urinary incontinence or burning on urination  Musculoskeletal: No joint pain                                                           Dermatologic: No rash  Neurological: as per HPI                                                                      Psychiatric: No behavioral problems  Endocrine: No known hypoglycemia             Hematologic/Lymphatic: No easy bleeding      O:  Vital Signs Last 24 Hrs  T(C): 36.7 (01 Mar 2021 11:14), Max: 36.9 (01 Mar 2021 04:30)  T(F): 98.1 (01 Mar 2021 11:14), Max: 98.5 (01 Mar 2021 04:30)  HR: 89 (01 Mar 2021 11:14) (89 - 104)  BP: 129/76 (01 Mar 2021 11:14) (118/75 - 132/69)  RR: 16 (01 Mar 2021 11:14) (16 - 18)  SpO2: 98% (01 Mar 2021 11:14) (96% - 100%)    General Exam:   General appearance: No acute distress                 Cardiovascular: Pedal dorsalis pulses intact bilaterally    Mental Status: Oriented to self, date and place.  Attention intact.  No dysarthria, aphasia or neglect.  Knowledge intact.  Registration intact.  Short and long term memory grossly intact.      Cranial Nerves: CN I - not tested.  PERRL, EOMI, VFF, no nystagmus or diplopia.  No APD.  Fundi not visualized.  CN V1-3 intact to light touch.  No facial asymmetry.  Hearing intact to finger rub bilaterally.  Tongue, uvula and palate midline.  Sternocleidomastoid and Trapezius intact bilaterally.    Motor:   Tone: Normal                  Strength intact throughout  No pronator drift bilaterally                      No dysmetria on finger-nose-finger or heel-shin-heel  No truncal ataxia.  No resting, postural or action tremor.  No myoclonus.    Sensation: intact to light touch x 4    Deep Tendon Reflexes: 2+ bilateral biceps, triceps, brachioradialis, knee and ankle  Toes flexor bilaterally    Gait: Normal and stable.  Romberg (-).        LABS:                        10.7   7.83  )-----------( 218      ( 01 Mar 2021 09:31 )             32.0     03-01    137  |  107  |  46<H>  ----------------------------<  90  5.0   |  20<L>  |  6.14<H>    Ca    9.2      01 Mar 2021 09:31  Phos  4.7     03-01  Mg     2.2     03-01              RADIOLOGY & ADDITIONAL STUDIES:  < from: CT Head No Cont (02.28.21 @ 17:02) >    EXAM:  CT BRAIN                            PROCEDURE DATE:  02/28/2021          INTERPRETATION:  CLINICAL INDICATION: Rule out intracranial hemorrhage.    Technique:    Multiple contiguous axial images were acquired from the skullbase to the vertexwithout the administration of intravenous contrast.  Coronal and sagittal reformations were made.    COMPARISON: 2/22/2021 head CT    FINDINGS:    The ventricles and sulci are within normal limits.  There are no areas of abnormal attenuation within the brain parenchyma.  There is no evidence of acute transcortical territorial infarction.  There is no intraparenchymal hematoma, mass effect or midline shift.  No abnormal extra-axial fluid collections are present.    The calvarium is intact.  The visualized intraorbital compartments are unremarkable.  Visualized paranasal sinuses appear free of acute disease.  Mastoid air cells are clear.    IMPRESSION:    No acute hemorrhage or midline shift. If symptoms persist, consider follow-up head CT or MRI, if no contraindications.            JAMIN GUZMAN MD, Attending Radiologist  This document has been electronically signed. Feb 28 2021  5:05PM    < end of copied text >

## 2021-03-01 NOTE — PROGRESS NOTE ADULT - PROBLEM SELECTOR PLAN 2
Presented with CIARRA and Cr 6  Baseline creatinine is 1.8  Urine protein/creatinine: 3.3, nephrotic range proteinuria without nephrotic syndrome, will repeat spot UPro/Cre once BP stabilizes   CT abdomen showed no findings of obstructive uropathy or other findings.  Renal US showed hyperechoic kidneys bilaterally   FeNa: 3.3%  Urine tox was negative urine eosinophils negative  Urine total protein 218  Pheochromocytoma unlikely- CT a/p showed normal adrenals, urine metanephrine negative, pending serum metanephrine   Normal C3, C4, SIFE neg, K/L 1.7, Syphillis neg, neg ASO, neg ANCA, neg ELMA, neg dsDNA, neg HIV, neg HepBsAg, and neg Hep C ab, neg anti-GBM  Immunofixation negative for monoclonal bands   YASH Kappa 7.38 (elevated) YAHS Lambda 4.33 (elevated)  Pending PLA2R antibody   Plan for kidney biopsy on 3/3. (Will keep NPO after midnight on 3/2. Check Coags on 3/3 and give DDAVP 22 mcg IV x1 30 mins prior to IR procedure).  Creatinine increasing 5.35 > 5.87 > 6.14 (3/1)   Nephro Dr. Massey following, f/u recommendations

## 2021-03-01 NOTE — PROGRESS NOTE ADULT - SUBJECTIVE AND OBJECTIVE BOX
EP     HISTORY OF PRESENT ILLNESS: HPI:  26 yo male with PMH of asthma presented to his PCP with complaints of headache, nausea and vomiting x 5 days. Pt was noted to have elevated sBP of >170 and was advised to come to emergency department. Patient states he started having headaches 5 days ago associated with nausea and 1 day hx of NBNB vomiting. Pt endorses episode of dysuria few days ago which has now resolved. Pt denies head trauma, any prior episodes of similar headaches. No family hx of Polycystic kidney disease, CVA, HTN. Denies diarrhea, abdominal pain, fever, chills, recent viral infection, no blood in urine, no flank pain, no urinary rentention (22 Feb 2021 20:11)    States he has prior history of lightheadedness when getting up too quickly, and has fainted in a hot kitchen before, but no symptoms related to exercise intolerance.  He is not aware of his heartbeat intermittently going faster in the bed and is not experiencing palpitations or chest discomfort.  No personal or family history of arrhythmia.  Has Asthma, diagnosed by a pulmonologist > 1 yr ago, and has been better/stable on Symbicort.  A 10 pt ROS is otherwise negative.    3/1- still has headache and some lightheadedness, but no SOB angina or palpitations.  sinus tachy during ambulation in his room.    acetaminophen   Tablet .. 650 milliGRAM(s) Oral every 6 hours PRN  ALBUTerol    90 MICROgram(s) HFA Inhaler 2 Puff(s) Inhalation every 6 hours PRN  amLODIPine   Tablet 10 milliGRAM(s) Oral daily  atorvastatin 40 milliGRAM(s) Oral at bedtime  budesonide  80 MICROgram(s)/formoterol 4.5 MICROgram(s) Inhaler 2 Puff(s) Inhalation two times a day  diphenhydrAMINE   Injectable 25 milliGRAM(s) IV Push every 8 hours  hydrALAZINE 25 milliGRAM(s) Oral three times a day  HYDROmorphone  Injectable 0.1 milliGRAM(s) IV Push every 8 hours PRN  lidocaine   Patch 1 Patch Transdermal daily  metoclopramide Injectable 10 milliGRAM(s) IV Push every 8 hours  ondansetron Injectable 5 milliGRAM(s) IV Push every 6 hours PRN  sodium chloride 0.9%. 1000 milliLiter(s) IV Continuous <Continuous>  valproate sodium IVPB 500 milliGRAM(s) IV Intermittent every 8 hours                          10.7   7.83  )-----------( 218      ( 01 Mar 2021 09:31 )             32.0       03-01    137  |  107  |  46<H>  ----------------------------<  90  5.0   |  20<L>  |  6.14<H>    Ca    9.2      01 Mar 2021 09:31  Phos  4.7     03-01  Mg     2.2     03-01      T(C): 36.7 (03-01-21 @ 11:14), Max: 36.9 (03-01-21 @ 04:30)  HR: 89 (03-01-21 @ 11:14) (89 - 104)  BP: 129/76 (03-01-21 @ 11:14) (118/75 - 132/69)  RR: 16 (03-01-21 @ 11:14) (16 - 18)  SpO2: 98% (03-01-21 @ 11:14) (96% - 99%)  Wt(kg): --    I&O's Summary    28 Feb 2021 07:01  -  01 Mar 2021 07:00  --------------------------------------------------------  IN: 225 mL / OUT: 0 mL / NET: 225 mL    01 Mar 2021 07:01  -  01 Mar 2021 14:36  --------------------------------------------------------  IN: 0 mL / OUT: 1000 mL / NET: -1000 mL      General: Well nourished, no acute distress, alert and oriented x 3  Head: normocephalic, no trauma  Neck: no JVD, no bruit, supple, not enlarged  CV: S1S2, no S3, regular rate, rhythm is SINUS, no murmurs.    Lungs: clear BL, no rales or wheezes  Abdomen: bowel sounds +, soft, nontender, nondistended  Extremities: no clubbing, cyanosis or edema  Neuro: Moves all 4 extremities, sensation intact x 4 extremities  Skin: warm and moist, normal turgor  Psych: Mood and affect are appropriate for circumstances  MSK: normal range of motion and strength x4 extremities.      TELEMETRY: NSR, sinus tachy, episodes of sinus tachy, 130-150bpm when walking in his room.  ECG: NSR  Echo: EF 55%, mild LVH	  	  ASSESSMENT/PLAN: 	25y Male with hypertensive urgency, nephrotic-range proteinuria and newly diagnosed acute renal failure.  Having asymptomatic episodes of SVT on telemetry that are of short duration and self-terminating.    Monitor on telemetry.  Does not need beta blockers at this point, and I would generally avoid these in a young man due to potential for sexual dysfunction at significant doses.  Awaiting kidney biopsy.  If no sustained arrhythmia on telemetry, recommend ambulatory cardiac monitoring.     Enzo Swan M.D.  Cardiac Electrophysiology  [phone]

## 2021-03-01 NOTE — PROGRESS NOTE ADULT - PROBLEM SELECTOR PLAN 6
Mildly elevated LFTs   Hepatitis panel negative  LFTs normalized  CT abdomen and pelvis showed no acute findings.

## 2021-03-01 NOTE — DIETITIAN INITIAL EVALUATION ADULT. - PERTINENT MEDS FT
MEDICATIONS  (STANDING):  amLODIPine   Tablet 10 milliGRAM(s) Oral daily  atorvastatin 40 milliGRAM(s) Oral at bedtime  budesonide  80 MICROgram(s)/formoterol 4.5 MICROgram(s) Inhaler 2 Puff(s) Inhalation two times a day  diphenhydrAMINE   Injectable 25 milliGRAM(s) IV Push every 8 hours  hydrALAZINE 25 milliGRAM(s) Oral three times a day  lidocaine   Patch 1 Patch Transdermal daily  metoclopramide Injectable 10 milliGRAM(s) IV Push every 8 hours  sodium chloride 0.9%. 1000 milliLiter(s) (75 mL/Hr) IV Continuous <Continuous>  valproate sodium IVPB 500 milliGRAM(s) IV Intermittent every 8 hours    MEDICATIONS  (PRN):  acetaminophen   Tablet .. 650 milliGRAM(s) Oral every 6 hours PRN Temp greater or equal to 38C (100.4F), Mild Pain (1 - 3)  ALBUTerol    90 MICROgram(s) HFA Inhaler 2 Puff(s) Inhalation every 6 hours PRN Bronchospasm  HYDROmorphone  Injectable 0.1 milliGRAM(s) IV Push every 8 hours PRN Severe Pain (7 - 10)  ondansetron Injectable 5 milliGRAM(s) IV Push every 6 hours PRN Nausea and/or Vomiting

## 2021-03-01 NOTE — DIETITIAN INITIAL EVALUATION ADULT. - PROBLEM SELECTOR PLAN 1
pt presented with Headaches,N/V  CTH negative   Ddx primary vs secondary HTN   Pt noted to have sbp >180 on admission  s/p amlodipine 5 mg and hydralazine in ED   cw amlodipine for now, increase dose as needed   will send work up for renin/ aldosterone in am   fu 5 HIAA, VMA, metanephrines in urine   fu Anca studies, christopher to r/o vasculitis   Fu urine studies, urine eosinphils   fu CT abdomen to r/o any obstructive pathology vs Polycystic kidney disease vs mass  monitor Bp closely  Nephro Dr. Massey

## 2021-03-01 NOTE — PROGRESS NOTE ADULT - PROBLEM SELECTOR PLAN 3
Baseline creatinine 1.29 (2/18) with proteinuria   PTHi elevated 99  Low phos diet  phos 4.7, monitor

## 2021-03-02 DIAGNOSIS — E87.5 HYPERKALEMIA: ICD-10-CM

## 2021-03-02 LAB
ANION GAP SERPL CALC-SCNC: 8 MMOL/L — SIGNIFICANT CHANGE UP (ref 5–17)
BUN SERPL-MCNC: 52 MG/DL — HIGH (ref 7–18)
CALCIUM SERPL-MCNC: 8.7 MG/DL — SIGNIFICANT CHANGE UP (ref 8.4–10.5)
CHLORIDE SERPL-SCNC: 109 MMOL/L — HIGH (ref 96–108)
CO2 SERPL-SCNC: 20 MMOL/L — LOW (ref 22–31)
CREAT SERPL-MCNC: 6.54 MG/DL — HIGH (ref 0.5–1.3)
GLUCOSE SERPL-MCNC: 109 MG/DL — HIGH (ref 70–99)
HCT VFR BLD CALC: 32.6 % — LOW (ref 39–50)
HGB BLD-MCNC: 10.6 G/DL — LOW (ref 13–17)
MAGNESIUM SERPL-MCNC: 1.8 MG/DL — SIGNIFICANT CHANGE UP (ref 1.6–2.6)
MCHC RBC-ENTMCNC: 28.3 PG — SIGNIFICANT CHANGE UP (ref 27–34)
MCHC RBC-ENTMCNC: 32.5 GM/DL — SIGNIFICANT CHANGE UP (ref 32–36)
MCV RBC AUTO: 86.9 FL — SIGNIFICANT CHANGE UP (ref 80–100)
NRBC # BLD: 0 /100 WBCS — SIGNIFICANT CHANGE UP (ref 0–0)
PHOSPHATE SERPL-MCNC: 3.8 MG/DL — SIGNIFICANT CHANGE UP (ref 2.5–4.5)
PHOSPHOLIPASE A2 RECEPTOR ELISA: <1.8 RU/ML — SIGNIFICANT CHANGE UP (ref 0–19.9)
PLATELET # BLD AUTO: 209 K/UL — SIGNIFICANT CHANGE UP (ref 150–400)
POTASSIUM SERPL-MCNC: 5.4 MMOL/L — HIGH (ref 3.5–5.3)
POTASSIUM SERPL-SCNC: 5.4 MMOL/L — HIGH (ref 3.5–5.3)
RBC # BLD: 3.75 M/UL — LOW (ref 4.2–5.8)
RBC # FLD: 12.6 % — SIGNIFICANT CHANGE UP (ref 10.3–14.5)
SARS-COV-2 RNA SPEC QL NAA+PROBE: SIGNIFICANT CHANGE UP
SODIUM SERPL-SCNC: 137 MMOL/L — SIGNIFICANT CHANGE UP (ref 135–145)
VMA SERPL-MCNC: SIGNIFICANT CHANGE UP
VMA UR-MCNC: SIGNIFICANT CHANGE UP
WBC # BLD: 7.99 K/UL — SIGNIFICANT CHANGE UP (ref 3.8–10.5)
WBC # FLD AUTO: 7.99 K/UL — SIGNIFICANT CHANGE UP (ref 3.8–10.5)

## 2021-03-02 PROCEDURE — 99233 SBSQ HOSP IP/OBS HIGH 50: CPT

## 2021-03-02 RX ORDER — HYDROMORPHONE HYDROCHLORIDE 2 MG/ML
0.1 INJECTION INTRAMUSCULAR; INTRAVENOUS; SUBCUTANEOUS ONCE
Refills: 0 | Status: DISCONTINUED | OUTPATIENT
Start: 2021-03-02 | End: 2021-03-02

## 2021-03-02 RX ORDER — SODIUM ZIRCONIUM CYCLOSILICATE 10 G/10G
10 POWDER, FOR SUSPENSION ORAL ONCE
Refills: 0 | Status: COMPLETED | OUTPATIENT
Start: 2021-03-02 | End: 2021-03-02

## 2021-03-02 RX ORDER — VALPROIC ACID (AS SODIUM SALT) 250 MG/5ML
500 SOLUTION, ORAL ORAL EVERY 8 HOURS
Refills: 0 | Status: DISCONTINUED | OUTPATIENT
Start: 2021-03-02 | End: 2021-03-03

## 2021-03-02 RX ORDER — DILTIAZEM HCL 120 MG
120 CAPSULE, EXT RELEASE 24 HR ORAL DAILY
Refills: 0 | Status: DISCONTINUED | OUTPATIENT
Start: 2021-03-02 | End: 2021-03-06

## 2021-03-02 RX ORDER — DESMOPRESSIN ACETATE 0.1 MG/1
20 TABLET ORAL ONCE
Refills: 0 | Status: DISCONTINUED | OUTPATIENT
Start: 2021-03-03 | End: 2021-03-04

## 2021-03-02 RX ORDER — DIPHENHYDRAMINE HCL 50 MG
25 CAPSULE ORAL EVERY 8 HOURS
Refills: 0 | Status: DISCONTINUED | OUTPATIENT
Start: 2021-03-02 | End: 2021-03-03

## 2021-03-02 RX ORDER — METOCLOPRAMIDE HCL 10 MG
5 TABLET ORAL EVERY 8 HOURS
Refills: 0 | Status: DISCONTINUED | OUTPATIENT
Start: 2021-03-02 | End: 2021-03-06

## 2021-03-02 RX ADMIN — HYDROMORPHONE HYDROCHLORIDE 0.1 MILLIGRAM(S): 2 INJECTION INTRAMUSCULAR; INTRAVENOUS; SUBCUTANEOUS at 11:49

## 2021-03-02 RX ADMIN — LIDOCAINE 1 PATCH: 4 CREAM TOPICAL at 11:49

## 2021-03-02 RX ADMIN — ATORVASTATIN CALCIUM 40 MILLIGRAM(S): 80 TABLET, FILM COATED ORAL at 21:58

## 2021-03-02 RX ADMIN — Medication 25 MILLIGRAM(S): at 21:58

## 2021-03-02 RX ADMIN — BUDESONIDE AND FORMOTEROL FUMARATE DIHYDRATE 2 PUFF(S): 160; 4.5 AEROSOL RESPIRATORY (INHALATION) at 11:50

## 2021-03-02 RX ADMIN — LIDOCAINE 1 PATCH: 4 CREAM TOPICAL at 23:35

## 2021-03-02 RX ADMIN — BUDESONIDE AND FORMOTEROL FUMARATE DIHYDRATE 2 PUFF(S): 160; 4.5 AEROSOL RESPIRATORY (INHALATION) at 22:17

## 2021-03-02 RX ADMIN — Medication 5 MILLIGRAM(S): at 19:06

## 2021-03-02 RX ADMIN — AMLODIPINE BESYLATE 10 MILLIGRAM(S): 2.5 TABLET ORAL at 06:59

## 2021-03-02 RX ADMIN — Medication 25 MILLIGRAM(S): at 14:51

## 2021-03-02 RX ADMIN — LIDOCAINE 1 PATCH: 4 CREAM TOPICAL at 19:20

## 2021-03-02 RX ADMIN — SODIUM ZIRCONIUM CYCLOSILICATE 10 GRAM(S): 10 POWDER, FOR SUSPENSION ORAL at 15:09

## 2021-03-02 RX ADMIN — Medication 650 MILLIGRAM(S): at 23:35

## 2021-03-02 RX ADMIN — Medication 650 MILLIGRAM(S): at 11:50

## 2021-03-02 RX ADMIN — Medication 25 MILLIGRAM(S): at 19:06

## 2021-03-02 RX ADMIN — Medication 27.5 MILLIGRAM(S): at 19:07

## 2021-03-02 RX ADMIN — Medication 25 MILLIGRAM(S): at 06:59

## 2021-03-02 NOTE — PROGRESS NOTE ADULT - SUBJECTIVE AND OBJECTIVE BOX
Interval Events:    denies complaints  PRA: 0.577  aldosterone: 11.3  serum metanephrines pending    Allergies    Kiwi (Swelling)  No Known Drug Allergies    Intolerances      Endocrine/Metabolic Medications:  atorvastatin 40 milliGRAM(s) Oral at bedtime      Vital Signs Last 24 Hrs  T(C): 36.7 (02 Mar 2021 11:10), Max: 37.1 (01 Mar 2021 23:56)  T(F): 98 (02 Mar 2021 11:10), Max: 98.8 (01 Mar 2021 23:56)  HR: 97 (02 Mar 2021 11:10) (76 - 105)  BP: 132/58 (02 Mar 2021 11:10) (121/74 - 152/96)  BP(mean): --  RR: 16 (02 Mar 2021 11:10) (15 - 18)  SpO2: 98% (02 Mar 2021 11:10) (98% - 100%)      PHYSICAL EXAM  NC/AT:    HEENT:    Neck:  No JVD    Respiratory:  Clear without rales or rhonchi    Cardiovascular:  RR without murmur    Gastrointestinal: Soft     Extremities: without cyanosis    Neurological:  non focal    LABS                        10.6   7.99  )-----------( 209      ( 02 Mar 2021 09:16 )             32.6                               137    |  109    |  52                  Calcium: 8.7   / iCa: x      (03-02 @ 09:16)    ----------------------------<  109       Magnesium: 1.8                              5.4     |  20     |  6.54             Phosphorous: 3.8        CAPILLARY BLOOD GLUCOSE            Assesment/plan      24 yo male with h/o asthma, admitted with headaches, nausea, vomiting x 5 days, also noted to have elevated SBP >170 and sent to ED    endocrinology consulted for uncontrolled HTN,  ? secondary HTN    HTN  uncontrolled  aldosterone- 27 (ULN 23 ng/dl) direct renin: 4.9 pg/ml  TSH wnl   urine tox neg  urine metanephrine pending  CT abdomen/pelvis: without evident adrenal abnormality    recommendations:  - plasma renin activity pending  - serum metanephrines pending  f/u 24 urine catecholamines  aldosterone 27.2 nd/dl (ULN 23) on 2/23, direct renin 4.9 pg/ml  repeat aldosterone 11.3 on 2/25- ?postural changes while inpatient- repeat outpatient  plasma renin activity: 0.577 on 2/23  ARR: 19.58  aldosterone and plasma renin activity done on different days    would repeat aldosterone/renin, metanephrines as outpatient    - recommend avoiding spironolactone/epleronone , doxazosin till outpatient workup is complete  can use alternate anti HTN- hydralazine, nifedipine, labetalol  CT adrenal as outpatient  planned for renal biopsy 3/3  however since renin level is not suppressed/low, without hypokalemia, less likely from primary aldosteronoma  nephrology on board    CIARRA  serum creatinine uptrending to 6  urine tox neg  hyperechoic kidneys on renal ultrasound  nephrology on board  planned for renal biopsy 3/3    elevated LFTs  hep panel neg  downtrending, improved      Discussed with primary team  Please call Endocrine- [phone]- Dr Lexy Mclain as needed     Interval Events:    denies complaints  PRA: 0.577  aldosterone: 11.3  serum metanephrines pending    Allergies    Kiwi (Swelling)  No Known Drug Allergies    Intolerances      Endocrine/Metabolic Medications:  atorvastatin 40 milliGRAM(s) Oral at bedtime      Vital Signs Last 24 Hrs  T(C): 36.7 (02 Mar 2021 11:10), Max: 37.1 (01 Mar 2021 23:56)  T(F): 98 (02 Mar 2021 11:10), Max: 98.8 (01 Mar 2021 23:56)  HR: 97 (02 Mar 2021 11:10) (76 - 105)  BP: 132/58 (02 Mar 2021 11:10) (121/74 - 152/96)  BP(mean): --  RR: 16 (02 Mar 2021 11:10) (15 - 18)  SpO2: 98% (02 Mar 2021 11:10) (98% - 100%)      PHYSICAL EXAM  NC/AT:    HEENT:    Neck:  No JVD    Respiratory:  Clear without rales or rhonchi    Cardiovascular:  RR without murmur    Gastrointestinal: Soft     Extremities: without cyanosis    Neurological:  non focal    LABS                        10.6   7.99  )-----------( 209      ( 02 Mar 2021 09:16 )             32.6                               137    |  109    |  52                  Calcium: 8.7   / iCa: x      (03-02 @ 09:16)    ----------------------------<  109       Magnesium: 1.8                              5.4     |  20     |  6.54             Phosphorous: 3.8        CAPILLARY BLOOD GLUCOSE            Assesment/plan      26 yo male with h/o asthma, admitted with headaches, nausea, vomiting x 5 days, also noted to have elevated SBP >170 and sent to ED    endocrinology consulted for uncontrolled HTN,  ? secondary HTN    HTN  uncontrolled  aldosterone- 27 (ULN 23 ng/dl) direct renin: 4.9 pg/ml  TSH wnl   urine tox neg  urine metanephrine pending  CT abdomen/pelvis: without evident adrenal abnormality    recommendations:  - serum metanephrines pending  f/u 24 urine catecholamines  aldosterone 27.2 nd/dl (ULN 23) on 2/23, direct renin 4.9 pg/ml  repeat aldosterone 11.3 on 2/25- ?postural changes while inpatient- repeat outpatient  plasma renin activity: 0.577 on 2/23    would repeat aldosterone/renin, metanephrines as outpatient    - recommend avoiding spironolactone/epleronone , doxazosin till outpatient workup is complete  can use alternate anti HTN- hydralazine, nifedipine, labetalol  CT adrenal as outpatient  planned for renal biopsy 3/3  however since renin level is not suppressed/low, without hypokalemia, less likely from primary aldosteronoma  repeat aldosterone/renin as outpatient  nephrology on board    CIARRA  serum creatinine uptrending to 6  urine tox neg  hyperechoic kidneys on renal ultrasound  nephrology on board  planned for renal biopsy 3/3    elevated LFTs  hep panel neg  downtrending, improved      Discussed with primary team  Please call Endocrine- [phone]- Dr Lexy Mclain as needed

## 2021-03-02 NOTE — PROGRESS NOTE ADULT - ATTENDING COMMENTS
I counseled the patient about his likely diagnosis, and the further treatment indicated to help control his migraine.

## 2021-03-02 NOTE — PROGRESS NOTE ADULT - ATTENDING COMMENTS
[facility] NEPHROLOGY  Justin Mckenzie M.D.  Chad Reich D.O.  Bteh Massey M.D.  Shayy Navarro, MSN, ANP-C  [phone]    [address] [place] [address]

## 2021-03-02 NOTE — PROGRESS NOTE ADULT - SUBJECTIVE AND OBJECTIVE BOX
PGY-1 Progress Note discussed with attending    PAGER #: [-----] TILL 5:00 PM  PLEASE CONTACT ON CALL TEAM:  - On Call Team (Please refer to Prasanna) FROM 5:00 PM - 8:30PM  - Nightfloat Team FROM 8:30 -7:30 AM    CHIEF COMPLAINT & BRIEF HOSPITAL COURSE:    INTERVAL HPI/OVERNIGHT EVENTS:     MEDICATIONS:  acetaminophen   Tablet .. 650 milliGRAM(s) Oral every 6 hours PRN  ALBUTerol    90 MICROgram(s) HFA Inhaler 2 Puff(s) Inhalation every 6 hours PRN  amLODIPine   Tablet 10 milliGRAM(s) Oral daily  atorvastatin 40 milliGRAM(s) Oral at bedtime  budesonide  80 MICROgram(s)/formoterol 4.5 MICROgram(s) Inhaler 2 Puff(s) Inhalation two times a day  diphenhydrAMINE   Injectable 25 milliGRAM(s) IV Push every 8 hours  hydrALAZINE 25 milliGRAM(s) Oral three times a day  lidocaine   Patch 1 Patch Transdermal daily  metoclopramide Injectable 10 milliGRAM(s) IV Push every 8 hours  ondansetron Injectable 5 milliGRAM(s) IV Push every 6 hours PRN  sodium bicarbonate 650 milliGRAM(s) Oral daily  sodium chloride 0.9%. 1000 milliLiter(s) IV Continuous <Continuous>  valproate sodium IVPB 500 milliGRAM(s) IV Intermittent every 8 hours      REVIEW OF SYSTEMS:  CONSTITUTIONAL: No fever, weight loss, or fatigue  RESPIRATORY: No cough, wheezing, chills or hemoptysis; No shortness of breath  CARDIOVASCULAR: No chest pain, palpitations, dizziness, or leg swelling  GASTROINTESTINAL: No abdominal pain. No nausea, vomiting, or hematemesis; No diarrhea or constipation. No melena or hematochezia.  GENITOURINARY: No dysuria or hematuria, urinary frequency  NEUROLOGICAL: No headaches, memory loss, loss of strength, numbness, or tremors  SKIN: No itching, burning, rashes, or lesions     Vital Signs Last 24 Hrs  T(C): 37.1 (01 Mar 2021 23:56), Max: 37.1 (01 Mar 2021 23:56)  T(F): 98.8 (01 Mar 2021 23:56), Max: 98.8 (01 Mar 2021 23:56)  HR: 76 (02 Mar 2021 04:44) (76 - 105)  BP: 129/70 (02 Mar 2021 04:44) (129/70 - 152/96)  BP(mean): --  RR: 18 (01 Mar 2021 23:56) (15 - 18)  SpO2: 100% (01 Mar 2021 23:56) (98% - 100%)    PHYSICAL EXAMINATION:  GENERAL: NAD, well built  HEAD:  Atraumatic, Normocephalic  EYES:  conjunctiva and sclera clear  NECK: Supple, No JVD, Normal thyroid  CHEST/LUNG: Clear to auscultation. Clear to percussion bilaterally; No rales, rhonchi, wheezing, or rubs  HEART: Regular rate and rhythm; No murmurs, rubs, or gallops  ABDOMEN: Soft, Nontender, Nondistended; Bowel sounds present  NERVOUS SYSTEM:  Alert & Oriented X3,    EXTREMITIES:  2+ Peripheral Pulses, No clubbing, cyanosis, or edema  SKIN: warm dry                          10.7   7.83  )-----------( 218      ( 01 Mar 2021 09:31 )             32.0     03-01    137  |  107  |  46<H>  ----------------------------<  90  5.0   |  20<L>  |  6.14<H>    Ca    9.2      01 Mar 2021 09:31  Phos  4.7     03-01  Mg     2.2     03-01                CAPILLARY BLOOD GLUCOSE      RADIOLOGY & ADDITIONAL TESTS:                   PGY-1 Progress Note discussed with attending    PAGER #: [[phone]] TILL 5:00 PM  PLEASE CONTACT ON CALL TEAM:  - On Call Team (Please refer to Prasanna) FROM 5:00 PM - 8:30PM  - Nightfloat Team FROM 8:30 -7:30 AM    CHIEF COMPLAINT & BRIEF HOSPITAL COURSE:  24 yo male with PMH of asthma presented to his PCP with complaints of headache, nausea and vomiting x 5 days. Pt was noted to have elevated SBP of >170 and was advised to come to emergency department. No family hx of Polycystic kidney disease, CVA, HTN. He was admitted for CIARRA with a creatinine of 6 and hypertensive emergency with a BP of 185/114.   In the ED the patients BP was 185/114. He was given amlodipine and hydralazine. He was continued on amlodipine 5 which was increased to 10 and hydralazine 10 which was increased to 25 with marked improvement in his BP. Cardiology was consulted (Dr. Peres). TTE showed mild MR and mild LVH. He was worked up for causes of secondary hypertension. Urine metanephrine was negative and CT A/P showed no adrenal pathology, making the diagnosis of pheochromocytoma unlikely. Serum Aldosterone and renin were obtained which showed aldosterone of 27.5, direct renin 4.9, renin activity 0.577 with an aldosterone : renin ratio of 47.66. Endocrine was consulted. Repeat aldosterone was 11.3. ? postural change. Endocrine recommended outpatient workup to repeat aldosterone/renin and metanephrine.   For his ARF he was noted to have a creatinine of 6 on admission. UA showed moderate blood, 500 protein. FeNa: 3.3%. Nephrology was consulted (Dr. Massey). Normal C3, C4, SIFE neg, K/L 1.7, ELMA, ANCA, dsDNA, ASO, Treponema Pallidum ab and Hepatitis panel all negative. Immunofixation negative for monoclonal bands. CT A/P was negative for any obstructive uropathy or acute findings.  Renal US showed bilateral hyperechoic kidneys.  For CKD the patient prior records wer eobtained from 2018 which showed a baseline creatinine of 1.29 and a UA with positive blood, protein and hyaline casts. PTHi was elevated at 99. The patient was started on a low phos diet. Nephrology was following.   For the patient headache he was started on Dilaudid 0.1mg due to ARF and persistent 8/10 frontal/bitemporal headaches with Tylenol. CTH on admission was negative. Repeat CTH was done for acute severe headaches which also showed no acute findings. Neurology was consulted. They recommended a cocktail of depakote, reglan, and benadryl Q8 for 3-5 days. Dilaudid was discontinued.   On admission the patient was noted to have multiple runs of asymptomatic SVT. Electrophysiology was consulted, (Dr Swan) and recommended against beta blockers due to potential side effects. The patient remained asymptomatic.     INTERVAL HPI/OVERNIGHT EVENTS: Yesterday the patient was complaining of severe headaches. Neurology recommended cocktail of Depakote, Benadryl and Reglan. This morning the patient has improvement in headache. Overnight the patient had several episodes of SVT up to 135.     MEDICATIONS:  acetaminophen   Tablet .. 650 milliGRAM(s) Oral every 6 hours PRN  ALBUTerol    90 MICROgram(s) HFA Inhaler 2 Puff(s) Inhalation every 6 hours PRN  amLODIPine   Tablet 10 milliGRAM(s) Oral daily  atorvastatin 40 milliGRAM(s) Oral at bedtime  budesonide  80 MICROgram(s)/formoterol 4.5 MICROgram(s) Inhaler 2 Puff(s) Inhalation two times a day  diphenhydrAMINE   Injectable 25 milliGRAM(s) IV Push every 8 hours  hydrALAZINE 25 milliGRAM(s) Oral three times a day  lidocaine   Patch 1 Patch Transdermal daily  metoclopramide Injectable 10 milliGRAM(s) IV Push every 8 hours  ondansetron Injectable 5 milliGRAM(s) IV Push every 6 hours PRN  sodium bicarbonate 650 milliGRAM(s) Oral daily  sodium chloride 0.9%. 1000 milliLiter(s) IV Continuous <Continuous>  valproate sodium IVPB 500 milliGRAM(s) IV Intermittent every 8 hours      REVIEW OF SYSTEMS:  CONSTITUTIONAL: No fever, weight loss, or fatigue  RESPIRATORY: No cough, wheezing, chills or hemoptysis; No shortness of breath  CARDIOVASCULAR: No chest pain, palpitations, dizziness, or leg swelling  GASTROINTESTINAL: No abdominal pain. No nausea, vomiting, or hematemesis; No diarrhea or constipation. No melena or hematochezia.  GENITOURINARY: No dysuria or hematuria, urinary frequency  NEUROLOGICAL: No headaches, memory loss, loss of strength, numbness, or tremors  SKIN: No itching, burning, rashes, or lesions     Vital Signs Last 24 Hrs  T(C): 37.1 (01 Mar 2021 23:56), Max: 37.1 (01 Mar 2021 23:56)  T(F): 98.8 (01 Mar 2021 23:56), Max: 98.8 (01 Mar 2021 23:56)  HR: 76 (02 Mar 2021 04:44) (76 - 105)  BP: 129/70 (02 Mar 2021 04:44) (129/70 - 152/96)  BP(mean): --  RR: 18 (01 Mar 2021 23:56) (15 - 18)  SpO2: 100% (01 Mar 2021 23:56) (98% - 100%)    PHYSICAL EXAMINATION:  GENERAL: NAD, well built  HEAD:  Atraumatic, Normocephalic  EYES:  conjunctiva and sclera clear  NECK: Supple, No JVD, Normal thyroid  CHEST/LUNG: Clear to auscultation. Clear to percussion bilaterally; No rales, rhonchi, wheezing, or rubs  HEART: Regular rate and rhythm; No murmurs, rubs, or gallops  ABDOMEN: Soft, Nontender, Nondistended; Bowel sounds present  NERVOUS SYSTEM:  Alert & Oriented X3,    EXTREMITIES:  2+ Peripheral Pulses, No clubbing, cyanosis, or edema  SKIN: warm dry                          10.7   7.83  )-----------( 218      ( 01 Mar 2021 09:31 )             32.0     03-01    137  |  107  |  46<H>  ----------------------------<  90  5.0   |  20<L>  |  6.14<H>    Ca    9.2      01 Mar 2021 09:31  Phos  4.7     03-01  Mg     2.2     03-01                CAPILLARY BLOOD GLUCOSE      RADIOLOGY & ADDITIONAL TESTS:                   PGY-1 Progress Note discussed with attending    PAGER #: [[phone]] TILL 5:00 PM  PLEASE CONTACT ON CALL TEAM:  - On Call Team (Please refer to Prasanna) FROM 5:00 PM - 8:30PM  - Nightfloat Team FROM 8:30 -7:30 AM    CHIEF COMPLAINT & BRIEF HOSPITAL COURSE:  26 yo male with PMH of asthma presented to his PCP with complaints of headache, nausea and vomiting x 5 days. Pt was noted to have elevated SBP of >170 and was advised to come to emergency department. No family hx of Polycystic kidney disease, CVA, HTN. He was admitted for CIARRA with a creatinine of 6 and hypertensive emergency with a BP of 185/114.   In the ED the patients BP was 185/114. He was given amlodipine and hydralazine. He was continued on amlodipine 5 which was increased to 10 and hydralazine 10 which was increased to 25 with marked improvement in his BP. Cardiology was consulted (Dr. Peres). TTE showed mild MR and mild LVH. He was worked up for causes of secondary hypertension. Urine metanephrine was negative and CT A/P showed no adrenal pathology, making the diagnosis of pheochromocytoma unlikely. Serum Aldosterone and renin were obtained which showed aldosterone of 27.5, direct renin 4.9, renin activity 0.577 with an aldosterone : renin ratio of 47.66. Endocrine was consulted. Repeat aldosterone was 11.3. ? postural change. Endocrine recommended outpatient workup to repeat aldosterone/renin and metanephrine.   For his ARF he was noted to have a creatinine of 6 on admission. UA showed moderate blood, 500 protein. FeNa: 3.3%. Nephrology was consulted (Dr. Massey). Normal C3, C4, SIFE neg, K/L 1.7, ELMA, ANCA, dsDNA, ASO, Treponema Pallidum ab and Hepatitis panel all negative. Immunofixation negative for monoclonal bands. CT A/P was negative for any obstructive uropathy or acute findings.  Renal US showed bilateral hyperechoic kidneys.  For CKD the patient prior records wer eobtained from 2018 which showed a baseline creatinine of 1.29 and a UA with positive blood, protein and hyaline casts. PTHi was elevated at 99. The patient was started on a low phos diet. Nephrology was following.   For the patient headache he was started on Dilaudid 0.1mg due to ARF and persistent 8/10 frontal/bitemporal headaches with Tylenol. CTH on admission was negative. Repeat CTH was done for acute severe headaches which also showed no acute findings. Neurology was consulted. They recommended a cocktail of depakote, reglan, and benadryl Q8 for 3-5 days. Dilaudid was discontinued.   On admission the patient was noted to have multiple runs of asymptomatic SVT. Electrophysiology was consulted, (Dr Swan) and recommended against beta blockers due to potential side effects. The patient remained asymptomatic.     INTERVAL HPI/OVERNIGHT EVENTS: Yesterday the patient was complaining of severe headaches. Neurology recommended cocktail of Depakote, Benadryl and Reglan. This morning the patient states he does not have a headache. He stated that he did not like the cocktail given for his headache overnight. He said it made him feel dizzy and made his arms tingle. Overnight the patient had several episodes of SVT up to 135.     MEDICATIONS:  acetaminophen   Tablet .. 650 milliGRAM(s) Oral every 6 hours PRN  ALBUTerol    90 MICROgram(s) HFA Inhaler 2 Puff(s) Inhalation every 6 hours PRN  amLODIPine   Tablet 10 milliGRAM(s) Oral daily  atorvastatin 40 milliGRAM(s) Oral at bedtime  budesonide  80 MICROgram(s)/formoterol 4.5 MICROgram(s) Inhaler 2 Puff(s) Inhalation two times a day  diphenhydrAMINE   Injectable 25 milliGRAM(s) IV Push every 8 hours  hydrALAZINE 25 milliGRAM(s) Oral three times a day  lidocaine   Patch 1 Patch Transdermal daily  metoclopramide Injectable 10 milliGRAM(s) IV Push every 8 hours  ondansetron Injectable 5 milliGRAM(s) IV Push every 6 hours PRN  sodium bicarbonate 650 milliGRAM(s) Oral daily  sodium chloride 0.9%. 1000 milliLiter(s) IV Continuous <Continuous>  valproate sodium IVPB 500 milliGRAM(s) IV Intermittent every 8 hours      REVIEW OF SYSTEMS:  CONSTITUTIONAL: No fever, weight loss, or fatigue  RESPIRATORY: No cough, wheezing, chills or hemoptysis; No shortness of breath  CARDIOVASCULAR: No chest pain, palpitations, dizziness, or leg swelling  GASTROINTESTINAL: No abdominal pain. No nausea, vomiting, or hematemesis; No diarrhea or constipation. No melena or hematochezia.  GENITOURINARY: No dysuria or hematuria, urinary frequency  NEUROLOGICAL: No headaches, memory loss, loss of strength, numbness, or tremors  SKIN: No itching, burning, rashes, or lesions     Vital Signs Last 24 Hrs  T(C): 37.1 (01 Mar 2021 23:56), Max: 37.1 (01 Mar 2021 23:56)  T(F): 98.8 (01 Mar 2021 23:56), Max: 98.8 (01 Mar 2021 23:56)  HR: 76 (02 Mar 2021 04:44) (76 - 105)  BP: 129/70 (02 Mar 2021 04:44) (129/70 - 152/96)  BP(mean): --  RR: 18 (01 Mar 2021 23:56) (15 - 18)  SpO2: 100% (01 Mar 2021 23:56) (98% - 100%)    PHYSICAL EXAMINATION:  GENERAL: NAD, well built  HEAD:  Atraumatic, Normocephalic  EYES:  conjunctiva and sclera clear  NECK: Supple, No JVD, Normal thyroid  CHEST/LUNG: Clear to auscultation. Clear to percussion bilaterally; No rales, rhonchi, wheezing, or rubs  HEART: Regular rate and rhythm; No murmurs, rubs, or gallops  ABDOMEN: Soft, Nontender, Nondistended; Bowel sounds present  NERVOUS SYSTEM:  Alert & Oriented X3,    EXTREMITIES:  2+ Peripheral Pulses, No clubbing, cyanosis, or edema  SKIN: warm dry                          10.7   7.83  )-----------( 218      ( 01 Mar 2021 09:31 )             32.0     03-01    137  |  107  |  46<H>  ----------------------------<  90  5.0   |  20<L>  |  6.14<H>    Ca    9.2      01 Mar 2021 09:31  Phos  4.7     03-01  Mg     2.2     03-01                CAPILLARY BLOOD GLUCOSE      RADIOLOGY & ADDITIONAL TESTS:                   PGY-1 Progress Note discussed with attending    PAGER #: [[phone]] TILL 5:00 PM  PLEASE CONTACT ON CALL TEAM:  - On Call Team (Please refer to Prasanna) FROM 5:00 PM - 8:30PM  - Nightfloat Team FROM 8:30 -7:30 AM    CHIEF COMPLAINT & BRIEF HOSPITAL COURSE:  26 yo male with PMH of asthma presented to his PCP with complaints of headache, nausea and vomiting x 5 days. Pt was noted to have elevated SBP of >170 and was advised to come to emergency department. No family hx of Polycystic kidney disease, CVA, HTN. He was admitted for CIARRA with a creatinine of 6 and hypertensive emergency with a BP of 185/114.   In the ED the patients BP was 185/114. He was given amlodipine and hydralazine. He was continued on amlodipine 5 which was increased to 10 and hydralazine 10 which was increased to 25 with marked improvement in his BP. Cardiology was consulted (Dr. Peres). TTE showed mild MR and mild LVH. He was worked up for causes of secondary hypertension. Urine metanephrine was negative and CT A/P showed no adrenal pathology, making the diagnosis of pheochromocytoma unlikely. Serum Aldosterone and renin were obtained which showed aldosterone of 27.5, direct renin 4.9, renin activity 0.577 with an aldosterone : renin ratio of 47.66. Endocrine was consulted. Repeat aldosterone was 11.3. ? postural change. Endocrine recommended outpatient workup to repeat aldosterone/renin and metanephrine.   For his ARF he was noted to have a creatinine of 6 on admission. UA showed moderate blood, 500 protein. FeNa: 3.3%. Nephrology was consulted (Dr. Massey). Normal C3, C4, SIFE neg, K/L 1.7, ELMA, ANCA, dsDNA, ASO, Treponema Pallidum ab and Hepatitis panel all negative. Immunofixation negative for monoclonal bands. CT A/P was negative for any obstructive uropathy or acute findings.  Renal US showed bilateral hyperechoic kidneys.  For CKD the patient prior records wer eobtained from 2018 which showed a baseline creatinine of 1.29 and a UA with positive blood, protein and hyaline casts. PTHi was elevated at 99. The patient was started on a low phos diet. Nephrology was following.   For the patient headache he was started on Dilaudid 0.1mg due to ARF and persistent 8/10 frontal/bitemporal headaches with Tylenol. CTH on admission was negative. Repeat CTH was done for acute severe headaches which also showed no acute findings. Neurology was consulted. They recommended a cocktail of depakote, reglan, and benadryl Q8 for 3-5 days. Dilaudid was discontinued.   On admission the patient was noted to have multiple runs of asymptomatic sinus tachycardia. Electrophysiology was consulted, (Dr Swan) and recommended against beta blockers due to potential side effects. .     INTERVAL HPI/OVERNIGHT EVENTS: Yesterday the patient was complaining of severe headaches. Neurology recommended cocktail of Depakote, Benadryl and Reglan. This morning the patient states he does not have a headache. He stated that he did not like the cocktail given for his headache overnight. He said it made him feel dizzy and made his arms tingle. Cocktail made PRN. Overnight the patient had several episodes of sinus tachycardia up to 135. This morning the patient stated his heart felt like it was pounding but he denied SOB, chest pain or dizziness. On the monitor his HR was 122 at that time.     MEDICATIONS:  acetaminophen   Tablet .. 650 milliGRAM(s) Oral every 6 hours PRN  ALBUTerol    90 MICROgram(s) HFA Inhaler 2 Puff(s) Inhalation every 6 hours PRN  amLODIPine   Tablet 10 milliGRAM(s) Oral daily  atorvastatin 40 milliGRAM(s) Oral at bedtime  budesonide  80 MICROgram(s)/formoterol 4.5 MICROgram(s) Inhaler 2 Puff(s) Inhalation two times a day  diphenhydrAMINE   Injectable 25 milliGRAM(s) IV Push every 8 hours  hydrALAZINE 25 milliGRAM(s) Oral three times a day  lidocaine   Patch 1 Patch Transdermal daily  metoclopramide Injectable 10 milliGRAM(s) IV Push every 8 hours  ondansetron Injectable 5 milliGRAM(s) IV Push every 6 hours PRN  sodium bicarbonate 650 milliGRAM(s) Oral daily  sodium chloride 0.9%. 1000 milliLiter(s) IV Continuous <Continuous>  valproate sodium IVPB 500 milliGRAM(s) IV Intermittent every 8 hours      REVIEW OF SYSTEMS:  CONSTITUTIONAL: No fever, weight loss, or fatigue  RESPIRATORY: No cough, wheezing, chills or hemoptysis; No shortness of breath  CARDIOVASCULAR: No chest pain, dizziness, or leg swelling, Positive palpitations and pounding heart beat  GASTROINTESTINAL: No abdominal pain. No nausea, vomiting, or hematemesis; No diarrhea or constipation. No melena or hematochezia.  GENITOURINARY: No dysuria or hematuria, urinary frequency  NEUROLOGICAL: No memory loss, loss of strength, numbness, or tremors. Positive headaches- improved  SKIN: No itching, burning, rashes, or lesions     Vital Signs Last 24 Hrs  T(C): 37.1 (01 Mar 2021 23:56), Max: 37.1 (01 Mar 2021 23:56)  T(F): 98.8 (01 Mar 2021 23:56), Max: 98.8 (01 Mar 2021 23:56)  HR: 76 (02 Mar 2021 04:44) (76 - 105)  BP: 129/70 (02 Mar 2021 04:44) (129/70 - 152/96)  BP(mean): --  RR: 18 (01 Mar 2021 23:56) (15 - 18)  SpO2: 100% (01 Mar 2021 23:56) (98% - 100%)    PHYSICAL EXAMINATION:  GENERAL: NAD, well built  HEAD:  Atraumatic, Normocephalic  EYES:  conjunctiva and sclera clear  NECK: Supple, No JVD, Normal thyroid  CHEST/LUNG: Clear to auscultation. Clear to percussion bilaterally; No rales, rhonchi, wheezing, or rubs  HEART: Regular rate and rhythm; No murmurs, rubs, or gallops  ABDOMEN: Soft, Nontender, Nondistended; Bowel sounds present  NERVOUS SYSTEM:  Alert & Oriented X3,    EXTREMITIES:  2+ Peripheral Pulses, No clubbing, cyanosis, or edema  SKIN: warm dry                          10.7   7.83  )-----------( 218      ( 01 Mar 2021 09:31 )             32.0     03-01    137  |  107  |  46<H>  ----------------------------<  90  5.0   |  20<L>  |  6.14<H>    Ca    9.2      01 Mar 2021 09:31  Phos  4.7     03-01  Mg     2.2     03-01                CAPILLARY BLOOD GLUCOSE      RADIOLOGY & ADDITIONAL TESTS:                   PGY-1 Progress Note discussed with attending    PAGER #: [[phone]] TILL 5:00 PM  PLEASE CONTACT ON CALL TEAM:  - On Call Team (Please refer to Prasanna) FROM 5:00 PM - 8:30PM  - Nightfloat Team FROM 8:30 -7:30 AM    CHIEF COMPLAINT & BRIEF HOSPITAL COURSE:  26 yo male with PMH of asthma presented to his PCP with complaints of headache, nausea and vomiting x 5 days. Pt was noted to have elevated SBP of >170 and was advised to come to emergency department. No family hx of Polycystic kidney disease, CVA, HTN. He was admitted for CIARRA with a creatinine of 6 and hypertensive emergency with a BP of 185/114.   In the ED the patients BP was 185/114. He was given amlodipine and hydralazine. He was continued on amlodipine 5 which was increased to 10 and hydralazine 10 which was increased to 25 with marked improvement in his BP. Cardiology was consulted (Dr. Peres). TTE showed mild MR and mild LVH. He was worked up for causes of secondary hypertension. Urine metanephrine was negative and CT A/P showed no adrenal pathology, making the diagnosis of pheochromocytoma unlikely. Serum Aldosterone and renin were obtained which showed aldosterone of 27.5, direct renin 4.9, renin activity 0.577 with an aldosterone : renin ratio of 47.66. Endocrine was consulted. Repeat aldosterone was 11.3. ? postural change. Endocrine recommended outpatient workup to repeat aldosterone/renin and metanephrine.   For his ARF he was noted to have a creatinine of 6 on admission. UA showed moderate blood, 500 protein. FeNa: 3.3%. Nephrology was consulted (Dr. Massey). Normal C3, C4, SIFE neg, K/L 1.7, ELMA, ANCA, dsDNA, ASO, Treponema Pallidum ab and Hepatitis panel all negative. Immunofixation negative for monoclonal bands. CT A/P was negative for any obstructive uropathy or acute findings.  Renal US showed bilateral hyperechoic kidneys.  For CKD the patient prior records wer eobtained from 2018 which showed a baseline creatinine of 1.29 and a UA with positive blood, protein and hyaline casts. PTHi was elevated at 99. The patient was started on a low phos diet. Nephrology was following.   For the patient headache he was started on Dilaudid 0.1mg due to ARF and persistent 8/10 frontal/bitemporal headaches with Tylenol. CTH on admission was negative. Repeat CTH was done for acute severe headaches which also showed no acute findings. Neurology was consulted. They recommended a cocktail of depakote, reglan, and benadryl Q8 for 3-5 days. Dilaudid was discontinued.   On admission the patient was noted to have multiple runs of asymptomatic sinus tachycardia. Electrophysiology was consulted, (Dr Swan) and recommended against beta blockers due to potential side effects. .     INTERVAL HPI/OVERNIGHT EVENTS: Yesterday the patient was complaining of severe headaches. Neurology recommended cocktail of Depakote, Benadryl and Reglan. This morning the patient states he does not have a headache. He stated that he did not like the cocktail given for his headache overnight. He said it made him feel dizzy and made his arms tingle. Cocktail made PRN. Overnight the patient had several episodes of sinus tachycardia up to 135. This morning the patient stated his heart felt like it was pounding but he denied SOB, chest pain or dizziness. On the monitor his HR was 122 at that time. Cardiology switched Amlodipine to Cardizem.    MEDICATIONS:  acetaminophen   Tablet .. 650 milliGRAM(s) Oral every 6 hours PRN  ALBUTerol    90 MICROgram(s) HFA Inhaler 2 Puff(s) Inhalation every 6 hours PRN  amLODIPine   Tablet 10 milliGRAM(s) Oral daily  atorvastatin 40 milliGRAM(s) Oral at bedtime  budesonide  80 MICROgram(s)/formoterol 4.5 MICROgram(s) Inhaler 2 Puff(s) Inhalation two times a day  diphenhydrAMINE   Injectable 25 milliGRAM(s) IV Push every 8 hours  hydrALAZINE 25 milliGRAM(s) Oral three times a day  lidocaine   Patch 1 Patch Transdermal daily  metoclopramide Injectable 10 milliGRAM(s) IV Push every 8 hours  ondansetron Injectable 5 milliGRAM(s) IV Push every 6 hours PRN  sodium bicarbonate 650 milliGRAM(s) Oral daily  sodium chloride 0.9%. 1000 milliLiter(s) IV Continuous <Continuous>  valproate sodium IVPB 500 milliGRAM(s) IV Intermittent every 8 hours      REVIEW OF SYSTEMS:  CONSTITUTIONAL: No fever, weight loss, or fatigue  RESPIRATORY: No cough, wheezing, chills or hemoptysis; No shortness of breath  CARDIOVASCULAR: No chest pain, dizziness, or leg swelling, Positive palpitations and pounding heart beat  GASTROINTESTINAL: No abdominal pain. No nausea, vomiting, or hematemesis; No diarrhea or constipation. No melena or hematochezia.  GENITOURINARY: No dysuria or hematuria, urinary frequency  NEUROLOGICAL: No memory loss, loss of strength, numbness, or tremors. Positive headaches- improved  SKIN: No itching, burning, rashes, or lesions     Vital Signs Last 24 Hrs  T(C): 37.1 (01 Mar 2021 23:56), Max: 37.1 (01 Mar 2021 23:56)  T(F): 98.8 (01 Mar 2021 23:56), Max: 98.8 (01 Mar 2021 23:56)  HR: 76 (02 Mar 2021 04:44) (76 - 105)  BP: 129/70 (02 Mar 2021 04:44) (129/70 - 152/96)  BP(mean): --  RR: 18 (01 Mar 2021 23:56) (15 - 18)  SpO2: 100% (01 Mar 2021 23:56) (98% - 100%)    PHYSICAL EXAMINATION:  GENERAL: NAD, well built  HEAD:  Atraumatic, Normocephalic  EYES:  conjunctiva and sclera clear  NECK: Supple, No JVD, Normal thyroid  CHEST/LUNG: Clear to auscultation. Clear to percussion bilaterally; No rales, rhonchi, wheezing, or rubs  HEART: Regular rate and rhythm; No murmurs, rubs, or gallops  ABDOMEN: Soft, Nontender, Nondistended; Bowel sounds present  NERVOUS SYSTEM:  Alert & Oriented X3,    EXTREMITIES:  2+ Peripheral Pulses, No clubbing, cyanosis, or edema  SKIN: warm dry                          10.7   7.83  )-----------( 218      ( 01 Mar 2021 09:31 )             32.0     03-01    137  |  107  |  46<H>  ----------------------------<  90  5.0   |  20<L>  |  6.14<H>    Ca    9.2      01 Mar 2021 09:31  Phos  4.7     03-01  Mg     2.2     03-01                CAPILLARY BLOOD GLUCOSE      RADIOLOGY & ADDITIONAL TESTS:

## 2021-03-02 NOTE — PROGRESS NOTE ADULT - ASSESSMENT
Patient is a 26yo Male with Asthma on Dupixent injections q 2kweeks p/w HA, n/v x 5 days. Pt sent in by his PMD due to elevated SBP >170. Pt was a/w CIARRA and hypertensive emergency. Nephrology consulted for Elevated serum creatinine.    1. CIARRA- ?GN (?IgA nephropathy) vs. CIARRA on CKD due to uncontrolled HTN. UA with 500 protein and mod blood; rbc 5-10. Pt on Dupixent- no urinary eos, peripheral eos or rash. Spot UPr/Cr 3.3/ repeated 3.82- nephrotic range proteinuria without nephrotic syndrome (albumin 3.4 with no edema). Renal function remains elevated. Pt asymptomatic. No need for dialysis at this time. Recc to decrease Reglan to 5mg IV q8hrs.   CK 1141, should not cause significant CIARRA. Serological w/u neg except PLAR-2 pending will f/u (thus far Normal C3, C4, SIFE neg, K/L 1.7, Syphillis neg,  Neg ASO, neg ANCA, neg ELMA, neg dsDNA, neg HIV, neg HepBsAg, and neg Hep C ab and neg antiGBM).   Discussed risk/ benefits/ alt of kidney biopsy- pt agreeable; will plan for possible kidney biopsy on 3/3. Will keep NPO after midnight on 3/2 Check Coags on 3/3 and give DDAVP 20 mcg IV x1 30 mins prior to IR procedure. Strict I/Os. Avoid nephrotoxins/ NSAIDs/ RCA. Monitor BMP.    2. CKD unspecified- previous SCr 1.29 in 2018 with proteinuria. See above.  PTHi 99 elevated. c/w Renal diet. Avoid nephrotoxins    3. Hypertensive emergency-resolved. CT head neg. BP acceptable. c/w Amlodipine 10mg PO qd. c/w Hydralazine 25mg PO tid, titrate as needed. c/w low salt diet.   Secondary w/u pending: UTox neg. Sebas 27.2 ng/dl; Renin 4.9 pg/ml (0.49ng/dl) Endo following. No pheo- CT with normal adrenals. w/u neg. TTE: mild MR, mild LVH.  Pt will need Renal US with dopplers as an outpt. Monitor BP  4. Metabolic Acidosis-in the setting of renal failure. Serum CO2 mildly decreased; c/w sodium bicarb 650mg PO qd. Serum lactate wnl and VBG ph 7.31.  Monitor ph/Co2    Mild hyperkalemia- will give Lokelma 10g PO x1. Monitor lytes.

## 2021-03-02 NOTE — PROGRESS NOTE ADULT - SUBJECTIVE AND OBJECTIVE BOX
Patient denies CP, SOB had some palpiations this AM    acetaminophen   Tablet .. 650 milliGRAM(s) Oral every 6 hours PRN  ALBUTerol    90 MICROgram(s) HFA Inhaler 2 Puff(s) Inhalation every 6 hours PRN  amLODIPine   Tablet 10 milliGRAM(s) Oral daily  atorvastatin 40 milliGRAM(s) Oral at bedtime  budesonide  80 MICROgram(s)/formoterol 4.5 MICROgram(s) Inhaler 2 Puff(s) Inhalation two times a day  diphenhydrAMINE   Injectable 25 milliGRAM(s) IV Push every 8 hours  hydrALAZINE 25 milliGRAM(s) Oral three times a day  HYDROmorphone  Injectable 0.1 milliGRAM(s) IV Push once  lidocaine   Patch 1 Patch Transdermal daily  metoclopramide Injectable 10 milliGRAM(s) IV Push every 8 hours  ondansetron Injectable 5 milliGRAM(s) IV Push every 6 hours PRN  sodium bicarbonate 650 milliGRAM(s) Oral daily  sodium chloride 0.9%. 1000 milliLiter(s) IV Continuous <Continuous>  sodium zirconium cyclosilicate 10 Gram(s) Oral once  valproate sodium IVPB 500 milliGRAM(s) IV Intermittent every 8 hours                            10.6   7.99  )-----------( 209      ( 02 Mar 2021 09:16 )             32.6       Hemoglobin: 10.6 g/dL (03-02 @ 09:16)  Hemoglobin: 10.7 g/dL (03-01 @ 09:31)  Hemoglobin: 11.2 g/dL (02-28 @ 07:48)  Hemoglobin: 11.3 g/dL (02-27 @ 08:21)  Hemoglobin: 12.1 g/dL (02-26 @ 08:51)      03-02    137  |  109<H>  |  52<H>  ----------------------------<  109<H>  5.4<H>   |  20<L>  |  6.54<H>    Ca    8.7      02 Mar 2021 09:16  Phos  3.8     03-02  Mg     1.8     03-02      Creatinine Trend: 6.54<--, 6.14<--, 5.87<--, 5.35<--, 5.48<--, 5.54<--    COAGS:           T(C): 36.7 (03-02-21 @ 11:10), Max: 37.1 (03-01-21 @ 23:56)  HR: 97 (03-02-21 @ 11:10) (76 - 105)  BP: 132/58 (03-02-21 @ 11:10) (121/74 - 152/96)  RR: 16 (03-02-21 @ 11:10) (15 - 18)  SpO2: 98% (03-02-21 @ 11:10) (98% - 100%)  Wt(kg): --    I&O's Summary    01 Mar 2021 07:01  -  02 Mar 2021 07:00  --------------------------------------------------------  IN: 1375 mL / OUT: 2500 mL / NET: -1125 mL      HEENT:  (-)icterus (-)pallor  CV: N S1 S2 1/6 SHAGUFTA (+)2 Pulses B/l  Resp:  Clear to ausculatation B/L, normal effort  GI: (+) BS Soft, NT, ND  Lymph:  (-)Edema, (-)obvious lymphadenopathy  Skin: Warm to touch, Normal turgor  Psych: Appropriate mood and affect      TELEMETRY: ST    TTE: < from: Transthoracic Echocardiogram (02.24.21 @ 18:49) >  1. Mild mitral regurgitation.  2. Mild concentric left ventricular hypertrophy.  3. Normal left ventricular systolic function.  4. Normal right ventricular size and function.    < end of copied text >          ASSESSMENT/PLAN: 	25y  Male PMH of asthma presented to his PCP with complaints of headache, nausea and vomiting x 5 days found with hypertnesive urgency and CIARRA.    - since the patient had some palpitations today and has demonstarted some PAT will swithc norvasc to Cardizem  mg P odialy  - BP has improved and patient normotensive today   - Check Serum renin and tim levels  - 24 hour urine collection for urine catecholamines and metanephrine  - TSH within normal Limits  - echo with no significant structural heart disease and normal LVEF  - renal f/u for biopsy wednesday  - EP eval appreciated   - workup of prior HA per primary team     Yehuda Peres MD, [facility]  BEEPER [phone]

## 2021-03-02 NOTE — PROGRESS NOTE ADULT - PROBLEM SELECTOR PLAN 4
2/27 patient endorsed severe 8-9/10 frontal and bitemporal headache which is causing him to have multiple bouts of NBNB emesis. The patient was given percocet 325mg x 1 and was started on Dilaudid 0.1mg PRN.     CTH 2/22 negative  C/w Dilaudid 0.1mg PRN for pain control  C/w IVF 75cc /hr - patient is vomiting due to pain  Repeat CTH no acute findings  Neurology consulted give Benadryl, Reglan, Depacon 2/27 patient endorsed severe 8-9/10 frontal and bitemporal headache which is causing him to have multiple bouts of NBNB emesis. The patient was given percocet 325mg x 1 and was started on Dilaudid 0.1mg PRN.     Repeat CTH no acute findings  Neurology consulted give cocktail of Depakote, Benadryl, Reglan.  D/c Dilaudid Potassium 5.0 > 5.4  F/u nephro    Monitor BMP Mild hyperkalemia- potassium 5.4  Lokelma 10g PO x1.   Monitor electrolytes

## 2021-03-02 NOTE — PROGRESS NOTE ADULT - SUBJECTIVE AND OBJECTIVE BOX
[facility] NEPHROLOGY- PROGRESS NOTE    Patient is a 24yo Male with Asthma on Dupixent injections q 2kweeks p/w HA, n/v x 5 days. Pt sent in by his PMD due to elevated SBP >170. Pt was a/w CIARRA and hypertensive emergency. Nephrology consulted for Elevated serum creatinine.    Hospital Medications: Medications reviewed.  REVIEW OF SYSTEMS:  CONSTITUTIONAL: No fevers or chills; Denies HA today  RESPIRATORY: No shortness of breath  CARDIOVASCULAR: No chest pain.  GASTROINTESTINAL: No diarrhea or abdominal pain or vomiting but c/o nausea  VASCULAR: No bilateral lower extremity edema.     VITALS:  T(F): 98 (21 @ 11:10), Max: 98.8 (21 @ 23:56)  HR: 97 (21 @ 11:10)  BP: 132/58 (21 @ 11:10)  RR: 16 (21 @ 11:10)  SpO2: 98% (21 @ 11:10)  Wt(kg): --     @ 07:01  -   @ 07:00  --------------------------------------------------------  IN: 1375 mL / OUT: 2500 mL / NET: -1125 mL      Height (cm): 788 ( @ 14:20)  Weight (kg): 63.5 ( @ 14:20)  BMI (kg/m2): 1 ( @ 14:20)  BSA (m2): 5.28 ( @ 14:20)      PHYSICAL EXAM:  Constitutional: NAD  Neurological: Awake and Alert  HEENT: anicteric sclera,   Respiratory: CTAB, no wheezes, rales or rhonchi  Cardiovascular: S1, S2, RRR  Gastrointestinal: BS+, soft, NT/ND  Extremities: No peripheral edema    LABS:      137  |  109<H>  |  52<H>  ----------------------------<  109<H>  5.4<H>   |  20<L>  |  6.54<H>    Ca    8.7      02 Mar 2021 09:16  Phos  3.8     03-02  Mg     1.8     03-02      Creatinine Trend: 6.54 <--, 6.14 <--, 5.87 <--, 5.35 <--, 5.48 <--, 5.54 <--, 5.82 <--                        10.6   7.99  )-----------( 209      ( 02 Mar 2021 09:16 )             32.6     Urine Studies:  Urinalysis Basic - ( 2021 20:34 )    Color: Yellow / Appearance: Clear / S.010 / pH:   Gluc:  / Ketone: Negative  / Bili: Negative / Urobili: Negative   Blood:  / Protein: 500 mg/dL / Nitrite: Negative   Leuk Esterase: Negative / RBC: 2-5 /HPF / WBC 0-2 /HPF   Sq Epi:  / Non Sq Epi: Few /HPF / Bacteria: Few /HPF      Creatinine, Random Urine: 57 mg/dL ( @ 17:46)

## 2021-03-02 NOTE — PROGRESS NOTE ADULT - PROBLEM SELECTOR PLAN 6
Mildly elevated LFTs   Hepatitis panel negative  LFTs normalized  CT abdomen and pelvis showed no acute findings. Pt noted to have non anion gap metabolic acidosis with respiratory compensation   bicarb 19-> 17 on admission   Metabolic acidosis likely in the setting of ARF   Lactate: 0.3  pH on VB.31 ()  Bicarb 20 (3/), monitor   Will hold off on bicarb replacement for now   Continue to monitor pH/CO2  Nephro Dr. Massey following Pt noted to have non anion gap metabolic acidosis with respiratory compensation   bicarb 19-> 17 on admission   Metabolic acidosis likely in the setting of ARF   Lactate: 0.3  pH on VB.31 ()  Bicarb 20 (3/), monitor   C/w sodium bicarb 650mg PO qd.  Continue to monitor pH/CO2  Nephro Dr. Massey following

## 2021-03-02 NOTE — PROGRESS NOTE ADULT - PROBLEM SELECTOR PLAN 1
Pt presented with Headaches, Pt noted to have sbp >180 on admission  S/p amlodipine 5 mg and hydralazine in ED   CTH (2/22) negative   C/w Amlodipine 10mg PO qd and low salt diet.   C/w Hydralazine 25mg PO tid, titrate as needed.  Monitor BP closely  /77 this AM   TTE: mild MR, mild LVH   Sebas elevated 27.2 ng/dl; Renin 4.9 pg/ml (0.49ng/dl): Sebas/ Renin 55.5 (2/23)  Repeat Sebas (2/25) 11.3  Repeat Renin pending  Endocrine following, recommending serum metanephrine   Nephro Dr. Massey following, appreciate recommendations Pt presented with Headaches, Pt noted to have sbp >180 on admission  CTH (2/22 and 2/28) negative   C/w Amlodipine 10mg PO qd and low salt diet.   C/w Hydralazine 25mg PO tid, titrate as needed.  Monitor BP closely  /70 this AM   TTE: mild MR, mild LVH   Sebas elevated 27.2 ng/dl; Renin 4.9 pg/ml (0.49ng/dl): Sebas/ Renin 55.5 (2/23)  Repeat Sebas (2/25) 11.3  Repeat Renin and serum metanephrine pending  Cardiology following  Endocrine following, appreciate recommendations, repeat serum also/renin and metanephrine as outpatient. would not start spironolactone/epleronone, doxazosin until outpatient workup is complete   Nephro Dr. Massey following, appreciate recommendations Pt presented with Headaches, Pt noted to have SBP >180 on admission  CTH (2/22 and 2/28) negative   C/w Amlodipine 10mg PO qd and low salt diet.   C/w Hydralazine 25mg PO tid, titrate as needed.  Monitor BP closely  /70 this AM   TTE: mild MR, mild LVH   Sebas elevated 27.2 ng/dl; Renin 4.9 pg/ml (0.49ng/dl): Sebas/ Renin 55.5 (2/23)  Repeat Sebas (2/25) 11.3  Repeat Renin and serum metanephrine pending  Cardiology following  Endocrine following, appreciate recommendations, repeat serum also/renin and metanephrine as outpatient. would not start spironolactone/epleronone, doxazosin until outpatient workup is complete   Nephro Dr. Massey following, appreciate recommendations Pt presented with Headaches, Pt noted to have SBP >180 on admission  CTH (2/22 and 2/28) negative   D/c Amlodipine   Start Cardizem  mg PO daily, Pt with episodes of PAT  C/w Hydralazine 25mg PO tid, titrate as needed.  Monitor BP closely  /70 this AM   TTE: mild MR, mild LVH   Tim elevated 27.2 ng/dl; Renin 4.9 pg/ml (0.49ng/dl): Tim/ Renin 55.5 (2/23)  Repeat Tim (2/25) 11.3  Repeat renin and serum metanephrine pending  Cardiology following  Endocrine following, appreciate recommendations, repeat serum tim/renin and metanephrine as outpatient. would not start spironolactone/epleronone, doxazosin until outpatient workup is complete   Nephro Dr. Massey following, appreciate recommendations

## 2021-03-02 NOTE — PROGRESS NOTE ADULT - PROBLEM SELECTOR PLAN 2
Presented with CIARRA and Cr 6  Baseline creatinine is 1.8  Urine protein/creatinine: 3.3, nephrotic range proteinuria without nephrotic syndrome, will repeat spot UPro/Cre once BP stabilizes   CT abdomen showed no findings of obstructive uropathy or other findings.  Renal US showed hyperechoic kidneys bilaterally   FeNa: 3.3%  Urine tox was negative urine eosinophils negative  Urine total protein 218  Pheochromocytoma unlikely- CT a/p showed normal adrenals, urine metanephrine negative, pending serum metanephrine   Normal C3, C4, SIFE neg, K/L 1.7, Syphillis neg, neg ASO, neg ANCA, neg ELMA, neg dsDNA, neg HIV, neg HepBsAg, and neg Hep C ab, neg anti-GBM  Immunofixation negative for monoclonal bands   YASH Kappa 7.38 (elevated) YASH Lambda 4.33 (elevated)  Pending PLA2R antibody   Plan for kidney biopsy on 3/3. (Will keep NPO after midnight on 3/2. Check Coags on 3/3 and give DDAVP 22 mcg IV x1 30 mins prior to IR procedure).  Creatinine increasing 5.35 > 5.87 > 6.14 (3/1)   Nephro Dr. Massey following, f/u recommendations Presented with CIARRA and Cr 6  Baseline creatinine is 1.29 (2018)  Urine protein/creatinine: 3.3, nephrotic range proteinuria without nephrotic syndrome, will repeat spot UPro/Cre once BP stabilizes   CT abdomen showed no findings of obstructive uropathy or other findings.  Renal US showed hyperechoic kidneys bilaterally   FeNa: 3.3%  Urine tox was negative urine eosinophils negative  Urine total protein 218  Pheochromocytoma unlikely- CT a/p showed normal adrenals, urine metanephrine negative, pending serum metanephrine   Normal C3, C4, SIFE neg, K/L 1.7, Syphillis neg, neg ASO, neg ANCA, neg ELMA, neg dsDNA, neg HIV, neg HepBsAg, and neg Hep C ab, neg anti-GBM  Immunofixation negative for monoclonal bands   YASH Kappa 7.38 (elevated) YASH Lambda 4.33 (elevated)  Pending PLA2R antibody   Plan for kidney biopsy Tomorrow. (NPO after midnight. Check Coags tomorrow and give DDAVP 22 mcg IV x1 30 mins prior to IR procedure.  Creatinine increasing 5.35 > 5.87 > 6.14 (3/1)   Nephro Dr. Massey following, f/u recommendations Presented with CIARRA and Cr 6  Baseline creatinine is 1.29 (2018)  Urine protein/creatinine: 3.3 (2/22)  Repeat urine protein/creatinine: 3.8 (2/26)   CT abdomen showed no findings of obstructive uropathy or other findings.  Renal US showed hyperechoic kidneys bilaterally   FeNa: 3.3%  Urine tox was negative urine eosinophils negative  Urine total protein 218  Pheochromocytoma unlikely- CT a/p showed normal adrenals, urine metanephrine negative, pending serum metanephrine   Normal C3, C4, SIFE neg, K/L 1.7, Syphillis neg, neg ASO, neg ANCA, neg ELMA, neg dsDNA, neg HIV, neg HepBsAg, and neg Hep C ab, neg anti-GBM  Immunofixation negative for monoclonal bands   YASH Kappa 7.38 (elevated) YASH Lambda 4.33 (elevated)  Pending PLA2R antibody   Plan for kidney biopsy Tomorrow. (NPO after midnight. Check Coags tomorrow and give DDAVP 22 mcg IV x1 30 mins prior to IR procedure.  Creatinine increasing 5.35 > 5.87 > 6.14 > 6.54 (3/2)    Nephro Dr. Massey following, f/u recommendations

## 2021-03-02 NOTE — PROGRESS NOTE ADULT - SUBJECTIVE AND OBJECTIVE BOX
EP     HISTORY OF PRESENT ILLNESS: HPI:  26 yo male with PMH of asthma presented to his PCP with complaints of headache, nausea and vomiting x 5 days. Pt was noted to have elevated sBP of >170 and was advised to come to emergency department. Patient states he started having headaches 5 days ago associated with nausea and 1 day hx of NBNB vomiting. Pt endorses episode of dysuria few days ago which has now resolved. Pt denies head trauma, any prior episodes of similar headaches. No family hx of Polycystic kidney disease, CVA, HTN. Denies diarrhea, abdominal pain, fever, chills, recent viral infection, no blood in urine, no flank pain, no urinary rentention (22 Feb 2021 20:11)    States he has prior history of lightheadedness when getting up too quickly, and has fainted in a hot kitchen before, but no symptoms related to exercise intolerance.  He is not aware of his heartbeat intermittently going faster in the bed and is not experiencing palpitations or chest discomfort.  No personal or family history of arrhythmia.  Has Asthma, diagnosed by a pulmonologist > 1 yr ago, and has been better/stable on Symbicort.  A 10 pt ROS is otherwise negative.    3/1- still has headache and some lightheadedness, but no SOB angina or palpitations.  sinus tachy during ambulation in his room.  3/2 - feeling OK, no angina, no shortness of breath.  tachycardic during ambulation in room, but no sudden on/off on telemetry to suggest ectopic rhythm.    acetaminophen   Tablet .. 650 milliGRAM(s) Oral every 6 hours PRN  ALBUTerol    90 MICROgram(s) HFA Inhaler 2 Puff(s) Inhalation every 6 hours PRN  atorvastatin 40 milliGRAM(s) Oral at bedtime  budesonide  80 MICROgram(s)/formoterol 4.5 MICROgram(s) Inhaler 2 Puff(s) Inhalation two times a day  diltiazem    milliGRAM(s) Oral daily  diphenhydrAMINE   Injectable 25 milliGRAM(s) IV Push every 8 hours  hydrALAZINE 25 milliGRAM(s) Oral three times a day  lidocaine   Patch 1 Patch Transdermal daily  metoclopramide Injectable 10 milliGRAM(s) IV Push every 8 hours  ondansetron Injectable 5 milliGRAM(s) IV Push every 6 hours PRN  sodium bicarbonate 650 milliGRAM(s) Oral daily  sodium chloride 0.9%. 1000 milliLiter(s) IV Continuous <Continuous>  sodium zirconium cyclosilicate 10 Gram(s) Oral once  valproate sodium IVPB 500 milliGRAM(s) IV Intermittent every 8 hours                            10.6   7.99  )-----------( 209      ( 02 Mar 2021 09:16 )             32.6       03-02    137  |  109<H>  |  52<H>  ----------------------------<  109<H>  5.4<H>   |  20<L>  |  6.54<H>    Ca    8.7      02 Mar 2021 09:16  Phos  3.8     03-02  Mg     1.8     03-02    T(C): 36.7 (03-02-21 @ 11:10), Max: 37.1 (03-01-21 @ 23:56)  HR: 97 (03-02-21 @ 11:10) (76 - 105)  BP: 132/58 (03-02-21 @ 11:10) (121/74 - 152/96)  RR: 16 (03-02-21 @ 11:10) (15 - 18)  SpO2: 98% (03-02-21 @ 11:10) (98% - 100%)  Wt(kg): --    I&O's Summary    01 Mar 2021 07:01  -  02 Mar 2021 07:00  --------------------------------------------------------  IN: 1375 mL / OUT: 2500 mL / NET: -1125 mL      General: Well nourished, no acute distress, alert and oriented x 3  Head: normocephalic, no trauma  Neck: no JVD, no bruit, supple, not enlarged  CV: S1S2, no S3, regular rate, rhythm is SINUS, no murmurs.    Lungs: clear BL, no rales or wheezes  Abdomen: bowel sounds +, soft, nontender, nondistended  Extremities: no clubbing, cyanosis or edema  Neuro: Moves all 4 extremities, sensation intact x 4 extremities  Skin: warm and moist, normal turgor  Psych: Mood and affect are appropriate for circumstances  MSK: normal range of motion and strength x4 extremities.    TELEMETRY: NSR, sinus tachy, episodes of sinus tachy, 130-150bpm when walking in his room.  ECG: NSR  Echo: EF 55%, mild LVH	  	  ASSESSMENT/PLAN: 	25y Male with hypertensive urgency, nephrotic-range proteinuria and newly diagnosed acute renal failure.  Having asymptomatic episodes of SVT on telemetry that are of short duration and self-terminating.    Monitor on telemetry.  Does not need beta blockers at this point, and I would generally avoid these in a young man due to potential for sexual dysfunction at significant doses.  Awaiting kidney biopsy tomorrow.  If no sustained arrhythmia on telemetry, recommend ambulatory cardiac monitoring.     Enzo Swan M.D.  Cardiac Electrophysiology  [phone]

## 2021-03-02 NOTE — PROGRESS NOTE ADULT - PROBLEM SELECTOR PLAN 7
Patient was noted to have short episodes of self terminating SVT on telemetry   Cardiology Dr. Peres and Electrophysiology Dr. Swan consulted  Do not recommend beta blocker at this time due possible side effects  If SVT persists will consider switching amlodipine to Diltiazem.  Pt is asymptomatic Patient was noted to have short episodes of self terminating SVT on telemetry   Cardiology Dr. Peres and Electrophysiology Dr. Swan consulted  Do not recommend beta blocker at this time due possible side effects  If SVT persists will consider switching amlodipine to Diltiazem.  F/u Cardio recommendations Patient was noted to have short episodes of self terminating sinus tachycardia on telemetry   Do not recommend beta blocker at this time due possible side effects  Pt with episodes of paroxysmal atrial tachycardia today   Switched Amlodipine 10mg daily to Cardizem  mg PO daily  Cardiology Dr. Peres following  Electrophysiology Dr. Swan following

## 2021-03-02 NOTE — PROGRESS NOTE ADULT - PROBLEM SELECTOR PLAN 5
Pt noted to have non anion gap metabolic acidosis with respiratory compensation   bicarb 19-> 17 on admission   Metabolic acidosis likely in the setting of ARF   Lactate: 0.3  pH on VB.31 ()  Bicarb 20 (3/1), monitor   Will hold off on bicarb replacement for now   Continue to monitor pH/CO2  Nephro Dr. Massey following 2/27 patient endorsed severe 8-9/10 frontal and bitemporal headache which is causing him to have multiple bouts of NBNB emesis. The patient was given percocet 325mg x 1 and was started on Dilaudid 0.1mg PRN.     Repeat CTH no acute findings  Neurology consulted  Cocktail of Depakote, Benadryl, Reglan PRN (pt complained of dizziness following cocktail, made PRN and not scheduled)  D/c Dilaudid

## 2021-03-02 NOTE — PROGRESS NOTE ADULT - SUBJECTIVE AND OBJECTIVE BOX
Time of Visit:  Patient seen and examined.     MEDICATIONS  (STANDING):  atorvastatin 40 milliGRAM(s) Oral at bedtime  budesonide  80 MICROgram(s)/formoterol 4.5 MICROgram(s) Inhaler 2 Puff(s) Inhalation two times a day  diltiazem    milliGRAM(s) Oral daily  diphenhydrAMINE   Injectable 25 milliGRAM(s) IV Push every 8 hours  hydrALAZINE 25 milliGRAM(s) Oral three times a day  lidocaine   Patch 1 Patch Transdermal daily  sodium bicarbonate 650 milliGRAM(s) Oral daily  sodium chloride 0.9%. 1000 milliLiter(s) (75 mL/Hr) IV Continuous <Continuous>  valproate sodium IVPB 500 milliGRAM(s) IV Intermittent every 8 hours      MEDICATIONS  (PRN):  acetaminophen   Tablet .. 650 milliGRAM(s) Oral every 6 hours PRN Temp greater or equal to 38C (100.4F), Mild Pain (1 - 3)  ALBUTerol    90 MICROgram(s) HFA Inhaler 2 Puff(s) Inhalation every 6 hours PRN Bronchospasm  metoclopramide Injectable 5 milliGRAM(s) IV Push every 8 hours PRN Severe headache  ondansetron Injectable 5 milliGRAM(s) IV Push every 6 hours PRN Nausea and/or Vomiting       Medications up to date at time of exam.      PHYSICAL EXAMINATION:  Patient has no new complaints.  GENERAL: The patient is a well-developed, well-nourished, in no apparent distress.     Vital Signs Last 24 Hrs  T(C): 36.3 (02 Mar 2021 15:39), Max: 37.1 (01 Mar 2021 23:56)  T(F): 97.4 (02 Mar 2021 15:39), Max: 98.8 (01 Mar 2021 23:56)  HR: 86 (02 Mar 2021 15:39) (76 - 97)  BP: 149/83 (02 Mar 2021 15:39) (121/74 - 152/96)  BP(mean): --  RR: 18 (02 Mar 2021 15:39) (15 - 18)  SpO2: 98% (02 Mar 2021 15:39) (98% - 100%)   (if applicable)    Chest Tube (if applicable)    HEENT: Head is normocephalic and atraumatic. Extraocular muscles are intact. Mucous membranes are moist.     NECK: Supple, no palpable adenopathy.    LUNGS: Clear to auscultation, no wheezing, rales, or rhonchi.    HEART: Regular rate and rhythm without murmur.    ABDOMEN: Soft, nontender, and nondistended.  No hepatosplenomegaly is noted.    : No painful voiding, no pelvic pain    EXTREMITIES: Without any cyanosis, clubbing, rash, lesions or edema.    NEUROLOGIC: Awake, alert, oriented, grossly intact    SKIN: Warm, dry, good turgor.      LABS:                        10.6   7.99  )-----------( 209      ( 02 Mar 2021 09:16 )             32.6     03-02    137  |  109<H>  |  52<H>  ----------------------------<  109<H>  5.4<H>   |  20<L>  |  6.54<H>    Ca    8.7      02 Mar 2021 09:16  Phos  3.8     03-02  Mg     1.8     03-02                          MICROBIOLOGY: (if applicable)    RADIOLOGY & ADDITIONAL STUDIES:  EKG:   CXR:  ECHO:    IMPRESSION: 25y Male PAST MEDICAL & SURGICAL HISTORY:  Asthma    No significant past surgical history     p/w           IMP: This is a 25 yr with asthma ,non smoker presented headaches due to hypertensive crisis and CIARRA. Metabolic acidosis due to CIARRA . Asthma stable       Sugg;  -BP still uncontrol   -adjust BP meds  -for kidney bx 3/3  -continue symbicort 80/4.5 bid  -f/u all serological makers   -continue Norvasc  -CIARRA work up  -DVT/ GI prophy

## 2021-03-03 ENCOUNTER — RESULT REVIEW (OUTPATIENT)
Age: 26
End: 2021-03-03

## 2021-03-03 LAB
ANION GAP SERPL CALC-SCNC: 8 MMOL/L — SIGNIFICANT CHANGE UP (ref 5–17)
APTT BLD: 32.9 SEC — SIGNIFICANT CHANGE UP (ref 27.5–35.5)
BLD GP AB SCN SERPL QL: SIGNIFICANT CHANGE UP
BUN SERPL-MCNC: 53 MG/DL — HIGH (ref 7–18)
CALCIUM SERPL-MCNC: 9 MG/DL — SIGNIFICANT CHANGE UP (ref 8.4–10.5)
CHLORIDE SERPL-SCNC: 109 MMOL/L — HIGH (ref 96–108)
CO2 SERPL-SCNC: 22 MMOL/L — SIGNIFICANT CHANGE UP (ref 22–31)
CREAT SERPL-MCNC: 6.7 MG/DL — HIGH (ref 0.5–1.3)
GLUCOSE SERPL-MCNC: 81 MG/DL — SIGNIFICANT CHANGE UP (ref 70–99)
HCT VFR BLD CALC: 31.3 % — LOW (ref 39–50)
HCT VFR BLD CALC: 31.8 % — LOW (ref 39–50)
HGB BLD-MCNC: 10.4 G/DL — LOW (ref 13–17)
HGB BLD-MCNC: 10.6 G/DL — LOW (ref 13–17)
INR BLD: 1.13 RATIO — SIGNIFICANT CHANGE UP (ref 0.88–1.16)
MAGNESIUM SERPL-MCNC: 2.2 MG/DL — SIGNIFICANT CHANGE UP (ref 1.6–2.6)
MCHC RBC-ENTMCNC: 28.2 PG — SIGNIFICANT CHANGE UP (ref 27–34)
MCHC RBC-ENTMCNC: 28.3 PG — SIGNIFICANT CHANGE UP (ref 27–34)
MCHC RBC-ENTMCNC: 33.2 GM/DL — SIGNIFICANT CHANGE UP (ref 32–36)
MCHC RBC-ENTMCNC: 33.3 GM/DL — SIGNIFICANT CHANGE UP (ref 32–36)
MCV RBC AUTO: 84.8 FL — SIGNIFICANT CHANGE UP (ref 80–100)
MCV RBC AUTO: 84.8 FL — SIGNIFICANT CHANGE UP (ref 80–100)
NRBC # BLD: 0 /100 WBCS — SIGNIFICANT CHANGE UP (ref 0–0)
NRBC # BLD: 0 /100 WBCS — SIGNIFICANT CHANGE UP (ref 0–0)
PHOSPHATE SERPL-MCNC: 5.4 MG/DL — HIGH (ref 2.5–4.5)
PLATELET # BLD AUTO: 223 K/UL — SIGNIFICANT CHANGE UP (ref 150–400)
PLATELET # BLD AUTO: 231 K/UL — SIGNIFICANT CHANGE UP (ref 150–400)
POTASSIUM SERPL-MCNC: 4.9 MMOL/L — SIGNIFICANT CHANGE UP (ref 3.5–5.3)
POTASSIUM SERPL-SCNC: 4.9 MMOL/L — SIGNIFICANT CHANGE UP (ref 3.5–5.3)
PROTHROM AB SERPL-ACNC: 13.4 SEC — SIGNIFICANT CHANGE UP (ref 10.6–13.6)
RBC # BLD: 3.69 M/UL — LOW (ref 4.2–5.8)
RBC # BLD: 3.75 M/UL — LOW (ref 4.2–5.8)
RBC # FLD: 12 % — SIGNIFICANT CHANGE UP (ref 10.3–14.5)
RBC # FLD: 12.1 % — SIGNIFICANT CHANGE UP (ref 10.3–14.5)
SODIUM SERPL-SCNC: 139 MMOL/L — SIGNIFICANT CHANGE UP (ref 135–145)
WBC # BLD: 6.57 K/UL — SIGNIFICANT CHANGE UP (ref 3.8–10.5)
WBC # BLD: 7.74 K/UL — SIGNIFICANT CHANGE UP (ref 3.8–10.5)
WBC # FLD AUTO: 6.57 K/UL — SIGNIFICANT CHANGE UP (ref 3.8–10.5)
WBC # FLD AUTO: 7.74 K/UL — SIGNIFICANT CHANGE UP (ref 3.8–10.5)

## 2021-03-03 PROCEDURE — 88348 ELECTRON MICROSCOPY DX: CPT | Mod: 26

## 2021-03-03 PROCEDURE — 99233 SBSQ HOSP IP/OBS HIGH 50: CPT

## 2021-03-03 PROCEDURE — 88313 SPECIAL STAINS GROUP 2: CPT | Mod: 26

## 2021-03-03 PROCEDURE — 50200 RENAL BIOPSY PERQ: CPT | Mod: LT

## 2021-03-03 PROCEDURE — 77012 CT SCAN FOR NEEDLE BIOPSY: CPT | Mod: 26,LT

## 2021-03-03 PROCEDURE — 88350 IMFLUOR EA ADDL 1ANTB STN PX: CPT | Mod: 26

## 2021-03-03 PROCEDURE — 88312 SPECIAL STAINS GROUP 1: CPT | Mod: 26

## 2021-03-03 PROCEDURE — 88346 IMFLUOR 1ST 1ANTB STAIN PX: CPT | Mod: 26

## 2021-03-03 PROCEDURE — 88305 TISSUE EXAM BY PATHOLOGIST: CPT | Mod: 26

## 2021-03-03 RX ORDER — DIVALPROEX SODIUM 500 MG/1
500 TABLET, DELAYED RELEASE ORAL
Refills: 0 | Status: DISCONTINUED | OUTPATIENT
Start: 2021-03-03 | End: 2021-03-06

## 2021-03-03 RX ADMIN — Medication 650 MILLIGRAM(S): at 15:33

## 2021-03-03 RX ADMIN — Medication 25 MILLIGRAM(S): at 15:33

## 2021-03-03 RX ADMIN — Medication 120 MILLIGRAM(S): at 06:38

## 2021-03-03 RX ADMIN — Medication 25 MILLIGRAM(S): at 21:59

## 2021-03-03 RX ADMIN — Medication 650 MILLIGRAM(S): at 20:06

## 2021-03-03 RX ADMIN — DIVALPROEX SODIUM 500 MILLIGRAM(S): 500 TABLET, DELAYED RELEASE ORAL at 21:59

## 2021-03-03 RX ADMIN — ATORVASTATIN CALCIUM 40 MILLIGRAM(S): 80 TABLET, FILM COATED ORAL at 22:00

## 2021-03-03 RX ADMIN — BUDESONIDE AND FORMOTEROL FUMARATE DIHYDRATE 2 PUFF(S): 160; 4.5 AEROSOL RESPIRATORY (INHALATION) at 22:00

## 2021-03-03 RX ADMIN — BUDESONIDE AND FORMOTEROL FUMARATE DIHYDRATE 2 PUFF(S): 160; 4.5 AEROSOL RESPIRATORY (INHALATION) at 15:33

## 2021-03-03 RX ADMIN — Medication 25 MILLIGRAM(S): at 06:38

## 2021-03-03 RX ADMIN — Medication 650 MILLIGRAM(S): at 21:00

## 2021-03-03 NOTE — PROGRESS NOTE ADULT - SUBJECTIVE AND OBJECTIVE BOX
Interventional Radiology Pre-Procedure Note    Procedure: Image guided biopsy of kidney parenchyma    Diagnosis/Indication: Patient is a 25y old Male with renal failure. Parenchymal kidney biopsy was requested.    PAST MEDICAL & SURGICAL HISTORY:  Asthma    No significant past surgical history    Allergies: Kiwi (Swelling)  No Known Drug Allergies    LABS:  CBC Full  -  ( 03 Mar 2021 08:49 )  WBC Count : 6.57 K/uL  RBC Count : 3.75 M/uL  Hemoglobin : 10.6 g/dL  Hematocrit : 31.8 %  Platelet Count - Automated : 231 K/uL  Mean Cell Volume : 84.8 fl  Mean Cell Hemoglobin : 28.3 pg  Mean Cell Hemoglobin Concentration : 33.3 gm/dL    03-03    139  |  109<H>  |  53<H>  ----------------------------<  81  4.9   |  22  |  6.70<H>    Ca    9.0      03 Mar 2021 08:49  Phos  5.4     03-03  Mg     2.2     03-03    PT/INR - ( 03 Mar 2021 08:49 )   PT: 13.4 sec;   INR: 1.13 ratio       PTT - ( 03 Mar 2021 08:49 )  PTT:32.9 sec    Procedure/ risks/ benefits/ alternatives were discussed with the patient, who verbalizes understanding, and witnessed informed consent was obtained.

## 2021-03-03 NOTE — PROGRESS NOTE ADULT - PROBLEM SELECTOR PLAN 4
Mild hyperkalemia- potassium 5.4  Lokelma 10g PO x1.   Monitor electrolytes Mild hyperkalemia (3/2)- potassium 5.4 S/P Lokelma 10g PO x1.   Monitor electrolytes Mild hyperkalemia (3/2)- potassium 5.4 S/P Lokelma 10g PO x1.   Repeat potassium 4.9  Monitor electrolytes

## 2021-03-03 NOTE — PROGRESS NOTE ADULT - SUBJECTIVE AND OBJECTIVE BOX
EP     HISTORY OF PRESENT ILLNESS: HPI:  24 yo male with PMH of asthma presented to his PCP with complaints of headache, nausea and vomiting x 5 days. Pt was noted to have elevated sBP of >170 and was advised to come to emergency department. Patient states he started having headaches 5 days ago associated with nausea and 1 day hx of NBNB vomiting. Pt endorses episode of dysuria few days ago which has now resolved. Pt denies head trauma, any prior episodes of similar headaches. No family hx of Polycystic kidney disease, CVA, HTN. Denies diarrhea, abdominal pain, fever, chills, recent viral infection, no blood in urine, no flank pain, no urinary rentention (22 Feb 2021 20:11)    States he has prior history of lightheadedness when getting up too quickly, and has fainted in a hot kitchen before, but no symptoms related to exercise intolerance.  He is not aware of his heartbeat intermittently going faster in the bed and is not experiencing palpitations or chest discomfort.  No personal or family history of arrhythmia.  Has Asthma, diagnosed by a pulmonologist > 1 yr ago, and has been better/stable on Symbicort.  A 10 pt ROS is otherwise negative.    3/1- still has headache and some lightheadedness, but no SOB angina or palpitations.  sinus tachy during ambulation in his room.  3/2 - feeling OK, no angina, no shortness of breath.  tachycardic during ambulation in room, but no sudden on/off on telemetry to suggest ectopic rhythm.  3/3- uneventful overnight.  no angina, orthopnea or palpitations. awaiting renal biopsy today.    acetaminophen   Tablet .. 650 milliGRAM(s) Oral every 6 hours PRN  ALBUTerol    90 MICROgram(s) HFA Inhaler 2 Puff(s) Inhalation every 6 hours PRN  atorvastatin 40 milliGRAM(s) Oral at bedtime  budesonide  80 MICROgram(s)/formoterol 4.5 MICROgram(s) Inhaler 2 Puff(s) Inhalation two times a day  desmopressin IVPB 20 MICROGram(s) IV Intermittent once  diltiazem    milliGRAM(s) Oral daily  diphenhydrAMINE   Injectable 25 milliGRAM(s) IV Push every 8 hours PRN  hydrALAZINE 25 milliGRAM(s) Oral three times a day  lidocaine   Patch 1 Patch Transdermal daily  metoclopramide Injectable 5 milliGRAM(s) IV Push every 8 hours PRN  ondansetron Injectable 5 milliGRAM(s) IV Push every 6 hours PRN  sodium bicarbonate 650 milliGRAM(s) Oral daily  sodium chloride 0.9%. 1000 milliLiter(s) IV Continuous <Continuous>  valproate sodium IVPB 500 milliGRAM(s) IV Intermittent every 8 hours PRN                            10.6   6.57  )-----------( 231      ( 03 Mar 2021 08:49 )             31.8       03-03    139  |  109<H>  |  53<H>  ----------------------------<  81  4.9   |  22  |  6.70<H>    Ca    9.0      03 Mar 2021 08:49  Phos  5.4     03-03  Mg     2.2     03-03      T(C): 36.4 (03-03-21 @ 07:14), Max: 36.7 (03-02-21 @ 11:10)  HR: 99 (03-03-21 @ 07:14) (86 - 99)  BP: 119/79 (03-03-21 @ 07:14) (116/69 - 149/83)  RR: 16 (03-03-21 @ 07:14) (16 - 18)  SpO2: 98% (03-03-21 @ 07:14) (97% - 99%)  Wt(kg): --    I&O's Summary    02 Mar 2021 07:01  -  03 Mar 2021 07:00  --------------------------------------------------------  IN: 450 mL / OUT: 800 mL / NET: -350 mL    General: Well nourished, no acute distress, alert and oriented x 3  Head: normocephalic, no trauma  Neck: no JVD, no bruit, supple, not enlarged  CV: S1S2, no S3, regular rate, rhythm is SINUS, no murmurs.    Lungs: clear BL, no rales or wheezes  Abdomen: bowel sounds +, soft, nontender, nondistended  Extremities: no clubbing, cyanosis or edema  Neuro: Moves all 4 extremities, sensation intact x 4 extremities  Skin: warm and moist, normal turgor  Psych: Mood and affect are appropriate for circumstances  MSK: normal range of motion and strength x4 extremities.    TELEMETRY: NSR, sinus tachy, episodes of sinus tachy, 130-150bpm when walking in his room.  ECG: NSR  Echo: EF 55%, mild LVH	  	  ASSESSMENT/PLAN: 	25y Male with hypertensive urgency, nephrotic-range proteinuria and newly diagnosed acute renal failure.  Having asymptomatic episodes of SVT on telemetry that are of short duration and self-terminating.    Monitor on telemetry.  Does not need beta blockers at this point, and I would generally avoid these in a young man due to potential for sexual dysfunction at significant doses.  Awaiting kidney biopsy tomorrow.  If no sustained arrhythmia on telemetry, recommend ambulatory cardiac monitoring.     Enzo Swan M.D.  Cardiac Electrophysiology  [phone]

## 2021-03-03 NOTE — PROGRESS NOTE ADULT - PROBLEM SELECTOR PLAN 6
Pt noted to have non anion gap metabolic acidosis with respiratory compensation   bicarb 19-> 17 on admission   Metabolic acidosis likely in the setting of ARF   Lactate: 0.3  pH on VB.31 ()  Bicarb 20 (3/), monitor   C/w sodium bicarb 650mg PO qd.  Continue to monitor pH/CO2  Nephro Dr. Massey following

## 2021-03-03 NOTE — PROGRESS NOTE ADULT - SUBJECTIVE AND OBJECTIVE BOX
Patient denies CP, SOB had some palpiations this AM    acetaminophen   Tablet .. 650 milliGRAM(s) Oral every 6 hours PRN  ALBUTerol    90 MICROgram(s) HFA Inhaler 2 Puff(s) Inhalation every 6 hours PRN  atorvastatin 40 milliGRAM(s) Oral at bedtime  budesonide  80 MICROgram(s)/formoterol 4.5 MICROgram(s) Inhaler 2 Puff(s) Inhalation two times a day  desmopressin IVPB 20 MICROGram(s) IV Intermittent once  diltiazem    milliGRAM(s) Oral daily  diphenhydrAMINE   Injectable 25 milliGRAM(s) IV Push every 8 hours PRN  hydrALAZINE 25 milliGRAM(s) Oral three times a day  lidocaine   Patch 1 Patch Transdermal daily  metoclopramide Injectable 5 milliGRAM(s) IV Push every 8 hours PRN  ondansetron Injectable 5 milliGRAM(s) IV Push every 6 hours PRN  sodium bicarbonate 650 milliGRAM(s) Oral daily  sodium chloride 0.9%. 1000 milliLiter(s) IV Continuous <Continuous>  valproate sodium IVPB 500 milliGRAM(s) IV Intermittent every 8 hours PRN                            10.6   6.57  )-----------( 231      ( 03 Mar 2021 08:49 )             31.8       Hemoglobin: 10.6 g/dL (03-03 @ 08:49)  Hemoglobin: 10.6 g/dL (03-02 @ 09:16)  Hemoglobin: 10.7 g/dL (03-01 @ 09:31)  Hemoglobin: 11.2 g/dL (02-28 @ 07:48)  Hemoglobin: 11.3 g/dL (02-27 @ 08:21)      03-03    139  |  109<H>  |  53<H>  ----------------------------<  81  4.9   |  22  |  6.70<H>    Ca    9.0      03 Mar 2021 08:49  Phos  5.4     03-03  Mg     2.2     03-03      Creatinine Trend: 6.70<--, 6.54<--, 6.14<--, 5.87<--, 5.35<--, 5.48<--    COAGS: PT/INR - ( 03 Mar 2021 08:49 )   PT: 13.4 sec;   INR: 1.13 ratio         PTT - ( 03 Mar 2021 08:49 )  PTT:32.9 sec          T(C): 36.3 (03-03-21 @ 11:00), Max: 36.7 (03-03-21 @ 04:30)  HR: 88 (03-03-21 @ 11:00) (86 - 99)  BP: 159/95 (03-03-21 @ 11:00) (116/69 - 159/95)  RR: 16 (03-03-21 @ 11:00) (16 - 18)  SpO2: 98% (03-03-21 @ 11:00) (97% - 99%)  Wt(kg): --    I&O's Summary    02 Mar 2021 07:01  -  03 Mar 2021 07:00  --------------------------------------------------------  IN: 450 mL / OUT: 800 mL / NET: -350 mL        HEENT:  (-)icterus (-)pallor  CV: N S1 S2 1/6 SHAGUFTA (+)2 Pulses B/l  Resp:  Clear to ausculatation B/L, normal effort  GI: (+) BS Soft, NT, ND  Lymph:  (-)Edema, (-)obvious lymphadenopathy  Skin: Warm to touch, Normal turgor  Psych: Appropriate mood and affect      TELEMETRY: ST    TTE: < from: Transthoracic Echocardiogram (02.24.21 @ 18:49) >  1. Mild mitral regurgitation.  2. Mild concentric left ventricular hypertrophy.  3. Normal left ventricular systolic function.  4. Normal right ventricular size and function.    < end of copied text >          ASSESSMENT/PLAN: 	25y  Male PMH of asthma presented to his PCP with complaints of headache, nausea and vomiting x 5 days found with hypertnesive urgency and CIARRA.    - no further palpitations on cardizem   - BP has improved and patient normotensive today   - Check Serum renin and tim levels  - 24 hour urine collection for urine catecholamines and metanephrine  - TSH within normal Limits  - echo with no significant structural heart disease and normal LVEF  - renal f/u for biopsy today  - EP ofelia Oleaer, MD, [facility]  BEEPER [phone]

## 2021-03-03 NOTE — PROGRESS NOTE ADULT - PROBLEM SELECTOR PLAN 7
Patient was noted to have short episodes of self terminating sinus tachycardia on telemetry   Do not recommend beta blocker at this time due possible side effects  Pt with episodes of paroxysmal atrial tachycardia today   Switched Amlodipine 10mg daily to Cardizem  mg PO daily  Cardiology Dr. Peres following  Electrophysiology Dr. Swan following

## 2021-03-03 NOTE — PROGRESS NOTE ADULT - PROBLEM SELECTOR PLAN 8
SUBJECTIVE:  This 68 year old male comes in for evaluation Of multiple medical problems.    Since the patient was last seen in the office he did undergo surgery to correct an Achilles tendon rupture. His boot is off and he is now ambulating.    Patient continues to take Actos 30 mg daily with Amaryl 2 mg a day and metformin thousand milligrams b.i.d. For treatment of this type 2 diabetes mellitus.    Blood pressures been managed on losartan 100 mg daily.    Lipids controlled on simvastatin 40 mg a day.    Known history of Parkinson's on Sinemet and Mirapex as well as amantadine. Doing well without tremors      PHYSICAL EXAMINATION:  Visit Vitals  /66   Pulse 68   Wt 89.4 kg   BMI 29.11 kg/m²     HEENT:  Eyes: PERRLA. EOM intact. Throat: No obvious inflammation.  LUNGS: Clear to auscultation and percussion.  HEART: Regular rhythm. Normal S1, S2. No murmurs.  ABDOMEN: No tenderness.  EXTREMITIES: No edema.    ASSESSMENT: (E11.9) Type 2 diabetes mellitus without complication, without long-term current use of insulin (CMS/[facility])  (primary encounter diagnosis)  Comment: diabetes under reasonable control with most recent A1c is 7 with a blood glucose of 136  Plan: continue current medication regimen    (I10) Essential hypertension  Comment: blood pressure controlled  Plan: maintain on losartan    (E78.5) Hyperlipidemia, unspecified hyperlipidemia type  Comment: lipid profile reviewed. Cholesterol 173 LDL 75 HDL 83  Plan: Continue simvastatin    (G20) Parkinson's disease (CMS/[facility])  Comment: no obvious tremor. Patient functioning quite well.  Plan: he remains under the care f neurology    (Z23) Need for vaccination  Comment: influenza and Pneumovax vaccine administered today  Plan: PNEUMOCOCCAL POLYSACCHARIDE,ADULT,SQ OR IM         VACCINE, INFLUENZA ENHANCED / HIGH DOSE SPLIT         VIRUS PRES FREE IM VACCINE, CANCELED: INFLUENZA        QUADRIVALENT SPLIT 0.5 ML VACCINE                 Continue with statin

## 2021-03-03 NOTE — PROGRESS NOTE ADULT - PROBLEM SELECTOR PLAN 1
Pt presented with Headaches, Pt noted to have SBP >180 on admission  CTH (2/22 and 2/28) negative   D/c Amlodipine   Start Cardizem  mg PO daily, Pt with episodes of PAT  C/w Hydralazine 25mg PO tid, titrate as needed.  Monitor BP closely  /70 this AM   TTE: mild MR, mild LVH   Tim elevated 27.2 ng/dl; Renin 4.9 pg/ml (0.49ng/dl): Tim/ Renin 55.5 (2/23)  Repeat Tim (2/25) 11.3  Repeat renin and serum metanephrine pending  Cardiology following  Endocrine following, appreciate recommendations, repeat serum tim/renin and metanephrine as outpatient. would not start spironolactone/epleronone, doxazosin until outpatient workup is complete   Nephro Dr. Massey following, appreciate recommendations Pt presented with Headaches, Pt noted to have SBP >180 on admission  CTH (2/22 and 2/28) negative   C/w Cardizem  mg PO daily, Pt with episodes of PAT  C/w Hydralazine 25mg PO tid, titrate as needed.  Monitor BP closely  /69 this AM   TTE: mild MR, mild LVH   Tim elevated 27.2 ng/dl; Renin 4.9 pg/ml (0.49ng/dl): Tim/ Renin 55.5 (2/23)  Repeat Tim (2/25) 11.3  Repeat renin and serum metanephrine pending  Cardiology following  Endocrine following, appreciate recommendations, repeat serum tim/renin and metanephrine as outpatient. would not start spironolactone/epleronone, doxazosin until outpatient workup is complete   Nephro Dr. Massey following, appreciate recommendations

## 2021-03-03 NOTE — PROGRESS NOTE ADULT - PROBLEM SELECTOR PLAN 5
2/27 patient endorsed severe 8-9/10 frontal and bitemporal headache which is causing him to have multiple bouts of NBNB emesis. The patient was given percocet 325mg x 1 and was started on Dilaudid 0.1mg PRN.     Repeat CTH no acute findings  Neurology consulted  Cocktail of Depakote, Benadryl, Reglan PRN (pt complained of dizziness following cocktail, made PRN and not scheduled)  D/c Dilaudid 2/27 patient endorsed severe 8-9/10 frontal and bitemporal headache which is causing him to have multiple bouts of NBNB emesis. The patient was given percocet 325mg x 1 and was started on Dilaudid 0.1mg PRN.     Repeat CTH no acute findings  Neurology consulted  Cocktail of Depakote, Benadryl, Reglan PRN (pt complained of dizziness following cocktail, made PRN and not scheduled)

## 2021-03-03 NOTE — PROGRESS NOTE ADULT - SUBJECTIVE AND OBJECTIVE BOX
NEUROLOGY FOLLOW-UP NOTE    NAME:  MARTHA MARTINEZ      ASSESSMENT:  25 RHM with migraine with status migrainosus      RECOMMENDATIONS:    - Initiate taper of Depakote ER 500mg as follows:       - 500mg PO TID x 5 days, then       - 500mg PO BID x 5 days, then       - 500mg PO BID x 5 days, then stop    May maintain metoclopramide 10mg PO/IV Q6H PRN breakthrough headache/nausea/vomiting    - No additional neuroimaging indicated at this time    - Routine follow-up in Neurology clinic for migraine management - Lynette Rae: [phone]        NOTE TO BE COMPLETED - PLEASE REFER TO ABOVE ONLY AND IGNORE INFORMATION BELOW    ******************************    HPI:  24 yo male with PMH of asthma presented to his PCP with complaints of headache, nausea and vomiting x 5 days. Pt was noted to have elevated sBP of >170 and was advised to come to emergency department. Patient states he started having headaches 5 days ago associated with nausea and 1 day hx of NBNB vomiting. Pt endorses episode of dysuria few days ago which has now resolved. Pt denies head trauma, any prior episodes of similar headaches. No family hx of Polycystic kidney disease, CVA, HTN. Denies diarrhea, abdominal pain, fever, chills, recent viral infection, no blood in urine, no flank pain, no urinary rentention (22 Feb 2021 20:11)      NEURO HPI:      INTERVAL HISTORY:      MEDICATIONS:  acetaminophen   Tablet .. 650 milliGRAM(s) Oral every 6 hours PRN  ALBUTerol    90 MICROgram(s) HFA Inhaler 2 Puff(s) Inhalation every 6 hours PRN  atorvastatin 40 milliGRAM(s) Oral at bedtime  budesonide  80 MICROgram(s)/formoterol 4.5 MICROgram(s) Inhaler 2 Puff(s) Inhalation two times a day  desmopressin IVPB 20 MICROGram(s) IV Intermittent once  diltiazem    milliGRAM(s) Oral daily  diVALproex  milliGRAM(s) Oral <User Schedule>  hydrALAZINE 25 milliGRAM(s) Oral three times a day  lidocaine   Patch 1 Patch Transdermal daily  metoclopramide Injectable 5 milliGRAM(s) IV Push every 8 hours PRN  ondansetron Injectable 5 milliGRAM(s) IV Push every 6 hours PRN  sodium bicarbonate 650 milliGRAM(s) Oral daily  sodium chloride 0.9%. 1000 milliLiter(s) IV Continuous <Continuous>      ALLERGIES:  Kiwi (Swelling)  No Known Drug Allergies      REVIEW OF SYSTEMS:  Fourteen systems reviewed and negative except as in HPI / Interval History.        OBJECTIVE:  Vital Signs Last 24 Hrs  T(C): 36.8 (03 Mar 2021 20:02), Max: 36.9 (03 Mar 2021 15:39)  T(F): 98.2 (03 Mar 2021 20:02), Max: 98.5 (03 Mar 2021 15:39)  HR: 94 (03 Mar 2021 20:02) (83 - 117)  BP: 144/81 (03 Mar 2021 20:02) (116/69 - 159/95)  BP(mean): 97 (03 Mar 2021 15:00) (83 - 103)  RR: 16 (03 Mar 2021 20:02) (11 - 18)  SpO2: 96% (03 Mar 2021 20:02) (96% - 100%)    General Examination:  General: No acute distress  HEENT: Atraumatic, Normocephalic  Respiratory: CTA B/l.  No crackles, rhonchi, or wheezes.  Cardiovascular: RRR.  Normal S1 & S2.  Normal b/l radial and pedal pulses.    Neurological Examination:  General / Mental Status: AAO x 3.  No aphasia or dysarthria.  Naming and repetition intact.  Cranial Nerves: VFF x 4.  PERRL.  EOMI x 2, No nystagmus or diplopia.  B/l V1-V3 equal and intact to light touch and pinprick.  Symmetric facial movement and palate elevation.  B/l hearing equal to finger rub.  5/5 strength with b/l sternocleidomastoid & trapezius.  Midline tongue protrusion, with no atrophy or fasciculations.  Motor: Normal bulk & tone in all four extremities.  5/5 strength throughout all four extremities.  No downward drift, rigidity, spasticity, or tremors in any of the four extremities.  Sensory: Intact to light touch and pinprick in all four extremities.  Negative Romberg.  Reflex: 2+ and symmetric at b/l biceps, triceps, brachioradialis, patellae, and ankles.  Downgoing toes b/l.  Coordination: No dysmetria with b/l finger-to-nose and heel raise tests.  Symmetric rapid alternating movements b/l.  Gait: Normal, narrow-based gait.  No difficulty with tiptoe, heel, and tandem gaits.        LABORATORY VALUES:                          10.4   7.74  )-----------( 223      ( 03 Mar 2021 20:44 )             31.3       03-03    139  |  109<H>  |  53<H>  ----------------------------<  81  4.9   |  22  |  6.70<H>    Ca    9.0      03 Mar 2021 08:49  Phos  5.4     03-03  Mg     2.2     03-03      02-23 Chol 320<H> LDL -- HDL 54 Trig 134    Glucose Trend  03-03-21 @ 08:49   -  -- 81 --  03-02-21 @ 09:16   -  -- 109<H> --  03-01-21 @ 09:31   -  -- 90 --                        NEUROIMAGING:          Please contact the Neurology consult service with any questions.      Shaun Richmond MD   of Neurology  [facility] at [facility]         NEUROLOGY FOLLOW-UP NOTE    NAME:  MARTHA MARTINEZ      ASSESSMENT:  25 RHM with migraine with status migrainosus, with evidence of IgA nephropathy on renal biopsy      RECOMMENDATIONS:    - Initiate taper of Depakote ER 500mg as follows:       - 500mg PO TID x 5 days, then       - 500mg PO BID x 5 days, then       - 500mg PO BID x 5 days, then stop    - May maintain metoclopramide 10mg PO/IV Q6H PRN breakthrough headache/nausea/vomiting    - Patient receiving IV methylprednisolone course for management of IgA nephropathy, which can cover for status migrainosus    - No additional neuroimaging indicated at this time    - Routine follow-up in Neurology clinic for migraine management - Lynette Rae: [phone]        ******************************    HPI:  26 yo male with PMH of asthma presented to his PCP with complaints of headache, nausea and vomiting x 5 days. Pt was noted to have elevated SBP of >170 and was advised to come to emergency department. Patient states he started having headaches 5 days ago associated with nausea and 1 day hx of NB/NB vomiting. Pt endorses episode of dysuria few days ago which has now resolved. Pt denies head trauma, any prior episodes of similar headaches. No family hx of Polycystic kidney disease, CVA, HTN. Denies diarrhea, abdominal pain, fever, chills, recent viral infection, no blood in urine, no flank pain, no urinary rentention (22 Feb 2021 20:11)    NEURO HPI:  25 RHM presenting with 5-day history of headache, associated with nausea and vomiting, without previous history of headache    INTERVAL HISTORY:  The patient's headache intensity has decreased from 6/10 to 3/10 after receiving 1 day's worth of IV Depacon protocol, followed by 1 day's worth of oral Depakote. He is pending a renal biopsy for evaluation of poor renal function.      MEDICATIONS:  acetaminophen   Tablet .. 650 milliGRAM(s) Oral every 6 hours PRN  ALBUTerol    90 MICROgram(s) HFA Inhaler 2 Puff(s) Inhalation every 6 hours PRN  atorvastatin 40 milliGRAM(s) Oral at bedtime  budesonide  80 MICROgram(s)/formoterol 4.5 MICROgram(s) Inhaler 2 Puff(s) Inhalation two times a day  desmopressin IVPB 20 MICROGram(s) IV Intermittent once  diltiazem    milliGRAM(s) Oral daily  diVALproex  milliGRAM(s) Oral <User Schedule>  hydrALAZINE 25 milliGRAM(s) Oral three times a day  lidocaine   Patch 1 Patch Transdermal daily  metoclopramide Injectable 5 milliGRAM(s) IV Push every 8 hours PRN  ondansetron Injectable 5 milliGRAM(s) IV Push every 6 hours PRN  sodium bicarbonate 650 milliGRAM(s) Oral daily  sodium chloride 0.9%. 1000 milliLiter(s) IV Continuous <Continuous>      ALLERGIES:  Kiwi (Swelling)  No Known Drug Allergies      REVIEW OF SYSTEMS:  Fourteen systems reviewed and negative except as in HPI / Interval History.        OBJECTIVE:  Vital Signs Last 24 Hrs  T(C): 36.8 (03 Mar 2021 20:02), Max: 36.9 (03 Mar 2021 15:39)  T(F): 98.2 (03 Mar 2021 20:02), Max: 98.5 (03 Mar 2021 15:39)  HR: 94 (03 Mar 2021 20:02) (83 - 117)  BP: 144/81 (03 Mar 2021 20:02) (116/69 - 159/95)  BP(mean): 97 (03 Mar 2021 15:00) (83 - 103)  RR: 16 (03 Mar 2021 20:02) (11 - 18)  SpO2: 96% (03 Mar 2021 20:02) (96% - 100%)    General Examination:  General: No acute distress  HEENT: Atraumatic, Normocephalic, No tenderness to palpation at scalp  Respiratory: CTA B/l.  No crackles, rhonchi, or wheezes.  Cardiovascular: RRR.  Normal S1 & S2.  Normal b/l radial and pedal pulses.    Neurological Examination:  General / Mental Status: AAO x 3.  No aphasia or dysarthria.  Naming and repetition intact.  Cranial Nerves: VFF x 4.  PERRL.  EOMI x 2, No nystagmus or diplopia.  B/l V1-V3 equal and intact to light touch and pinprick.  Symmetric facial movement and palate elevation.  B/l hearing equal to finger rub.  5/5 strength with b/l sternocleidomastoid & trapezius.  Midline tongue protrusion, with no atrophy or fasciculations.  Motor: Normal bulk & tone in all four extremities.  5/5 strength throughout all four extremities.  No downward drift, rigidity, spasticity, or tremors in any of the four extremities.  Sensory: Intact to light touch and pinprick in all four extremities.  Reflex: 2+ and symmetric at b/l biceps, triceps, brachioradialis, patellae, and ankles.  Downgoing toes b/l.  Coordination: No dysmetria with b/l finger-to-nose and heel raise tests.  Gait and Romberg testing deferred per patient request.        LABORATORY VALUES:                          10.4   7.74  )-----------( 223      ( 03 Mar 2021 20:44 )             31.3       03-03    139  |  109<H>  |  53<H>  ----------------------------<  81  4.9   |  22  |  6.70<H>    Ca    9.0      03 Mar 2021 08:49  Phos  5.4     03-03  Mg     2.2     03-03 02-23 Chol 320<H> LDL -- HDL 54 Trig 134    Glucose Trend  03-03-21 @ 08:49   -  -- 81 --  03-02-21 @ 09:16   -  -- 109<H> --  03-01-21 @ 09:31   -  -- 90 --          NEUROIMAGING:      CT Head x 2 (2/22/21 & 2/28/21):  - No acute intracranial abnormality or interval change between scans    TTE (2/24/21):  - LVEF > 55%  - Mild concentric left ventricular hypertrophy  - Mild mitral and tricuspid regurgitation  - Trace tricuspid regurgitation  - No cardiac embolus or intracardiac shunt    Core Needle Renal Biopsy (3/3/21):  - IgA nephropathy, mesangial and sclerosing form, with features of collapsing glomerulopathy        Please contact the Neurology consult service with any questions.      Shaun Richmond MD   of Neurology  [facility] at [facility]

## 2021-03-03 NOTE — PROGRESS NOTE ADULT - SUBJECTIVE AND OBJECTIVE BOX
PGY-1 Progress Note discussed with attending    PAGER #: [-----] TILL 5:00 PM  PLEASE CONTACT ON CALL TEAM:  - On Call Team (Please refer to Prasanna) FROM 5:00 PM - 8:30PM  - Nightfloat Team FROM 8:30 -7:30 AM    CHIEF COMPLAINT & BRIEF HOSPITAL COURSE:    INTERVAL HPI/OVERNIGHT EVENTS:     MEDICATIONS:  acetaminophen   Tablet .. 650 milliGRAM(s) Oral every 6 hours PRN  ALBUTerol    90 MICROgram(s) HFA Inhaler 2 Puff(s) Inhalation every 6 hours PRN  atorvastatin 40 milliGRAM(s) Oral at bedtime  budesonide  80 MICROgram(s)/formoterol 4.5 MICROgram(s) Inhaler 2 Puff(s) Inhalation two times a day  desmopressin IVPB 20 MICROGram(s) IV Intermittent once  diltiazem    milliGRAM(s) Oral daily  diphenhydrAMINE   Injectable 25 milliGRAM(s) IV Push every 8 hours PRN  hydrALAZINE 25 milliGRAM(s) Oral three times a day  lidocaine   Patch 1 Patch Transdermal daily  metoclopramide Injectable 5 milliGRAM(s) IV Push every 8 hours PRN  ondansetron Injectable 5 milliGRAM(s) IV Push every 6 hours PRN  sodium bicarbonate 650 milliGRAM(s) Oral daily  sodium chloride 0.9%. 1000 milliLiter(s) IV Continuous <Continuous>  valproate sodium IVPB 500 milliGRAM(s) IV Intermittent every 8 hours PRN      REVIEW OF SYSTEMS:  CONSTITUTIONAL: No fever, weight loss, or fatigue  RESPIRATORY: No cough, wheezing, chills or hemoptysis; No shortness of breath  CARDIOVASCULAR: No chest pain, palpitations, dizziness, or leg swelling  GASTROINTESTINAL: No abdominal pain. No nausea, vomiting, or hematemesis; No diarrhea or constipation. No melena or hematochezia.  GENITOURINARY: No dysuria or hematuria, urinary frequency  NEUROLOGICAL: No headaches, memory loss, loss of strength, numbness, or tremors  SKIN: No itching, burning, rashes, or lesions     Vital Signs Last 24 Hrs  T(C): 36.4 (03 Mar 2021 07:14), Max: 36.7 (02 Mar 2021 11:10)  T(F): 97.5 (03 Mar 2021 07:14), Max: 98.1 (03 Mar 2021 04:30)  HR: 99 (03 Mar 2021 07:14) (86 - 99)  BP: 119/79 (03 Mar 2021 07:14) (116/69 - 149/83)  BP(mean): --  RR: 16 (03 Mar 2021 07:14) (16 - 18)  SpO2: 98% (03 Mar 2021 07:14) (97% - 99%)    PHYSICAL EXAMINATION:  GENERAL: NAD, well built  HEAD:  Atraumatic, Normocephalic  EYES:  conjunctiva and sclera clear  NECK: Supple, No JVD, Normal thyroid  CHEST/LUNG: Clear to auscultation. Clear to percussion bilaterally; No rales, rhonchi, wheezing, or rubs  HEART: Regular rate and rhythm; No murmurs, rubs, or gallops  ABDOMEN: Soft, Nontender, Nondistended; Bowel sounds present  NERVOUS SYSTEM:  Alert & Oriented X3,    EXTREMITIES:  2+ Peripheral Pulses, No clubbing, cyanosis, or edema  SKIN: warm dry                          10.6   7.99  )-----------( 209      ( 02 Mar 2021 09:16 )             32.6     03-02    137  |  109<H>  |  52<H>  ----------------------------<  109<H>  5.4<H>   |  20<L>  |  6.54<H>    Ca    8.7      02 Mar 2021 09:16  Phos  3.8     03-02  Mg     1.8     03-02                CAPILLARY BLOOD GLUCOSE      RADIOLOGY & ADDITIONAL TESTS:                   PGY-1 Progress Note discussed with attending    PAGER #: [[phone]] TILL 5:00 PM  PLEASE CONTACT ON CALL TEAM:  - On Call Team (Please refer to Prasanna) FROM 5:00 PM - 8:30PM  - Nightfloat Team FROM 8:30 -7:30 AM    CHIEF COMPLAINT & BRIEF HOSPITAL COURSE:  26 yo male with PMH of asthma presented to his PCP with complaints of headache, nausea and vomiting x 5 days. Pt was noted to have elevated SBP of >170 and was advised to come to emergency department. No family hx of Polycystic kidney disease, CVA, HTN. He was admitted for CIARRA with a creatinine of 6 and hypertensive emergency with a BP of 185/114.   In the ED the patients BP was 185/114. He was given amlodipine and hydralazine. He was continued on amlodipine 5 which was increased to 10 and hydralazine 10 which was increased to 25 with marked improvement in his BP. Cardiology was consulted (Dr. Peres). TTE showed mild MR and mild LVH. He was worked up for causes of secondary hypertension. Urine metanephrine was negative and CT A/P showed no adrenal pathology, making the diagnosis of pheochromocytoma unlikely. Serum Aldosterone and renin were obtained which showed aldosterone of 27.5, direct renin 4.9, renin activity 0.577 with an aldosterone : renin ratio of 47.66. Endocrine was consulted. Repeat aldosterone was 11.3. ? postural change. Endocrine recommended outpatient workup to repeat aldosterone/renin and metanephrine.   For his ARF he was noted to have a creatinine of 6 on admission. UA showed moderate blood, 500 protein. FeNa: 3.3%. Nephrology was consulted (Dr. Massey). Normal C3, C4, SIFE neg, K/L 1.7, ELMA, ANCA, dsDNA, ASO, Treponema Pallidum ab and Hepatitis panel all negative. Immunofixation negative for monoclonal bands. CT A/P was negative for any obstructive uropathy or acute findings.  Renal US showed bilateral hyperechoic kidneys.  For CKD the patient prior records were obtained from 2018 which showed a baseline creatinine of 1.29 and a UA with positive blood, protein and hyaline casts. PTHi was elevated at 99. The patient was started on a low phos diet. Nephrology was following.   For the patient headache he was started on Dilaudid 0.1mg due to ARF and persistent 8/10 frontal/bitemporal headaches with Tylenol. CTH on admission was negative. Repeat CTH was done for acute severe headaches which also showed no acute findings. Neurology was consulted. They recommended a cocktail of depakote, reglan, and benadryl Q8 for 3-5 days as needed. Dilaudid was discontinued.   On admission the patient was noted to have multiple runs of asymptomatic sinus tachycardia. Electrophysiology was consulted, (Dr Swan) and recommended against beta blockers due to potential side effects. The patient was started on Cardizem and Amlodipine was discontinued     INTERVAL HPI/OVERNIGHT EVENTS: No acute events overnight. The patient had no complaints this morning. Plan for renal biopsy today with IR.    MEDICATIONS:  acetaminophen   Tablet .. 650 milliGRAM(s) Oral every 6 hours PRN  ALBUTerol    90 MICROgram(s) HFA Inhaler 2 Puff(s) Inhalation every 6 hours PRN  atorvastatin 40 milliGRAM(s) Oral at bedtime  budesonide  80 MICROgram(s)/formoterol 4.5 MICROgram(s) Inhaler 2 Puff(s) Inhalation two times a day  desmopressin IVPB 20 MICROGram(s) IV Intermittent once  diltiazem    milliGRAM(s) Oral daily  diphenhydrAMINE   Injectable 25 milliGRAM(s) IV Push every 8 hours PRN  hydrALAZINE 25 milliGRAM(s) Oral three times a day  lidocaine   Patch 1 Patch Transdermal daily  metoclopramide Injectable 5 milliGRAM(s) IV Push every 8 hours PRN  ondansetron Injectable 5 milliGRAM(s) IV Push every 6 hours PRN  sodium bicarbonate 650 milliGRAM(s) Oral daily  sodium chloride 0.9%. 1000 milliLiter(s) IV Continuous <Continuous>  valproate sodium IVPB 500 milliGRAM(s) IV Intermittent every 8 hours PRN      REVIEW OF SYSTEMS:  CONSTITUTIONAL: No fever, weight loss, or fatigue  RESPIRATORY: No cough, wheezing, chills or hemoptysis; No shortness of breath  CARDIOVASCULAR: No chest pain, palpitations, dizziness, or leg swelling  GASTROINTESTINAL: No abdominal pain. No nausea, vomiting, or hematemesis; No diarrhea or constipation. No melena or hematochezia.  GENITOURINARY: No dysuria or hematuria, urinary frequency  NEUROLOGICAL: No memory loss, loss of strength, numbness, or tremors. Headaches improved  SKIN: No itching, burning, rashes, or lesions     Vital Signs Last 24 Hrs  T(C): 36.4 (03 Mar 2021 07:14), Max: 36.7 (02 Mar 2021 11:10)  T(F): 97.5 (03 Mar 2021 07:14), Max: 98.1 (03 Mar 2021 04:30)  HR: 99 (03 Mar 2021 07:14) (86 - 99)  BP: 119/79 (03 Mar 2021 07:14) (116/69 - 149/83)  BP(mean): --  RR: 16 (03 Mar 2021 07:14) (16 - 18)  SpO2: 98% (03 Mar 2021 07:14) (97% - 99%)    PHYSICAL EXAMINATION:  GENERAL: NAD, well built  HEAD:  Atraumatic, Normocephalic  EYES:  conjunctiva and sclera clear  NECK: Supple, No JVD, Normal thyroid  CHEST/LUNG: Clear to auscultation. Clear to percussion bilaterally; No rales, rhonchi, wheezing, or rubs  HEART: Regular rate and rhythm; No murmurs, rubs, or gallops  ABDOMEN: Soft, Nontender, Nondistended; Bowel sounds present  NERVOUS SYSTEM:  Alert & Oriented X3,    EXTREMITIES:  2+ Peripheral Pulses, No clubbing, cyanosis, or edema  SKIN: warm dry                          10.6   7.99  )-----------( 209      ( 02 Mar 2021 09:16 )             32.6     03-02    137  |  109<H>  |  52<H>  ----------------------------<  109<H>  5.4<H>   |  20<L>  |  6.54<H>    Ca    8.7      02 Mar 2021 09:16  Phos  3.8     03-02  Mg     1.8     03-02                CAPILLARY BLOOD GLUCOSE      RADIOLOGY & ADDITIONAL TESTS:

## 2021-03-03 NOTE — PROGRESS NOTE ADULT - PROBLEM SELECTOR PLAN 2
Presented with CIARRA and Cr 6  Baseline creatinine is 1.29 (2018)  Urine protein/creatinine: 3.3 (2/22)  Repeat urine protein/creatinine: 3.8 (2/26)   CT abdomen showed no findings of obstructive uropathy or other findings.  Renal US showed hyperechoic kidneys bilaterally   FeNa: 3.3%  Urine tox was negative urine eosinophils negative  Urine total protein 218  Pheochromocytoma unlikely- CT a/p showed normal adrenals, urine metanephrine negative, pending serum metanephrine   Normal C3, C4, SIFE neg, K/L 1.7, Syphillis neg, neg ASO, neg ANCA, neg ELMA, neg dsDNA, neg HIV, neg HepBsAg, and neg Hep C ab, neg anti-GBM  Immunofixation negative for monoclonal bands   YASH Kappa 7.38 (elevated) YASH Lambda 4.33 (elevated)  Pending PLA2R antibody   Plan for kidney biopsy Tomorrow. (NPO after midnight. Check Coags tomorrow and give DDAVP 22 mcg IV x1 30 mins prior to IR procedure.  Creatinine increasing 5.35 > 5.87 > 6.14 > 6.54 (3/2)    Nephro Dr. Massey following, f/u recommendations Presented with CIARRA and Cr 6  Baseline creatinine is 1.29 (2018)  Urine protein/creatinine: 3.3 (2/22)  Repeat urine protein/creatinine: 3.8 (2/26)   CT abdomen showed no findings of obstructive uropathy or other findings.  Renal US showed hyperechoic kidneys bilaterally   FeNa: 3.3%  Urine tox was negative urine eosinophils negative  Pheochromocytoma unlikely- CT a/p showed normal adrenals, urine metanephrine negative, pending serum metanephrine   Normal C3, C4, SIFE neg, K/L 1.7, Syphillis neg, neg ASO, neg ANCA, neg ELMA, neg dsDNA, neg HIV, neg HepBsAg, and neg Hep C ab, neg anti-GBM  Immunofixation negative for monoclonal bands   YASH Kappa 7.38 (elevated) YASH Lambda 4.33 (elevated)  Pending PLA2R antibody   Renal biopsy today with IR.   Give DDAVP 22 mcg IV x1 30 mins prior to IR procedure.  Creatinine increasing 5.35 > 5.87 > 6.14 > 6.54 (3/2)    Nephro Dr. Massey following, f/u recommendations Presented with CIARRA and Cr 6  Baseline creatinine is 1.29 (2018)  Urine protein/creatinine: 3.3 (2/22)  Repeat urine protein/creatinine: 3.8 (2/26)   CT abdomen showed no findings of obstructive uropathy or other findings.  Renal US showed hyperechoic kidneys bilaterally   FeNa: 3.3%  Urine tox was negative urine eosinophils negative  Pheochromocytoma unlikely- CT a/p showed normal adrenals, urine metanephrine negative, pending serum metanephrine   Normal C3, C4, SIFE neg, K/L 1.7, Syphillis neg, neg ASO, neg ANCA, neg ELMA, neg dsDNA, neg HIV, neg HepBsAg, and neg Hep C ab, neg anti-GBM  Immunofixation negative for monoclonal bands   YASH Kappa 7.38 (elevated) YASH Lambda 4.33 (elevated)  Pending PLA2R antibody   Renal biopsy today with IR.   Give DDAVP 22 mcg IV x1 30 mins prior to IR procedure.  Creatinine increasing 5.35 > 5.87 > 6.14 > 6.54 > 6.70 (3/3)    Nephro Dr. Massey following, f/u recommendations

## 2021-03-03 NOTE — PROGRESS NOTE ADULT - SUBJECTIVE AND OBJECTIVE BOX
Time of Visit:  Patient seen and examined.     MEDICATIONS  (STANDING):  atorvastatin 40 milliGRAM(s) Oral at bedtime  budesonide  80 MICROgram(s)/formoterol 4.5 MICROgram(s) Inhaler 2 Puff(s) Inhalation two times a day  desmopressin IVPB 20 MICROGram(s) IV Intermittent once  diltiazem    milliGRAM(s) Oral daily  hydrALAZINE 25 milliGRAM(s) Oral three times a day  lidocaine   Patch 1 Patch Transdermal daily  sodium bicarbonate 650 milliGRAM(s) Oral daily  sodium chloride 0.9%. 1000 milliLiter(s) (75 mL/Hr) IV Continuous <Continuous>      MEDICATIONS  (PRN):  acetaminophen   Tablet .. 650 milliGRAM(s) Oral every 6 hours PRN Temp greater or equal to 38C (100.4F), Mild Pain (1 - 3)  ALBUTerol    90 MICROgram(s) HFA Inhaler 2 Puff(s) Inhalation every 6 hours PRN Bronchospasm  diphenhydrAMINE   Injectable 25 milliGRAM(s) IV Push every 8 hours PRN headache  metoclopramide Injectable 5 milliGRAM(s) IV Push every 8 hours PRN Severe headache  ondansetron Injectable 5 milliGRAM(s) IV Push every 6 hours PRN Nausea and/or Vomiting  valproate sodium IVPB 500 milliGRAM(s) IV Intermittent every 8 hours PRN headache       Medications up to date at time of exam.      PHYSICAL EXAMINATION:  Patient has no new complaints.  GENERAL: The patient is a well-developed, well-nourished, in no apparent distress.     Vital Signs Last 24 Hrs  T(C): 36.6 (03 Mar 2021 15:00), Max: 36.7 (03 Mar 2021 04:30)  T(F): 97.9 (03 Mar 2021 15:00), Max: 98.1 (03 Mar 2021 04:30)  HR: 83 (03 Mar 2021 15:00) (83 - 117)  BP: 148/83 (03 Mar 2021 15:00) (116/69 - 159/95)  BP(mean): 97 (03 Mar 2021 15:00) (83 - 103)  RR: 12 (03 Mar 2021 15:00) (11 - 18)  SpO2: 98% (03 Mar 2021 15:00) (97% - 100%)   (if applicable)    Chest Tube (if applicable)    HEENT: Head is normocephalic and atraumatic. Extraocular muscles are intact. Mucous membranes are moist.     NECK: Supple, no palpable adenopathy.    LUNGS: Clear to auscultation, no wheezing, rales, or rhonchi.    HEART: Regular rate and rhythm without murmur.    ABDOMEN: Soft, nontender, and nondistended.  No hepatosplenomegaly is noted.    : No painful voiding, no pelvic pain    EXTREMITIES: Without any cyanosis, clubbing, rash, lesions or edema.    NEUROLOGIC: Awake, alert, oriented, grossly intact    SKIN: Warm, dry, good turgor.      LABS:                        10.6   6.57  )-----------( 231      ( 03 Mar 2021 08:49 )             31.8     03-03    139  |  109<H>  |  53<H>  ----------------------------<  81  4.9   |  22  |  6.70<H>    Ca    9.0      03 Mar 2021 08:49  Phos  5.4     03-03  Mg     2.2     03-03      PT/INR - ( 03 Mar 2021 08:49 )   PT: 13.4 sec;   INR: 1.13 ratio         PTT - ( 03 Mar 2021 08:49 )  PTT:32.9 sec                    MICROBIOLOGY: (if applicable)    RADIOLOGY & ADDITIONAL STUDIES:  EKG:   CXR:  ECHO:    IMPRESSION: 25y Male PAST MEDICAL & SURGICAL HISTORY:  Asthma    No significant past surgical history     p/w           IMP: This is a 25 yr with asthma ,non smoker presented headaches due to hypertensive crisis and CIARRA. Metabolic acidosis due to CIARRA . Asthma stable       Sugg;  -BP still uncontrol   -adjust BP meds  -for kidney bx 3/3  -continue symbicort 80/4.5 bid  -f/u all serological makers   -continue Norvasc  -CIARRA work up  -DVT/ GI prophy

## 2021-03-03 NOTE — PROGRESS NOTE ADULT - PROBLEM SELECTOR PLAN 3
Baseline creatinine 1.29 (2/18) with proteinuria   PTHi elevated 99  Low phos diet  phos 4.7, monitor Baseline creatinine 1.29 (2/18) with proteinuria   PTHi elevated 99  Low phos diet  phos 3.8, monitor

## 2021-03-04 ENCOUNTER — TRANSCRIPTION ENCOUNTER (OUTPATIENT)
Age: 26
End: 2021-03-04

## 2021-03-04 DIAGNOSIS — I16.1 HYPERTENSIVE EMERGENCY: ICD-10-CM

## 2021-03-04 LAB
ALBUMIN SERPL ELPH-MCNC: 3.2 G/DL — LOW (ref 3.5–5)
ALP SERPL-CCNC: 80 U/L — SIGNIFICANT CHANGE UP (ref 40–120)
ALT FLD-CCNC: 41 U/L DA — SIGNIFICANT CHANGE UP (ref 10–60)
ANION GAP SERPL CALC-SCNC: 11 MMOL/L — SIGNIFICANT CHANGE UP (ref 5–17)
AST SERPL-CCNC: 21 U/L — SIGNIFICANT CHANGE UP (ref 10–40)
BILIRUB SERPL-MCNC: 0.4 MG/DL — SIGNIFICANT CHANGE UP (ref 0.2–1.2)
BUN SERPL-MCNC: 57 MG/DL — HIGH (ref 7–18)
CALCIUM SERPL-MCNC: 8.9 MG/DL — SIGNIFICANT CHANGE UP (ref 8.4–10.5)
CHLORIDE SERPL-SCNC: 105 MMOL/L — SIGNIFICANT CHANGE UP (ref 96–108)
CO2 SERPL-SCNC: 20 MMOL/L — LOW (ref 22–31)
CREAT SERPL-MCNC: 6.61 MG/DL — HIGH (ref 0.5–1.3)
GLUCOSE SERPL-MCNC: 93 MG/DL — SIGNIFICANT CHANGE UP (ref 70–99)
HCT VFR BLD CALC: 30.3 % — LOW (ref 39–50)
HGB BLD-MCNC: 10.1 G/DL — LOW (ref 13–17)
MAGNESIUM SERPL-MCNC: 2.3 MG/DL — SIGNIFICANT CHANGE UP (ref 1.6–2.6)
MCHC RBC-ENTMCNC: 28.5 PG — SIGNIFICANT CHANGE UP (ref 27–34)
MCHC RBC-ENTMCNC: 33.3 GM/DL — SIGNIFICANT CHANGE UP (ref 32–36)
MCV RBC AUTO: 85.4 FL — SIGNIFICANT CHANGE UP (ref 80–100)
NRBC # BLD: 0 /100 WBCS — SIGNIFICANT CHANGE UP (ref 0–0)
PHOSPHATE SERPL-MCNC: 5.7 MG/DL — HIGH (ref 2.5–4.5)
PLATELET # BLD AUTO: 228 K/UL — SIGNIFICANT CHANGE UP (ref 150–400)
POTASSIUM SERPL-MCNC: 4.4 MMOL/L — SIGNIFICANT CHANGE UP (ref 3.5–5.3)
POTASSIUM SERPL-SCNC: 4.4 MMOL/L — SIGNIFICANT CHANGE UP (ref 3.5–5.3)
PROT SERPL-MCNC: 6.6 G/DL — SIGNIFICANT CHANGE UP (ref 6–8.3)
RBC # BLD: 3.55 M/UL — LOW (ref 4.2–5.8)
RBC # FLD: 12.3 % — SIGNIFICANT CHANGE UP (ref 10.3–14.5)
SODIUM SERPL-SCNC: 136 MMOL/L — SIGNIFICANT CHANGE UP (ref 135–145)
WBC # BLD: 7.52 K/UL — SIGNIFICANT CHANGE UP (ref 3.8–10.5)
WBC # FLD AUTO: 7.52 K/UL — SIGNIFICANT CHANGE UP (ref 3.8–10.5)

## 2021-03-04 RX ORDER — ACETAMINOPHEN 500 MG
2 TABLET ORAL
Qty: 10 | Refills: 0
Start: 2021-03-04 | End: 2021-03-08

## 2021-03-04 RX ORDER — HYDRALAZINE HCL 50 MG
1 TABLET ORAL
Qty: 90 | Refills: 0
Start: 2021-03-04 | End: 2021-04-02

## 2021-03-04 RX ORDER — SEVELAMER CARBONATE 2400 MG/1
800 POWDER, FOR SUSPENSION ORAL
Refills: 0 | Status: DISCONTINUED | OUTPATIENT
Start: 2021-03-04 | End: 2021-03-06

## 2021-03-04 RX ORDER — PANTOPRAZOLE SODIUM 20 MG/1
40 TABLET, DELAYED RELEASE ORAL
Refills: 0 | Status: DISCONTINUED | OUTPATIENT
Start: 2021-03-04 | End: 2021-03-06

## 2021-03-04 RX ORDER — ATORVASTATIN CALCIUM 80 MG/1
1 TABLET, FILM COATED ORAL
Qty: 30 | Refills: 0
Start: 2021-03-04 | End: 2021-04-02

## 2021-03-04 RX ORDER — SODIUM BICARBONATE 1 MEQ/ML
650 SYRINGE (ML) INTRAVENOUS
Refills: 0 | Status: DISCONTINUED | OUTPATIENT
Start: 2021-03-04 | End: 2021-03-05

## 2021-03-04 RX ADMIN — PANTOPRAZOLE SODIUM 40 MILLIGRAM(S): 20 TABLET, DELAYED RELEASE ORAL at 17:16

## 2021-03-04 RX ADMIN — Medication 25 MILLIGRAM(S): at 13:15

## 2021-03-04 RX ADMIN — Medication 120 MILLIGRAM(S): at 05:31

## 2021-03-04 RX ADMIN — LIDOCAINE 1 PATCH: 4 CREAM TOPICAL at 11:49

## 2021-03-04 RX ADMIN — Medication 25 MILLIGRAM(S): at 21:49

## 2021-03-04 RX ADMIN — BUDESONIDE AND FORMOTEROL FUMARATE DIHYDRATE 2 PUFF(S): 160; 4.5 AEROSOL RESPIRATORY (INHALATION) at 21:49

## 2021-03-04 RX ADMIN — ATORVASTATIN CALCIUM 40 MILLIGRAM(S): 80 TABLET, FILM COATED ORAL at 21:48

## 2021-03-04 RX ADMIN — DIVALPROEX SODIUM 500 MILLIGRAM(S): 500 TABLET, DELAYED RELEASE ORAL at 21:49

## 2021-03-04 RX ADMIN — Medication 258 MILLIGRAM(S): at 14:51

## 2021-03-04 RX ADMIN — LIDOCAINE 1 PATCH: 4 CREAM TOPICAL at 20:15

## 2021-03-04 RX ADMIN — Medication 650 MILLIGRAM(S): at 11:49

## 2021-03-04 RX ADMIN — DIVALPROEX SODIUM 500 MILLIGRAM(S): 500 TABLET, DELAYED RELEASE ORAL at 05:31

## 2021-03-04 RX ADMIN — DIVALPROEX SODIUM 500 MILLIGRAM(S): 500 TABLET, DELAYED RELEASE ORAL at 11:50

## 2021-03-04 RX ADMIN — BUDESONIDE AND FORMOTEROL FUMARATE DIHYDRATE 2 PUFF(S): 160; 4.5 AEROSOL RESPIRATORY (INHALATION) at 11:50

## 2021-03-04 RX ADMIN — Medication 650 MILLIGRAM(S): at 17:16

## 2021-03-04 RX ADMIN — SEVELAMER CARBONATE 800 MILLIGRAM(S): 2400 POWDER, FOR SUSPENSION ORAL at 17:16

## 2021-03-04 RX ADMIN — Medication 25 MILLIGRAM(S): at 05:31

## 2021-03-04 NOTE — PROGRESS NOTE ADULT - PROBLEM SELECTOR PLAN 4
Mild hyperkalemia (3/2)- potassium 5.4 S/P Lokelma 10g PO x1.   Repeat potassium 4.9  Monitor electrolytes

## 2021-03-04 NOTE — DISCHARGE NOTE PROVIDER - NSDCMRMEDTOKEN_GEN_ALL_CORE_FT
Symbicort 80 mcg-4.5 mcg/inh inhalation aerosol: 2 puff(s) inhaled 2 times a day   dilTIAZem 120 mg/24 hours oral capsule, extended release: 1 cap(s) orally once a day  hydrALAZINE 25 mg oral tablet: 1 tab(s) orally 3 times a day  Lipitor 40 mg oral tablet: 1 tab(s) orally once a day (at bedtime)  predniSONE 20 mg oral tablet: 3 tab(s) orally once a day   Protonix 40 mg oral delayed release tablet: 1 tab(s) orally once a day (before a meal)  Symbicort 80 mcg-4.5 mcg/inh inhalation aerosol: 2 puff(s) inhaled 2 times a day  Tylenol Regular Strength 325 mg oral tablet: 2 tab(s) orally prn, As Needed -Temp greater or equal to 38C (100.4F), Mild Pain (1 - 3) MDD:2000mg   dilTIAZem 120 mg/24 hours oral capsule, extended release: 1 cap(s) orally once a day  hydrALAZINE 25 mg oral tablet: 1 tab(s) orally 3 times a day  Lipitor 40 mg oral tablet: 1 tab(s) orally once a day (at bedtime)  predniSONE 20 mg oral tablet: 3 tab(s) orally once a day   Protonix 40 mg oral delayed release tablet: 1 tab(s) orally once a day (before a meal)  sevelamer carbonate 800 mg oral tablet: 1 tab(s) orally 3 times a day (with meals)  sodium bicarbonate 650 mg oral tablet: 2 tab(s) orally 2 times a day  Symbicort 80 mcg-4.5 mcg/inh inhalation aerosol: 2 puff(s) inhaled 2 times a day  Tylenol Regular Strength 325 mg oral tablet: 2 tab(s) orally prn, As Needed -Temp greater or equal to 38C (100.4F), Mild Pain (1 - 3) MDD:2000mg

## 2021-03-04 NOTE — DISCHARGE NOTE PROVIDER - HOSPITAL COURSE
26 yo male with PMH of asthma presented to his PCP with complaints of headache, nausea and vomiting x 5 days. Pt was noted to have elevated SBP of >170 and was advised to come to emergency department. No family hx of Polycystic kidney disease, CVA, HTN. He was admitted for CIARRA with a creatinine of 6 and hypertensive emergency with a BP of 185/114.     In the ED the patients BP was 185/114. He was given amlodipine and hydralazine. He was continued on amlodipine 5 which was increased to 10 and hydralazine 10 which was increased to 25 with marked improvement in his BP. Amlodipine was switched to Cardizem for episodes of paroxysmal atrial tachycardia. Cardiology was consulted (Dr. Peres). TTE showed mild MR and mild LVH. He was worked up for causes of secondary hypertension. Urine metanephrine and VMA were negative and CT A/P showed no adrenal pathology, making the diagnosis of pheochromocytoma unlikely. Serum Aldosterone and renin were obtained which showed aldosterone of 27.5, direct renin 4.9, renin activity 0.577 with an aldosterone : renin ratio of 47.66. Endocrine was consulted. Repeat aldosterone was 11.3. ? postural change. Endocrine recommended outpatient workup to repeat aldosterone/renin and metanephrine.     For his ARF he was noted to have a creatinine of 6 on admission. Baseline creatinine in 2018 was 1.29. UA showed moderate blood, 500 protein. FeNa: 3.3%. Nephrology was consulted (Dr. Massey). Normal C3, C4, SIFE neg, K/L 1.7, Syphillis neg, neg ASO, neg ANCA, neg ELMA, neg dsDNA, neg HIV, neg HepBsAg, and neg Hep C ab, neg anti-GBM  Immunofixation negative for monoclonal bands, YASH Kappa 7.38 (elevated), YASH Lambda 4.33 (elevated). CT A/P was negative for any obstructive uropathy or acute findings.  Renal US showed bilateral hyperechoic kidneys. The patient underwent a renal biopsy with IR. pending results. During his stay his creatinine remained elevated without signs and symptoms of uremia or fluid overload, hemodialysis was not initiated.    For CKD the patient prior records were obtained from 2018 which showed a baseline creatinine of 1.29 and a UA with positive blood, protein and hyaline casts. PTHi was elevated at 99. The patient was started on a low phos diet. Nephrology was following.     The patient was also noted to have metabolic acidosis secondary to acute renal failure. on admission Bicarb was 19 which trended down to 17 and back up to 20. The patient was started on sodium bicarb 650 mg oral daily. Bicarb remained around 20.    During hospital course the patient complained of severe headaches. He was started on Dilaudid 0.1mg (renally dosed for ARF) for persistent 8/10 frontal/bitemporal headaches with Tylenol. CTH on admission was negative. Repeat CTH was done for acute severe headaches which also showed no acute findings. Neurology was consulted. They recommended a cocktail of Depakote, Reglan, and Benadryl Q8 for 3-5 days as needed. Dilaudid was discontinued. The patient headaches improved.    On admission the patient was noted to have multiple runs of asymptomatic sinus tachycardia. Electrophysiology was consulted, (Dr Swan) and recommended against beta blockers due to potential side effects. The patient was started on Cardizem and Amlodipine was discontinued. The patient denied further palpitations while on Cardizem.      For the patients medical history of asthma, pulmonology was consulted. He continued Albuterol Q6 hours prn and Budesonide /Formoterol 2 puffs twice daily with pulmonary oral hygiene. The patients O2 saturations remained >96% and the patient did not experience any acute asthma exacerbations during his hospital stay. 24 yo male with PMH of asthma presented to his PCP with complaints of headache, nausea and vomiting x 5 days. Pt was noted to have elevated SBP of >170 and was advised to come to emergency department. No family hx of Polycystic kidney disease, CVA, HTN. He was admitted for CIARRA with a creatinine of 6 and hypertensive emergency with a BP of 185/114.     In the ED the patients BP was 185/114. He was given amlodipine and hydralazine. He was continued on amlodipine 5 which was increased to 10 and hydralazine 10 which was increased to 25 with marked improvement in his BP. Amlodipine was switched to Cardizem for episodes of paroxysmal atrial tachycardia. Cardiology was consulted (Dr. Peres). TTE showed mild MR and mild LVH. He was worked up for causes of secondary hypertension. Urine metanephrine and VMA were negative and CT A/P showed no adrenal pathology, making the diagnosis of pheochromocytoma unlikely. Serum Aldosterone and renin were obtained which showed aldosterone of 27.5, direct renin 4.9, renin activity 0.577 with an aldosterone : renin ratio of 47.66. Endocrine was consulted. Repeat aldosterone was 11.3. ? postural change. Endocrine recommended outpatient workup to repeat aldosterone/renin and metanephrine.     For his ARF he was noted to have a creatinine of 6 on admission. Baseline creatinine in 2018 was 1.29. UA showed moderate blood, 500 protein. FeNa: 3.3%. Nephrology was consulted (Dr. Massey). Normal C3, C4, SIFE neg, K/L 1.7, Syphillis neg, neg ASO, neg ANCA, neg ELMA, neg dsDNA, neg HIV, neg HepBsAg, and neg Hep C ab, neg anti-GBM  Immunofixation negative for monoclonal bands, YASH Kappa 7.38 (elevated), YASH Lambda 4.33 (elevated). CT A/P was negative for any obstructive uropathy or acute findings.  Renal US showed bilateral hyperechoic kidneys. The patient underwent a renal biopsy with IR. Prelim results showed IgA Nephropathy, treated with 3 days of solumedrol 1000mg IVPB. Will discharge on prednisone 60g qd. Will f/u as o/p with Dr. Massey. During his stay his creatinine remained elevated without signs and symptoms of uremia or fluid overload, hemodialysis was not initiated.    For CKD the patient prior records were obtained from 2018 which showed a baseline creatinine of 1.29 and a UA with positive blood, protein and hyaline casts. PTHi was elevated at 99. The patient was started on a low phos diet. Nephrology was following.     The patient was also noted to have metabolic acidosis secondary to acute renal failure. on admission Bicarb was 19 which trended down to 17 and back up to 20. The patient was started on sodium bicarb 650 mg oral daily. Bicarb remained around 20.    During hospital course the patient complained of severe headaches. He was started on Dilaudid 0.1mg (renally dosed for ARF) for persistent 8/10 frontal/bitemporal headaches with Tylenol. CTH on admission was negative. Repeat CTH was done for acute severe headaches which also showed no acute findings. Neurology was consulted. They recommended a cocktail of Depakote, Reglan, and Benadryl Q8 for 3-5 days as needed. Dilaudid was discontinued. The patient headaches improved.    On admission the patient was noted to have multiple runs of asymptomatic sinus tachycardia. Electrophysiology was consulted, (Dr Swan) and recommended against beta blockers due to potential side effects. The patient was started on Cardizem and Amlodipine was discontinued. The patient denied further palpitations while on Cardizem.      For the patients medical history of asthma, pulmonology was consulted. He continued Albuterol Q6 hours prn and Budesonide /Formoterol 2 puffs twice daily with pulmonary oral hygiene. The patients O2 saturations remained >96% and the patient did not experience any acute asthma exacerbations during his hospital stay.    Patient is stable for discharge per attending and is advised to follow up with PCP as outpatient  Please refer to patient's complete medical chart with documents for a full hospital course, for this is only a brief summary.

## 2021-03-04 NOTE — DISCHARGE NOTE PROVIDER - NSDCCAREPROVSEEN_GEN_ALL_CORE_FT
Christopher, Sylvain Massey, Beth Peres, Yehuda Mclain, Lexy Swan, Enzo iSms, Ainsley Vaughn, Castillo Daley

## 2021-03-04 NOTE — PROGRESS NOTE ADULT - PROBLEM SELECTOR PLAN 2
Presented with CIARRA and Cr 6  Baseline creatinine is 1.29 (2018)  Urine protein/creatinine: 3.3 (2/22)  Repeat urine protein/creatinine: 3.8 (2/26)   CT abdomen showed no findings of obstructive uropathy or other findings.  Renal US showed hyperechoic kidneys bilaterally   FeNa: 3.3%  Urine tox was negative urine eosinophils negative  Pheochromocytoma unlikely- CT a/p showed normal adrenals, urine metanephrine negative, pending serum metanephrine   Normal C3, C4, SIFE neg, K/L 1.7, Syphillis neg, neg ASO, neg ANCA, neg ELMA, neg dsDNA, neg HIV, neg HepBsAg, and neg Hep C ab, neg anti-GBM  Immunofixation negative for monoclonal bands   YASH Kappa 7.38 (elevated) YASH Lambda 4.33 (elevated)  Pending PLA2R antibody   Renal biopsy today with IR.   Give DDAVP 22 mcg IV x1 30 mins prior to IR procedure.  Creatinine increasing 5.35 > 5.87 > 6.14 > 6.54 > 6.70 (3/3)    Nephro Dr. Massey following, f/u recommendations

## 2021-03-04 NOTE — PROGRESS NOTE ADULT - SUBJECTIVE AND OBJECTIVE BOX
EP     HISTORY OF PRESENT ILLNESS: HPI:  26 yo male with PMH of asthma presented to his PCP with complaints of headache, nausea and vomiting x 5 days. Pt was noted to have elevated sBP of >170 and was advised to come to emergency department. Patient states he started having headaches 5 days ago associated with nausea and 1 day hx of NBNB vomiting. Pt endorses episode of dysuria few days ago which has now resolved. Pt denies head trauma, any prior episodes of similar headaches. No family hx of Polycystic kidney disease, CVA, HTN. Denies diarrhea, abdominal pain, fever, chills, recent viral infection, no blood in urine, no flank pain, no urinary rentention (22 Feb 2021 20:11)    States he has prior history of lightheadedness when getting up too quickly, and has fainted in a hot kitchen before, but no symptoms related to exercise intolerance.  He is not aware of his heartbeat intermittently going faster in the bed and is not experiencing palpitations or chest discomfort.  No personal or family history of arrhythmia.  Has Asthma, diagnosed by a pulmonologist > 1 yr ago, and has been better/stable on Symbicort.  A 10 pt ROS is otherwise negative.    3/1- still has headache and some lightheadedness, but no SOB angina or palpitations.  sinus tachy during ambulation in his room.  3/2 - feeling OK, no angina, no shortness of breath.  tachycardic during ambulation in room, but no sudden on/off on telemetry to suggest ectopic rhythm.  3/3- uneventful overnight.  no angina, orthopnea or palpitations. awaiting renal biopsy today.  3/4- feeling well, resting comfortably.  no new complaints.    acetaminophen   Tablet .. 650 milliGRAM(s) Oral every 6 hours PRN  ALBUTerol    90 MICROgram(s) HFA Inhaler 2 Puff(s) Inhalation every 6 hours PRN  atorvastatin 40 milliGRAM(s) Oral at bedtime  budesonide  80 MICROgram(s)/formoterol 4.5 MICROgram(s) Inhaler 2 Puff(s) Inhalation two times a day  diltiazem    milliGRAM(s) Oral daily  diVALproex  milliGRAM(s) Oral <User Schedule>  hydrALAZINE 25 milliGRAM(s) Oral three times a day  lidocaine   Patch 1 Patch Transdermal daily  methylPREDNISolone sodium succinate IVPB 1000 milliGRAM(s) IV Intermittent daily  metoclopramide Injectable 5 milliGRAM(s) IV Push every 8 hours PRN  ondansetron Injectable 5 milliGRAM(s) IV Push every 6 hours PRN  sodium bicarbonate 650 milliGRAM(s) Oral daily  sodium chloride 0.9%. 1000 milliLiter(s) IV Continuous <Continuous>                            10.1   7.52  )-----------( 228      ( 04 Mar 2021 08:17 )             30.3       03-04    136  |  105  |  57<H>  ----------------------------<  93  4.4   |  20<L>  |  6.61<H>    Ca    8.9      04 Mar 2021 08:17  Phos  5.7     03-04  Mg     2.3     03-04    TPro  6.6  /  Alb  3.2<L>  /  TBili  0.4  /  DBili  x   /  AST  21  /  ALT  41  /  AlkPhos  80  03-04    T(C): 36.6 (03-04-21 @ 11:21), Max: 37.4 (03-03-21 @ 23:37)  HR: 97 (03-04-21 @ 11:21) (83 - 97)  BP: 120/61 (03-04-21 @ 11:21) (120/61 - 149/92)  RR: 16 (03-04-21 @ 11:21) (12 - 18)  SpO2: 98% (03-04-21 @ 11:21) (96% - 100%)  Wt(kg): --    I&O's Summary    03 Mar 2021 07:01  -  04 Mar 2021 07:00  --------------------------------------------------------  IN: 0 mL / OUT: 300 mL / NET: -300 mL      General: Well nourished, no acute distress, alert and oriented x 3  Head: normocephalic, no trauma  Neck: no JVD, no bruit, supple, not enlarged  CV: S1S2, no S3, regular rate, rhythm is SINUS, no murmurs.    Lungs: clear BL, no rales or wheezes  Abdomen: bowel sounds +, soft, nontender, nondistended  Extremities: no clubbing, cyanosis or edema  Neuro: Moves all 4 extremities, sensation intact x 4 extremities  Skin: warm and moist, normal turgor  Psych: Mood and affect are appropriate for circumstances  MSK: normal range of motion and strength x4 extremities.    TELEMETRY: NSR, sinus tachy, episodes of sinus tachy, 130-150bpm when walking in his room.  ECG: NSR  Echo: EF 55%, mild LVH	  	  ASSESSMENT/PLAN: 	25y Male with hypertensive urgency, nephrotic-range proteinuria and newly diagnosed acute renal failure.  Having asymptomatic episodes of SVT on telemetry that are of short duration and self-terminating.    Monitor on telemetry.  Does not need beta blockers at this point, and I would generally avoid these in a young man due to potential for sexual dysfunction at significant doses.  Awaiting kidney biopsy results.  If no sustained arrhythmia on telemetry, recommend ambulatory cardiac monitoring.     Enzo Swan M.D.  Cardiac Electrophysiology  [phone]

## 2021-03-04 NOTE — DISCHARGE NOTE PROVIDER - NSDCCPCAREPLAN_GEN_ALL_CORE_FT
PRINCIPAL DISCHARGE DIAGNOSIS  Diagnosis: CIARRA (acute kidney injury)  Assessment and Plan of Treatment: You presented with headaches nausea and vomiting and on admission you were noted to be in acute renal failure with a creatinine of 6.17. We did a urinalysis which showed moderate blood and 500 protein. Your FeNa was 3.3%, suggesting intrinsic renal disease. Nephrology was consulted (Dr. Massey). We did a full workup which showed normal C3 and C4, SIFE negative, K/L 1.7, Syphillis negative, ASO nagative, ANCA negative, ELMA negative, dsDNA negative, HIV negative, HepBsAg negative, Hep C ab negative, anti-glomerular basement antibody negative. Immunofixation was negative for monoclonal bands, YASH Kappa 7.38 (elevated), YASH Lambda 4.33 (elevated). CT of your abdomen and pelvis was negative for any obstructive uropathy or acute findings.  Renal ultrasound showed bilateral hyperechoic kidneys. We could not determine the exact cause of your renal failure after an extensive workup so you underwent a renal biopsy to assist us in determining the cause and extent of damage. Evaluation of biopsy is still in process. During your stay your creatinine remained elevated without signs and symptoms of uremia or fluid overload. Nephrology did not recommend hemodialysis during your stay.      SECONDARY DISCHARGE DIAGNOSES  Diagnosis: HTN (hypertension)  Assessment and Plan of Treatment: On admission your blood pressure was noted to be high at 185/114. Cardiology was consulted. You were started on Amlodipine and Hydralazine with marked improvement in your blood pressure. Amlodipine was switched to Cardizem for episodes of paroxysmal atrial tachycardia on telemetry monitor and palpitations. Transthoracic echocardiogram showed mild mitral redurgitation and mild left ventricular hypertrophy. You were worked up for causes of hypertension. Your urine levels of metanephrine and VMA were negative and CT abdomen and pelvis showed no adrenal pathology, making the diagnosis of pheochromocytoma unlikely. Serum Aldosterone and renin were obtained which showed aldosterone of 27.2, direct renin 4.9, renin activity 0.577 with an aldosterone : renin ratio of 47.66. Endocrine was consulted for a workup of hyperaldosteronism. Repeat aldosterone was 11.3 down from 27.2. Endocrinology recommended outpatient workup to repeat aldosterone/renin and metanephrine levels.    Diagnosis: Metabolic acidemia, unspecified  Assessment and Plan of Treatment: On admission you were noted to have metabolic acidosis due to acute renal failure. Your bicarbonate level was 19 which trended down to 17 and back up to 20. You were started on sodium bicarbonate 650mg PO daily. Your bicarbonate remained at 20 and did not decrease further. Please follow up with nephrologist Dr. Massey. Please take you medications as prescribed.      Diagnosis: SVT (supraventricular tachycardia)  Assessment and Plan of Treatment: During your hospital stay you were noted to have multiple runs of sinus tachycardia that were initally asymptomatic however later you stated you felt like your heart was beating hard. Electrophysiology was consulted, (Dr Swan) and cardiology was consulted. They recommended against starting a beta blockers due to potential side effects in a young male your age. You were instead started on Cardizem and Amlodipine was discontinued. Your complaints of a pounding heart improved while on Cardizem.         Diagnosis: Headache  Assessment and Plan of Treatment: During your hospital stay you were complaining of severe headaches. Your pain was not improved with Tylenol so you were started on Dilaudid 0.1mg for persistent 8/10 frontal headaches that wrapped around your head. You had a CT head on admission which was negative. we obtained a repeat CT head after your headaches started which also showed no acute findings. Neurology was consulted. Dilaudid was stopped. You were given Depakote, Reglan, and Benadryl every 8 hours as needed for your headaches for 3-5 days and the medications were stopped. Your headaches improved.    Diagnosis: Asthma  Assessment and Plan of Treatment: You presented with a medical history of asthma. Pulmonology was consulted. You continued Albuterol every 6 hours as needed and Budesonide /Formoterol 2 puffs twice daily. Your oxygen saturations remained >96% and you did not experience any acute asthma exacerbations during your hospital stay. Please continue to take your medications as prescribed.     PRINCIPAL DISCHARGE DIAGNOSIS  Diagnosis: Acute renal failure (ARF)  Assessment and Plan of Treatment: You presented with headaches nausea and vomiting and on admission you were noted to be in acute renal failure with a creatinine of 6.17. We did a urinalysis which showed moderate blood and 500 protein. Your FeNa was 3.3%, suggesting intrinsic renal disease. Nephrology was consulted (Dr. Massey). We did a full workup which showed normal C3 and C4, SIFE negative, K/L 1.7, Syphillis negative, ASO nagative, ANCA negative, ELMA negative, dsDNA negative, HIV negative, HepBsAg negative, Hep C ab negative, anti-glomerular basement antibody negative. Immunofixation was negative for monoclonal bands, YASH Kappa 7.38 (elevated), YASH Lambda 4.33 (elevated). CT of your abdomen and pelvis was negative for any obstructive uropathy or acute findings.  Renal ultrasound showed bilateral hyperechoic kidneys. We could not determine the exact cause of your renal failure after an extensive workup so you underwent a renal biopsy to assist us in determining the cause and extent of damage. Evaluation of biopsy is still in process. During your stay your creatinine remained elevated without signs and symptoms of uremia or fluid overload. Nephrology did not recommend hemodialysis during your stay. Kindly follow up with Nephrologist Dr. Massey on 3/16 at 1 pm in her office.      SECONDARY DISCHARGE DIAGNOSES  Diagnosis: Hypertensive emergency  Assessment and Plan of Treatment: On admission your blood pressure was noted to be high at 185/114. Cardiology was consulted. You were started on Amlodipine and Hydralazine with marked improvement in your blood pressure. Amlodipine was switched to Cardizem for episodes of paroxysmal atrial tachycardia on telemetry monitor and palpitations. Transthoracic echocardiogram showed mild mitral redurgitation and mild left ventricular hypertrophy. You were worked up for causes of hypertension. Your urine levels of metanephrine and VMA were negative and CT abdomen and pelvis showed no adrenal pathology, making the diagnosis of pheochromocytoma unlikely. Serum Aldosterone and renin were obtained which showed aldosterone of 27.2, direct renin 4.9, renin activity 0.577 with an aldosterone : renin ratio of 47.66. Endocrine was consulted for a workup of hyperaldosteronism. Repeat aldosterone was 11.3 down from 27.2. Endocrinology recommended outpatient workup to repeat aldosterone/renin and metanephrine levels. Kindly follow up with Endocrinologist Dr. Mclain and your primary care doctor on discharge in a week or two weeks.    Diagnosis: Headache  Assessment and Plan of Treatment: During your hospital stay you were complaining of severe headaches. Your pain was not improved with Tylenol so you were started on Dilaudid 0.1mg for persistent 8/10 frontal headaches that wrapped around your head. You had a CT head on admission which was negative. we obtained a repeat CT head after your headaches started which also showed no acute findings. Neurology was consulted. Dilaudid was stopped. You were given Depakote, Reglan, and Benadryl every 8 hours as needed for your headaches for 3-5 days and the medications were stopped. Your headaches improved.    Diagnosis: SVT (supraventricular tachycardia)  Assessment and Plan of Treatment: During your hospital stay you were noted to have multiple runs of sinus tachycardia that were initally asymptomatic however later you stated you felt like your heart was beating hard. Electrophysiology was consulted, (Dr Swan) and cardiology was consulted. They recommended against starting a beta blockers due to potential side effects in a young male your age. You were instead started on Cardizem and Amlodipine was discontinued. Your complaints of a pounding heart improved while on Cardizem. You will need to take these medications on discharge and follow up with cardiologist Dr. Swan in a week or two weeks.      Diagnosis: Asthma  Assessment and Plan of Treatment: You presented with a medical history of asthma. Pulmonology was consulted. You continued Albuterol every 6 hours as needed and Budesonide /Formoterol 2 puffs twice daily. Your oxygen saturations remained >96% and you did not experience any acute asthma exacerbations during your hospital stay. Please continue to take your medications as prescribed.    Diagnosis: Metabolic acidemia, unspecified  Assessment and Plan of Treatment: On admission you were noted to have metabolic acidosis due to acute renal failure. Your bicarbonate level was 19 which trended down to 17 and back up to 20. You were started on sodium bicarbonate 650mg PO daily. Your bicarbonate remained at 20 and did not decrease further. Please follow up with nephrologist Dr. Massey. Please take you medications as prescribed.       PRINCIPAL DISCHARGE DIAGNOSIS  Diagnosis: Acute renal failure (ARF)  Assessment and Plan of Treatment: You presented with headaches nausea and vomiting and on admission you were noted to be in acute renal failure with a creatinine of 6.17. We did a urinalysis which showed moderate blood and 500 protein. Your FeNa was 3.3%, suggesting intrinsic renal disease. Nephrology was consulted (Dr. Massey). We did a full workup which showed normal C3 and C4, SIFE negative, K/L 1.7, Syphillis negative, ASO nagative, ANCA negative, ELMA negative, dsDNA negative, HIV negative, HepBsAg negative, Hep C ab negative, anti-glomerular basement antibody negative. Immunofixation was negative for monoclonal bands, YASH Kappa 7.38 (elevated), YASH Lambda 4.33 (elevated). CT of your abdomen and pelvis was negative for any obstructive uropathy or acute findings.  Renal ultrasound showed bilateral hyperechoic kidneys. During your stay your creatinine remained elevated without signs and symptoms of uremia or fluid overload. Nephrology did not recommend hemodialysis during your stay. We could not determine the exact cause of your renal failure after an extensive workup so you underwent a renal biopsy to assist us in determining the cause and extent of damage. Preliminary biopsy result showed IgA Nephropathy. You were treated with steroids of 3 days of solumedrol 1000mg and will be discharged on prednisone 60mg daily. Kindly follow up with Nephrologist Dr. Massey on 3/16 at 1 pm in her office.      SECONDARY DISCHARGE DIAGNOSES  Diagnosis: Hypertensive emergency  Assessment and Plan of Treatment: On admission your blood pressure was noted to be high at 185/114. Cardiology was consulted. You were started on Amlodipine and Hydralazine with marked improvement in your blood pressure. Amlodipine was switched to Cardizem for episodes of paroxysmal atrial tachycardia on telemetry monitor and palpitations. Transthoracic echocardiogram showed mild mitral redurgitation and mild left ventricular hypertrophy. You were worked up for causes of hypertension. Your urine levels of metanephrine and VMA were negative and CT abdomen and pelvis showed no adrenal pathology, making the diagnosis of pheochromocytoma unlikely. Serum Aldosterone and renin were obtained which showed aldosterone of 27.2, direct renin 4.9, renin activity 0.577 with an aldosterone : renin ratio of 47.66. Endocrine was consulted for a workup of hyperaldosteronism. Repeat aldosterone was 11.3 down from 27.2. Endocrinology recommended outpatient workup to repeat aldosterone/renin and metanephrine levels. Kindly follow up with Endocrinologist Dr. Mclain and your primary care doctor on discharge in a week or two weeks.    Diagnosis: Headache  Assessment and Plan of Treatment: During your hospital stay you were complaining of severe headaches. Your pain was not improved with Tylenol so you were started on Dilaudid 0.1mg for persistent 8/10 frontal headaches that wrapped around your head. You had a CT head on admission which was negative. we obtained a repeat CT head after your headaches started which also showed no acute findings. Neurology was consulted. Dilaudid was stopped. You were given Depakote, Reglan, and Benadryl every 8 hours as needed for your headaches for 3-5 days and the medications were stopped. Your headaches improved.    Diagnosis: SVT (supraventricular tachycardia)  Assessment and Plan of Treatment: During your hospital stay you were noted to have multiple runs of sinus tachycardia that were initally asymptomatic however later you stated you felt like your heart was beating hard. Electrophysiology was consulted, (Dr Swan) and cardiology was consulted. They recommended against starting a beta blockers due to potential side effects in a young male your age. You were instead started on Cardizem and Amlodipine was discontinued. Your complaints of a pounding heart improved while on Cardizem. You will need to take these medications on discharge and follow up with cardiologist Dr. Swan in a week or two weeks.      Diagnosis: Asthma  Assessment and Plan of Treatment: You presented with a medical history of asthma. Pulmonology was consulted. You continued Albuterol every 6 hours as needed and Budesonide /Formoterol 2 puffs twice daily. Your oxygen saturations remained >96% and you did not experience any acute asthma exacerbations during your hospital stay. Please continue to take your medications as prescribed.    Diagnosis: Metabolic acidemia, unspecified  Assessment and Plan of Treatment: On admission you were noted to have metabolic acidosis due to acute renal failure. Your bicarbonate level was 19 which trended down to 17 and back up to 20. You were started on sodium bicarbonate 650mg PO daily. Your bicarbonate remained at 20 and did not decrease further. Please follow up with nephrologist Dr. Massey. Please take you medications as prescribed.       PRINCIPAL DISCHARGE DIAGNOSIS  Diagnosis: Acute renal failure (ARF)  Assessment and Plan of Treatment: You presented with headaches nausea and vomiting and on admission you were noted to be in acute renal failure with a creatinine of 6.17. We did a urinalysis which showed moderate blood and 500 protein. Your FeNa was 3.3%, suggesting intrinsic renal disease. Nephrology was consulted (Dr. Massey). We did a full workup which showed normal C3 and C4, SIFE negative, K/L 1.7, Syphillis negative, ASO nagative, ANCA negative, ELMA negative, dsDNA negative, HIV negative, HepBsAg negative, Hep C ab negative, anti-glomerular basement antibody negative. Immunofixation was negative for monoclonal bands, YASH Kappa 7.38 (elevated), YASH Lambda 4.33 (elevated). CT of your abdomen and pelvis was negative for any obstructive uropathy or acute findings.  Renal ultrasound showed bilateral hyperechoic kidneys. During your stay your creatinine remained elevated without signs and symptoms of uremia or fluid overload. Nephrology did not recommend hemodialysis during your stay. We could not determine the exact cause of your renal failure after an extensive workup so you underwent a renal biopsy to assist us in determining the cause and extent of damage. Preliminary biopsy result showed IgA Nephropathy. You were treated with steroids of 3 days of solumedrol 1000mg and will be discharged on prednisone 60mg daily. Kindly follow up with Nephrologist Dr. Massey on 3/16 at 1 pm in her office. Continue to take Sodium bicarb 1300mg twice daily and Renvela 800mg thrice daily .      SECONDARY DISCHARGE DIAGNOSES  Diagnosis: Hypertensive emergency  Assessment and Plan of Treatment: On admission your blood pressure was noted to be high at 185/114. Cardiology was consulted. You were started on Amlodipine and Hydralazine with marked improvement in your blood pressure. Amlodipine was switched to Cardizem for episodes of paroxysmal atrial tachycardia on telemetry monitor and palpitations. Transthoracic echocardiogram showed mild mitral redurgitation and mild left ventricular hypertrophy. You were worked up for causes of hypertension. Your urine levels of metanephrine and VMA were negative and CT abdomen and pelvis showed no adrenal pathology, making the diagnosis of pheochromocytoma unlikely. Serum Aldosterone and renin were obtained which showed aldosterone of 27.2, direct renin 4.9, renin activity 0.577 with an aldosterone : renin ratio of 47.66. Endocrine was consulted for a workup of hyperaldosteronism. Repeat aldosterone was 11.3 down from 27.2. Endocrinology recommended outpatient workup to repeat aldosterone/renin and metanephrine levels. Kindly follow up with Endocrinologist Dr. Mclain and your primary care doctor on discharge in a week or two weeks.    Diagnosis: Headache  Assessment and Plan of Treatment: During your hospital stay you were complaining of severe headaches. Your pain was not improved with Tylenol so you were started on Dilaudid 0.1mg for persistent 8/10 frontal headaches that wrapped around your head. You had a CT head on admission which was negative. we obtained a repeat CT head after your headaches started which also showed no acute findings. Neurology was consulted. Dilaudid was stopped. You were given Depakote, Reglan, and Benadryl every 8 hours as needed for your headaches for 3-5 days and the medications were stopped. Your headaches improved.    Diagnosis: SVT (supraventricular tachycardia)  Assessment and Plan of Treatment: During your hospital stay you were noted to have multiple runs of sinus tachycardia that were initally asymptomatic however later you stated you felt like your heart was beating hard. Electrophysiology was consulted, (Dr Swan) and cardiology was consulted. They recommended against starting a beta blockers due to potential side effects in a young male your age. You were instead started on Cardizem and Amlodipine was discontinued. Your complaints of a pounding heart improved while on Cardizem. You will need to take these medications on discharge and follow up with cardiologist Dr. Swan in a week or two weeks.      Diagnosis: Asthma  Assessment and Plan of Treatment: You presented with a medical history of asthma. Pulmonology was consulted. You continued Albuterol every 6 hours as needed and Budesonide /Formoterol 2 puffs twice daily. Your oxygen saturations remained >96% and you did not experience any acute asthma exacerbations during your hospital stay. Please continue to take your medications as prescribed.    Diagnosis: Metabolic acidemia, unspecified  Assessment and Plan of Treatment: On admission you were noted to have metabolic acidosis due to acute renal failure. Your bicarbonate level was 19 which trended down to 17 and back up to 20. You were started on sodium bicarbonate 650mg PO daily. Your bicarbonate remained at 20 and did not decrease further. Please follow up with nephrologist Dr. Massey. Please take you medications as prescribed.

## 2021-03-04 NOTE — PROGRESS NOTE ADULT - SUBJECTIVE AND OBJECTIVE BOX
PGY-1 Progress Note discussed with attending    PAGER #: [[phone]] TILL 5:00 PM  PLEASE CONTACT ON CALL TEAM:  - On Call Team (Please refer to Prasanna) FROM 5:00 PM - 8:30PM  - Nightfloat Team FROM 8:30 -7:30 AM    CHIEF COMPLAINT & BRIEF HOSPITAL COURSE:  24 yo male with PMH of asthma presented to his PCP with complaints of headache, nausea and vomiting x 5 days. Pt was noted to have elevated SBP of >170 and was advised to come to emergency department. No family hx of Polycystic kidney disease, CVA, HTN. He was admitted for CIARRA with a creatinine of 6 and hypertensive emergency with a BP of 185/114.   In the ED the patients BP was 185/114. He was given amlodipine and hydralazine. He was continued on amlodipine 5 which was increased to 10 and hydralazine 10 which was increased to 25 with marked improvement in his BP. Cardiology was consulted (Dr. Peres). TTE showed mild MR and mild LVH. He was worked up for causes of secondary hypertension. Urine metanephrine was negative and CT A/P showed no adrenal pathology, making the diagnosis of pheochromocytoma unlikely. Serum Aldosterone and renin were obtained which showed aldosterone of 27.5, direct renin 4.9, renin activity 0.577 with an aldosterone : renin ratio of 47.66. Endocrine was consulted. Repeat aldosterone was 11.3. ? postural change. Endocrine recommended outpatient workup to repeat aldosterone/renin and metanephrine.   For his ARF he was noted to have a creatinine of 6 on admission. UA showed moderate blood, 500 protein. FeNa: 3.3%. Nephrology was consulted (Dr. Massey). Normal C3, C4, SIFE neg, K/L 1.7, ELMA, ANCA, dsDNA, ASO, Treponema Pallidum ab and Hepatitis panel all negative. Immunofixation negative for monoclonal bands. CT A/P was negative for any obstructive uropathy or acute findings.  Renal US showed bilateral hyperechoic kidneys.  For CKD the patient prior records were obtained from 2018 which showed a baseline creatinine of 1.29 and a UA with positive blood, protein and hyaline casts. PTHi was elevated at 99. The patient was started on a low phos diet. Nephrology was following.   For the patient headache he was started on Dilaudid 0.1mg due to ARF and persistent 8/10 frontal/bitemporal headaches with Tylenol. CTH on admission was negative. Repeat CTH was done for acute severe headaches which also showed no acute findings. Neurology was consulted. They recommended a cocktail of depakote, reglan, and benadryl Q8 for 3-5 days as needed. Dilaudid was discontinued.   On admission the patient was noted to have multiple runs of asymptomatic sinus tachycardia. Electrophysiology was consulted, (Dr Swan) and recommended against beta blockers due to potential side effects. The patient was started on Cardizem and Amlodipine was discontinued     INTERVAL HPI/OVERNIGHT EVENTS: No acute events overnight. The patient had no complaints this morning. Renal biopsy done on 3/3. Prelims showed IgA nephropathy.     REVIEW OF SYSTEMS:  CONSTITUTIONAL: No fever, weight loss, or fatigue  RESPIRATORY: No cough, wheezing, chills or hemoptysis; No shortness of breath  CARDIOVASCULAR: No chest pain, palpitations, dizziness, or leg swelling  GASTROINTESTINAL: No abdominal pain. No nausea, vomiting, or hematemesis; No diarrhea or constipation. No melena or hematochezia.  GENITOURINARY: No dysuria or hematuria, urinary frequency  NEUROLOGICAL: No headaches, memory loss, loss of strength, numbness, or tremors  SKIN: No itching, burning, rashes, or lesions     Vital Signs Last 24 Hrs  T(C): 36.6 (04 Mar 2021 11:21), Max: 37.4 (03 Mar 2021 23:37)  T(F): 97.9 (04 Mar 2021 11:21), Max: 99.3 (03 Mar 2021 23:37)  HR: 97 (04 Mar 2021 11:21) (87 - 97)  BP: 120/61 (04 Mar 2021 11:21) (120/61 - 149/92)  BP(mean): --  RR: 16 (04 Mar 2021 11:21) (16 - 18)  SpO2: 98% (04 Mar 2021 11:21) (96% - 100%)    PHYSICAL EXAMINATION:  GENERAL: NAD, well built  HEAD:  Atraumatic, Normocephalic  EYES:  conjunctiva and sclera clear  NECK: Supple, No JVD, Normal thyroid  CHEST/LUNG: Clear to auscultation. Clear to percussion bilaterally; No rales, rhonchi, wheezing, or rubs  HEART: Regular rate and rhythm; No murmurs, rubs, or gallops  ABDOMEN: Soft, Nontender, Nondistended; Bowel sounds present  NERVOUS SYSTEM:  Alert & Oriented X3,    EXTREMITIES:  2+ Peripheral Pulses, No clubbing, cyanosis, or edema  SKIN: warm dry                          10.1   7.52  )-----------( 228      ( 04 Mar 2021 08:17 )             30.3     03-04    136  |  105  |  57<H>  ----------------------------<  93  4.4   |  20<L>  |  6.61<H>    Ca    8.9      04 Mar 2021 08:17  Phos  5.7     03-04  Mg     2.3     03-04    TPro  6.6  /  Alb  3.2<L>  /  TBili  0.4  /  DBili  x   /  AST  21  /  ALT  41  /  AlkPhos  80  03-04    LIVER FUNCTIONS - ( 04 Mar 2021 08:17 )  Alb: 3.2 g/dL / Pro: 6.6 g/dL / ALK PHOS: 80 U/L / ALT: 41 U/L DA / AST: 21 U/L / GGT: x           PT/INR - ( 03 Mar 2021 08:49 )   PT: 13.4 sec;   INR: 1.13 ratio         PTT - ( 03 Mar 2021 08:49 )  PTT:32.9 sec    CAPILLARY BLOOD GLUCOSE    RADIOLOGY & ADDITIONAL TESTS: PGY-1 Progress Note discussed with attending    PAGER #: [[phone]] TILL 5:00 PM  PLEASE CONTACT ON CALL TEAM:  - On Call Team (Please refer to Prasanna) FROM 5:00 PM - 8:30PM  - Nightfloat Team FROM 8:30 -7:30 AM    CHIEF COMPLAINT & BRIEF HOSPITAL COURSE:  24 yo male with PMH of asthma presented to his PCP with complaints of headache, nausea and vomiting x 5 days. Pt was noted to have elevated SBP of >170 and was advised to come to emergency department. No family hx of Polycystic kidney disease, CVA, HTN. He was admitted for CIARRA with a creatinine of 6 and hypertensive emergency with a BP of 185/114.   In the ED the patients BP was 185/114. He was given amlodipine and hydralazine. He was continued on amlodipine 5 which was increased to 10 and hydralazine 10 which was increased to 25 with marked improvement in his BP. Cardiology was consulted (Dr. Peres). TTE showed mild MR and mild LVH. He was worked up for causes of secondary hypertension. Urine metanephrine was negative and CT A/P showed no adrenal pathology, making the diagnosis of pheochromocytoma unlikely. Serum Aldosterone and renin were obtained which showed aldosterone of 27.5, direct renin 4.9, renin activity 0.577 with an aldosterone : renin ratio of 47.66. Endocrine was consulted. Repeat aldosterone was 11.3. ? postural change. Endocrine recommended outpatient workup to repeat aldosterone/renin and metanephrine.   For his ARF he was noted to have a creatinine of 6 on admission. UA showed moderate blood, 500 protein. FeNa: 3.3%. Nephrology was consulted (Dr. Massey). Normal C3, C4, SIFE neg, K/L 1.7, ELMA, ANCA, dsDNA, ASO, Treponema Pallidum ab and Hepatitis panel all negative. Immunofixation negative for monoclonal bands. CT A/P was negative for any obstructive uropathy or acute findings.  Renal US showed bilateral hyperechoic kidneys.  For CKD the patient prior records were obtained from 2018 which showed a baseline creatinine of 1.29 and a UA with positive blood, protein and hyaline casts. PTHi was elevated at 99. The patient was started on a low phos diet. Nephrology was following.   For the patient headache he was started on Dilaudid 0.1mg due to ARF and persistent 8/10 frontal/bitemporal headaches with Tylenol. CTH on admission was negative. Repeat CTH was done for acute severe headaches which also showed no acute findings. Neurology was consulted. They recommended a cocktail of depakote, reglan, and benadryl Q8 for 3-5 days as needed. Dilaudid was discontinued.   On admission the patient was noted to have multiple runs of asymptomatic sinus tachycardia. Electrophysiology was consulted, (Dr Swan) and recommended against beta blockers due to potential side effects. The patient was started on Cardizem and Amlodipine was discontinued     INTERVAL HPI/OVERNIGHT EVENTS: No acute events overnight. The patient had no complaints this morning. Renal biopsy done on 3/3. Prelims showed IgA nephropathy. Started on solumedrol IVPB 1000 for 3 days. Nephro Dr. Massey followed.    REVIEW OF SYSTEMS:  CONSTITUTIONAL: No fever, weight loss, or fatigue  RESPIRATORY: No cough, wheezing, chills or hemoptysis; No shortness of breath  CARDIOVASCULAR: No chest pain, palpitations, dizziness, or leg swelling  GASTROINTESTINAL: No abdominal pain. No nausea, vomiting, or hematemesis; No diarrhea or constipation. No melena or hematochezia.  GENITOURINARY: No dysuria or hematuria, urinary frequency  NEUROLOGICAL: No headaches, memory loss, loss of strength, numbness, or tremors  SKIN: No itching, burning, rashes, or lesions     Vital Signs Last 24 Hrs  T(C): 36.6 (04 Mar 2021 11:21), Max: 37.4 (03 Mar 2021 23:37)  T(F): 97.9 (04 Mar 2021 11:21), Max: 99.3 (03 Mar 2021 23:37)  HR: 97 (04 Mar 2021 11:21) (87 - 97)  BP: 120/61 (04 Mar 2021 11:21) (120/61 - 149/92)  BP(mean): --  RR: 16 (04 Mar 2021 11:21) (16 - 18)  SpO2: 98% (04 Mar 2021 11:21) (96% - 100%)    PHYSICAL EXAMINATION:  GENERAL: NAD, well built  HEAD:  Atraumatic, Normocephalic  EYES:  conjunctiva and sclera clear  NECK: Supple, No JVD, Normal thyroid  CHEST/LUNG: Clear to auscultation. Clear to percussion bilaterally; No rales, rhonchi, wheezing, or rubs  HEART: Regular rate and rhythm; No murmurs, rubs, or gallops  ABDOMEN: Soft, Nontender, Nondistended; Bowel sounds present  NERVOUS SYSTEM:  Alert & Oriented X3,    EXTREMITIES:  2+ Peripheral Pulses, No clubbing, cyanosis, or edema  SKIN: warm dry                          10.1   7.52  )-----------( 228      ( 04 Mar 2021 08:17 )             30.3     03-04    136  |  105  |  57<H>  ----------------------------<  93  4.4   |  20<L>  |  6.61<H>    Ca    8.9      04 Mar 2021 08:17  Phos  5.7     03-04  Mg     2.3     03-04    TPro  6.6  /  Alb  3.2<L>  /  TBili  0.4  /  DBili  x   /  AST  21  /  ALT  41  /  AlkPhos  80  03-04    LIVER FUNCTIONS - ( 04 Mar 2021 08:17 )  Alb: 3.2 g/dL / Pro: 6.6 g/dL / ALK PHOS: 80 U/L / ALT: 41 U/L DA / AST: 21 U/L / GGT: x           PT/INR - ( 03 Mar 2021 08:49 )   PT: 13.4 sec;   INR: 1.13 ratio         PTT - ( 03 Mar 2021 08:49 )  PTT:32.9 sec    CAPILLARY BLOOD GLUCOSE    RADIOLOGY & ADDITIONAL TESTS:

## 2021-03-04 NOTE — PROGRESS NOTE ADULT - SUBJECTIVE AND OBJECTIVE BOX
Time of Visit:  Patient seen and examined.     MEDICATIONS  (STANDING):  atorvastatin 40 milliGRAM(s) Oral at bedtime  budesonide  80 MICROgram(s)/formoterol 4.5 MICROgram(s) Inhaler 2 Puff(s) Inhalation two times a day  diltiazem    milliGRAM(s) Oral daily  diVALproex  milliGRAM(s) Oral <User Schedule>  hydrALAZINE 25 milliGRAM(s) Oral three times a day  lidocaine   Patch 1 Patch Transdermal daily  methylPREDNISolone sodium succinate IVPB 1000 milliGRAM(s) IV Intermittent daily  pantoprazole    Tablet 40 milliGRAM(s) Oral before breakfast  sevelamer carbonate 800 milliGRAM(s) Oral three times a day with meals  sodium bicarbonate 650 milliGRAM(s) Oral two times a day  sodium chloride 0.9%. 1000 milliLiter(s) (75 mL/Hr) IV Continuous <Continuous>      MEDICATIONS  (PRN):  acetaminophen   Tablet .. 650 milliGRAM(s) Oral every 6 hours PRN Temp greater or equal to 38C (100.4F), Mild Pain (1 - 3)  ALBUTerol    90 MICROgram(s) HFA Inhaler 2 Puff(s) Inhalation every 6 hours PRN Bronchospasm  metoclopramide Injectable 5 milliGRAM(s) IV Push every 8 hours PRN Severe headache  ondansetron Injectable 5 milliGRAM(s) IV Push every 6 hours PRN Nausea and/or Vomiting       Medications up to date at time of exam.      PHYSICAL EXAMINATION:  Patient has no new complaints.  GENERAL: The patient is a well-developed, well-nourished, in no apparent distress.     Vital Signs Last 24 Hrs  T(C): 36.4 (04 Mar 2021 16:08), Max: 37.4 (03 Mar 2021 23:37)  T(F): 97.6 (04 Mar 2021 16:08), Max: 99.3 (03 Mar 2021 23:37)  HR: 97 (04 Mar 2021 16:08) (87 - 97)  BP: 132/71 (04 Mar 2021 16:08) (120/61 - 146/67)  BP(mean): --  RR: 16 (04 Mar 2021 16:08) (16 - 18)  SpO2: 98% (04 Mar 2021 16:08) (96% - 99%)   (if applicable)    Chest Tube (if applicable)    HEENT: Head is normocephalic and atraumatic. Extraocular muscles are intact. Mucous membranes are moist.     NECK: Supple, no palpable adenopathy.    LUNGS: Clear to auscultation, no wheezing, rales, or rhonchi.    HEART: Regular rate and rhythm without murmur.    ABDOMEN: Soft, nontender, and nondistended.  No hepatosplenomegaly is noted.    : No painful voiding, no pelvic pain    EXTREMITIES: Without any cyanosis, clubbing, rash, lesions or edema.    NEUROLOGIC: Awake, alert, oriented, grossly intact    SKIN: Warm, dry, good turgor.      LABS:                        10.1   7.52  )-----------( 228      ( 04 Mar 2021 08:17 )             30.3     03-04    136  |  105  |  57<H>  ----------------------------<  93  4.4   |  20<L>  |  6.61<H>    Ca    8.9      04 Mar 2021 08:17  Phos  5.7     03-04  Mg     2.3     03-04    TPro  6.6  /  Alb  3.2<L>  /  TBili  0.4  /  DBili  x   /  AST  21  /  ALT  41  /  AlkPhos  80  03-04    PT/INR - ( 03 Mar 2021 08:49 )   PT: 13.4 sec;   INR: 1.13 ratio         PTT - ( 03 Mar 2021 08:49 )  PTT:32.9 sec                    MICROBIOLOGY: (if applicable)    RADIOLOGY & ADDITIONAL STUDIES:  EKG:   CXR:  ECHO:    IMPRESSION: 25y Male PAST MEDICAL & SURGICAL HISTORY:  Asthma    No significant past surgical history     p/w         IMP: This is a 25 yr with asthma ,non smoker presented headaches due to hypertensive crisis and CIARRA. Metabolic acidosis due to CIARRA . Asthma stable . S/P l kidney bx . Prelim path .. IgA nephropathy with glomerulonephritis       Sugg;  -BP still uncontrol   -started on 3 days of pulse steroid therapy by Neph 3/4  -f/u official path report   -continue symbicort 80/4.5 bid  -continue cardiazem   -CIARRA work up  -DVT/ GI prophy

## 2021-03-04 NOTE — PROGRESS NOTE ADULT - ASSESSMENT
24 yo male with PMH of asthma presented to his PCP with complaints of headache, nausea and vomiting. Pt was noted to have elevated sBP of >170 and was advised to come to emergency department. The patient was admitted for acute hypertensive emergency and ARF.

## 2021-03-04 NOTE — PROGRESS NOTE ADULT - SUBJECTIVE AND OBJECTIVE BOX
Patient denies CP, SOB No further palpitations.  No headache    acetaminophen   Tablet .. 650 milliGRAM(s) Oral every 6 hours PRN  ALBUTerol    90 MICROgram(s) HFA Inhaler 2 Puff(s) Inhalation every 6 hours PRN  atorvastatin 40 milliGRAM(s) Oral at bedtime  budesonide  80 MICROgram(s)/formoterol 4.5 MICROgram(s) Inhaler 2 Puff(s) Inhalation two times a day  desmopressin IVPB 20 MICROGram(s) IV Intermittent once  diltiazem    milliGRAM(s) Oral daily  diVALproex  milliGRAM(s) Oral <User Schedule>  hydrALAZINE 25 milliGRAM(s) Oral three times a day  lidocaine   Patch 1 Patch Transdermal daily  metoclopramide Injectable 5 milliGRAM(s) IV Push every 8 hours PRN  ondansetron Injectable 5 milliGRAM(s) IV Push every 6 hours PRN  sodium bicarbonate 650 milliGRAM(s) Oral daily  sodium chloride 0.9%. 1000 milliLiter(s) IV Continuous <Continuous>                            10.1   7.52  )-----------( 228      ( 04 Mar 2021 08:17 )             30.3       Hemoglobin: 10.1 g/dL (03-04 @ 08:17)  Hemoglobin: 10.4 g/dL (03-03 @ 20:44)  Hemoglobin: 10.6 g/dL (03-03 @ 08:49)  Hemoglobin: 10.6 g/dL (03-02 @ 09:16)  Hemoglobin: 10.7 g/dL (03-01 @ 09:31)      03-04    136  |  105  |  57<H>  ----------------------------<  93  4.4   |  20<L>  |  6.61<H>    Ca    8.9      04 Mar 2021 08:17  Phos  5.7     03-04  Mg     2.3     03-04    TPro  6.6  /  Alb  3.2<L>  /  TBili  0.4  /  DBili  x   /  AST  21  /  ALT  41  /  AlkPhos  80  03-04    Creatinine Trend: 6.61<--, 6.70<--, 6.54<--, 6.14<--, 5.87<--, 5.35<--    COAGS:           T(C): 36.6 (03-04-21 @ 11:21), Max: 37.4 (03-03-21 @ 23:37)  HR: 97 (03-04-21 @ 11:21) (83 - 117)  BP: 120/61 (03-04-21 @ 11:21) (120/61 - 149/92)  RR: 16 (03-04-21 @ 11:21) (11 - 18)  SpO2: 98% (03-04-21 @ 11:21) (96% - 100%)  Wt(kg): --    I&O's Summary    03 Mar 2021 07:01  -  04 Mar 2021 07:00  --------------------------------------------------------  IN: 0 mL / OUT: 300 mL / NET: -300 mL          HEENT:  (-)icterus (-)pallor  CV: N S1 S2 1/6 SHAGUFTA (+)2 Pulses B/l  Resp:  Clear to ausculatation B/L, normal effort  GI: (+) BS Soft, NT, ND  Lymph:  (-)Edema, (-)obvious lymphadenopathy  Skin: Warm to touch, Normal turgor  Psych: Appropriate mood and affect      TELEMETRY: ST    TTE: < from: Transthoracic Echocardiogram (02.24.21 @ 18:49) >  1. Mild mitral regurgitation.  2. Mild concentric left ventricular hypertrophy.  3. Normal left ventricular systolic function.  4. Normal right ventricular size and function.    < end of copied text >          ASSESSMENT/PLAN: 	25y  Male PMH of asthma presented to his PCP with complaints of headache, nausea and vomiting x 5 days found with hypertnesive urgency and CIARRA.    - no further palpitations on cardizem   - BP has improved and patient normotensive today   - Check Serum renin and tim levels  - 24 hour urine collection for urine catecholamines and metanephrine  - TSH within normal Limits  - echo with no significant structural heart disease and normal LVEF  - renal f/u for biopsy pathology  - EP eval appreciated       Yehuda Peres MD, [facility]  BEEPER [phone]

## 2021-03-04 NOTE — PROGRESS NOTE ADULT - SUBJECTIVE AND OBJECTIVE BOX
[facility] NEPHROLOGY- PROGRESS NOTE    Patient is a 26yo Male with Asthma on Dupixent injections q 2kweeks p/w HA, n/v x 5 days. Pt sent in by his PMD due to elevated SBP >170. Pt was a/w CIARRA and hypertensive emergency. Nephrology consulted for Elevated serum creatinine.  s/p IR kidney biopsy on 3/3    Hospital Medications: Medications reviewed.  REVIEW OF SYSTEMS:  CONSTITUTIONAL: No fevers or chills +mild HA  RESPIRATORY: No shortness of breath  CARDIOVASCULAR: No chest pain.  GASTROINTESTINAL: No nausea, vomiting, diarrhea or abdominal pain.   : Denies flank pain or gross hematuria  VASCULAR: No bilateral lower extremity edema.     VITALS:  T(F): 97.9 (21 @ 11:21), Max: 99.3 (21 @ 23:37)  HR: 97 (21 @ 11:21)  BP: 120/61 (21 @ 11:21)  RR: 16 (21 @ 11:21)  SpO2: 98% (21 @ 11:21)  Wt(kg): --     @ 07:01  -   @ 07:00  --------------------------------------------------------  IN: 0 mL / OUT: 300 mL / NET: -300 mL      PHYSICAL EXAM:  Constitutional: NAD  Neurological: Awake and Alert  HEENT: anicteric sclera,   Respiratory: CTAB, no wheezes, rales or rhonchi  Cardiovascular: S1, S2, RRR  Gastrointestinal: BS+, soft, NT/ND  Extremities: No peripheral edema      LABS:      136  |  105  |  57<H>  ----------------------------<  93  4.4   |  20<L>  |  6.61<H>    Ca    8.9      04 Mar 2021 08:17  Phos  5.7     03-04  Mg     2.3     -04    TPro  6.6  /  Alb  3.2<L>  /  TBili  0.4  /  DBili      /  AST  21  /  ALT  41  /  AlkPhos  80  -    Creatinine Trend: 6.61 <--, 6.70 <--, 6.54 <--, 6.14 <--, 5.87 <--, 5.35 <--, 5.48 <--                        10.1   7.52  )-----------( 228      ( 04 Mar 2021 08:17 )             30.3     Urine Studies:  Urinalysis Basic - ( 2021 20:34 )    Color: Yellow / Appearance: Clear / S.010 / pH:   Gluc:  / Ketone: Negative  / Bili: Negative / Urobili: Negative   Blood:  / Protein: 500 mg/dL / Nitrite: Negative   Leuk Esterase: Negative / RBC: 2-5 /HPF / WBC 0-2 /HPF   Sq Epi:  / Non Sq Epi: Few /HPF / Bacteria: Few /HPF      Creatinine, Random Urine: 57 mg/dL ( @ 17:46)

## 2021-03-04 NOTE — PROGRESS NOTE ADULT - PROBLEM SELECTOR PLAN 5
2/27 patient endorsed severe 8-9/10 frontal and bitemporal headache which is causing him to have multiple bouts of NBNB emesis. The patient was given percocet 325mg x 1 and was started on Dilaudid 0.1mg PRN.     Repeat CTH no acute findings  Neurology consulted  Cocktail of Depakote, Benadryl, Reglan PRN (pt complained of dizziness following cocktail, made PRN and not scheduled)

## 2021-03-04 NOTE — PROGRESS NOTE ADULT - ATTENDING COMMENTS
[facility] NEPHROLOGY  Justin Mckenzie M.D.  Chad Reich D.O.  Beth Massey M.D.  Shayy Navarro, MSN, ANP-C  [phone]    [address] [place] [address]

## 2021-03-04 NOTE — PROGRESS NOTE ADULT - ASSESSMENT
Patient is a 26yo Male with Asthma on Dupixent injections q 2kweeks p/w HA, n/v x 5 days. Pt sent in by his PMD due to elevated SBP >170. Pt was a/w CIARRA and hypertensive emergency. Nephrology consulted for Elevated serum creatinine.    1. CIARRA- ?GN vs. CIARRA on CKD due to uncontrolled HTN. UA with 500 protein and mod blood; rbc 5-10. Spot UPr/Cr 3.3- nephrotic range proteinuria without nephrotic syndrome (albumin 3.4 with no edema).  Pt with elevated renal function but no uremic; no need for HD at this time. Serological w/u neg.   s/p IR kidney biopsy on 3/3; h/h stable.   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 HIV neg, COVID ab neg. Will check Parvo and CMV.   Will give Solumedrol 1g IV qd x 3 days. Monitor FS.   Pt with hyperphosphatemia- will give renvela 800mg PO tid with meals. c/w low phos diet.  Strict I/Os. Avoid nephrotoxins/ NSAIDs/ RCA. Monitor BMP.    2. CKD unspecified- previous SCr 1.29 in 2018 with proteinuria. See above.  Avoid nephrotoxins    3. Hypertensive emergency- BP in /114; CT head neg. BP acceptable. c/w  Cardizem 120mg PO qd for h/o SVT as per cards. c/w Hydralazine 25mg PO tid, titrate as needed. c/w low salt diet.   Secondary w/u pending: UTox neg. Sebas 27.2 ng/dl; Renin 4.9 pg/ml (0.49ng/dl) Endo following. Low suspicion of pheo- CT with normal adrenals. w/u pending. TTE: mild MR, mild LVH.  Pt will need Renal US with dopplers as an outpt. Monitor BP  4. Metabolic Acidosis-in the setting of renal failure. Serum lactate wnl and VBG ph 7.31. Will increase sodium bicarb to 650mg PO bid.  Monitor ph/Co2

## 2021-03-04 NOTE — PROGRESS NOTE ADULT - PROBLEM SELECTOR PLAN 3
Baseline creatinine 1.29 (2/18) with proteinuria   PTHi elevated 99  Low phos diet  phos 3.8, monitor

## 2021-03-04 NOTE — PROGRESS NOTE ADULT - PROBLEM SELECTOR PLAN 1
Pt presented with Headaches, Pt noted to have SBP >180 on admission  CTH (2/22 and 2/28) negative   C/w Cardizem  mg PO daily, Pt with episodes of PAT  C/w Hydralazine 25mg PO tid, titrate as needed.  Monitor BP closely  /69 this AM   TTE: mild MR, mild LVH   Tim elevated 27.2 ng/dl; Renin 4.9 pg/ml (0.49ng/dl): Tim/ Renin 55.5 (2/23)  Repeat Tim (2/25) 11.3  Repeat renin and serum metanephrine pending  Cardiology following  Endocrine following, appreciate recommendations, repeat serum tim/renin and metanephrine as outpatient. would not start spironolactone/epleronone, doxazosin until outpatient workup is complete   Nephro Dr. Massey following, appreciate recommendations

## 2021-03-04 NOTE — DISCHARGE NOTE PROVIDER - CARE PROVIDER_API CALL
Beth Massey)  Internal Medicine  [address] [place] [address]  Phone: [phone]  Fax: [phone]  Scheduled Appointment: 03/16/2021 01:15 PM

## 2021-03-05 LAB
ALBUMIN SERPL ELPH-MCNC: 3.2 G/DL — LOW (ref 3.5–5)
ALP SERPL-CCNC: 83 U/L — SIGNIFICANT CHANGE UP (ref 40–120)
ALT FLD-CCNC: 51 U/L DA — SIGNIFICANT CHANGE UP (ref 10–60)
ANION GAP SERPL CALC-SCNC: 10 MMOL/L — SIGNIFICANT CHANGE UP (ref 5–17)
ANION GAP SERPL CALC-SCNC: 12 MMOL/L — SIGNIFICANT CHANGE UP (ref 5–17)
AST SERPL-CCNC: 25 U/L — SIGNIFICANT CHANGE UP (ref 10–40)
BILIRUB SERPL-MCNC: 0.5 MG/DL — SIGNIFICANT CHANGE UP (ref 0.2–1.2)
BUN SERPL-MCNC: 60 MG/DL — HIGH (ref 7–18)
BUN SERPL-MCNC: 62 MG/DL — HIGH (ref 7–18)
CALCIUM SERPL-MCNC: 8.5 MG/DL — SIGNIFICANT CHANGE UP (ref 8.4–10.5)
CALCIUM SERPL-MCNC: 8.7 MG/DL — SIGNIFICANT CHANGE UP (ref 8.4–10.5)
CHLORIDE SERPL-SCNC: 103 MMOL/L — SIGNIFICANT CHANGE UP (ref 96–108)
CHLORIDE SERPL-SCNC: 105 MMOL/L — SIGNIFICANT CHANGE UP (ref 96–108)
CO2 SERPL-SCNC: 19 MMOL/L — LOW (ref 22–31)
CO2 SERPL-SCNC: 20 MMOL/L — LOW (ref 22–31)
CREAT SERPL-MCNC: 6.87 MG/DL — HIGH (ref 0.5–1.3)
CREAT SERPL-MCNC: 7.05 MG/DL — HIGH (ref 0.5–1.3)
GLUCOSE SERPL-MCNC: 132 MG/DL — HIGH (ref 70–99)
GLUCOSE SERPL-MCNC: 141 MG/DL — HIGH (ref 70–99)
HCT VFR BLD CALC: 29.6 % — LOW (ref 39–50)
HCT VFR BLD CALC: 29.9 % — LOW (ref 39–50)
HGB BLD-MCNC: 10.3 G/DL — LOW (ref 13–17)
HGB BLD-MCNC: 9.9 G/DL — LOW (ref 13–17)
MAGNESIUM SERPL-MCNC: 2.2 MG/DL — SIGNIFICANT CHANGE UP (ref 1.6–2.6)
MCHC RBC-ENTMCNC: 28.5 PG — SIGNIFICANT CHANGE UP (ref 27–34)
MCHC RBC-ENTMCNC: 28.6 PG — SIGNIFICANT CHANGE UP (ref 27–34)
MCHC RBC-ENTMCNC: 33.4 GM/DL — SIGNIFICANT CHANGE UP (ref 32–36)
MCHC RBC-ENTMCNC: 34.4 GM/DL — SIGNIFICANT CHANGE UP (ref 32–36)
MCV RBC AUTO: 83.1 FL — SIGNIFICANT CHANGE UP (ref 80–100)
MCV RBC AUTO: 85.3 FL — SIGNIFICANT CHANGE UP (ref 80–100)
NRBC # BLD: 0 /100 WBCS — SIGNIFICANT CHANGE UP (ref 0–0)
NRBC # BLD: 0 /100 WBCS — SIGNIFICANT CHANGE UP (ref 0–0)
PHOSPHATE SERPL-MCNC: 5.9 MG/DL — HIGH (ref 2.5–4.5)
PLATELET # BLD AUTO: 196 K/UL — SIGNIFICANT CHANGE UP (ref 150–400)
PLATELET # BLD AUTO: 201 K/UL — SIGNIFICANT CHANGE UP (ref 150–400)
POTASSIUM SERPL-MCNC: 4.7 MMOL/L — SIGNIFICANT CHANGE UP (ref 3.5–5.3)
POTASSIUM SERPL-MCNC: 5.4 MMOL/L — HIGH (ref 3.5–5.3)
POTASSIUM SERPL-SCNC: 4.7 MMOL/L — SIGNIFICANT CHANGE UP (ref 3.5–5.3)
POTASSIUM SERPL-SCNC: 5.4 MMOL/L — HIGH (ref 3.5–5.3)
PROT SERPL-MCNC: 6.5 G/DL — SIGNIFICANT CHANGE UP (ref 6–8.3)
RBC # BLD: 3.47 M/UL — LOW (ref 4.2–5.8)
RBC # BLD: 3.6 M/UL — LOW (ref 4.2–5.8)
RBC # FLD: 11.9 % — SIGNIFICANT CHANGE UP (ref 10.3–14.5)
RBC # FLD: 12 % — SIGNIFICANT CHANGE UP (ref 10.3–14.5)
SODIUM SERPL-SCNC: 134 MMOL/L — LOW (ref 135–145)
SODIUM SERPL-SCNC: 135 MMOL/L — SIGNIFICANT CHANGE UP (ref 135–145)
WBC # BLD: 4.07 K/UL — SIGNIFICANT CHANGE UP (ref 3.8–10.5)
WBC # BLD: 6.5 K/UL — SIGNIFICANT CHANGE UP (ref 3.8–10.5)
WBC # FLD AUTO: 4.07 K/UL — SIGNIFICANT CHANGE UP (ref 3.8–10.5)
WBC # FLD AUTO: 6.5 K/UL — SIGNIFICANT CHANGE UP (ref 3.8–10.5)

## 2021-03-05 PROCEDURE — 99233 SBSQ HOSP IP/OBS HIGH 50: CPT

## 2021-03-05 RX ORDER — SODIUM BICARBONATE 1 MEQ/ML
1300 SYRINGE (ML) INTRAVENOUS
Refills: 0 | Status: DISCONTINUED | OUTPATIENT
Start: 2021-03-05 | End: 2021-03-06

## 2021-03-05 RX ORDER — SODIUM ZIRCONIUM CYCLOSILICATE 10 G/10G
10 POWDER, FOR SUSPENSION ORAL ONCE
Refills: 0 | Status: COMPLETED | OUTPATIENT
Start: 2021-03-05 | End: 2021-03-05

## 2021-03-05 RX ADMIN — SEVELAMER CARBONATE 800 MILLIGRAM(S): 2400 POWDER, FOR SUSPENSION ORAL at 17:11

## 2021-03-05 RX ADMIN — BUDESONIDE AND FORMOTEROL FUMARATE DIHYDRATE 2 PUFF(S): 160; 4.5 AEROSOL RESPIRATORY (INHALATION) at 10:22

## 2021-03-05 RX ADMIN — DIVALPROEX SODIUM 500 MILLIGRAM(S): 500 TABLET, DELAYED RELEASE ORAL at 05:57

## 2021-03-05 RX ADMIN — PANTOPRAZOLE SODIUM 40 MILLIGRAM(S): 20 TABLET, DELAYED RELEASE ORAL at 05:58

## 2021-03-05 RX ADMIN — Medication 650 MILLIGRAM(S): at 16:22

## 2021-03-05 RX ADMIN — Medication 650 MILLIGRAM(S): at 04:44

## 2021-03-05 RX ADMIN — DIVALPROEX SODIUM 500 MILLIGRAM(S): 500 TABLET, DELAYED RELEASE ORAL at 22:05

## 2021-03-05 RX ADMIN — Medication 650 MILLIGRAM(S): at 01:30

## 2021-03-05 RX ADMIN — DIVALPROEX SODIUM 500 MILLIGRAM(S): 500 TABLET, DELAYED RELEASE ORAL at 13:22

## 2021-03-05 RX ADMIN — Medication 120 MILLIGRAM(S): at 05:57

## 2021-03-05 RX ADMIN — Medication 25 MILLIGRAM(S): at 13:22

## 2021-03-05 RX ADMIN — Medication 650 MILLIGRAM(S): at 05:57

## 2021-03-05 RX ADMIN — LIDOCAINE 1 PATCH: 4 CREAM TOPICAL at 04:44

## 2021-03-05 RX ADMIN — Medication 258 MILLIGRAM(S): at 05:58

## 2021-03-05 RX ADMIN — SODIUM ZIRCONIUM CYCLOSILICATE 10 GRAM(S): 10 POWDER, FOR SUSPENSION ORAL at 11:44

## 2021-03-05 RX ADMIN — Medication 25 MILLIGRAM(S): at 22:05

## 2021-03-05 RX ADMIN — Medication 25 MILLIGRAM(S): at 05:57

## 2021-03-05 RX ADMIN — ATORVASTATIN CALCIUM 40 MILLIGRAM(S): 80 TABLET, FILM COATED ORAL at 22:04

## 2021-03-05 RX ADMIN — SEVELAMER CARBONATE 800 MILLIGRAM(S): 2400 POWDER, FOR SUSPENSION ORAL at 13:22

## 2021-03-05 RX ADMIN — SEVELAMER CARBONATE 800 MILLIGRAM(S): 2400 POWDER, FOR SUSPENSION ORAL at 08:00

## 2021-03-05 RX ADMIN — Medication 1300 MILLIGRAM(S): at 17:11

## 2021-03-05 RX ADMIN — BUDESONIDE AND FORMOTEROL FUMARATE DIHYDRATE 2 PUFF(S): 160; 4.5 AEROSOL RESPIRATORY (INHALATION) at 22:06

## 2021-03-05 NOTE — PROGRESS NOTE ADULT - PROBLEM SELECTOR PLAN 6
Pt noted to have non anion gap metabolic acidosis with respiratory compensation   bicarb 19-> 17 on admission   Metabolic acidosis likely in the setting of ARF   Lactate: 0.3  pH on VB.31 ()  Bicarb 20 (3/), monitor   C/w sodium bicarb 1300mg PO bid.  Nephro Dr. Massey following

## 2021-03-05 NOTE — PROGRESS NOTE ADULT - ATTENDING COMMENTS
Renal bx noted; conveying IgA nephropathy. IV steroids started. Will monitor over the next few days prior to proceeding with d/c planning for home on po steroids and close outpatient follow up with nephrology.    Pt may possibly need HD given an incident of hyperkalemia today, however will r/o any lysed sample to cause erroneous hyperkalemia.

## 2021-03-05 NOTE — PROGRESS NOTE ADULT - PROBLEM SELECTOR PLAN 4
Mild hyperkalemia (3/2)- potassium 5.4 S/P Lokelma 10g PO x1.   Potassium today is 5.4  Monitor electrolytes

## 2021-03-05 NOTE — PROGRESS NOTE ADULT - ATTENDING COMMENTS
I counseled the patient about his diagnosis of migraine, and the medications to consider to help prevent recurrence of migraines.

## 2021-03-05 NOTE — PROGRESS NOTE ADULT - SUBJECTIVE AND OBJECTIVE BOX
[facility] NEPHROLOGY- PROGRESS NOTE    Patient is a 24yo Male with Asthma on Dupixent injections q 2kweeks p/w HA, n/v x 5 days. Pt sent in by his PMD due to elevated SBP >170. Pt was a/w CIARRA and hypertensive emergency. Nephrology consulted for Elevated serum creatinine.  s/p IR kidney biopsy on 3/3    Hospital Medications: Medications reviewed.  REVIEW OF SYSTEMS:  CONSTITUTIONAL: No fevers or chills   RESPIRATORY: No shortness of breath  CARDIOVASCULAR: No chest pain.  GASTROINTESTINAL: No nausea, vomiting, diarrhea or abdominal pain.   : Denies flank pain or gross hematuria  VASCULAR: No bilateral lower extremity edema.     VITALS:  T(F): 97.4 (03-05-21 @ 11:11), Max: 98.7 (03-04-21 @ 20:36)  HR: 89 (03-05-21 @ 13:19)  BP: 145/82 (03-05-21 @ 13:19)  RR: 18 (03-05-21 @ 11:11)  SpO2: 96% (03-05-21 @ 11:11)  Wt(kg): --      PHYSICAL EXAM:  Constitutional: NAD  Neurological: Awake and Alert  HEENT: anicteric sclera,   Respiratory: CTAB, no wheezes, rales or rhonchi  Cardiovascular: S1, S2, RRR  Gastrointestinal: BS+, soft, NT/ND  Extremities: No peripheral edema        LABS:  03-05    134<L>  |  103  |  60<H>  ----------------------------<  132<H>  5.4<H>   |  19<L>  |  6.87<H>    Ca    8.5      05 Mar 2021 08:33  Phos  5.9     03-05  Mg     2.2     03-05    TPro  6.5  /  Alb  3.2<L>  /  TBili  0.5  /  DBili      /  AST  25  /  ALT  51  /  AlkPhos  83  03-05    Creatinine Trend: 6.87 <--, 6.61 <--, 6.70 <--, 6.54 <--, 6.14 <--, 5.87 <--, 5.35 <--                        10.3   6.50  )-----------( 196      ( 05 Mar 2021 13:43 )             29.9     Urine Studies:    Creatinine, Random Urine: 57 mg/dL (02-26 @ 17:46)    MARTHA MARTINEZ 4   Surgical Final Report   Final Diagnosis   Kidney, needle core biopsy   IgA nephropathy, mesangial and sclerosing form, with features of   collapsing glomerulopathy (see comment)   - Oxford classification score: M1 E0 S1 T2 C0   Acute tubular injury and focal interstitial inflammation (see   comment)   Summary of chronic changes:   - Global glomerulosclerosis (47% of glomeruli)   - Segmental glomerulosclerosis (20%of glomeruli)   - Tubular atrophy and interstitial fibrosis (50% of cortex)   - Mild arterial and arteriolar sclerosis

## 2021-03-05 NOTE — PROGRESS NOTE ADULT - PROBLEM SELECTOR PLAN 3
Baseline creatinine 1.29 (2/18) with proteinuria   PTHi elevated 99  Low phos diet  phos today is 5.9  on renelva  monitor PO4

## 2021-03-05 NOTE — PROGRESS NOTE ADULT - ASSESSMENT
Patient is a 24yo Male with Asthma on Dupixent injections q 2kweeks p/w HA, n/v x 5 days. Pt sent in by his PMD due to elevated SBP >170. Pt was a/w CIARRA and hypertensive emergency. Nephrology consulted for Elevated serum creatinine.    1. CIARRA on CKD unspecified- UA with 500 protein and mod blood; rbc 5-10. Spot UPr/Cr 3.3- nephrotic range proteinuria without nephrotic syndrome (albumin 3.4 with no edema)   s/p renal biopsy 3/3; pathology +IgA Nephropathy with collapsing glomerulopathy; IFTA 50% for which pt was started on Solumedrol 1g IV qd x 3 days. Monitor FS      .  Pt with elevated renal function but no uremic; no need for HD at this time. Serological w/u neg.   s/p IR kidney biopsy on 3/3; h/h stable.   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 HIV neg, COVID ab neg. Will check Parvo and CMV.   Will give Solumedrol 1g IV qd x 3 days. Monitor FS.   Pt with hyperphosphatemia- will give renvela 800mg PO tid with meals. c/w low phos diet.  Strict I/Os. Avoid nephrotoxins/ NSAIDs/ RCA. Monitor BMP.    2. CKD unspecified- previous SCr 1.29 in 2018 with proteinuria. See above.  Avoid nephrotoxins    3. Hypertensive emergency- BP in /114; CT head neg. BP acceptable. c/w  Cardizem 120mg PO qd for h/o SVT as per cards. c/w Hydralazine 25mg PO tid, titrate as needed. c/w low salt diet.   Secondary w/u pending: UTox neg. Sebas 27.2 ng/dl; Renin 4.9 pg/ml (0.49ng/dl) Endo following. Low suspicion of pheo- CT with normal adrenals. w/u pending. TTE: mild MR, mild LVH.  Pt will need Renal US with dopplers as an outpt. Monitor BP  4. Metabolic Acidosis-in the setting of renal failure. Serum lactate wnl and VBG ph 7.31. Will increase sodium bicarb to 650mg PO bid.  Monitor ph/Co2 Patient is a 26yo Male with Asthma on Dupixent injections q 2kweeks p/w HA, n/v x 5 days. Pt sent in by his PMD due to elevated SBP >170. Pt was a/w CIARRA and hypertensive emergency. Nephrology consulted for Elevated serum creatinine.    1. CIARRA on CKD unspecified- UA with 500 protein and mod blood; rbc 5-10. Spot UPr/Cr 3.3- nephrotic range proteinuria without nephrotic syndrome (albumin 3.4 with no edema)   s/p renal biopsy 3/3; pathology +IgA Nephropathy with collapsing glomerulopathy; IFTA 50% for which pt was started on Solumedrol 1g IV qd x 3 days. Monitor FS; will d/c on Prednisone 60mg PO qd.   Will avoid ACEi/ ARB due to low GFR ~10%. Will f/u viral panel for collapsing glomerulopathy; thus far HIV neg, COVID ab neg; will f/u parvo, CMV and EBV.   Discussed with patient that due to low GFR and severe proteinuria- poor prognosis and will likely need dialysis in the near future. Discussed different modalities of dialysis; pt interested in PD. Pt has an appt to f/u with me on 3/16 @ 1:15 pm; will consider genetic testing at that time for APOL-1 gene.   Pt with no uremic symptoms or complaints. No need for RRT at this time.   Hyperphosphatemia- c/w 800mg PO tid with meals. c/w low phos diet.  Hyperkalemia- will give Lokelma 10g PO x1 c/w low K diet. Strict I/Os. Avoid nephrotoxins/ NSAIDs/ RCA. Monitor BMP.    2. CKD unspecified- previous SCr 1.29 in 2018 with proteinuria. See above.  Avoid nephrotoxins    3. Hypertensive emergency- BP in /114; CT head neg. BP acceptable. c/w  Cardizem 120mg PO qd for h/o SVT as per cards. c/w Hydralazine 25mg PO tid, titrate as needed. c/w low salt diet.   Secondary w/u pending: UTox neg. Sebas 27.2 ng/dl; Renin 4.9 pg/ml (0.49ng/dl) Endo following. Low suspicion of pheo- CT with normal adrenals. w/u pending. TTE: mild MR, mild LVH.  Pt will need Renal US with dopplers as an outpt. Monitor BP  4. Metabolic Acidosis-in the setting of renal failure. Serum lactate wnl and VBG ph 7.31. Will increase sodium bicarb to 1300mg PO bid.  Monitor ph/Co2 Patient is a 26yo Male with Asthma on Dupixent injections q 2kweeks p/w HA, n/v x 5 days. Pt sent in by his PMD due to elevated SBP >170. Pt was a/w CIARRA and hypertensive emergency. Nephrology consulted for Elevated serum creatinine.    1. CIARRA on CKD unspecified- UA with 500 protein and mod blood; rbc 5-10. Spot UPr/Cr 3.3- nephrotic range proteinuria without nephrotic syndrome (albumin 3.4 with no edema)   s/p renal biopsy 3/3; pathology +IgA Nephropathy with collapsing glomerulopathy; IFTA 50% for which pt was started on Solumedrol 1g IV qd x 3 days. Monitor FS; will d/c on Prednisone 60mg PO qd.   Will avoid ACEi/ ARB due to low GFR ~10%. Will f/u viral panel for collapsing glomerulopathy; thus far HIV neg, COVID ab neg; will f/u parvo, CMV and EBV.   Discussed with patient that due to low GFR and severe proteinuria- poor prognosis and will likely need dialysis in the near future. Discussed different modalities of dialysis; pt interested in PD. Pt has an appt to f/u with me on 3/16 @ 1:15 pm; will consider genetic testing at that time for APOL-1 gene.   Pt with no uremic symptoms or complaints. No need for RRT at this time.   Hyperphosphatemia- c/w Renvela 800mg PO tid with meals. c/w low phos diet.  Hyperkalemia- will give Lokelma 10g PO x1 c/w low K diet. Strict I/Os. Avoid nephrotoxins/ NSAIDs/ RCA. Monitor BMP.    2. CKD unspecified- previous SCr 1.29 in 2018 with proteinuria. See above.  Avoid nephrotoxins    3. Hypertensive emergency- BP in /114; CT head neg. BP acceptable. c/w  Cardizem 120mg PO qd for h/o SVT as per cards. c/w Hydralazine 25mg PO tid, titrate as needed. c/w low salt diet.   Secondary w/u pending: UTox neg. Sebas 27.2 ng/dl; Renin 4.9 pg/ml (0.49ng/dl) Endo following. Low suspicion of pheo- CT with normal adrenals. w/u pending. TTE: mild MR, mild LVH.  Pt will need Renal US with dopplers as an outpt. Monitor BP  4. Metabolic Acidosis-in the setting of renal failure. Serum lactate wnl and VBG ph 7.31. Will increase sodium bicarb to 1300mg PO bid.  Monitor ph/Co2

## 2021-03-05 NOTE — PROGRESS NOTE ADULT - PROBLEM SELECTOR PLAN 1
Presented with CIARRA and Cr 6  Baseline creatinine is 1.29 (2018)  Urine protein/creatinine: 3.3 (2/22)  Repeat urine protein/creatinine: 3.8 (2/26)   CT abdomen showed no findings of obstructive uropathy or other findings.  Renal US showed hyperechoic kidneys bilaterally   FeNa: 3.3%  Urine tox was negative urine eosinophils negative  Pheochromocytoma unlikely- CT a/p showed normal adrenals, urine metanephrine negative, pending serum metanephrine   Normal C3, C4, SIFE neg, K/L 1.7, Syphillis neg, neg ASO, neg ANCA, neg ELMA, neg dsDNA, neg HIV, neg HepBsAg, and neg Hep C ab, neg anti-GBM  Immunofixation negative for monoclonal bands   YASH Kappa 7.38 (elevated) YASH Lambda 4.33 (elevated)  Renal biopsy on 3/3 with IR.  Prelim biopsy showed IgA Neprhropathy  on solumedrol IVP 1000 for 3 days  Creatinine increasing 5.35 > 5.87 > 6.14 > 6.54 > 6.70 >6.87    Nephro Dr. Massey following

## 2021-03-05 NOTE — PROGRESS NOTE ADULT - ATTENDING COMMENTS
[facility] NEPHROLOGY  Justin Mckenzie M.D.  Chad Reich D.O.  Beth Massey M.D.  Shayy Navarro, MSN, ANP-C  [phone]    [address] [place] [address]

## 2021-03-05 NOTE — PROGRESS NOTE ADULT - ASSESSMENT
26 yo male with PMH of asthma presented to his PCP with complaints of headache, nausea and vomiting. Pt was noted to have elevated sBP of >170 and was advised to come to emergency department. The patient was admitted for acute hypertensive emergency and ARF.

## 2021-03-05 NOTE — PROGRESS NOTE ADULT - SUBJECTIVE AND OBJECTIVE BOX
NEUROLOGY FOLLOW-UP NOTE    NAME:  MARTHA MARTINEZ      ASSESSMENT:  25 RHM with new diagnosis of IgA nephropathy, with recent status migrainosus, now resolved      RECOMMENDATIONS:    - IV Depacon protocol complete - No indication to maintain oral Depakote taper at this time    - Complete IV methylprednisolone course to manage IgA nephropathy - This can also help cover for migraine    - No need for daily prescription medication for migraine prophylaxis - Patient may use PRN acetaminophen as needed for breakthrough headache    - Routine follow-up appointment in Neurology clinic for migraine management          NOTE TO BE COMPLETED - PLEASE REFER TO ABOVE ONLY AND IGNORE INFORMATION BELOW    ******************************    HPI:  24 yo male with PMH of asthma presented to his PCP with complaints of headache, nausea and vomiting x 5 days. Pt was noted to have elevated sBP of >170 and was advised to come to emergency department. Patient states he started having headaches 5 days ago associated with nausea and 1 day hx of NBNB vomiting. Pt endorses episode of dysuria few days ago which has now resolved. Pt denies head trauma, any prior episodes of similar headaches. No family hx of Polycystic kidney disease, CVA, HTN. Denies diarrhea, abdominal pain, fever, chills, recent viral infection, no blood in urine, no flank pain, no urinary rentention (22 Feb 2021 20:11)      NEURO HPI:      INTERVAL HISTORY:      MEDICATIONS:  acetaminophen   Tablet .. 650 milliGRAM(s) Oral every 6 hours PRN  ALBUTerol    90 MICROgram(s) HFA Inhaler 2 Puff(s) Inhalation every 6 hours PRN  atorvastatin 40 milliGRAM(s) Oral at bedtime  budesonide  80 MICROgram(s)/formoterol 4.5 MICROgram(s) Inhaler 2 Puff(s) Inhalation two times a day  diltiazem    milliGRAM(s) Oral daily  diVALproex  milliGRAM(s) Oral <User Schedule>  hydrALAZINE 25 milliGRAM(s) Oral three times a day  lidocaine   Patch 1 Patch Transdermal daily  methylPREDNISolone sodium succinate IVPB 1000 milliGRAM(s) IV Intermittent daily  metoclopramide Injectable 5 milliGRAM(s) IV Push every 8 hours PRN  ondansetron Injectable 5 milliGRAM(s) IV Push every 6 hours PRN  pantoprazole    Tablet 40 milliGRAM(s) Oral before breakfast  sevelamer carbonate 800 milliGRAM(s) Oral three times a day with meals  sodium bicarbonate 1300 milliGRAM(s) Oral two times a day  sodium chloride 0.9%. 1000 milliLiter(s) IV Continuous <Continuous>      ALLERGIES:  Kiwi (Swelling)  No Known Drug Allergies      REVIEW OF SYSTEMS:  Fourteen systems reviewed and negative except as in HPI / Interval History.        OBJECTIVE:  Vital Signs Last 24 Hrs  T(C): 36.7 (06 Mar 2021 11:01), Max: 37.2 (05 Mar 2021 23:46)  T(F): 98.1 (06 Mar 2021 11:01), Max: 98.9 (05 Mar 2021 23:46)  HR: 87 (06 Mar 2021 11:01) (87 - 100)  BP: 127/78 (06 Mar 2021 11:01) (127/78 - 150/86)  BP(mean): --  RR: 16 (06 Mar 2021 11:01) (16 - 18)  SpO2: 98% (06 Mar 2021 11:01) (96% - 98%)    General Examination:  General: No acute distress  HEENT: Atraumatic, Normocephalic  Respiratory: CTA B/l.  No crackles, rhonchi, or wheezes.  Cardiovascular: RRR.  Normal S1 & S2.  Normal b/l radial and pedal pulses.    Neurological Examination:  General / Mental Status: AAO x 3.  No aphasia or dysarthria.  Naming and repetition intact.  Cranial Nerves: VFF x 4.  PERRL.  EOMI x 2, No nystagmus or diplopia.  B/l V1-V3 equal and intact to light touch and pinprick.  Symmetric facial movement and palate elevation.  B/l hearing equal to finger rub.  5/5 strength with b/l sternocleidomastoid & trapezius.  Midline tongue protrusion, with no atrophy or fasciculations.  Motor: Normal bulk & tone in all four extremities.  5/5 strength throughout all four extremities.  No downward drift, rigidity, spasticity, or tremors in any of the four extremities.  Sensory: Intact to light touch and pinprick in all four extremities.  Negative Romberg.  Reflex: 2+ and symmetric at b/l biceps, triceps, brachioradialis, patellae, and ankles.  Downgoing toes b/l.  Coordination: No dysmetria with b/l finger-to-nose and heel raise tests.  Symmetric rapid alternating movements b/l.  Gait: Normal, narrow-based gait.  No difficulty with tiptoe, heel, and tandem gaits.        LABORATORY VALUES:                          10.3   6.50  )-----------( 196      ( 05 Mar 2021 13:43 )             29.9       03-05    135  |  105  |  62<H>  ----------------------------<  141<H>  4.7   |  20<L>  |  7.05<H>    Ca    8.7      05 Mar 2021 13:43  Phos  5.9     03-05  Mg     2.2     03-05    TPro  6.5  /  Alb  3.2<L>  /  TBili  0.5  /  DBili  x   /  AST  25  /  ALT  51  /  AlkPhos  83  03-05    02-23 Chol 320<H> LDL -- HDL 54 Trig 134    Glucose Trend  03-06-21 @ 11:41   -  -- -- 147<H>  03-05-21 @ 13:43   -  -- 141<H> --  03-05-21 @ 08:33   -  -- 132<H> --  03-04-21 @ 08:17   -  -- 93 --                        NEUROIMAGING:          Please contact the Neurology consult service with any questions.      Shaun Richmond MD   of Neurology  [employer] [facility] [employer] at [facility]         NEUROLOGY FOLLOW-UP NOTE    NAME:  MARTHA MARTINEZ      ASSESSMENT:  25 RHM with new diagnosis of IgA nephropathy, with recent status migrainosus, now resolved      RECOMMENDATIONS:    - IV Depacon protocol complete - No indication to maintain oral Depakote taper at this time    - Complete IV methylprednisolone course to manage IgA nephropathy - This can also help cover for migraine    - No need for daily prescription medication for migraine prophylaxis - Patient may use PRN acetaminophen as needed for breakthrough headache    - Routine follow-up appointment in Neurology clinic for migraine management        ******************************    HPI:  26 yo male with PMH of asthma presented to his PCP with complaints of headache, nausea and vomiting x 5 days. Pt was noted to have elevated SBP of >170 and was advised to come to emergency department. Patient states he started having headaches 5 days ago associated with nausea and 1 day hx of NB/NB vomiting. Pt endorses episode of dysuria few days ago which has now resolved. Pt denies head trauma, any prior episodes of similar headaches. No family hx of Polycystic kidney disease, CVA, HTN. Denies diarrhea, abdominal pain, fever, chills, recent viral infection, no blood in urine, no flank pain, no urinary rentention (22 Feb 2021 20:11)    NEURO HPI:  25 RHM presenting with 5-day history of headache, associated with nausea and vomiting, without previous history of headache    INTERVAL HISTORY:  The patient reports having a mild headache in the late afternoon, rated 2-3/10, which has resolved without intervention. He is no longer receiving oral Depakote, and he is completing an IV methylprednisolone course for biopsy-proven IgA nephropathy.      MEDICATIONS:  acetaminophen   Tablet .. 650 milliGRAM(s) Oral every 6 hours PRN  ALBUTerol    90 MICROgram(s) HFA Inhaler 2 Puff(s) Inhalation every 6 hours PRN  atorvastatin 40 milliGRAM(s) Oral at bedtime  budesonide  80 MICROgram(s)/formoterol 4.5 MICROgram(s) Inhaler 2 Puff(s) Inhalation two times a day  diltiazem    milliGRAM(s) Oral daily  diVALproex  milliGRAM(s) Oral <User Schedule>  hydrALAZINE 25 milliGRAM(s) Oral three times a day  lidocaine   Patch 1 Patch Transdermal daily  methylPREDNISolone sodium succinate IVPB 1000 milliGRAM(s) IV Intermittent daily  metoclopramide Injectable 5 milliGRAM(s) IV Push every 8 hours PRN  ondansetron Injectable 5 milliGRAM(s) IV Push every 6 hours PRN  pantoprazole    Tablet 40 milliGRAM(s) Oral before breakfast  sevelamer carbonate 800 milliGRAM(s) Oral three times a day with meals  sodium bicarbonate 1300 milliGRAM(s) Oral two times a day  sodium chloride 0.9%. 1000 milliLiter(s) IV Continuous <Continuous>      ALLERGIES:  Kiwi (Swelling)  No Known Drug Allergies      REVIEW OF SYSTEMS:  Fourteen systems reviewed and negative except as in HPI / Interval History.        OBJECTIVE:  Vital Signs Last 24 Hrs  T(C): 36.7 (06 Mar 2021 11:01), Max: 37.2 (05 Mar 2021 23:46)  T(F): 98.1 (06 Mar 2021 11:01), Max: 98.9 (05 Mar 2021 23:46)  HR: 87 (06 Mar 2021 11:01) (87 - 100)  BP: 127/78 (06 Mar 2021 11:01) (127/78 - 150/86)  RR: 16 (06 Mar 2021 11:01) (16 - 18)  SpO2: 98% (06 Mar 2021 11:01) (96% - 98%)    General Examination:  General: No acute distress  HEENT: Atraumatic, Normocephalic, No tenderness at scalp  Respiratory: CTA B/l.  No crackles, rhonchi, or wheezes.  Cardiovascular: RRR.  Normal S1 & S2.  Normal b/l radial and pedal pulses.    Neurological Examination:  General / Mental Status: AAO x 3.  No aphasia or dysarthria.  Naming and repetition intact.  Cranial Nerves: VFF x 4.  PERRL.  EOMI x 2, No nystagmus or diplopia.  B/l V1-V3 equal and intact to light touch and pinprick.  Symmetric facial movement and palate elevation.  B/l hearing equal to finger rub.  5/5 strength with b/l sternocleidomastoid & trapezius.  Midline tongue protrusion, with no atrophy or fasciculations.  Motor: Normal bulk & tone in all four extremities.  5/5 strength throughout all four extremities.  No downward drift, rigidity, spasticity, or tremors in any of the four extremities.  Sensory: Intact to light touch and pinprick in all four extremities.  Negative Romberg.  Reflex: 2+ and symmetric at b/l biceps, triceps, brachioradialis, patellae, and ankles.  Downgoing toes b/l.  Coordination: No dysmetria with b/l finger-to-nose and heel raise tests.  Gait: Normal, narrow-based gait.  No difficulty with tiptoe, heel, and tandem gaits.        LABORATORY VALUES:                          10.3   6.50  )-----------( 196      ( 05 Mar 2021 13:43 )             29.9       03-05    135  |  105  |  62<H>  ----------------------------<  141<H>  4.7   |  20<L>  |  7.05<H>    Ca    8.7      05 Mar 2021 13:43  Phos  5.9     03-05  Mg     2.2     03-05    TPro  6.5  /  Alb  3.2<L>  /  TBili  0.5  /  DBili  x   /  AST  25  /  ALT  51  /  AlkPhos  83  03-05    02-23 Chol 320<H> LDL -- HDL 54 Trig 134    Glucose Trend  03-06-21 @ 11:41   -  -- -- 147<H>  03-05-21 @ 13:43   -  -- 141<H> --  03-05-21 @ 08:33   -  -- 132<H> --  03-04-21 @ 08:17   -  -- 93 --          NEUROIMAGING:      CT Head x 2 (2/22/21 & 2/28/21):  - No acute intracranial abnormality or interval change between scans    TTE (2/24/21):  - LVEF > 55%  - Mild concentric left ventricular hypertrophy  - Mild mitral and tricuspid regurgitation  - Trace tricuspid regurgitation  - No cardiac embolus or intracardiac shunt    Core Needle Renal Biopsy (3/3/21):  - IgA nephropathy, mesangial and sclerosing form, with features of collapsing glomerulopathy        Please contact the Neurology consult service with any questions.      Shaun Richmond MD   of Neurology  [facility] [employer] [facility]

## 2021-03-05 NOTE — PROGRESS NOTE ADULT - SUBJECTIVE AND OBJECTIVE BOX
EP     HISTORY OF PRESENT ILLNESS: HPI:  26 yo male with PMH of asthma presented to his PCP with complaints of headache, nausea and vomiting x 5 days. Pt was noted to have elevated sBP of >170 and was advised to come to emergency department. Patient states he started having headaches 5 days ago associated with nausea and 1 day hx of NBNB vomiting. Pt endorses episode of dysuria few days ago which has now resolved. Pt denies head trauma, any prior episodes of similar headaches. No family hx of Polycystic kidney disease, CVA, HTN. Denies diarrhea, abdominal pain, fever, chills, recent viral infection, no blood in urine, no flank pain, no urinary rentention (22 Feb 2021 20:11)    States he has prior history of lightheadedness when getting up too quickly, and has fainted in a hot kitchen before, but no symptoms related to exercise intolerance.  He is not aware of his heartbeat intermittently going faster in the bed and is not experiencing palpitations or chest discomfort.  No personal or family history of arrhythmia.  Has Asthma, diagnosed by a pulmonologist > 1 yr ago, and has been better/stable on Symbicort.  A 10 pt ROS is otherwise negative.    3/5- resting comfortably, no acute complaints.  no angina, orthopnea or palpitations.    acetaminophen   Tablet .. 650 milliGRAM(s) Oral every 6 hours PRN  ALBUTerol    90 MICROgram(s) HFA Inhaler 2 Puff(s) Inhalation every 6 hours PRN  atorvastatin 40 milliGRAM(s) Oral at bedtime  budesonide  80 MICROgram(s)/formoterol 4.5 MICROgram(s) Inhaler 2 Puff(s) Inhalation two times a day  diltiazem    milliGRAM(s) Oral daily  diVALproex  milliGRAM(s) Oral <User Schedule>  hydrALAZINE 25 milliGRAM(s) Oral three times a day  lidocaine   Patch 1 Patch Transdermal daily  methylPREDNISolone sodium succinate IVPB 1000 milliGRAM(s) IV Intermittent daily  metoclopramide Injectable 5 milliGRAM(s) IV Push every 8 hours PRN  ondansetron Injectable 5 milliGRAM(s) IV Push every 6 hours PRN  pantoprazole    Tablet 40 milliGRAM(s) Oral before breakfast  sevelamer carbonate 800 milliGRAM(s) Oral three times a day with meals  sodium bicarbonate 1300 milliGRAM(s) Oral two times a day  sodium chloride 0.9%. 1000 milliLiter(s) IV Continuous <Continuous>                          9.9    4.07  )-----------( 201      ( 05 Mar 2021 08:33 )             29.6       03-05    134<L>  |  103  |  60<H>  ----------------------------<  132<H>  5.4<H>   |  19<L>  |  6.87<H>    Ca    8.5      05 Mar 2021 08:33  Phos  5.9     03-05  Mg     2.2     03-05    TPro  6.5  /  Alb  3.2<L>  /  TBili  0.5  /  DBili  x   /  AST  25  /  ALT  51  /  AlkPhos  83  03-05    T(C): 36.3 (03-05-21 @ 11:11), Max: 37.1 (03-04-21 @ 20:36)  HR: 103 (03-05-21 @ 11:11) (83 - 103)  BP: 138/71 (03-05-21 @ 11:11) (132/71 - 159/92)  RR: 18 (03-05-21 @ 11:11) (16 - 18)  SpO2: 96% (03-05-21 @ 11:11) (94% - 98%)  Wt(kg): --    I&O's Summary    General: Well nourished, no acute distress, alert and oriented x 3  Head: normocephalic, no trauma  Neck: no JVD, no bruit, supple, not enlarged  CV: S1S2, no S3, regular rate, rhythm is SINUS, no murmurs.    Lungs: clear BL, no rales or wheezes  Abdomen: bowel sounds +, soft, nontender, nondistended  Extremities: no clubbing, cyanosis or edema  Neuro: Moves all 4 extremities, sensation intact x 4 extremities  Skin: warm and moist, normal turgor  Psych: Mood and affect are appropriate for circumstances  MSK: normal range of motion and strength x4 extremities.    TELEMETRY: NSR, sinus tachy, episodes of sinus tachy, 130-150bpm when walking in his room.  ECG: NSR  Echo: EF 55%, mild LVH	  	  ASSESSMENT/PLAN: 	25y Male with hypertensive urgency, nephrotic-range proteinuria and newly diagnosed acute renal failure.  Having asymptomatic episodes of SVT on telemetry that are of short duration and self-terminating.    Monitor on telemetry.  No sustained SVT, only sinus tachy with activity.  Does not need beta blockers at this point, and I would generally avoid these in a young man due to potential for sexual dysfunction at significant doses.  Kidney bx results with IgA nephropathy.  Management per nephrology team.  If no sustained arrhythmia on telemetry, recommend ambulatory cardiac monitoring.     Enzo Swan M.D.  Cardiac Electrophysiology  [phone]

## 2021-03-05 NOTE — PROGRESS NOTE ADULT - SUBJECTIVE AND OBJECTIVE BOX
Interval Events:    tolerating po intake  renal biopsy- IgA nephropathy on high dose steroids  poc glucose increasing  no prior h/o diabetes    Allergies    Kiwi (Swelling)  No Known Drug Allergies    Intolerances      Endocrine/Metabolic Medications:  atorvastatin 40 milliGRAM(s) Oral at bedtime  methylPREDNISolone sodium succinate IVPB 1000 milliGRAM(s) IV Intermittent daily      Vital Signs Last 24 Hrs  T(C): 36.3 (05 Mar 2021 11:11), Max: 37.1 (04 Mar 2021 20:36)  T(F): 97.4 (05 Mar 2021 11:11), Max: 98.7 (04 Mar 2021 20:36)  HR: 89 (05 Mar 2021 13:19) (83 - 103)  BP: 145/82 (05 Mar 2021 13:19) (132/71 - 159/92)  BP(mean): --  RR: 18 (05 Mar 2021 11:11) (16 - 18)  SpO2: 96% (05 Mar 2021 11:11) (94% - 98%)      PHYSICAL EXAM  NC/AT:    HEENT:    Neck:  No JVD    Respiratory:  Clear without rales or rhonchi    Cardiovascular:  RR without murmur    Gastrointestinal: Soft     Extremities: without cyanosis    Neurological:  non focal      LABS                        10.3   6.50  )-----------( 196      ( 05 Mar 2021 13:43 )             29.9                               135    |  105    |  62                  Calcium: 8.7   / iCa: x      (03-05 @ 13:43)    ----------------------------<  141       Magnesium: x                                4.7     |  20     |  7.05             Phosphorous: x        TPro  6.5    /  Alb  3.2    /  TBili  0.5    /  DBili  x      /  AST  25     /  ALT  51     /  AlkPhos  83     05 Mar 2021 08:33    CAPILLARY BLOOD GLUCOSE            Assesment/plan        24 yo male with h/o asthma, admitted with headaches, nausea, vomiting x 5 days, also noted to have elevated SBP >170 and sent to ED    endocrinology consulted for uncontrolled HTN,  ? secondary HTN    HTN  uncontrolled  aldosterone- 27 (ULN 23 ng/dl) direct renin: 4.9 pg/ml  TSH wnl   urine tox neg  urine metanephrine pending  CT abdomen/pelvis: without evident adrenal abnormality    recommendations:  - serum metanephrines pending  f/u 24 urine catecholamines  aldosterone 27.2 nd/dl (ULN 23) on 2/23, direct renin 4.9 pg/ml  repeat aldosterone 11.3 on 2/25- ?postural changes while inpatient- repeat outpatient  plasma renin activity: 0.577 on 2/23    would repeat aldosterone/renin, metanephrines as outpatient    - recommend avoiding spironolactone/epleronone , doxazosin till outpatient workup is complete  can use alternate anti HTN- hydralazine, nifedipine, labetalol  CT adrenal as outpatient based on repeat labs  renal biopsy 3/3- IgA nephropathy  however since renin level is not suppressed/low, without hypokalemia, less likely from primary aldosteronoma  repeat aldosterone/renin as outpatient  nephrology on board    CIARRA  serum creatinine uptrending to 7  urine tox neg  hyperechoic kidneys on renal ultrasound  nephrology on board  renal biopsy-3/3- IgA nephropathy, mesangial and sclerosing  on high dose steroids    steroid induced hyperglycemia  HBA1C 5.4  on high dose steroids for IgA nephropathy  - monitor poc glucose ac/hs  - start low dose sliding scale if poc glucose consistently >140mg/dl  reassess based on requirements  goal inpatient glucose range 140-180mg/dl    elevated LFTs  hep panel neg  downtrending, improved      Discussed with primary team  Please call Endocrine- - Dr Lexy Mclain as needed

## 2021-03-05 NOTE — PROGRESS NOTE ADULT - SUBJECTIVE AND OBJECTIVE BOX
Patient denies CP, SOB No further palpitations.  No headache    acetaminophen   Tablet .. 650 milliGRAM(s) Oral every 6 hours PRN  ALBUTerol    90 MICROgram(s) HFA Inhaler 2 Puff(s) Inhalation every 6 hours PRN  atorvastatin 40 milliGRAM(s) Oral at bedtime  budesonide  80 MICROgram(s)/formoterol 4.5 MICROgram(s) Inhaler 2 Puff(s) Inhalation two times a day  diltiazem    milliGRAM(s) Oral daily  diVALproex  milliGRAM(s) Oral <User Schedule>  hydrALAZINE 25 milliGRAM(s) Oral three times a day  lidocaine   Patch 1 Patch Transdermal daily  methylPREDNISolone sodium succinate IVPB 1000 milliGRAM(s) IV Intermittent daily  metoclopramide Injectable 5 milliGRAM(s) IV Push every 8 hours PRN  ondansetron Injectable 5 milliGRAM(s) IV Push every 6 hours PRN  pantoprazole    Tablet 40 milliGRAM(s) Oral before breakfast  sevelamer carbonate 800 milliGRAM(s) Oral three times a day with meals  sodium bicarbonate 1300 milliGRAM(s) Oral two times a day  sodium chloride 0.9%. 1000 milliLiter(s) IV Continuous <Continuous>  sodium zirconium cyclosilicate 10 Gram(s) Oral once                            9.9    4.07  )-----------( 201      ( 05 Mar 2021 08:33 )             29.6       Hemoglobin: 9.9 g/dL (03-05 @ 08:33)  Hemoglobin: 10.1 g/dL (03-04 @ 08:17)  Hemoglobin: 10.4 g/dL (03-03 @ 20:44)  Hemoglobin: 10.6 g/dL (03-03 @ 08:49)  Hemoglobin: 10.6 g/dL (03-02 @ 09:16)      03-05    134<L>  |  103  |  60<H>  ----------------------------<  132<H>  5.4<H>   |  19<L>  |  6.87<H>    Ca    8.5      05 Mar 2021 08:33  Phos  5.9     03-05  Mg     2.2     03-05    TPro  6.5  /  Alb  3.2<L>  /  TBili  0.5  /  DBili  x   /  AST  25  /  ALT  51  /  AlkPhos  83  03-05    Creatinine Trend: 6.87<--, 6.61<--, 6.70<--, 6.54<--, 6.14<--, 5.87<--    COAGS:           T(C): 36.4 (03-05-21 @ 07:32), Max: 37.1 (03-04-21 @ 20:36)  HR: 91 (03-05-21 @ 07:32) (83 - 103)  BP: 132/81 (03-05-21 @ 07:32) (120/61 - 159/92)  RR: 17 (03-05-21 @ 07:32) (16 - 17)  SpO2: 94% (03-05-21 @ 07:32) (94% - 98%)  Wt(kg): --    I&O's Summary      HEENT:  (-)icterus (-)pallor  CV: N S1 S2 1/6 SHAGUFTA (+)2 Pulses B/l  Resp:  Clear to ausculatation B/L, normal effort  GI: (+) BS Soft, NT, ND  Lymph:  (-)Edema, (-)obvious lymphadenopathy  Skin: Warm to touch, Normal turgor  Psych: Appropriate mood and affect      TELEMETRY: ST    TTE: < from: Transthoracic Echocardiogram (02.24.21 @ 18:49) >  1. Mild mitral regurgitation.  2. Mild concentric left ventricular hypertrophy.  3. Normal left ventricular systolic function.  4. Normal right ventricular size and function.    < end of copied text >          ASSESSMENT/PLAN: 	25y  Male PMH of asthma presented to his PCP with complaints of headache, nausea and vomiting x 5 days found with hypertnesive urgency and CIARRA.    - no further palpitations on cardizem   - BP has improved and patient normotensive today   - Check Serum renin and tim levels  - TSH within normal Limits  - echo with no significant structural heart disease and normal LVEF  - steroids per renal  - EP eval appreciated       Yehuda Peres MD, [facility]  BEEPER [phone]

## 2021-03-05 NOTE — PROGRESS NOTE ADULT - SUBJECTIVE AND OBJECTIVE BOX
Time of Visit:  Patient seen and examined.     MEDICATIONS  (STANDING):  atorvastatin 40 milliGRAM(s) Oral at bedtime  budesonide  80 MICROgram(s)/formoterol 4.5 MICROgram(s) Inhaler 2 Puff(s) Inhalation two times a day  diltiazem    milliGRAM(s) Oral daily  diVALproex  milliGRAM(s) Oral <User Schedule>  hydrALAZINE 25 milliGRAM(s) Oral three times a day  lidocaine   Patch 1 Patch Transdermal daily  methylPREDNISolone sodium succinate IVPB 1000 milliGRAM(s) IV Intermittent daily  pantoprazole    Tablet 40 milliGRAM(s) Oral before breakfast  sevelamer carbonate 800 milliGRAM(s) Oral three times a day with meals  sodium bicarbonate 1300 milliGRAM(s) Oral two times a day  sodium chloride 0.9%. 1000 milliLiter(s) (75 mL/Hr) IV Continuous <Continuous>      MEDICATIONS  (PRN):  acetaminophen   Tablet .. 650 milliGRAM(s) Oral every 6 hours PRN Temp greater or equal to 38C (100.4F), Mild Pain (1 - 3)  ALBUTerol    90 MICROgram(s) HFA Inhaler 2 Puff(s) Inhalation every 6 hours PRN Bronchospasm  metoclopramide Injectable 5 milliGRAM(s) IV Push every 8 hours PRN Severe headache  ondansetron Injectable 5 milliGRAM(s) IV Push every 6 hours PRN Nausea and/or Vomiting       Medications up to date at time of exam.      PHYSICAL EXAMINATION:  Patient has no new complaints.  GENERAL: The patient is a well-developed, well-nourished, in no apparent distress.     Vital Signs Last 24 Hrs  T(C): 36.8 (05 Mar 2021 15:42), Max: 37.1 (04 Mar 2021 20:36)  T(F): 98.2 (05 Mar 2021 15:42), Max: 98.7 (04 Mar 2021 20:36)  HR: 100 (05 Mar 2021 15:42) (83 - 103)  BP: 150/86 (05 Mar 2021 15:42) (132/81 - 159/92)  BP(mean): --  RR: 16 (05 Mar 2021 15:42) (16 - 18)  SpO2: 96% (05 Mar 2021 15:42) (94% - 98%)   (if applicable)    Chest Tube (if applicable)    HEENT: Head is normocephalic and atraumatic. Extraocular muscles are intact. Mucous membranes are moist.     NECK: Supple, no palpable adenopathy.    LUNGS: Clear to auscultation, no wheezing, rales, or rhonchi.    HEART: Regular rate and rhythm without murmur.    ABDOMEN: Soft, nontender, and nondistended.  No hepatosplenomegaly is noted.    : No painful voiding, no pelvic pain    EXTREMITIES: Without any cyanosis, clubbing, rash, lesions or edema.    NEUROLOGIC: Awake, alert, oriented, grossly intact    SKIN: Warm, dry, good turgor.      LABS:                        10.3   6.50  )-----------( 196      ( 05 Mar 2021 13:43 )             29.9     03-05    135  |  105  |  62<H>  ----------------------------<  141<H>  4.7   |  20<L>  |  7.05<H>    Ca    8.7      05 Mar 2021 13:43  Phos  5.9     03-05  Mg     2.2     03-05    TPro  6.5  /  Alb  3.2<L>  /  TBili  0.5  /  DBili  x   /  AST  25  /  ALT  51  /  AlkPhos  83  03-05                        MICROBIOLOGY: (if applicable)    RADIOLOGY & ADDITIONAL STUDIES:  EKG:   CXR:  ECHO:    IMPRESSION: 25y Male PAST MEDICAL & SURGICAL HISTORY:  Asthma    No significant past surgical history     p/w         IMP: This is a 25 yr with asthma ,non smoker presented headaches due to hypertensive crisis and CIARRA. Metabolic acidosis due to CIARRA . Asthma stable . S/P l kidney bx . Prelim path .. IgA nephropathy with glomerulonephritis       Sugg;  -BP still uncontrol   -started on 3 days of pulse steroid therapy by Neph 3/4  -f/u official path report   -continue symbicort 80/4.5 bid  -continue cardiazem   -CIARRA work up  -DVT/ GI prophy

## 2021-03-05 NOTE — PROGRESS NOTE ADULT - SUBJECTIVE AND OBJECTIVE BOX
PGY-1 Progress Note discussed with attending    PAGER #: [[phone]] TILL 5:00 PM  PLEASE CONTACT ON CALL TEAM:  - On Call Team (Please refer to Prasanna) FROM 5:00 PM - 8:30PM  - Nightfloat Team FROM 8:30 -7:30 AM    CHIEF COMPLAINT & BRIEF HOSPITAL COURSE:  26 yo male with PMH of asthma presented to his PCP with complaints of headache, nausea and vomiting x 5 days. Pt was noted to have elevated SBP of >170 and was advised to come to emergency department. No family hx of Polycystic kidney disease, CVA, HTN. He was admitted for CIARRA with a creatinine of 6 and hypertensive emergency with a BP of 185/114.   In the ED the patients BP was 185/114. He was given amlodipine and hydralazine. He was continued on amlodipine 5 which was increased to 10 and hydralazine 10 which was increased to 25 with marked improvement in his BP. Cardiology was consulted (Dr. Peres). TTE showed mild MR and mild LVH. He was worked up for causes of secondary hypertension. Urine metanephrine was negative and CT A/P showed no adrenal pathology, making the diagnosis of pheochromocytoma unlikely. Serum Aldosterone and renin were obtained which showed aldosterone of 27.5, direct renin 4.9, renin activity 0.577 with an aldosterone : renin ratio of 47.66. Endocrine was consulted. Repeat aldosterone was 11.3. ? postural change. Endocrine recommended outpatient workup to repeat aldosterone/renin and metanephrine.   For his ARF he was noted to have a creatinine of 6 on admission. UA showed moderate blood, 500 protein. FeNa: 3.3%. Nephrology was consulted (Dr. Massey). Normal C3, C4, SIFE neg, K/L 1.7, ELMA, ANCA, dsDNA, ASO, Treponema Pallidum ab and Hepatitis panel all negative. Immunofixation negative for monoclonal bands. CT A/P was negative for any obstructive uropathy or acute findings.  Renal US showed bilateral hyperechoic kidneys.  For CKD the patient prior records were obtained from 2018 which showed a baseline creatinine of 1.29 and a UA with positive blood, protein and hyaline casts. PTHi was elevated at 99. The patient was started on a low phos diet. Nephrology was following.   For the patient headache he was started on Dilaudid 0.1mg due to ARF and persistent 8/10 frontal/bitemporal headaches with Tylenol. CTH on admission was negative. Repeat CTH was done for acute severe headaches which also showed no acute findings. Neurology was consulted. They recommended a cocktail of depakote, reglan, and benadryl Q8 for 3-5 days as needed. Dilaudid was discontinued.   On admission the patient was noted to have multiple runs of asymptomatic sinus tachycardia. Electrophysiology was consulted, (Dr Swan) and recommended against beta blockers due to potential side effects. The patient was started on Cardizem and Amlodipine was discontinued     INTERVAL HPI/OVERNIGHT EVENTS: No acute events overnight. The patient had no complaints this morning. Renal biopsy done on 3/3. Prelims showed IgA nephropathy. On solumedrol IVPB 1000 for 3 days till 3/6. Nephro Dr. Massey followed. For hyperphosphatemia, added renvela. Sodium bicarb increased to 1300 bid.     REVIEW OF SYSTEMS:  CONSTITUTIONAL: No fever, weight loss, or fatigue  RESPIRATORY: No cough, wheezing, chills or hemoptysis; No shortness of breath  CARDIOVASCULAR: No chest pain, palpitations, dizziness, or leg swelling  GASTROINTESTINAL: No abdominal pain. No nausea, vomiting, or hematemesis; No diarrhea or constipation. No melena or hematochezia.  GENITOURINARY: No dysuria or hematuria, urinary frequency  NEUROLOGICAL: No headaches, memory loss, loss of strength, numbness, or tremors  SKIN: No itching, burning, rashes, or lesions     Vital Signs Last 24 Hrs  T(C): 36.3 (05 Mar 2021 11:11), Max: 37.1 (04 Mar 2021 20:36)  T(F): 97.4 (05 Mar 2021 11:11), Max: 98.7 (04 Mar 2021 20:36)  HR: 89 (05 Mar 2021 13:19) (83 - 103)  BP: 145/82 (05 Mar 2021 13:19) (132/71 - 159/92)  BP(mean): --  RR: 18 (05 Mar 2021 11:11) (16 - 18)  SpO2: 96% (05 Mar 2021 11:11) (94% - 98%)    PHYSICAL EXAMINATION:  GENERAL: NAD, well built  HEAD:  Atraumatic, Normocephalic  EYES:  conjunctiva and sclera clear  NECK: Supple, No JVD, Normal thyroid  CHEST/LUNG: Clear to auscultation. Clear to percussion bilaterally; No rales, rhonchi, wheezing, or rubs  HEART: Regular rate and rhythm; No murmurs, rubs, or gallops  ABDOMEN: Soft, Nontender, Nondistended; Bowel sounds present  NERVOUS SYSTEM:  Alert & Oriented X3,    EXTREMITIES:  2+ Peripheral Pulses, No clubbing, cyanosis, or edema  SKIN: warm dry                          10.3   6.50  )-----------( 196      ( 05 Mar 2021 13:43 )             29.9     03-05    134<L>  |  103  |  60<H>  ----------------------------<  132<H>  5.4<H>   |  19<L>  |  6.87<H>    Ca    8.5      05 Mar 2021 08:33  Phos  5.9     03-05  Mg     2.2     03-05    TPro  6.5  /  Alb  3.2<L>  /  TBili  0.5  /  DBili  x   /  AST  25  /  ALT  51  /  AlkPhos  83  03-05    LIVER FUNCTIONS - ( 05 Mar 2021 08:33 )  Alb: 3.2 g/dL / Pro: 6.5 g/dL / ALK PHOS: 83 U/L / ALT: 51 U/L DA / AST: 25 U/L / GGT: x                   CAPILLARY BLOOD GLUCOSE      RADIOLOGY & ADDITIONAL TESTS:

## 2021-03-05 NOTE — PROGRESS NOTE ADULT - SUBJECTIVE AND OBJECTIVE BOX
MARTHA MARTINEZ  MR# [id number]  25yMale        Patient is a 25y old  Male who presents with a chief complaint of headache, nausea/ vomiting (05 Mar 2021 10:55)      INTERVAL HPI/OVERNIGHT EVENTS:  Patient seen and examined at bedside. No notations of chest pain, palpitation, SOB, orthopnea, nausea, vomiting or abdominal pain.    ALLERGIES  Kiwi (Swelling)  No Known Drug Allergies      MEDICATIONS  acetaminophen   Tablet .. 650 milliGRAM(s) Oral every 6 hours PRN Temp greater or equal to 38C (100.4F), Mild Pain (1 - 3)  ALBUTerol    90 MICROgram(s) HFA Inhaler 2 Puff(s) Inhalation every 6 hours PRN Bronchospasm  atorvastatin 40 milliGRAM(s) Oral at bedtime  budesonide  80 MICROgram(s)/formoterol 4.5 MICROgram(s) Inhaler 2 Puff(s) Inhalation two times a day  diltiazem    milliGRAM(s) Oral daily  diVALproex  milliGRAM(s) Oral <User Schedule>  hydrALAZINE 25 milliGRAM(s) Oral three times a day  lidocaine   Patch 1 Patch Transdermal daily  methylPREDNISolone sodium succinate IVPB 1000 milliGRAM(s) IV Intermittent daily  metoclopramide Injectable 5 milliGRAM(s) IV Push every 8 hours PRN Severe headache  ondansetron Injectable 5 milliGRAM(s) IV Push every 6 hours PRN Nausea and/or Vomiting  pantoprazole    Tablet 40 milliGRAM(s) Oral before breakfast  sevelamer carbonate 800 milliGRAM(s) Oral three times a day with meals  sodium bicarbonate 1300 milliGRAM(s) Oral two times a day  sodium chloride 0.9%. 1000 milliLiter(s) IV Continuous <Continuous>  sodium zirconium cyclosilicate 10 Gram(s) Oral once              REVIEW OF SYSTEMS:  CONSTITUTIONAL: No fever, weight loss, or fatigue  EYES: No eye pain, visual disturbances, or discharge  ENT:  No difficulty hearing, tinnitus, vertigo; No sinus or throat pain  NECK: No pain or stiffness  RESPIRATORY: No cough, wheezing, chills or hemoptysis; No Shortness of Breath  CARDIOVASCULAR: No chest pain, palpitations, passing out, dizziness, or leg swelling  GASTROINTESTINAL: No abdominal or epigastric pain. No nausea, vomiting, or hematemesis; No diarrhea or constipation. No melena or hematochezia.  GENITOURINARY: No dysuria, frequency, hematuria, or incontinence  NEUROLOGICAL: No headaches, memory loss, loss of strength, numbness, or tremors  SKIN: No itching, burning, rashes, or lesions   LYMPH Nodes: No enlarged glands  ENDOCRINE: No heat or cold intolerance; No hair loss  MUSCULOSKELETAL: No joint pain or swelling; No muscle, back, or extremity pain  PSYCHIATRIC: No depression, anxiety, mood swings, or difficulty sleeping  HEME/LYMPH: No easy bruising, or bleeding gums  ALLERGY AND IMMUNOLOGIC: No hives or eczema	    [ ] All others negative	  [ ] Unable to obtain      T(C): 36.3 (03-05-21 @ 11:11), Max: 37.1 (03-04-21 @ 20:36)  T(F): 97.4 (03-05-21 @ 11:11), Max: 98.7 (03-04-21 @ 20:36)  HR: 103 (03-05-21 @ 11:11) (83 - 103)  BP: 138/71 (03-05-21 @ 11:11) (120/61 - 159/92)  RR: 18 (03-05-21 @ 11:11) (16 - 18)  SpO2: 96% (03-05-21 @ 11:11) (94% - 98%)  Wt(kg): --    I&O's Summary        PHYSICAL EXAM:  A X O x  HEAD:  Atraumatic, Normocephalic  EYES: EOMI, PERRLA, conjunctiva and sclera clear  NECK: Supple, No JVD, Normal thyroid  Resp: CTAB, No crackles, wheezing,   CVS: Regular rate and rhythm; No discernable murmurs, rubs, or gallops  ABD: Soft, Nontender, Nondistended; Bowel sounds present  EXTREMITIES:  2+ Peripheral Pulses, No edema  LYMPH: No dicernable lymphadenopathy noted  GENERAL: NAD, well-groomed, well-developed      LABS:                        9.9    4.07  )-----------( 201      ( 05 Mar 2021 08:33 )             29.6     03-05    134<L>  |  103  |  60<H>  ----------------------------<  132<H>  5.4<H>   |  19<L>  |  6.87<H>    Ca    8.5      05 Mar 2021 08:33  Phos  5.9     03-05  Mg     2.2     03-05    TPro  6.5  /  Alb  3.2<L>  /  TBili  0.5  /  DBili  x   /  AST  25  /  ALT  51  /  AlkPhos  83  03-05        CAPILLARY BLOOD GLUCOSE          Troponins:  ProBNP:  Lipid Profile:   HgA1c:  TSH:           RADIOLOGY & ADDITIONAL TESTS:    Imaging Personally Reviewed:  [ ] YES  [ ] NO      Consultant(s) Notes Reviewed:  [x ] YES  [ ] NO    Care Discussed with Consultants/Other Providers [ x] YES  [ ] NO          PAST MEDICAL & SURGICAL HISTORY:  Asthma    No significant past surgical history          Acute renal failure    Handoff    MEWS Score    Asthma    Acute renal failure (ARF)    CIARRA (acute kidney injury)    Hypertensive emergency    Hypertensive emergency    Hyperkalemia    Headache    Hyperlipidemia    CKD (chronic kidney disease)    SVT (supraventricular tachycardia)    Asthma    Elevated LFTs    Metabolic acidemia, unspecified    Prophylactic measure    Acute renal failure (ARF)    Hypertensive emergency    No significant past surgical history    No significant past surgical history    W-SENT FROM PMD/HIGH BP    90+    Hypertensive emergency    Acute renal failure    Headache    SVT (supraventricular tachycardia)    Asthma    Metabolic acidemia, unspecified    Hypertensive emergency    HTN (hypertension)    [id number]

## 2021-03-06 VITALS — SYSTOLIC BLOOD PRESSURE: 135 MMHG | DIASTOLIC BLOOD PRESSURE: 82 MMHG | HEART RATE: 89 BPM

## 2021-03-06 LAB
CMV DNA CSF QL NAA+PROBE: SIGNIFICANT CHANGE UP
EBV EA AB SER IA-ACNC: 6.5 U/ML — SIGNIFICANT CHANGE UP
EBV EA AB TITR SER IF: POSITIVE
EBV EA IGG SER-ACNC: NEGATIVE — SIGNIFICANT CHANGE UP
EBV NA IGG SER IA-ACNC: 224 U/ML — HIGH
EBV PATRN SPEC IB-IMP: SIGNIFICANT CHANGE UP
EBV VCA IGG AVIDITY SER QL IA: POSITIVE
EBV VCA IGM SER IA-ACNC: <10 U/ML — SIGNIFICANT CHANGE UP
EBV VCA IGM SER IA-ACNC: >750 U/ML — HIGH
EBV VCA IGM TITR FLD: NEGATIVE — SIGNIFICANT CHANGE UP
GLUCOSE BLDC GLUCOMTR-MCNC: 147 MG/DL — HIGH (ref 70–99)
RENIN PLAS-CCNC: 0.94 NG/ML/HR — SIGNIFICANT CHANGE UP (ref 0.17–5.38)

## 2021-03-06 PROCEDURE — 84300 ASSAY OF URINE SODIUM: CPT

## 2021-03-06 PROCEDURE — 82607 VITAMIN B-12: CPT

## 2021-03-06 PROCEDURE — 77012 CT SCAN FOR NEEDLE BIOPSY: CPT

## 2021-03-06 PROCEDURE — 86663 EPSTEIN-BARR ANTIBODY: CPT

## 2021-03-06 PROCEDURE — 86060 ANTISTREPTOLYSIN O TITER: CPT

## 2021-03-06 PROCEDURE — 87086 URINE CULTURE/COLONY COUNT: CPT

## 2021-03-06 PROCEDURE — 88350 IMFLUOR EA ADDL 1ANTB STN PX: CPT

## 2021-03-06 PROCEDURE — 86900 BLOOD TYPING SEROLOGIC ABO: CPT

## 2021-03-06 PROCEDURE — 86038 ANTINUCLEAR ANTIBODIES: CPT

## 2021-03-06 PROCEDURE — 85025 COMPLETE CBC W/AUTO DIFF WBC: CPT

## 2021-03-06 PROCEDURE — 86850 RBC ANTIBODY SCREEN: CPT

## 2021-03-06 PROCEDURE — 80048 BASIC METABOLIC PNL TOTAL CA: CPT

## 2021-03-06 PROCEDURE — 36415 COLL VENOUS BLD VENIPUNCTURE: CPT

## 2021-03-06 PROCEDURE — 88313 SPECIAL STAINS GROUP 2: CPT

## 2021-03-06 PROCEDURE — 83735 ASSAY OF MAGNESIUM: CPT

## 2021-03-06 PROCEDURE — 93005 ELECTROCARDIOGRAM TRACING: CPT

## 2021-03-06 PROCEDURE — U0005: CPT

## 2021-03-06 PROCEDURE — 87205 SMEAR GRAM STAIN: CPT

## 2021-03-06 PROCEDURE — 74176 CT ABD & PELVIS W/O CONTRAST: CPT

## 2021-03-06 PROCEDURE — 83605 ASSAY OF LACTIC ACID: CPT

## 2021-03-06 PROCEDURE — 85652 RBC SED RATE AUTOMATED: CPT

## 2021-03-06 PROCEDURE — 86901 BLOOD TYPING SEROLOGIC RH(D): CPT

## 2021-03-06 PROCEDURE — 84100 ASSAY OF PHOSPHORUS: CPT

## 2021-03-06 PROCEDURE — 88312 SPECIAL STAINS GROUP 1: CPT

## 2021-03-06 PROCEDURE — 83935 ASSAY OF URINE OSMOLALITY: CPT

## 2021-03-06 PROCEDURE — 80307 DRUG TEST PRSMV CHEM ANLYZR: CPT

## 2021-03-06 PROCEDURE — 83497 ASSAY OF 5-HIAA: CPT

## 2021-03-06 PROCEDURE — 84466 ASSAY OF TRANSFERRIN: CPT

## 2021-03-06 PROCEDURE — 82570 ASSAY OF URINE CREATININE: CPT

## 2021-03-06 PROCEDURE — 83970 ASSAY OF PARATHORMONE: CPT

## 2021-03-06 PROCEDURE — 82746 ASSAY OF FOLIC ACID SERUM: CPT

## 2021-03-06 PROCEDURE — 86703 HIV-1/HIV-2 1 RESULT ANTBDY: CPT

## 2021-03-06 PROCEDURE — 85027 COMPLETE CBC AUTOMATED: CPT

## 2021-03-06 PROCEDURE — 93306 TTE W/DOPPLER COMPLETE: CPT

## 2021-03-06 PROCEDURE — 81001 URINALYSIS AUTO W/SCOPE: CPT

## 2021-03-06 PROCEDURE — 71045 X-RAY EXAM CHEST 1 VIEW: CPT

## 2021-03-06 PROCEDURE — 50200 RENAL BIOPSY PERQ: CPT

## 2021-03-06 PROCEDURE — 84443 ASSAY THYROID STIM HORMONE: CPT

## 2021-03-06 PROCEDURE — 86036 ANCA SCREEN EACH ANTIBODY: CPT

## 2021-03-06 PROCEDURE — 82436 ASSAY OF URINE CHLORIDE: CPT

## 2021-03-06 PROCEDURE — 84156 ASSAY OF PROTEIN URINE: CPT

## 2021-03-06 PROCEDURE — 86664 EPSTEIN-BARR NUCLEAR ANTIGEN: CPT

## 2021-03-06 PROCEDURE — 86160 COMPLEMENT ANTIGEN: CPT

## 2021-03-06 PROCEDURE — 84585 ASSAY OF URINE VMA: CPT

## 2021-03-06 PROCEDURE — 82803 BLOOD GASES ANY COMBINATION: CPT

## 2021-03-06 PROCEDURE — 83540 ASSAY OF IRON: CPT

## 2021-03-06 PROCEDURE — 96374 THER/PROPH/DIAG INJ IV PUSH: CPT

## 2021-03-06 PROCEDURE — 85610 PROTHROMBIN TIME: CPT

## 2021-03-06 PROCEDURE — 86665 EPSTEIN-BARR CAPSID VCA: CPT

## 2021-03-06 PROCEDURE — 85730 THROMBOPLASTIN TIME PARTIAL: CPT

## 2021-03-06 PROCEDURE — 82550 ASSAY OF CK (CPK): CPT

## 2021-03-06 PROCEDURE — 82553 CREATINE MB FRACTION: CPT

## 2021-03-06 PROCEDURE — 83516 IMMUNOASSAY NONANTIBODY: CPT

## 2021-03-06 PROCEDURE — 86255 FLUORESCENT ANTIBODY SCREEN: CPT

## 2021-03-06 PROCEDURE — 80074 ACUTE HEPATITIS PANEL: CPT

## 2021-03-06 PROCEDURE — 85045 AUTOMATED RETICULOCYTE COUNT: CPT

## 2021-03-06 PROCEDURE — 76775 US EXAM ABDO BACK WALL LIM: CPT

## 2021-03-06 PROCEDURE — 82728 ASSAY OF FERRITIN: CPT

## 2021-03-06 PROCEDURE — 88305 TISSUE EXAM BY PATHOLOGIST: CPT

## 2021-03-06 PROCEDURE — 94640 AIRWAY INHALATION TREATMENT: CPT

## 2021-03-06 PROCEDURE — 86334 IMMUNOFIX E-PHORESIS SERUM: CPT

## 2021-03-06 PROCEDURE — 84484 ASSAY OF TROPONIN QUANT: CPT

## 2021-03-06 PROCEDURE — 80053 COMPREHEN METABOLIC PANEL: CPT

## 2021-03-06 PROCEDURE — 86780 TREPONEMA PALLIDUM: CPT

## 2021-03-06 PROCEDURE — 87635 SARS-COV-2 COVID-19 AMP PRB: CPT

## 2021-03-06 PROCEDURE — 88346 IMFLUOR 1ST 1ANTB STAIN PX: CPT

## 2021-03-06 PROCEDURE — 70450 CT HEAD/BRAIN W/O DYE: CPT

## 2021-03-06 PROCEDURE — 86769 SARS-COV-2 COVID-19 ANTIBODY: CPT

## 2021-03-06 PROCEDURE — 86225 DNA ANTIBODY NATIVE: CPT

## 2021-03-06 PROCEDURE — 82088 ASSAY OF ALDOSTERONE: CPT

## 2021-03-06 PROCEDURE — 99285 EMERGENCY DEPT VISIT HI MDM: CPT

## 2021-03-06 PROCEDURE — 83521 IG LIGHT CHAINS FREE EACH: CPT

## 2021-03-06 PROCEDURE — 84244 ASSAY OF RENIN: CPT

## 2021-03-06 PROCEDURE — 83835 ASSAY OF METANEPHRINES: CPT

## 2021-03-06 PROCEDURE — 80061 LIPID PANEL: CPT

## 2021-03-06 PROCEDURE — 88348 ELECTRON MICROSCOPY DX: CPT

## 2021-03-06 PROCEDURE — 82310 ASSAY OF CALCIUM: CPT

## 2021-03-06 PROCEDURE — 83036 HEMOGLOBIN GLYCOSYLATED A1C: CPT

## 2021-03-06 PROCEDURE — 82962 GLUCOSE BLOOD TEST: CPT

## 2021-03-06 PROCEDURE — 83550 IRON BINDING TEST: CPT

## 2021-03-06 RX ORDER — DILTIAZEM HCL 120 MG
1 CAPSULE, EXT RELEASE 24 HR ORAL
Qty: 30 | Refills: 0
Start: 2021-03-06 | End: 2021-04-04

## 2021-03-06 RX ORDER — SEVELAMER CARBONATE 2400 MG/1
1 POWDER, FOR SUSPENSION ORAL
Qty: 60 | Refills: 0
Start: 2021-03-06 | End: 2021-03-25

## 2021-03-06 RX ORDER — PANTOPRAZOLE SODIUM 20 MG/1
1 TABLET, DELAYED RELEASE ORAL
Qty: 30 | Refills: 0
Start: 2021-03-06 | End: 2021-04-04

## 2021-03-06 RX ORDER — SODIUM BICARBONATE 1 MEQ/ML
2 SYRINGE (ML) INTRAVENOUS
Qty: 120 | Refills: 0
Start: 2021-03-06 | End: 2021-04-04

## 2021-03-06 RX ADMIN — SEVELAMER CARBONATE 800 MILLIGRAM(S): 2400 POWDER, FOR SUSPENSION ORAL at 08:14

## 2021-03-06 RX ADMIN — SEVELAMER CARBONATE 800 MILLIGRAM(S): 2400 POWDER, FOR SUSPENSION ORAL at 12:20

## 2021-03-06 RX ADMIN — Medication 120 MILLIGRAM(S): at 05:18

## 2021-03-06 RX ADMIN — DIVALPROEX SODIUM 500 MILLIGRAM(S): 500 TABLET, DELAYED RELEASE ORAL at 12:20

## 2021-03-06 RX ADMIN — Medication 1300 MILLIGRAM(S): at 05:18

## 2021-03-06 RX ADMIN — DIVALPROEX SODIUM 500 MILLIGRAM(S): 500 TABLET, DELAYED RELEASE ORAL at 05:18

## 2021-03-06 RX ADMIN — Medication 25 MILLIGRAM(S): at 13:36

## 2021-03-06 RX ADMIN — Medication 25 MILLIGRAM(S): at 05:18

## 2021-03-06 RX ADMIN — PANTOPRAZOLE SODIUM 40 MILLIGRAM(S): 20 TABLET, DELAYED RELEASE ORAL at 05:19

## 2021-03-06 RX ADMIN — Medication 258 MILLIGRAM(S): at 05:19

## 2021-03-06 NOTE — PROGRESS NOTE ADULT - SUBJECTIVE AND OBJECTIVE BOX
pt seen and examined, no complaints, ROS - .           acetaminophen   Tablet .. 650 milliGRAM(s) Oral every 6 hours PRN  ALBUTerol    90 MICROgram(s) HFA Inhaler 2 Puff(s) Inhalation every 6 hours PRN  atorvastatin 40 milliGRAM(s) Oral at bedtime  budesonide  80 MICROgram(s)/formoterol 4.5 MICROgram(s) Inhaler 2 Puff(s) Inhalation two times a day  diltiazem    milliGRAM(s) Oral daily  diVALproex  milliGRAM(s) Oral <User Schedule>  hydrALAZINE 25 milliGRAM(s) Oral three times a day  lidocaine   Patch 1 Patch Transdermal daily  methylPREDNISolone sodium succinate IVPB 1000 milliGRAM(s) IV Intermittent daily  metoclopramide Injectable 5 milliGRAM(s) IV Push every 8 hours PRN  ondansetron Injectable 5 milliGRAM(s) IV Push every 6 hours PRN  pantoprazole    Tablet 40 milliGRAM(s) Oral before breakfast  sevelamer carbonate 800 milliGRAM(s) Oral three times a day with meals  sodium bicarbonate 1300 milliGRAM(s) Oral two times a day  sodium chloride 0.9%. 1000 milliLiter(s) IV Continuous <Continuous>                            10.3   6.50  )-----------( 196      ( 05 Mar 2021 13:43 )             29.9       Hemoglobin: 10.3 g/dL (03-05 @ 13:43)  Hemoglobin: 9.9 g/dL (03-05 @ 08:33)  Hemoglobin: 10.1 g/dL (03-04 @ 08:17)  Hemoglobin: 10.4 g/dL (03-03 @ 20:44)  Hemoglobin: 10.6 g/dL (03-03 @ 08:49)      03-05    135  |  105  |  62<H>  ----------------------------<  141<H>  4.7   |  20<L>  |  7.05<H>    Ca    8.7      05 Mar 2021 13:43  Phos  5.9     03-05  Mg     2.2     03-05    TPro  6.5  /  Alb  3.2<L>  /  TBili  0.5  /  DBili  x   /  AST  25  /  ALT  51  /  AlkPhos  83  03-05    Creatinine Trend: 7.05<--, 6.87<--, 6.61<--, 6.70<--, 6.54<--, 6.14<--    COAGS:           T(C): 36.8 (03-06-21 @ 04:30), Max: 37.2 (03-05-21 @ 23:46)  HR: 93 (03-06-21 @ 04:30) (89 - 103)  BP: 145/77 (03-06-21 @ 04:30) (132/81 - 150/86)  RR: 18 (03-06-21 @ 04:30) (16 - 18)  SpO2: 96% (03-06-21 @ 04:30) (94% - 98%)  Wt(kg): --    I&O's Summary    HEENT:  (-)icterus (-)pallor  CV: N S1 S2 1/6 SHAGUFTA (+)2 Pulses B/l  Resp:  Clear to ausculatation B/L, normal effort  GI: (+) BS Soft, NT, ND  Lymph:  (-)Edema, (-)obvious lymphadenopathy  Skin: Warm to touch, Normal turgor  Psych: Appropriate mood and affect      TELEMETRY: ST    TTE: < from: Transthoracic Echocardiogram (02.24.21 @ 18:49) >  1. Mild mitral regurgitation.  2. Mild concentric left ventricular hypertrophy.  3. Normal left ventricular systolic function.  4. Normal right ventricular size and function.    < end of copied text >          ASSESSMENT/PLAN: 	25y  Male PMH of asthma presented to his PCP with complaints of headache, nausea and vomiting x 5 days found with hypertnesive urgency and CIARRA.    -  tolerating CCB    - BP stable   - TSH within normal Limits  - echo with no significant structural heart disease and normal LVEF  - steroids per renal  - EP eval appreciated

## 2021-03-06 NOTE — PROGRESS NOTE ADULT - ATTENDING COMMENTS
Agree with above assessment and plan as outlined above.    - D/C planning for today    Yehuda Peres MD, [facility]  BEEPER [phone]

## 2021-03-06 NOTE — PROGRESS NOTE ADULT - PROBLEM SELECTOR PROBLEM 2
Acute renal failure (ARF)
Hypertensive emergency
Acute renal failure (ARF)
Hypertensive emergency

## 2021-03-06 NOTE — PROGRESS NOTE ADULT - PROBLEM SELECTOR PROBLEM 1
Hypertensive emergency
Acute renal failure (ARF)
Hypertensive emergency
Acute renal failure (ARF)
Hypertensive emergency

## 2021-03-06 NOTE — PROGRESS NOTE ADULT - ASSESSMENT
Patient is a 26yo Male with Asthma on Dupixent injections q 2kweeks p/w HA, n/v x 5 days. Pt sent in by his PMD due to elevated SBP >170. Pt was a/w CIARRA and hypertensive emergency. Nephrology consulted for Elevated serum creatinine.    1. CIARRA on CKD unspecified- UA with 500 protein and mod blood; rbc 5-10. Spot UPr/Cr 3.3- nephrotic range proteinuria without nephrotic syndrome (albumin 3.4 with no edema)   s/p renal biopsy 3/3; pathology +IgA Nephropathy with collapsing glomerulopathy; IFTA 50% for which pt was started on Solumedrol 1g IV qd x 3 days. Monitor FS; will d/c on Prednisone 60mg PO qd.   Will avoid ACEi/ ARB due to low GFR ~10%. Will f/u viral panel for collapsing glomerulopathy; thus far HIV neg, COVID ab neg; will f/u parvo, CMV and EBV.   Discussed with patient that due to low GFR and severe proteinuria- poor prognosis and will likely need dialysis in the near future. Discussed different modalities of dialysis; pt interested in PD. Pt has an appt to f/u with me on 3/16 @ 1:15 pm; will consider genetic testing at that time for APOL-1 gene.   Pt with no uremic symptoms or complaints. No need for RRT at this time.   Hyperphosphatemia- c/w Renvela 800mg PO tid with meals. c/w low phos diet.  Hyperkalemia- resolved s/p lokelma 10g PO x1 c/w low K diet. Strict I/Os. Avoid nephrotoxins/ NSAIDs/ RCA. Monitor BMP.    2. CKD unspecified- previous SCr 1.29 in 2018 with proteinuria. See above.  Avoid nephrotoxins    3. Hypertensive emergency- BP in /114; CT head neg. BP acceptable. c/w  Cardizem 120mg PO qd for h/o SVT as per cards. c/w Hydralazine 25mg PO tid, titrate as needed. c/w low salt diet.   Secondary w/u pending: UTox neg. Sebas 27.2 ng/dl; Renin 4.9 pg/ml (0.49ng/dl) Endo following. Low suspicion of pheo- CT with normal adrenals. w/u pending. TTE: mild MR, mild LVH.  Pt will need Renal US with dopplers as an outpt. Monitor BP  4. Metabolic Acidosis-in the setting of renal failure. Serum lactate wnl and VBG ph 7.31. Continue with sodium bicarb 1300mg PO bid.  Monitor ph/Co2      NO renal objection to D/C today.

## 2021-03-06 NOTE — PROGRESS NOTE ADULT - SUBJECTIVE AND OBJECTIVE BOX
Time of Visit:  Patient seen and examined.     MEDICATIONS  (STANDING):  atorvastatin 40 milliGRAM(s) Oral at bedtime  budesonide  80 MICROgram(s)/formoterol 4.5 MICROgram(s) Inhaler 2 Puff(s) Inhalation two times a day  diltiazem    milliGRAM(s) Oral daily  diVALproex  milliGRAM(s) Oral <User Schedule>  hydrALAZINE 25 milliGRAM(s) Oral three times a day  lidocaine   Patch 1 Patch Transdermal daily  methylPREDNISolone sodium succinate IVPB 1000 milliGRAM(s) IV Intermittent daily  pantoprazole    Tablet 40 milliGRAM(s) Oral before breakfast  sevelamer carbonate 800 milliGRAM(s) Oral three times a day with meals  sodium bicarbonate 1300 milliGRAM(s) Oral two times a day  sodium chloride 0.9%. 1000 milliLiter(s) (75 mL/Hr) IV Continuous <Continuous>      MEDICATIONS  (PRN):  acetaminophen   Tablet .. 650 milliGRAM(s) Oral every 6 hours PRN Temp greater or equal to 38C (100.4F), Mild Pain (1 - 3)  ALBUTerol    90 MICROgram(s) HFA Inhaler 2 Puff(s) Inhalation every 6 hours PRN Bronchospasm  metoclopramide Injectable 5 milliGRAM(s) IV Push every 8 hours PRN Severe headache  ondansetron Injectable 5 milliGRAM(s) IV Push every 6 hours PRN Nausea and/or Vomiting       Medications up to date at time of exam.      PHYSICAL EXAMINATION:  Patient has no new complaints.  GENERAL: The patient is a well-developed, well-nourished, in no apparent distress.     Vital Signs Last 24 Hrs  T(C): 36.7 (06 Mar 2021 11:01), Max: 37.2 (05 Mar 2021 23:46)  T(F): 98.1 (06 Mar 2021 11:01), Max: 98.9 (05 Mar 2021 23:46)  HR: 89 (06 Mar 2021 13:35) (87 - 99)  BP: 135/82 (06 Mar 2021 13:35) (127/78 - 145/77)  BP(mean): --  RR: 16 (06 Mar 2021 11:01) (16 - 18)  SpO2: 98% (06 Mar 2021 11:01) (96% - 98%)   (if applicable)    Chest Tube (if applicable)    HEENT: Head is normocephalic and atraumatic. Extraocular muscles are intact. Mucous membranes are moist.     NECK: Supple, no palpable adenopathy.    LUNGS: Clear to auscultation, no wheezing, rales, or rhonchi.    HEART: Regular rate and rhythm without murmur.    ABDOMEN: Soft, nontender, and nondistended.  No hepatosplenomegaly is noted.    : No painful voiding, no pelvic pain    EXTREMITIES: Without any cyanosis, clubbing, rash, lesions or edema.    NEUROLOGIC: Awake, alert, oriented, grossly intact    SKIN: Warm, dry, good turgor.      LABS:                        10.3   6.50  )-----------( 196      ( 05 Mar 2021 13:43 )             29.9     03-05    135  |  105  |  62<H>  ----------------------------<  141<H>  4.7   |  20<L>  |  7.05<H>    Ca    8.7      05 Mar 2021 13:43  Phos  5.9     03-05  Mg     2.2     03-05    TPro  6.5  /  Alb  3.2<L>  /  TBili  0.5  /  DBili  x   /  AST  25  /  ALT  51  /  AlkPhos  83  03-05                        MICROBIOLOGY: (if applicable)    RADIOLOGY & ADDITIONAL STUDIES:  EKG:   CXR:  ECHO:    IMPRESSION: 25y Male PAST MEDICAL & SURGICAL HISTORY:  Asthma    No significant past surgical history     p/w         IMP: This is a 25 yr with asthma ,non smoker presented headaches due to hypertensive crisis and CIARRA. Metabolic acidosis due to CIARRA . Asthma stable . S/P l kidney bx . Prelim path .. IgA nephropathy with glomerulonephritis       Sugg;  -BP is better   -started on 3 days of pulse steroid therapy by Neph 3/4  -f/u official path report   -continue symbicort 80/4.5 bid  -continue cardiazem   -CIARRA work up  -DVT/ GI prophy

## 2021-03-06 NOTE — PROGRESS NOTE ADULT - PROBLEM SELECTOR PLAN 1
Presented with CIARRA and Cr 6  Baseline creatinine is 1.29 (2018)  Urine protein/creatinine: 3.3 (2/22)  Repeat urine protein/creatinine: 3.8 (2/26)   CT abdomen showed no findings of obstructive uropathy or other findings.  Renal US showed hyperechoic kidneys bilaterally   FeNa: 3.3%  Urine tox was negative urine eosinophils negative  Pheochromocytoma unlikely- CT a/p showed normal adrenals, urine metanephrine negative, pending serum metanephrine   Normal C3, C4, SIFE neg, K/L 1.7, Syphillis neg, neg ASO, neg ANCA, neg ELMA, neg dsDNA, neg HIV, neg HepBsAg, and neg Hep C ab, neg anti-GBM  Immunofixation negative for monoclonal bands   YASH Kappa 7.38 (elevated) YASH Lambda 4.33 (elevated)  Renal biopsy on 3/3 with IR.  Prelim biopsy showed IgA Nephropathy  on solumedrol IVP 1000 for 3 days till 3/6  Creatinine increasing 5.35 > 5.87 > 6.14 > 6.54 > 6.70 >6.87    Nephrologist Dr. Massey following

## 2021-03-06 NOTE — PROGRESS NOTE ADULT - REASON FOR ADMISSION
headache, nausea/ vomiting

## 2021-03-06 NOTE — PROGRESS NOTE ADULT - ATTENDING COMMENTS
[facility] NEPHROLOGY  Justin Mckenzie M.D.  Chad Reich D.O.  Beth Massey M.D.  Shayy Navarro, MSN, ANP-C    Telephone: [phone]  Facsimile: [phone]    [address] [place] [address]

## 2021-03-06 NOTE — PROGRESS NOTE ADULT - SUBJECTIVE AND OBJECTIVE BOX
PGY-1 Progress Note discussed with attending    PAGER #: [phone]] TILL 5:00 PM  PLEASE CONTACT ON CALL TEAM:  - On Call Team (Please refer to Prasanna) FROM 5:00 PM - 8:30PM  - Nightfloat Team FROM 8:30 -7:30 AM    CHIEF COMPLAINT & BRIEF HOSPITAL COURSE:  24 yo male with PMH of asthma presented to his PCP with complaints of headache, nausea and vomiting x 5 days. Pt was noted to have elevated SBP of >170 and was advised to come to emergency department. No family hx of Polycystic kidney disease, CVA, HTN. He was admitted for CIARRA with a creatinine of 6 and hypertensive emergency with a BP of 185/114.   In the ED the patients BP was 185/114. He was given amlodipine and hydralazine. He was continued on amlodipine 5 which was increased to 10 and hydralazine 10 which was increased to 25 with marked improvement in his BP. Cardiology was consulted (Dr. Peres). TTE showed mild MR and mild LVH. He was worked up for causes of secondary hypertension. Urine metanephrine was negative and CT A/P showed no adrenal pathology, making the diagnosis of pheochromocytoma unlikely. Serum Aldosterone and renin were obtained which showed aldosterone of 27.5, direct renin 4.9, renin activity 0.577 with an aldosterone : renin ratio of 47.66. Endocrine was consulted. Repeat aldosterone was 11.3. ? postural change. Endocrine recommended outpatient workup to repeat aldosterone/renin and metanephrine.   For his ARF he was noted to have a creatinine of 6 on admission. UA showed moderate blood, 500 protein. FeNa: 3.3%. Nephrology was consulted (Dr. Massey). Normal C3, C4, SIFE neg, K/L 1.7, ELMA, ANCA, dsDNA, ASO, Treponema Pallidum ab and Hepatitis panel all negative. Immunofixation negative for monoclonal bands. CT A/P was negative for any obstructive uropathy or acute findings.  Renal US showed bilateral hyperechoic kidneys.  For CKD the patient prior records were obtained from 2018 which showed a baseline creatinine of 1.29 and a UA with positive blood, protein and hyaline casts. PTHi was elevated at 99. The patient was started on a low phos diet. Nephrology was following.   For the patient headache he was started on Dilaudid 0.1mg due to ARF and persistent 8/10 frontal/bitemporal headaches with Tylenol. CTH on admission was negative. Repeat CTH was done for acute severe headaches which also showed no acute findings. Neurology was consulted. They recommended a cocktail of depakote, reglan, and benadryl Q8 for 3-5 days as needed. Dilaudid was discontinued.   On admission the patient was noted to have multiple runs of asymptomatic sinus tachycardia. Electrophysiology was consulted, (Dr Swan) and recommended against beta blockers due to potential side effects. The patient was started on Cardizem and Amlodipine was discontinued     INTERVAL HPI/OVERNIGHT EVENTS: No acute events overnight. The patient had no complaints this morning. Renal biopsy done on 3/3. Prelims showed IgA nephropathy. On solumedrol IVPB 1000 for 3 days till 3/6. Nephro Dr. Massey followed.    REVIEW OF SYSTEMS:  CONSTITUTIONAL: No fever, weight loss, or fatigue  RESPIRATORY: No cough, wheezing, chills or hemoptysis; No shortness of breath  CARDIOVASCULAR: No chest pain, palpitations, dizziness, or leg swelling  GASTROINTESTINAL: No abdominal pain. No nausea, vomiting, or hematemesis; No diarrhea or constipation. No melena or hematochezia.  GENITOURINARY: No dysuria or hematuria, urinary frequency  NEUROLOGICAL: No headaches, memory loss, loss of strength, numbness, or tremors  SKIN: No itching, burning, rashes, or lesions     Vital Signs Last 24 Hrs  T(C): 36.8 (06 Mar 2021 04:30), Max: 37.2 (05 Mar 2021 23:46)  T(F): 98.3 (06 Mar 2021 04:30), Max: 98.9 (05 Mar 2021 23:46)  HR: 93 (06 Mar 2021 04:30) (89 - 103)  BP: 145/77 (06 Mar 2021 04:30) (132/81 - 150/86)  BP(mean): --  RR: 18 (06 Mar 2021 04:30) (16 - 18)  SpO2: 96% (06 Mar 2021 04:30) (94% - 98%)    PHYSICAL EXAMINATION:  GENERAL: NAD, well built  HEAD:  Atraumatic, Normocephalic  EYES:  conjunctiva and sclera clear  NECK: Supple, No JVD, Normal thyroid  CHEST/LUNG: Clear to auscultation. Clear to percussion bilaterally; No rales, rhonchi, wheezing, or rubs  HEART: Regular rate and rhythm; No murmurs, rubs, or gallops  ABDOMEN: Soft, Nontender, Nondistended; Bowel sounds present  NERVOUS SYSTEM:  Alert & Oriented X3,    EXTREMITIES:  2+ Peripheral Pulses, No clubbing, cyanosis, or edema  SKIN: warm dry                          10.3   6.50  )-----------( 196      ( 05 Mar 2021 13:43 )             29.9     03-05    135  |  105  |  62<H>  ----------------------------<  141<H>  4.7   |  20<L>  |  7.05<H>    Ca    8.7      05 Mar 2021 13:43  Phos  5.9     03-05  Mg     2.2     03-05    TPro  6.5  /  Alb  3.2<L>  /  TBili  0.5  /  DBili  x   /  AST  25  /  ALT  51  /  AlkPhos  83  03-05    LIVER FUNCTIONS - ( 05 Mar 2021 08:33 )  Alb: 3.2 g/dL / Pro: 6.5 g/dL / ALK PHOS: 83 U/L / ALT: 51 U/L DA / AST: 25 U/L / GGT: x           CAPILLARY BLOOD GLUCOSE    RADIOLOGY & ADDITIONAL TESTS:

## 2021-03-06 NOTE — PROGRESS NOTE ADULT - SUBJECTIVE AND OBJECTIVE BOX
Interval Events:    received 3 days high dose steroids  poc glucose mildly hyperglycemic  denies complaints    Allergies    Kiwi (Swelling)  No Known Drug Allergies    Intolerances      Endocrine/Metabolic Medications:  atorvastatin 40 milliGRAM(s) Oral at bedtime  methylPREDNISolone sodium succinate IVPB 1000 milliGRAM(s) IV Intermittent daily      Vital Signs Last 24 Hrs  T(C): 36.7 (06 Mar 2021 11:01), Max: 37.2 (05 Mar 2021 23:46)  T(F): 98.1 (06 Mar 2021 11:01), Max: 98.9 (05 Mar 2021 23:46)  HR: 89 (06 Mar 2021 13:35) (87 - 100)  BP: 135/82 (06 Mar 2021 13:35) (127/78 - 150/86)  BP(mean): --  RR: 16 (06 Mar 2021 11:01) (16 - 18)  SpO2: 98% (06 Mar 2021 11:01) (96% - 98%)      PHYSICAL EXAM  NC/AT:    HEENT:    Neck:  No JVD    Respiratory:  Clear without rales or rhonchi    Cardiovascular:  RR without murmur    Gastrointestinal: Soft     Extremities: without cyanosis    Neurological:  non focal    LABS        CAPILLARY BLOOD GLUCOSE      POCT Blood Glucose.: 147 mg/dL (06 Mar 2021 11:41)        Final Diagnosis  Kidney, needle core biopsy    IgA nephropathy, mesangial and sclerosing form, with features of  collapsing glomerulopathy (see comment)  - Oxford classification score: M1 E0 S1 T2 C0    Acute tubular injury and focal interstitial inflammation (see  comment)    Summary of chronic changes:  - Global glomerulosclerosis (47% of glomeruli)  - Segmental glomerulosclerosis (20% of glomeruli)  - Tubular atrophy and interstitial fibrosis (50% of cortex)  - Mild arterial and arteriolar sclerosis        Assesment/plan            26 yo male with h/o asthma, eczema admitted with headaches, nausea, vomiting x 5 days, also noted to have elevated SBP >170 and sent to ED    endocrinology consulted for uncontrolled HTN,  ? secondary HTN    HTN  uncontrolled  aldosterone- 27 (ULN 23 ng/dl) direct renin: 4.9 pg/ml  TSH wnl   urine tox neg  urine metanephrine pending  CT abdomen/pelvis: without evident adrenal abnormality    recommendations:  - serum metanephrines pending  f/u 24 urine catecholamines  aldosterone 27.2 nd/dl (ULN 23) on 2/23, direct renin 4.9 pg/ml  repeat aldosterone 11.3 on 2/25- ?postural changes while inpatient- repeat outpatient  plasma renin activity: 0.577 on 2/23    would repeat aldosterone/renin, metanephrines as outpatient- discussed with patient    - recommend avoiding spironolactone/epleronone , doxazosin till outpatient workup is complete  can use alternate anti HTN- hydralazine, nifedipine, labetalol  CT adrenal as outpatient based on repeat labs  renal biopsy 3/3- IgA nephropathy  however since renin level is not suppressed/low, without hypokalemia, less likely from primary aldosteronoma  repeat aldosterone/renin as outpatient  nephrology on board    CIARRA  serum creatinine uptrending to 7  urine tox neg  hyperechoic kidneys on renal ultrasound  nephrology on board  renal biopsy-3/3- IgA nephropathy, mesangial and sclerosing  received high dose steroids    steroid induced hyperglycemia  HBA1C 5.4  on high dose steroids for IgA nephropathy  - monitor poc glucose ac/hs  - start low dose sliding scale if poc glucose consistently >140mg/dl  reassess based on requirements  goal inpatient glucose range 140-180mg/dl      elevated LFTs  hep panel neg  downtrending, improved      Discussed with patient and primary team  Please call Endocrine- [phone]- Dr Lexy Mclain as needed

## 2021-03-06 NOTE — PROGRESS NOTE ADULT - PROVIDER SPECIALTY LIST ADULT
Cardiology
Electrophysiology
Electrophysiology
Endocrinology
Endocrinology
Nephrology
Nephrology
Neurology
Pulmonology
Pulmonology
Cardiology
Electrophysiology
Endocrinology
Nephrology
Neurology
Pulmonology
Electrophysiology
Intervent Radiology
Nephrology
Neurology
Pulmonology
Electrophysiology
Nephrology
Pulmonology
Pulmonology
Nephrology
Nephrology
Internal Medicine

## 2021-03-06 NOTE — PROGRESS NOTE ADULT - SUBJECTIVE AND OBJECTIVE BOX
[facility] NEPHROLOGY- PROGRESS NOTE    Patient is a 24yo Male with Asthma on Dupixent injections q 2kweeks p/w HA, n/v x 5 days. Pt sent in by his PMD due to elevated SBP >170. Pt was a/w CIARRA and hypertensive emergency. Nephrology consulted for Elevated serum creatinine.  s/p IR kidney biopsy on 3/3    Hospital Medications: Medications reviewed.  REVIEW OF SYSTEMS:  CONSTITUTIONAL: No fevers or chills   RESPIRATORY: No shortness of breath  CARDIOVASCULAR: No chest pain.  GASTROINTESTINAL: No nausea, vomiting, diarrhea or abdominal pain.   : Denies flank pain or gross hematuria  VASCULAR: No bilateral lower extremity edema.     VITALS:  T(F): 98.1 (03-06-21 @ 11:01), Max: 98.9 (03-05-21 @ 23:46)  HR: 89 (03-06-21 @ 13:35)  BP: 135/82 (03-06-21 @ 13:35)  RR: 16 (03-06-21 @ 11:01)  SpO2: 98% (03-06-21 @ 11:01)  Wt(kg): --      PHYSICAL EXAM:  Constitutional: NAD  Neurological: Awake and Alert  HEENT: anicteric sclera,   Respiratory: CTAB, no wheezes, rales or rhonchi  Cardiovascular: S1, S2, RRR  Gastrointestinal: BS+, soft, NT/ND  Extremities: No peripheral edema        LABS:  03-05    135  |  105  |  62<H>  ----------------------------<  141<H>  4.7   |  20<L>  |  7.05<H>    Ca    8.7      05 Mar 2021 13:43  Phos  5.9     03-05  Mg     2.2     03-05    TPro  6.5  /  Alb  3.2<L>  /  TBili  0.5  /  DBili      /  AST  25  /  ALT  51  /  AlkPhos  83  03-05    Creatinine Trend: 7.05 <--, 6.87 <--, 6.61 <--, 6.70 <--, 6.54 <--, 6.14 <--, 5.87 <--                        10.3   6.50  )-----------( 196      ( 05 Mar 2021 13:43 )             29.9     Urine Studies:

## 2021-03-08 PROBLEM — Z00.00 ENCOUNTER FOR PREVENTIVE HEALTH EXAMINATION: Status: ACTIVE | Noted: 2021-03-08

## 2021-03-09 LAB
SARS-COV-2 IGG SERPL IA-ACNC: 3.6 RATIO — HIGH
SARS-COV-2 IGG SERPL QL IA: ABNORMAL
SARS-COV-2 IGG SERPL QL IA: POSITIVE
SARS-COV-2 IGM SERPL IA-ACNC: 0.92 RATIO — HIGH

## 2021-03-11 ENCOUNTER — APPOINTMENT (OUTPATIENT)
Dept: TRANSPLANT | Facility: CLINIC | Age: 26
End: 2021-03-11
Payer: COMMERCIAL

## 2021-03-11 ENCOUNTER — APPOINTMENT (OUTPATIENT)
Dept: NEPHROLOGY | Facility: CLINIC | Age: 26
End: 2021-03-11
Payer: COMMERCIAL

## 2021-03-11 VITALS
BODY MASS INDEX: 21.38 KG/M2 | TEMPERATURE: 97.9 F | SYSTOLIC BLOOD PRESSURE: 142 MMHG | HEART RATE: 85 BPM | DIASTOLIC BLOOD PRESSURE: 92 MMHG | WEIGHT: 161.3 LBS | OXYGEN SATURATION: 97 % | HEIGHT: 73 IN

## 2021-03-11 LAB
METANEPHRINE, PL: 48.2 PG/ML — SIGNIFICANT CHANGE UP (ref 0–88)
NORMETANEPHRINE, PL: 227.2 PG/ML — HIGH (ref 0–107.7)

## 2021-03-11 PROCEDURE — 99072 ADDL SUPL MATRL&STAF TM PHE: CPT

## 2021-03-11 PROCEDURE — 99204 OFFICE O/P NEW MOD 45 MIN: CPT

## 2021-03-11 PROCEDURE — G0180 MD CERTIFICATION HHA PATIENT: CPT

## 2021-03-11 NOTE — PLAN
[FreeTextEntry1] : 1. 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 MARTHA MARTINEZ  Is an excellent candidate for kidney transplantation and would benefit from transplantation . will discuss with family today  regarding potential living donors. \par 2.Cardiac risk: echo from feb 2021 was wnl. \par 3. ID: Serology for acute and chronic viral infections. Screening for latent TB\par 4. Imaging: reviewed \par 7.Hypertension- controlled. follows with nephrology \par \par I have personally discussed the risks and benefits of transplantation and patient attended transplant education class where the following was disclosed:\par  \par Reviewed factors affecting survival and morbidity while on dialysis, the transplant wait list and reviewed sam-operative and long-term risk factors affecting outcome in kidney transplantation. \par  \par One year SRTR outcomes for national and [facility] were discussed in regards to patient survival and graft survival after transplantation. \par  \par Details of transplant surgery, including complications were discussed.\par Immunosuppression and complications including infection including life threatening sepsis and opportunistic infections, malignancy and new onset diabetes were discussed. \par  \par Benefits of live donor transplantation as well as variability in wait times across regions and multiple listing were discussed. \par KDPI >85% and PHS high risk criteria donors were discussed. \par HCV kidney transplantation was discussed.\par  \par Will proceed with completing/ updating work up and listing for transplant/ live donor transplant once work up is reviewed and found to be acceptable by multidisciplinary listing committee.\par

## 2021-03-11 NOTE — PHYSICAL EXAM
[Well Developed] : well developed [Well Nourished] : well nourished [No Acute Distress] : no acute distress [Normocephalic] : normocephalic [Atraumatic] : atraumatic [Sclera Anicteric] : sclera anicteric [Neck Supple] : neck supple [Clear to Auscultation] : clear to auscultation [Breathing Comfortably on RA] : breathing comfortably on room air [Normal Rate] : normal rate [Regular Rhythm] : regular rhythm [Soft] : soft [Non-tender] : non-tender [No] : no fistula/graft [] : right dorsalis pedis palpable [Alert] : alert [Responds to Questions Appropriately] : responds to questions appropriately [Oriented] : oriented [Appropriate] : appropriate [FreeTextEntry1] : No carotid bruits [TextBox_34] : No ulcers or edema [TextBox_86] : No adenopathy

## 2021-03-11 NOTE — HISTORY OF PRESENT ILLNESS
[TextBox_42] : MARTHA MARTINEZ is a 25 year old male who presents for kidney transplant evaluation. \par Cause of ESRD : IgA nephropathy. He has had no medical issues , untill last month ( feb 2021) when he was admitted in [facility] with c/o headache and nausea and found to have elevated BP and advanced CKD. Renal biopsy done at the time revealed IgA nephropathy. 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 47% global glomerulosclerosis, 20% segmental glomerulosclerosis , 50% IFTA and mild arterial and arteriolar sclerosis.\par Nephrologist: Dr Beth De Paz. not on dialysis , most recent eGFR is 10\par     \par Other PMH :\par Cardiac - HTN diagnosed last month( feb 2021) . No MI , CHF, CAD\par Pulmonary-  has Asthma, controlled. \par Infections- no h/o TB, HIV, Hepatitis  \par Heme- no h/o malignancy or bleeding disorders.No DVT/PE\par Endo- no h/o diabetes, no Thyroid disease\par Neuro- no h/o CVA or TIA \par Psych- no suicidal ideation, depression or anxiety. \par Sensitization events- no previous blood transfusions or previous transplant\par Surgical h/o : none \par Functional status: excellent functional status , works out and plays sports. \par Social history: works full time ( import and export)  Is a non smoker, no alcohol or illicit use.\par Family history:  no family h/o kidney disease. has 3 older siblings who are all healthy. \par \par \par \par

## 2021-03-11 NOTE — HISTORY OF PRESENT ILLNESS
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

## 2021-03-11 NOTE — REASON FOR VISIT
[Initial] : an initial visit for [Kidney Transplant Evaluation] : kidney transplant evaluation [FreeTextEntry2] : Shanice Massey MD

## 2021-03-11 NOTE — PLAN
[We should proceed with our protocol for kidney transplantation evaluation.] : We should proceed with our protocol for kidney transplantation evaluation. [TextBox_6] : I discussed fertility, pregnancy, and transplantation with patient.\par I also discussed recurrent Ig A nephropathy in the transplanted graft, as well as the likelihood of requiring more than one transplant over time.\par Given his age, patient is aware that he will almost certainly require a second kidney transplant later in life. \par Patient is aware that although in the setting of kidney failure, he had diminished fertility, such fertility will be regained immediately after transplantation. This implies that he will cause a pregnancy unless an active action to prevent so is undertaken. \par Patient is aware that IgA nephropathy can recur in the transplanted kidney with an incidence of up to 60%.  He is also aware that such recurrences can require additional treatments as well as additional biopsies.  Patient is also aware that this can potentially lead to loss of the transplanted kidney.\par Patient is aware that it is possible to have children after transplantation.  I have instructed him to notify us at least three to four months in advance prior to a pregnancy so that we can discontinue teratogenic medications including but not limited to mycophenolic compounds. \par

## 2021-03-11 NOTE — REVIEW OF SYSTEMS
[Wears glasses] : wears glasses [Can Walk (___ Blocks)] : can walk [unfilled] blocks [Urine Output: ____] : Urine Output: [unfilled] [Anemia] : anemia [Fever] : no fever [Chills] : no chills [Fatigue] : no fatigue [Night Sweats] : no night sweats [Recent Weight Gain (___ Lbs)] : no recent weight gain [Recent Weight Loss (___ Lbs)] : no recent weight loss [Sore throat] : no sore throat [Pain/Stiffness] : no pain/stiffness [Rhinorrhea] : no rhinorrhea [Trauma] : no trauma [Sclera anicteric] : sclera icteric [Double vision] : no double vision [Blurred Vision] : no blurred vision [Eye Pain] : no eye pain [Chest Pain] : no chest pain [Palpitations] : no palpitations [SOB] : no shortness of breath [Wheezing] : no wheezing [Cough] : no cough [Dyspnea on Exertion] : no dyspnea on exertion [Pleuritic Chest Pain] : no pleuritic chest pain [Abdominal Pain] : no abdominal pain [Nausea] : no nausea [Constipation] : no constipation [Diarrhea] : diarrhea [Vomiting] : no vomiting [Dysuria] : no dysuria [Frequency] : no frequency [Urgency] : no urinary urgency [Incontinence] : no incontinence [Hematuria] : no hematuria [UTI] : no UTI [Joint Pain] : no joint pain [Joint Stiffness] : no joint stiffness [Muscle Pain] : no muscle pain [Muscle Weakness] : no muscle weakness [Tattoos] : no tattoos [Itching] : no itching [Skin Rash] : no skin rash [Hair Changes] : no hair changes [Headache] : no headache [Dizziness] : no dizziness [Fainting] : no fainting [Confusion] : no confusion [Memory Loss] : no memory loss [Unsteady Gait] : steady gait [Seizures] : no seizures [Suicidal] : not suicidal [Hallucinations] : no hallucinations [Anxiety] : no anxiety [Depression] : no depression [Adenopathy] : no adenopathy [Easy Bleeding] : no easy bleeding [Easy Bruising] : no easy bruising

## 2021-03-11 NOTE — CONSULT LETTER
[Dear  ___] : Dear  [unfilled], [Consult Letter:] : I had the pleasure of evaluating your patient, [unfilled]. [Please see my note below.] : Please see my note below. [Consult Closing:] : Thank you very much for allowing me to participate in the care of this patient.  If you have any questions, please do not hesitate to contact me. [Sincerely,] : Sincerely, [FreeTextEntry2] : Shanice Massey MD [FreeTextEntry3] : [place]

## 2021-03-12 ENCOUNTER — APPOINTMENT (OUTPATIENT)
Dept: TRANSPLANT | Facility: CLINIC | Age: 26
End: 2021-03-12
Payer: COMMERCIAL

## 2021-03-12 PROCEDURE — XXXXX: CPT

## 2021-03-13 LAB
DOPAMINE - URINE 24 HOUR: 88 UG/24 HR — SIGNIFICANT CHANGE UP (ref 0–510)
DOPAMINE UR-MCNC: 34 UG/L — SIGNIFICANT CHANGE UP
EPINEPH UR-MCNC: <1 UG/L — SIGNIFICANT CHANGE UP
EPINEPHRINE - URINE, 24 HOUR: <3 UG/24 HR — SIGNIFICANT CHANGE UP (ref 0–20)
NOREPINEPH UR-MCNC: 17 UG/L — SIGNIFICANT CHANGE UP
NOREPINEPHRINE - URINE, 24 HOUR: 44 UG/24 HR — SIGNIFICANT CHANGE UP (ref 0–135)

## 2021-03-19 LAB
ABO + RH PNL BLD: NORMAL
ABO + RH PNL BLD: NORMAL
ALBUMIN SERPL ELPH-MCNC: 3.9 G/DL
ALP BLD-CCNC: 62 U/L
ALT SERPL-CCNC: 25 U/L
ANION GAP SERPL CALC-SCNC: 9 MMOL/L
APPEARANCE: CLEAR
AST SERPL-CCNC: 13 U/L
BACTERIA: NEGATIVE
BASOPHILS # BLD AUTO: 0.01 K/UL
BASOPHILS NFR BLD AUTO: 0.1 %
BILIRUB SERPL-MCNC: 0.3 MG/DL
BILIRUBIN URINE: NEGATIVE
BLOOD URINE: NORMAL
BUN SERPL-MCNC: 86 MG/DL
CALCIUM SERPL-MCNC: 8.9 MG/DL
CHLORIDE SERPL-SCNC: 104 MMOL/L
CHOLEST SERPL-MCNC: 145 MG/DL
CMV IGG SERPL QL: 6.3 U/ML
CMV IGG SERPL-IMP: POSITIVE
CO2 SERPL-SCNC: 26 MMOL/L
COLOR: NORMAL
CREAT SERPL-MCNC: 5.49 MG/DL
CREAT SPEC-SCNC: 78 MG/DL
CREAT/PROT UR: 3.8 RATIO
EBV DNA SERPL NAA+PROBE-ACNC: NOT DETECTED IU/ML
EBV EA AB SER IA-ACNC: 5.7 U/ML
EBV EA AB TITR SER IF: POSITIVE
EBV EA IGG SER QL IA: 202 U/ML
EBV EA IGG SER-ACNC: NEGATIVE
EBV EA IGM SER IA-ACNC: NEGATIVE
EBV PATRN SPEC IB-IMP: NORMAL
EBV VCA IGG SER IA-ACNC: >750 U/ML
EBV VCA IGM SER QL IA: <10 U/ML
EOSINOPHIL # BLD AUTO: 0 K/UL
EOSINOPHIL NFR BLD AUTO: 0 %
EPSTEIN-BARR VIRUS CAPSID ANTIGEN IGG: POSITIVE
ESTIMATED AVERAGE GLUCOSE: 114 MG/DL
GLUCOSE QUALITATIVE U: NEGATIVE
GLUCOSE SERPL-MCNC: 120 MG/DL
HAV IGM SER QL: NONREACTIVE
HBA1C MFR BLD HPLC: 5.6 %
HBV CORE IGG+IGM SER QL: NONREACTIVE
HBV SURFACE AB SER QL: NONREACTIVE
HBV SURFACE AB SERPL IA-ACNC: 4.5 MIU/ML
HBV SURFACE AG SER QL: NONREACTIVE
HCT VFR BLD CALC: 32 %
HCV AB SER QL: NONREACTIVE
HCV S/CO RATIO: 0.08 S/CO
HDLC SERPL-MCNC: 84 MG/DL
HEPATITIS A IGG ANTIBODY: NONREACTIVE
HGB BLD-MCNC: 10.7 G/DL
HIV1+2 AB SPEC QL IA.RAPID: NONREACTIVE
HSV 1+2 IGG SER IA-IMP: NEGATIVE
HSV 1+2 IGG SER IA-IMP: POSITIVE
HSV1 IGG SER QL: 22.9 INDEX
HSV2 IGG SER QL: 0.24 INDEX
HYALINE CASTS: 3 /LPF
IMM GRANULOCYTES NFR BLD AUTO: 1.6 %
KETONES URINE: NEGATIVE
LDLC SERPL CALC-MCNC: 47 MG/DL
LEUKOCYTE ESTERASE URINE: NEGATIVE
LYMPHOCYTES # BLD AUTO: 0.76 K/UL
LYMPHOCYTES NFR BLD AUTO: 6.8 %
M TB IFN-G BLD-IMP: ABNORMAL
MAGNESIUM SERPL-MCNC: 2.4 MG/DL
MAN DIFF?: NORMAL
MCHC RBC-ENTMCNC: 28.8 PG
MCHC RBC-ENTMCNC: 33.4 GM/DL
MCV RBC AUTO: 86 FL
MICROSCOPIC-UA: NORMAL
MONOCYTES # BLD AUTO: 0.12 K/UL
MONOCYTES NFR BLD AUTO: 1.1 %
NEUTROPHILS # BLD AUTO: 10.14 K/UL
NEUTROPHILS NFR BLD AUTO: 90.4 %
NITRITE URINE: NEGATIVE
NONHDLC SERPL-MCNC: 61 MG/DL
PH URINE: 6
PHOSPHATE SERPL-MCNC: 2.9 MG/DL
PLATELET # BLD AUTO: 289 K/UL
POTASSIUM SERPL-SCNC: 5.4 MMOL/L
PROT SERPL-MCNC: 6.1 G/DL
PROT UR-MCNC: 300 MG/DL
PROTEIN URINE: ABNORMAL
PSA SERPL-MCNC: 0.49 NG/ML
QUANTIFERON TB PLUS MITOGEN MINUS NIL: 0 IU/ML
QUANTIFERON TB PLUS NIL: 0.03 IU/ML
QUANTIFERON TB PLUS TB1 MINUS NIL: -0.01 IU/ML
QUANTIFERON TB PLUS TB2 MINUS NIL: -0.01 IU/ML
RBC # BLD: 3.72 M/UL
RBC # FLD: 11.9 %
RED BLOOD CELLS URINE: 3 /HPF
RUBV IGG FLD-ACNC: 2.2 INDEX
RUBV IGG SER-IMP: POSITIVE
SARS-COV-2 IGG SERPL IA-ACNC: 0.14 INDEX
SARS-COV-2 IGG SERPL QL IA: NEGATIVE
SODIUM SERPL-SCNC: 138 MMOL/L
SPECIFIC GRAVITY URINE: 1.01
SQUAMOUS EPITHELIAL CELLS: 1 /HPF
T GONDII AB SER-IMP: NEGATIVE
T GONDII IGG SER QL: <3 IU/ML
T PALLIDUM AB SER QL IA: NEGATIVE
TRIGL SERPL-MCNC: 75 MG/DL
UROBILINOGEN URINE: NORMAL
VZV AB TITR SER: POSITIVE
VZV IGG SER IF-ACNC: >4000 INDEX
WBC # FLD AUTO: 11.21 K/UL
WHITE BLOOD CELLS URINE: 2 /HPF

## 2021-03-29 ENCOUNTER — APPOINTMENT (OUTPATIENT)
Dept: CT IMAGING | Facility: HOSPITAL | Age: 26
End: 2021-03-29

## 2021-03-30 ENCOUNTER — APPOINTMENT (OUTPATIENT)
Dept: INFECTIOUS DISEASE | Facility: CLINIC | Age: 26
End: 2021-03-30
Payer: COMMERCIAL

## 2021-04-19 ENCOUNTER — APPOINTMENT (OUTPATIENT)
Dept: CARDIOLOGY | Facility: CLINIC | Age: 26
End: 2021-04-19

## 2021-06-16 ENCOUNTER — NON-APPOINTMENT (OUTPATIENT)
Age: 26
End: 2021-06-16

## 2021-06-16 ENCOUNTER — APPOINTMENT (OUTPATIENT)
Dept: CARDIOLOGY | Facility: CLINIC | Age: 26
End: 2021-06-16
Payer: COMMERCIAL

## 2021-06-16 ENCOUNTER — TRANSCRIPTION ENCOUNTER (OUTPATIENT)
Age: 26
End: 2021-06-16

## 2021-06-16 VITALS
TEMPERATURE: 98.2 F | HEART RATE: 85 BPM | SYSTOLIC BLOOD PRESSURE: 146 MMHG | BODY MASS INDEX: 18.87 KG/M2 | OXYGEN SATURATION: 100 % | DIASTOLIC BLOOD PRESSURE: 82 MMHG | WEIGHT: 143 LBS

## 2021-06-16 DIAGNOSIS — Z23 ENCOUNTER FOR IMMUNIZATION: ICD-10-CM

## 2021-06-16 DIAGNOSIS — Z78.9 OTHER SPECIFIED HEALTH STATUS: ICD-10-CM

## 2021-06-16 PROCEDURE — 99072 ADDL SUPL MATRL&STAF TM PHE: CPT

## 2021-06-16 PROCEDURE — 99205 OFFICE O/P NEW HI 60 MIN: CPT

## 2021-06-16 PROCEDURE — 93000 ELECTROCARDIOGRAM COMPLETE: CPT | Mod: NC

## 2021-06-16 RX ORDER — ATORVASTATIN CALCIUM 40 MG/1
40 TABLET, FILM COATED ORAL
Qty: 90 | Refills: 1 | Status: ACTIVE | COMMUNITY
Start: 2021-06-16

## 2021-06-22 NOTE — HISTORY OF PRESENT ILLNESS
[FreeTextEntry1] : Patient is a 25 year-old with no significant past medical history until February, when he presented with hypertensive emergency and was diagnosed with IgA nephropathy, in February 2021.\par Echocardiogram during that admission showed concentric LVH, but normal LV systolic function, and no evidence of structural heart disease.\par He tries to exercise, but he finds he gets tired more easily than he used to. He runs, uses a bike, and does jump-rope. He fatigues, but he does not get chest pain or shortness of breath (out of proportion to his exertion). \par He received both doses of Pfizer's Covid-19 vaccine.

## 2021-06-22 NOTE — CARDIOLOGY SUMMARY
[de-identified] : 6/16/2021 sinus 79 bpm, early repolarization abnormality [de-identified] : 2/24/2021 mild concentric LVH, LVEF >55%

## 2021-06-22 NOTE — DISCUSSION/SUMMARY
[FreeTextEntry1] : Patient is a 25 year-old gentleman who presents for cardiac evaluation prior to possible renal transplant.\par Echocardiogram showed a structurally normal heart with LVH.\par He is capable of > 4 METS at baseline, and he is low risk for ischemic heart disease.\par He is a preemptive candidate for transplant.\par \par No further cardiovascular testing is necessary prior to consideration for renal transplant.

## 2021-07-01 ENCOUNTER — EMERGENCY (EMERGENCY)
Facility: HOSPITAL | Age: 26
LOS: 1 days | Discharge: HOME | End: 2021-07-01
Attending: STUDENT IN AN ORGANIZED HEALTH CARE EDUCATION/TRAINING PROGRAM
Payer: COMMERCIAL

## 2021-07-01 VITALS
HEART RATE: 97 BPM | SYSTOLIC BLOOD PRESSURE: 145 MMHG | HEIGHT: 74 IN | DIASTOLIC BLOOD PRESSURE: 85 MMHG | TEMPERATURE: 98 F | WEIGHT: 141.1 LBS | RESPIRATION RATE: 18 BRPM

## 2021-07-01 PROCEDURE — 93005 ELECTROCARDIOGRAM TRACING: CPT

## 2021-07-01 PROCEDURE — 99284 EMERGENCY DEPT VISIT MOD MDM: CPT

## 2021-07-01 PROCEDURE — 99283 EMERGENCY DEPT VISIT LOW MDM: CPT

## 2021-07-01 NOTE — ED ADULT NURSE NOTE - OBJECTIVE STATEMENT
Pt. c/o intermittent nose bleeds that started PTA and stopped once in the ED. Pt. denies any pain, dizziness, or nausea.

## 2021-07-01 NOTE — ED PROVIDER NOTE - PATIENT PORTAL LINK FT
You can access the FollowMyHealth Patient Portal offered by [facility] by registering at the following website: [email or web address]. By joining [facility]’s FollowMyHealth portal, you will also be able to view your health information using other applications (apps) compatible with our system.

## 2021-07-01 NOTE — ED ADULT NURSE NOTE - NSIMPLEMENTINTERV_GEN_ALL_ED
Implemented All Universal Safety Interventions:  [place] to call system. Call bell, personal items and telephone within reach. Instruct patient to call for assistance. Room bathroom lighting operational. Non-slip footwear when patient is off stretcher. Physically safe environment: no spills, clutter or unnecessary equipment. Stretcher in lowest position, wheels locked, appropriate side rails in place.

## 2021-07-01 NOTE — ED PROVIDER NOTE - OBJECTIVE STATEMENT
26 y/o M with a significant PMHx of HTN and renal failure presents to the ED with nosebleed. Per the patient, he woke up with trickling bright red blood from the R nostril, spontaneously resolved in a few minutes. Patient reports another nosebleed while in his car, prompting him to come to the ED, however also spontaneously resolved. Patient stopped bleeding 2 hours ago. Denies trauma, pain, difficulty speaking, difficulty swallowing, headache, nausea, vomiting, URI symptoms, nasal congestion, difficulty breathing or any other acute complaints.

## 2021-07-01 NOTE — ED PROVIDER NOTE - PHYSICAL EXAMINATION
Sohrawardy:   VS reviewed  NAD, not ill-appearing  aaox3  nc/at, neck rom normal, supple  rrr, s1s2, no jvd, no pitting edema  normal resp effort  lungs ctab, no w/r/r  abdomen soft, nd/nt   no rash  cn intact, strength 5/5, sensation intact, no neuro deficits     R nostril with no dried blood, no hematoma, no masses  posterior pharynx clear

## 2021-07-01 NOTE — ED PROVIDER NOTE - NS ED ROS FT
(+) epistaxis  (-) fevers, chills  (-) dizziness, lightheadedness, vision changes  (-) neck pain, stiffness  (-) cp, palpitations  (-) sob, cough, hemoptysis  (-) abd pain, n/v/d/c   (-) urinary sxs, back pain  (-) weakness, paresthesias

## 2021-07-01 NOTE — ED PROVIDER NOTE - CARE PLAN
Principal Discharge DX:	Nosebleed  Secondary Diagnosis:	Hypertension, unspecified type  Secondary Diagnosis:	Renal disease

## 2021-07-01 NOTE — ED PROVIDER NOTE - CLINICAL SUMMARY MEDICAL DECISION MAKING FREE TEXT BOX
24 y/o M with past medical history of HTN and renal failure presents with R sided nosebleed. No nosebleed in the ER, resolved prior to evaluation. Patient over 2 hours without nosebleed. Physical exam benign. Discussed with Dr. Holman, who states he will see patient as outpatient. Patient's blood pressure within normal limits in the ER. Patient compliant with medications. Will also provide patient ENT follow up. No indication for emergent cauterization in the ER. Given strict return precautions. Patient stable for discharge home.

## 2021-07-04 NOTE — ED ADULT NURSE NOTE - NSIMPLEMENTINTERV_GEN_ALL_ED
Gen: Denies fever, chills, generalized weakness  CV: Denies chest pain, palpitations  HEENT: Denies neck pain, vision changes  Skin: Denies rash, erythema, color changes  Resp: Denies SOB, cough  Endo: Denies sensitivity to heat, cold, increased urination  GI: Denies constipation, diarrhea  Msk: Denies back pain, LE swelling, extremity pain  : Denies dysuria, increased frequency  Neuro: Denies LOC, focal weakness, numbness, tingling  Psych: Denies hx of psych, SI, HI Implemented All Universal Safety Interventions:  [place] to call system. Call bell, personal items and telephone within reach. Instruct patient to call for assistance. Room bathroom lighting operational. Non-slip footwear when patient is off stretcher. Physically safe environment: no spills, clutter or unnecessary equipment. Stretcher in lowest position, wheels locked, appropriate side rails in place.

## 2021-07-13 ENCOUNTER — APPOINTMENT (OUTPATIENT)
Dept: INFECTIOUS DISEASE | Facility: CLINIC | Age: 26
End: 2021-07-13
Payer: COMMERCIAL

## 2021-07-13 VITALS
DIASTOLIC BLOOD PRESSURE: 93 MMHG | SYSTOLIC BLOOD PRESSURE: 155 MMHG | OXYGEN SATURATION: 100 % | WEIGHT: 142 LBS | BODY MASS INDEX: 18.73 KG/M2 | TEMPERATURE: 98.7 F | HEART RATE: 96 BPM

## 2021-07-13 PROCEDURE — 90670 PCV13 VACCINE IM: CPT

## 2021-07-13 PROCEDURE — G0009: CPT

## 2021-07-13 PROCEDURE — 99203 OFFICE O/P NEW LOW 30 MIN: CPT | Mod: 25

## 2021-07-15 ENCOUNTER — NON-APPOINTMENT (OUTPATIENT)
Age: 26
End: 2021-07-15

## 2021-07-15 LAB
COVID-19 SPIKE DOMAIN ANTIBODY INTERPRETATION: POSITIVE
M TB IFN-G BLD-IMP: NEGATIVE
MEV IGG FLD QL IA: 172 AU/ML
MEV IGG+IGM SER-IMP: POSITIVE
MUV AB SER-ACNC: POSITIVE
MUV IGG SER QL IA: 86.9 AU/ML
QUANTIFERON TB PLUS MITOGEN MINUS NIL: 9.54 IU/ML
QUANTIFERON TB PLUS NIL: 0.02 IU/ML
QUANTIFERON TB PLUS TB1 MINUS NIL: 0 IU/ML
QUANTIFERON TB PLUS TB2 MINUS NIL: 0 IU/ML
SARS-COV-2 AB SERPL IA-ACNC: >250 U/ML

## 2021-07-15 NOTE — PHYSICAL EXAM
[General Appearance - Alert] : alert [General Appearance - In No Acute Distress] : in no acute distress [Sclera] : the sclera and conjunctiva were normal [PERRL With Normal Accommodation] : pupils were equal in size, round, reactive to light [Extraocular Movements] : extraocular movements were intact [Outer Ear] : the ears and nose were normal in appearance [Oropharynx] : the oropharynx was normal with no thrush [Neck Appearance] : the appearance of the neck was normal [Auscultation Breath Sounds / Voice Sounds] : lungs were clear to auscultation bilaterally [Heart Rate And Rhythm] : heart rate was normal and rhythm regular [Heart Sounds] : normal S1 and S2 [Heart Sounds Gallop] : no gallops [Murmurs] : no murmurs [Heart Sounds Pericardial Friction Rub] : no pericardial rub [Edema] : there was no peripheral edema [Bowel Sounds] : normal bowel sounds [Abdomen Soft] : soft [Abdomen Tenderness] : non-tender [Abdomen Mass (___ Cm)] : no abdominal mass palpated [No Palpable Adenopathy] : no palpable adenopathy [Musculoskeletal - Swelling] : no joint swelling [Nail Clubbing] : no clubbing  or cyanosis of the fingernails [Motor Tone] : muscle strength and tone were normal [Skin Color & Pigmentation] : normal skin color and pigmentation [] : no rash [Affect] : the affect was normal

## 2021-07-15 NOTE — HISTORY OF PRESENT ILLNESS
[FreeTextEntry1] : 25 year old male PMH IgA Nephropathy (previously on Prednisone), CKD who presents to ID office for pre-renal transplant evaluation. Of note, Quantiferon gold checked in 3/2021 which was indeterminate. Denies history of positive latent TB testing in the past. Denies tuberculosis exposure or family members with MBT. Denies cough or SOB. Denies chills, fevers or night sweats.\par \par PMH: Asthma, Not on HD\par PSH: No PSH\par FH: No autoimmune disease\par All: no allergies\par Soc: no smoking, social etoh use. \par  [] : No [de-identified] : Born in [place] [de-identified] : No travel outside of the [place], [employer] in 2018/2019, Vacation to [place].  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

## 2021-07-15 NOTE — ASSESSMENT
[FreeTextEntry1] : 25 year old male PMH IgA Nephropathy (previously on Prednisone), CKD who presents to ID office for pre-renal transplant evaluation. \par \par Of note, Quantiferon gold checked in 3/2021 which was indeterminate. \par No prior positive latent TB testing or known exposures\par No high risk epidemiology\par \par #Pre-Renal Transplant Evaluation, Indeterminate Quantiferon Gold\par --Will check repeat Quantiferon (if negative no further workup indicated - was on corticosteroids at time of prior test)\par --Will check Mumps and Measles IgG\par --Will check COVID19 Ernie Antibody\par \par #Encounter to Vaccinate Patient\par COVID19 Pfizer Vaccine dose 2 on 4/17/21 \par --Will administer Prevnar Today (Will require Pneumovax in ~8 weeks)\par --Will administer HAV Vaccine today (repeat dose in 6 months)\par --To start HBV Vaccine Series at his HD center tomorrow\par --Will require Tdap\par \par Followup: 6 months

## 2021-07-16 PROBLEM — I10 ESSENTIAL (PRIMARY) HYPERTENSION: Chronic | Status: ACTIVE | Noted: 2021-07-02

## 2021-07-16 PROBLEM — N19 UNSPECIFIED KIDNEY FAILURE: Chronic | Status: ACTIVE | Noted: 2021-07-02

## 2021-07-20 ENCOUNTER — APPOINTMENT (OUTPATIENT)
Dept: TRANSPLANT | Facility: CLINIC | Age: 26
End: 2021-07-20
Payer: COMMERCIAL

## 2021-07-20 PROCEDURE — 99001T: CUSTOM

## 2021-07-20 PROCEDURE — 86806 LYMPHOCYTOTOXICITY ASSAY: CPT

## 2021-07-22 RX ORDER — FUROSEMIDE 40 MG/1
40 TABLET ORAL TWICE DAILY
Qty: 60 | Refills: 3 | Status: DISCONTINUED | COMMUNITY
Start: 2021-06-16 | End: 2021-07-22

## 2021-07-23 ENCOUNTER — NON-APPOINTMENT (OUTPATIENT)
Age: 26
End: 2021-07-23

## 2021-07-27 LAB — SARS-COV-2 N GENE NPH QL NAA+PROBE: NOT DETECTED

## 2021-08-29 ENCOUNTER — INPATIENT (INPATIENT)
Facility: HOSPITAL | Age: 26
LOS: 2 days | Discharge: HOME | DRG: 674 | End: 2021-09-01
Attending: INTERNAL MEDICINE | Admitting: STUDENT IN AN ORGANIZED HEALTH CARE EDUCATION/TRAINING PROGRAM
Payer: COMMERCIAL

## 2021-08-29 VITALS
SYSTOLIC BLOOD PRESSURE: 152 MMHG | RESPIRATION RATE: 18 BRPM | DIASTOLIC BLOOD PRESSURE: 105 MMHG | TEMPERATURE: 98 F | OXYGEN SATURATION: 100 % | WEIGHT: 139.99 LBS | HEART RATE: 82 BPM | HEIGHT: 74 IN

## 2021-08-29 DIAGNOSIS — N17.9 ACUTE KIDNEY FAILURE, UNSPECIFIED: ICD-10-CM

## 2021-08-29 LAB
ALBUMIN SERPL ELPH-MCNC: 4.5 G/DL — SIGNIFICANT CHANGE UP (ref 3.3–5)
ALP SERPL-CCNC: 55 U/L — SIGNIFICANT CHANGE UP (ref 40–120)
ALT FLD-CCNC: 20 U/L — SIGNIFICANT CHANGE UP (ref 10–45)
ANION GAP SERPL CALC-SCNC: 18 MMOL/L — HIGH (ref 5–17)
AST SERPL-CCNC: 13 U/L — SIGNIFICANT CHANGE UP (ref 10–40)
BASE EXCESS BLDV CALC-SCNC: -5.3 MMOL/L — LOW (ref -2–2)
BASOPHILS # BLD AUTO: 0.04 K/UL — SIGNIFICANT CHANGE UP (ref 0–0.2)
BASOPHILS NFR BLD AUTO: 0.6 % — SIGNIFICANT CHANGE UP (ref 0–2)
BILIRUB SERPL-MCNC: 0.4 MG/DL — SIGNIFICANT CHANGE UP (ref 0.2–1.2)
BUN SERPL-MCNC: 104 MG/DL — HIGH (ref 7–23)
CA-I SERPL-SCNC: 1.29 MMOL/L — SIGNIFICANT CHANGE UP (ref 1.15–1.33)
CALCIUM SERPL-MCNC: 9.8 MG/DL — SIGNIFICANT CHANGE UP (ref 8.4–10.5)
CHLORIDE BLDV-SCNC: 105 MMOL/L — SIGNIFICANT CHANGE UP (ref 96–108)
CHLORIDE SERPL-SCNC: 103 MMOL/L — SIGNIFICANT CHANGE UP (ref 96–108)
CO2 BLDV-SCNC: 22 MMOL/L — SIGNIFICANT CHANGE UP (ref 22–26)
CO2 SERPL-SCNC: 17 MMOL/L — LOW (ref 22–31)
CREAT SERPL-MCNC: 12.21 MG/DL — HIGH (ref 0.5–1.3)
EOSINOPHIL # BLD AUTO: 0.35 K/UL — SIGNIFICANT CHANGE UP (ref 0–0.5)
EOSINOPHIL NFR BLD AUTO: 5 % — SIGNIFICANT CHANGE UP (ref 0–6)
GAS PNL BLDV: 138 MMOL/L — SIGNIFICANT CHANGE UP (ref 136–145)
GAS PNL BLDV: SIGNIFICANT CHANGE UP
GAS PNL BLDV: SIGNIFICANT CHANGE UP
GLUCOSE BLDV-MCNC: 84 MG/DL — SIGNIFICANT CHANGE UP (ref 70–99)
GLUCOSE SERPL-MCNC: 90 MG/DL — SIGNIFICANT CHANGE UP (ref 70–99)
HCO3 BLDV-SCNC: 21 MMOL/L — LOW (ref 22–29)
HCT VFR BLD CALC: 25.5 % — LOW (ref 39–50)
HCT VFR BLDA CALC: 26 % — LOW (ref 39–51)
HGB BLD CALC-MCNC: 8.5 G/DL — LOW (ref 12.6–17.4)
HGB BLD-MCNC: 8.2 G/DL — LOW (ref 13–17)
IMM GRANULOCYTES NFR BLD AUTO: 0.3 % — SIGNIFICANT CHANGE UP (ref 0–1.5)
LACTATE BLDV-MCNC: 0.9 MMOL/L — SIGNIFICANT CHANGE UP (ref 0.7–2)
LIDOCAIN IGE QN: 102 U/L — HIGH (ref 7–60)
LYMPHOCYTES # BLD AUTO: 1.43 K/UL — SIGNIFICANT CHANGE UP (ref 1–3.3)
LYMPHOCYTES # BLD AUTO: 20.5 % — SIGNIFICANT CHANGE UP (ref 13–44)
MCHC RBC-ENTMCNC: 28.5 PG — SIGNIFICANT CHANGE UP (ref 27–34)
MCHC RBC-ENTMCNC: 32.2 GM/DL — SIGNIFICANT CHANGE UP (ref 32–36)
MCV RBC AUTO: 88.5 FL — SIGNIFICANT CHANGE UP (ref 80–100)
MONOCYTES # BLD AUTO: 0.28 K/UL — SIGNIFICANT CHANGE UP (ref 0–0.9)
MONOCYTES NFR BLD AUTO: 4 % — SIGNIFICANT CHANGE UP (ref 2–14)
NEUTROPHILS # BLD AUTO: 4.85 K/UL — SIGNIFICANT CHANGE UP (ref 1.8–7.4)
NEUTROPHILS NFR BLD AUTO: 69.6 % — SIGNIFICANT CHANGE UP (ref 43–77)
NRBC # BLD: 0 /100 WBCS — SIGNIFICANT CHANGE UP (ref 0–0)
PCO2 BLDV: 41 MMHG — LOW (ref 42–55)
PH BLDV: 7.31 — LOW (ref 7.32–7.43)
PLATELET # BLD AUTO: 161 K/UL — SIGNIFICANT CHANGE UP (ref 150–400)
PO2 BLDV: 43 MMHG — SIGNIFICANT CHANGE UP (ref 25–45)
POTASSIUM BLDV-SCNC: 5.1 MMOL/L — SIGNIFICANT CHANGE UP (ref 3.5–5.1)
POTASSIUM SERPL-MCNC: 5.1 MMOL/L — SIGNIFICANT CHANGE UP (ref 3.5–5.3)
POTASSIUM SERPL-SCNC: 5.1 MMOL/L — SIGNIFICANT CHANGE UP (ref 3.5–5.3)
PROT SERPL-MCNC: 7 G/DL — SIGNIFICANT CHANGE UP (ref 6–8.3)
RBC # BLD: 2.88 M/UL — LOW (ref 4.2–5.8)
RBC # FLD: 12.6 % — SIGNIFICANT CHANGE UP (ref 10.3–14.5)
SAO2 % BLDV: 74 % — SIGNIFICANT CHANGE UP (ref 67–88)
SODIUM SERPL-SCNC: 138 MMOL/L — SIGNIFICANT CHANGE UP (ref 135–145)
WBC # BLD: 6.97 K/UL — SIGNIFICANT CHANGE UP (ref 3.8–10.5)
WBC # FLD AUTO: 6.97 K/UL — SIGNIFICANT CHANGE UP (ref 3.8–10.5)

## 2021-08-29 PROCEDURE — 93010 ELECTROCARDIOGRAM REPORT: CPT

## 2021-08-29 PROCEDURE — 99291 CRITICAL CARE FIRST HOUR: CPT | Mod: 25

## 2021-08-29 RX ORDER — ACETAMINOPHEN 500 MG
650 TABLET ORAL ONCE
Refills: 0 | Status: COMPLETED | OUTPATIENT
Start: 2021-08-29 | End: 2021-08-29

## 2021-08-29 RX ORDER — SODIUM CHLORIDE 9 MG/ML
1000 INJECTION INTRAMUSCULAR; INTRAVENOUS; SUBCUTANEOUS ONCE
Refills: 0 | Status: COMPLETED | OUTPATIENT
Start: 2021-08-29 | End: 2021-08-29

## 2021-08-29 RX ORDER — METOCLOPRAMIDE HCL 10 MG
10 TABLET ORAL ONCE
Refills: 0 | Status: COMPLETED | OUTPATIENT
Start: 2021-08-29 | End: 2021-08-29

## 2021-08-29 RX ADMIN — Medication 650 MILLIGRAM(S): at 22:24

## 2021-08-29 RX ADMIN — SODIUM CHLORIDE 1000 MILLILITER(S): 9 INJECTION INTRAMUSCULAR; INTRAVENOUS; SUBCUTANEOUS at 22:23

## 2021-08-29 RX ADMIN — Medication 10 MILLIGRAM(S): at 22:24

## 2021-08-29 NOTE — ED PROVIDER NOTE - CLINICAL SUMMARY MEDICAL DECISION MAKING FREE TEXT BOX
Pillo Saez): 25y M w/ PMHx of renal failure due to IGA necropathy and HTN presenting for HA and N/V. PE and neuro exam WNL. Low suspicion for CNS pathology. Low suspicion for SAH. Plan to treat HA symptomatically w/ IV fluids, Tylenol and Reglan. Will also check basic labs, VBG to assess for metabolic derangements and reassess.

## 2021-08-29 NOTE — ED PROVIDER NOTE - OBJECTIVE STATEMENT
25y M w/ PMHx of renal failure due to IGA necropathy (not on dialysis) and HTN presenting today c/o HA since this morning a/w N/V x3. Pt describes HA as sharp frontal HA radiating to occipital. Denies visual changes, CP, SOB, dizziness, lightheadedness, weakness or numbness. 25y M w/ PMHx of renal failure due to IGA necropathy (not on dialysis) and HTN presenting today c/o HA since this morning a/w N/V x3. Pt describes HA as sharp frontal HA radiating to occipital. Denies visual changes, CP, SOB, dizziness, lightheadedness, weakness or numbness.    Attendinyo male with IGA nephropathy presents with feeling nausea, headaches since this morning and overall fatigue and weakness.  no focal neuro defecits.  decreased po intake but tolerating po.  follows with nephrology at [facility] but has not followed up in awhile.  pt is supposed to go to dialysis center tomorrow for labs.  has not started dialysis.

## 2021-08-29 NOTE — ED ADULT NURSE NOTE - OBJECTIVE STATEMENT
Pt is a 25 yr old male with pmh of HTN, asthma, and IGG nephropathy coming from home for one day of headache, nausea, and vomiting. Pt states he woke up today with a headache, and vomited approx 3 times. Pt has been continuously nauseous but denies any blood in the emesis. Pt has not been able to keep food down and feels weaker than normal. Pt's sclera is slightly jaundice. Pt is a/ox 3- not scheduled to start dialysis yet- vitals showing hypertension but otherwise normal. Pt is a 25 yr old male with pmh of HTN, asthma, and IGA nephropathy coming from home for one day of headache, nausea, and vomiting. Pt states he woke up today with a headache, and vomited approx 3 times. Pt has been continuously nauseous but denies any blood in the emesis. Pt has not been able to keep food down and feels weaker than normal. Pt's sclera is slightly jaundice. Pt is a/ox 3- not scheduled to start dialysis yet- vitals showing hypertension but otherwise normal. Pt is a 25 yr old male with pmh of HTN, asthma, and IGA nephropathy coming from home for one day of headache, nausea, and vomiting. Pt states he woke up today with a headache- band like and sharp in intensity-, and vomited approx 3 times. Pt has been continuously nauseous- with mild abd pain- but denies any blood in the emesis. Pt has not been able to keep food down and feels weaker than normal. Pt's sclera is slightly jaundice. Pt is a/ox 3- not scheduled to start dialysis yet- vitals showing hypertension but otherwise normal.

## 2021-08-29 NOTE — ED PROVIDER NOTE - NSICDXPASTMEDICALHX_GEN_ALL_CORE_FT
PAST MEDICAL HISTORY:  ESRF (end stage renal failure)      PAST MEDICAL HISTORY:  ESRF (end stage renal failure)     HTN (hypertension)

## 2021-08-29 NOTE — ED PROVIDER NOTE - PROGRESS NOTE DETAILS
Acerra:  Pt feels better.  discussed case with nephrology fellow as creatinine increased to 12.  will admit for further evaluation.

## 2021-08-30 DIAGNOSIS — Z29.9 ENCOUNTER FOR PROPHYLACTIC MEASURES, UNSPECIFIED: ICD-10-CM

## 2021-08-30 DIAGNOSIS — R09.89 OTHER SPECIFIED SYMPTOMS AND SIGNS INVOLVING THE CIRCULATORY AND RESPIRATORY SYSTEMS: ICD-10-CM

## 2021-08-30 DIAGNOSIS — I16.0 HYPERTENSIVE URGENCY: ICD-10-CM

## 2021-08-30 DIAGNOSIS — R51.9 HEADACHE, UNSPECIFIED: ICD-10-CM

## 2021-08-30 DIAGNOSIS — R11.2 NAUSEA WITH VOMITING, UNSPECIFIED: ICD-10-CM

## 2021-08-30 DIAGNOSIS — D64.9 ANEMIA, UNSPECIFIED: ICD-10-CM

## 2021-08-30 DIAGNOSIS — N02.8 RECURRENT AND PERSISTENT HEMATURIA WITH OTHER MORPHOLOGIC CHANGES: ICD-10-CM

## 2021-08-30 DIAGNOSIS — N17.9 ACUTE KIDNEY FAILURE, UNSPECIFIED: ICD-10-CM

## 2021-08-30 LAB
ALBUMIN SERPL ELPH-MCNC: 4.1 G/DL — SIGNIFICANT CHANGE UP (ref 3.3–5)
ALP SERPL-CCNC: 52 U/L — SIGNIFICANT CHANGE UP (ref 40–120)
ALT FLD-CCNC: 18 U/L — SIGNIFICANT CHANGE UP (ref 10–45)
ANION GAP SERPL CALC-SCNC: 17 MMOL/L — SIGNIFICANT CHANGE UP (ref 5–17)
APPEARANCE UR: CLEAR — SIGNIFICANT CHANGE UP
APTT BLD: 31.3 SEC — SIGNIFICANT CHANGE UP (ref 27.5–35.5)
AST SERPL-CCNC: 13 U/L — SIGNIFICANT CHANGE UP (ref 10–40)
BASOPHILS # BLD AUTO: 0.04 K/UL — SIGNIFICANT CHANGE UP (ref 0–0.2)
BASOPHILS NFR BLD AUTO: 0.6 % — SIGNIFICANT CHANGE UP (ref 0–2)
BILIRUB SERPL-MCNC: 0.3 MG/DL — SIGNIFICANT CHANGE UP (ref 0.2–1.2)
BILIRUB UR-MCNC: NEGATIVE — SIGNIFICANT CHANGE UP
BUN SERPL-MCNC: 101 MG/DL — HIGH (ref 7–23)
CALCIUM SERPL-MCNC: 9.4 MG/DL — SIGNIFICANT CHANGE UP (ref 8.4–10.5)
CHLORIDE SERPL-SCNC: 105 MMOL/L — SIGNIFICANT CHANGE UP (ref 96–108)
CO2 SERPL-SCNC: 17 MMOL/L — LOW (ref 22–31)
COLOR SPEC: COLORLESS — SIGNIFICANT CHANGE UP
CREAT SERPL-MCNC: 12.07 MG/DL — HIGH (ref 0.5–1.3)
DIFF PNL FLD: ABNORMAL
EOSINOPHIL # BLD AUTO: 0.3 K/UL — SIGNIFICANT CHANGE UP (ref 0–0.5)
EOSINOPHIL NFR BLD AUTO: 4.5 % — SIGNIFICANT CHANGE UP (ref 0–6)
GLUCOSE SERPL-MCNC: 87 MG/DL — SIGNIFICANT CHANGE UP (ref 70–99)
GLUCOSE UR QL: NEGATIVE — SIGNIFICANT CHANGE UP
HAV IGM SER-ACNC: SIGNIFICANT CHANGE UP
HBV CORE IGM SER-ACNC: SIGNIFICANT CHANGE UP
HBV SURFACE AG SER-ACNC: SIGNIFICANT CHANGE UP
HCT VFR BLD CALC: 24.7 % — LOW (ref 39–50)
HCV AB S/CO SERPL IA: 0.09 S/CO — SIGNIFICANT CHANGE UP (ref 0–0.99)
HCV AB SERPL-IMP: SIGNIFICANT CHANGE UP
HGB BLD-MCNC: 7.9 G/DL — LOW (ref 13–17)
IMM GRANULOCYTES NFR BLD AUTO: 0.4 % — SIGNIFICANT CHANGE UP (ref 0–1.5)
INR BLD: 1.13 RATIO — SIGNIFICANT CHANGE UP (ref 0.88–1.16)
KETONES UR-MCNC: NEGATIVE — SIGNIFICANT CHANGE UP
LEUKOCYTE ESTERASE UR-ACNC: NEGATIVE — SIGNIFICANT CHANGE UP
LYMPHOCYTES # BLD AUTO: 1.41 K/UL — SIGNIFICANT CHANGE UP (ref 1–3.3)
LYMPHOCYTES # BLD AUTO: 20.9 % — SIGNIFICANT CHANGE UP (ref 13–44)
MAGNESIUM SERPL-MCNC: 2.1 MG/DL — SIGNIFICANT CHANGE UP (ref 1.6–2.6)
MCHC RBC-ENTMCNC: 28.6 PG — SIGNIFICANT CHANGE UP (ref 27–34)
MCHC RBC-ENTMCNC: 32 GM/DL — SIGNIFICANT CHANGE UP (ref 32–36)
MCV RBC AUTO: 89.5 FL — SIGNIFICANT CHANGE UP (ref 80–100)
MONOCYTES # BLD AUTO: 0.31 K/UL — SIGNIFICANT CHANGE UP (ref 0–0.9)
MONOCYTES NFR BLD AUTO: 4.6 % — SIGNIFICANT CHANGE UP (ref 2–14)
NEUTROPHILS # BLD AUTO: 4.65 K/UL — SIGNIFICANT CHANGE UP (ref 1.8–7.4)
NEUTROPHILS NFR BLD AUTO: 69 % — SIGNIFICANT CHANGE UP (ref 43–77)
NITRITE UR-MCNC: NEGATIVE — SIGNIFICANT CHANGE UP
NRBC # BLD: 0 /100 WBCS — SIGNIFICANT CHANGE UP (ref 0–0)
PH UR: 7 — SIGNIFICANT CHANGE UP (ref 5–8)
PLATELET # BLD AUTO: 155 K/UL — SIGNIFICANT CHANGE UP (ref 150–400)
POTASSIUM SERPL-MCNC: 4.7 MMOL/L — SIGNIFICANT CHANGE UP (ref 3.5–5.3)
POTASSIUM SERPL-SCNC: 4.7 MMOL/L — SIGNIFICANT CHANGE UP (ref 3.5–5.3)
PROT SERPL-MCNC: 6.4 G/DL — SIGNIFICANT CHANGE UP (ref 6–8.3)
PROT UR-MCNC: ABNORMAL
PROTHROM AB SERPL-ACNC: 13.5 SEC — SIGNIFICANT CHANGE UP (ref 10.6–13.6)
RBC # BLD: 2.76 M/UL — LOW (ref 4.2–5.8)
RBC # FLD: 12.7 % — SIGNIFICANT CHANGE UP (ref 10.3–14.5)
SARS-COV-2 RNA SPEC QL NAA+PROBE: SIGNIFICANT CHANGE UP
SODIUM SERPL-SCNC: 139 MMOL/L — SIGNIFICANT CHANGE UP (ref 135–145)
SP GR SPEC: 1.01 — SIGNIFICANT CHANGE UP (ref 1.01–1.02)
UROBILINOGEN FLD QL: NEGATIVE — SIGNIFICANT CHANGE UP
WBC # BLD: 6.74 K/UL — SIGNIFICANT CHANGE UP (ref 3.8–10.5)
WBC # FLD AUTO: 6.74 K/UL — SIGNIFICANT CHANGE UP (ref 3.8–10.5)

## 2021-08-30 PROCEDURE — 76937 US GUIDE VASCULAR ACCESS: CPT | Mod: 26

## 2021-08-30 PROCEDURE — 71045 X-RAY EXAM CHEST 1 VIEW: CPT | Mod: 26

## 2021-08-30 PROCEDURE — 36556 INSERT NON-TUNNEL CV CATH: CPT

## 2021-08-30 PROCEDURE — 99223 1ST HOSP IP/OBS HIGH 75: CPT

## 2021-08-30 PROCEDURE — 77001 FLUOROGUIDE FOR VEIN DEVICE: CPT | Mod: 26

## 2021-08-30 RX ORDER — BUDESONIDE AND FORMOTEROL FUMARATE DIHYDRATE 160; 4.5 UG/1; UG/1
2 AEROSOL RESPIRATORY (INHALATION)
Qty: 0 | Refills: 0 | DISCHARGE

## 2021-08-30 RX ORDER — CHLORHEXIDINE GLUCONATE 213 G/1000ML
1 SOLUTION TOPICAL
Refills: 0 | Status: DISCONTINUED | OUTPATIENT
Start: 2021-08-30 | End: 2021-09-01

## 2021-08-30 RX ORDER — SODIUM BICARBONATE 1 MEQ/ML
650 SYRINGE (ML) INTRAVENOUS
Refills: 0 | Status: DISCONTINUED | OUTPATIENT
Start: 2021-08-30 | End: 2021-08-30

## 2021-08-30 RX ORDER — ATORVASTATIN CALCIUM 80 MG/1
40 TABLET, FILM COATED ORAL AT BEDTIME
Refills: 0 | Status: DISCONTINUED | OUTPATIENT
Start: 2021-08-30 | End: 2021-09-01

## 2021-08-30 RX ORDER — HYDRALAZINE HCL 50 MG
100 TABLET ORAL THREE TIMES A DAY
Refills: 0 | Status: DISCONTINUED | OUTPATIENT
Start: 2021-08-30 | End: 2021-09-01

## 2021-08-30 RX ORDER — LOSARTAN POTASSIUM 100 MG/1
25 TABLET, FILM COATED ORAL AT BEDTIME
Refills: 0 | Status: DISCONTINUED | OUTPATIENT
Start: 2021-08-30 | End: 2021-08-31

## 2021-08-30 RX ORDER — CHLORHEXIDINE GLUCONATE 213 G/1000ML
1 SOLUTION TOPICAL DAILY
Refills: 0 | Status: DISCONTINUED | OUTPATIENT
Start: 2021-08-30 | End: 2021-08-30

## 2021-08-30 RX ORDER — BUDESONIDE AND FORMOTEROL FUMARATE DIHYDRATE 160; 4.5 UG/1; UG/1
2 AEROSOL RESPIRATORY (INHALATION)
Refills: 0 | Status: DISCONTINUED | OUTPATIENT
Start: 2021-08-30 | End: 2021-09-01

## 2021-08-30 RX ORDER — SEVELAMER CARBONATE 2400 MG/1
800 POWDER, FOR SUSPENSION ORAL
Refills: 0 | Status: DISCONTINUED | OUTPATIENT
Start: 2021-08-30 | End: 2021-09-01

## 2021-08-30 RX ORDER — ACETAMINOPHEN 500 MG
650 TABLET ORAL EVERY 6 HOURS
Refills: 0 | Status: DISCONTINUED | OUTPATIENT
Start: 2021-08-30 | End: 2021-09-01

## 2021-08-30 RX ORDER — ERYTHROPOIETIN 10000 [IU]/ML
6000 INJECTION, SOLUTION INTRAVENOUS; SUBCUTANEOUS
Refills: 0 | Status: DISCONTINUED | OUTPATIENT
Start: 2021-08-30 | End: 2021-09-01

## 2021-08-30 RX ORDER — DILTIAZEM HCL 120 MG
120 CAPSULE, EXT RELEASE 24 HR ORAL DAILY
Refills: 0 | Status: DISCONTINUED | OUTPATIENT
Start: 2021-08-30 | End: 2021-09-01

## 2021-08-30 RX ORDER — CHLORHEXIDINE GLUCONATE 213 G/1000ML
1 SOLUTION TOPICAL DAILY
Refills: 0 | Status: DISCONTINUED | OUTPATIENT
Start: 2021-08-30 | End: 2021-09-01

## 2021-08-30 RX ORDER — PANTOPRAZOLE SODIUM 20 MG/1
40 TABLET, DELAYED RELEASE ORAL
Refills: 0 | Status: DISCONTINUED | OUTPATIENT
Start: 2021-08-30 | End: 2021-09-01

## 2021-08-30 RX ORDER — ONDANSETRON 8 MG/1
4 TABLET, FILM COATED ORAL EVERY 8 HOURS
Refills: 0 | Status: DISCONTINUED | OUTPATIENT
Start: 2021-08-30 | End: 2021-09-01

## 2021-08-30 RX ORDER — DILTIAZEM HCL 120 MG
1 CAPSULE, EXT RELEASE 24 HR ORAL
Qty: 0 | Refills: 0 | DISCHARGE

## 2021-08-30 RX ORDER — LANOLIN ALCOHOL/MO/W.PET/CERES
3 CREAM (GRAM) TOPICAL AT BEDTIME
Refills: 0 | Status: DISCONTINUED | OUTPATIENT
Start: 2021-08-30 | End: 2021-09-01

## 2021-08-30 RX ORDER — SODIUM CHLORIDE 9 MG/ML
10 INJECTION INTRAMUSCULAR; INTRAVENOUS; SUBCUTANEOUS
Refills: 0 | Status: DISCONTINUED | OUTPATIENT
Start: 2021-08-30 | End: 2021-09-01

## 2021-08-30 RX ADMIN — LOSARTAN POTASSIUM 25 MILLIGRAM(S): 100 TABLET, FILM COATED ORAL at 21:35

## 2021-08-30 RX ADMIN — SEVELAMER CARBONATE 800 MILLIGRAM(S): 2400 POWDER, FOR SUSPENSION ORAL at 12:10

## 2021-08-30 RX ADMIN — ATORVASTATIN CALCIUM 40 MILLIGRAM(S): 80 TABLET, FILM COATED ORAL at 21:35

## 2021-08-30 RX ADMIN — SEVELAMER CARBONATE 800 MILLIGRAM(S): 2400 POWDER, FOR SUSPENSION ORAL at 16:53

## 2021-08-30 RX ADMIN — PANTOPRAZOLE SODIUM 40 MILLIGRAM(S): 20 TABLET, DELAYED RELEASE ORAL at 05:33

## 2021-08-30 RX ADMIN — Medication 650 MILLIGRAM(S): at 22:35

## 2021-08-30 RX ADMIN — Medication 100 MILLIGRAM(S): at 09:07

## 2021-08-30 RX ADMIN — BUDESONIDE AND FORMOTEROL FUMARATE DIHYDRATE 2 PUFF(S): 160; 4.5 AEROSOL RESPIRATORY (INHALATION) at 09:07

## 2021-08-30 RX ADMIN — Medication 650 MILLIGRAM(S): at 21:35

## 2021-08-30 RX ADMIN — Medication 100 MILLIGRAM(S): at 16:53

## 2021-08-30 RX ADMIN — Medication 650 MILLIGRAM(S): at 05:21

## 2021-08-30 RX ADMIN — Medication 100 MILLIGRAM(S): at 20:14

## 2021-08-30 RX ADMIN — ERYTHROPOIETIN 6000 UNIT(S): 10000 INJECTION, SOLUTION INTRAVENOUS; SUBCUTANEOUS at 20:27

## 2021-08-30 RX ADMIN — SEVELAMER CARBONATE 800 MILLIGRAM(S): 2400 POWDER, FOR SUSPENSION ORAL at 09:06

## 2021-08-30 RX ADMIN — Medication 120 MILLIGRAM(S): at 05:21

## 2021-08-30 RX ADMIN — Medication 100 MILLIGRAM(S): at 01:50

## 2021-08-30 NOTE — CONSULT NOTE ADULT - ASSESSMENT
25y Male with history of HTN presents with headaches. Nephrology consulted for advanced renal failure.    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 Patient now ESRD and educated on need to initiate HD. Patient agreeable. Would contact IR for shiley placement which will convert to TDC prior to discharge. Will plan for HD today after access obtained. Check Hep BsAg. Eventual outpatient HD placement at [facility] transitional care unit. Discontinue reynaga and sodium bicarbonate. Monitor electrolytes.    2) HTN with ESRD: BP improving. Continue with current medications. Start losartan 25 mg QHS to protect residual renal function.     3) Anemia of renal disease: Hb low. Check AM iron stores. Start Epo 6K with HD. Monitor Hb.    4) Hyperphosphatemia: Continue with renvela 1 tab with meals. Monitor serum calcium and phosphorus.      [facility] NEPHROLOGY  Justin Mckenzie M.D.  Chad Reich D.O.  Beth Massey M.D.  Shayy Navarro, MSN, ANP-C    Telephone: [phone]  Facsimile: [phone]    [id number] [place] [address]

## 2021-08-30 NOTE — H&P ADULT - ASSESSMENT
25M w/ hx of acute renal failure due to IgA nephropathy, HTN p/w headache, nausea and vomiting found to have CIARRA on CKD and hypertensive urgency

## 2021-08-30 NOTE — H&P ADULT - PROBLEM SELECTOR PLAN 1
Maybe related to degree of hypertension. Currently resolved s/p treatment in ER  -Tylenol PRN  -Reglan PRN  -Hypertension management as below

## 2021-08-30 NOTE — H&P ADULT - PROBLEM SELECTOR PLAN 3
As per ER note, communication was made with nephrology? Unclear why pt has ronnell in as pt denies retention? Given level of elevation in BUN and creatinine may need to consider dialysis before d/c.  -Nephrology consult in AM  -Strict I/Os  -Cont. ronnell for now, discuss with nephrology if still needed  -Cont. Nabicarb  -Cont. Sevelamer

## 2021-08-30 NOTE — H&P ADULT - PROBLEM SELECTOR PLAN 2
Pt not sure what does of diltiazem he takes but that it might be higher than 120mg  -Cont. dilatiazem 120mg daily for now, would obtain collateral  -Cont. hydralazine 100mg TID  -Trend vital signs

## 2021-08-30 NOTE — H&P ADULT - HISTORY OF PRESENT ILLNESS
25M w/ hx of acute renal failure due to IgA nephropathy, HTN p/w headache, nausea and vomiting. Pt states he was in his usual state of health until this AM when he woke up with a pounding headache associated with multiple episodes of nausea and vomiting. Came to [facility] Er for further evaluation. Pt seemed to have similar symptoms during March 2021 visit to [facility]. Found to have hypertensive urgency/emergency and CIARRA. Has since been followed outpatient by nephrology with plans to consider kidney transplant. Pt states he is currently without nausea or headache after treatment in ER. Denies any fevers, chills, travel or sick contacts. Denies SOB, chest pain or LE edema. Endorses medication compliance.    In ER: Given NS 1L, tylenol 650mg, Reglan 10mg IV

## 2021-08-30 NOTE — H&P ADULT - NSHPPHYSICALEXAM_GEN_ALL_CORE
Vital Signs Last 24 Hrs  T(C): 36.6 (08-30-21 @ 01:00), Max: 36.7 (08-29-21 @ 20:18)  T(F): 97.8 (08-30-21 @ 01:00), Max: 98 (08-29-21 @ 20:18)  HR: 86 (08-30-21 @ 01:00) (66 - 86)  BP: 163/106 (08-30-21 @ 01:00) (152/105 - 165/111)  BP(mean): 119 (08-30-21 @ 00:00) (119 - 128)  RR: 14 (08-30-21 @ 01:00) (14 - 18)  SpO2: 100% (08-30-21 @ 01:00) (100% - 100%)

## 2021-08-30 NOTE — ED ADULT NURSE REASSESSMENT NOTE - NS ED NURSE REASSESS COMMENT FT1
16 fr coude Ng catheter placed with MD order. Aseptic technique maintained. Two RNs at bedside during procedure. Catheter draining clear yellow urine.

## 2021-08-30 NOTE — H&P ADULT - NSICDXPASTMEDICALHX_GEN_ALL_CORE_FT
PAST MEDICAL HISTORY:  IgA nephropathy       ESRF (end stage renal failure)     HTN (hypertension)

## 2021-08-30 NOTE — PHARMACOTHERAPY INTERVENTION NOTE - COMMENTS
Clarified home medications discrepancies with pharmacy (based on past 3 month records, updated in Outpatient Medication Review.     atorvastatin 40 mg oral tablet: 1 tab(s) orally once a day   calcitriol 0.25 mcg oral capsule: 1 cap(s) orally 3 times a week   dilTIAZem 240 mg/24 hours oral capsule, extended release: 1 cap(s) orally once a day   hydrALAZINE 100 mg oral tablet: 1 tab(s) orally 3 times a day   pantoprazole 40 mg oral delayed release tablet: 1 tab(s) orally once a day   sevelamer carbonate 800 mg oral tablet: 1 tab(s) orally 3 times a day (with meals)   sodium bicarbonate 650 mg oral tablet: 2 tab(s) orally 2 times a day   Symbicort 160 mcg-4.5 mcg/inh inhalation aerosol: 2 puff(s) inhaled 2 times a day    - All home medications resumed with the exception of calcitriol and sodium bicarbonate. Consider resumption once clinically appropriate  - Diltiazem ER capsules resumed at lower dose of 120mg. Monitor BP and HR accordingly given the addition of losartan.

## 2021-08-31 LAB
ANION GAP SERPL CALC-SCNC: 15 MMOL/L — SIGNIFICANT CHANGE UP (ref 5–17)
BUN SERPL-MCNC: 68 MG/DL — HIGH (ref 7–23)
CALCIUM SERPL-MCNC: 9.7 MG/DL — SIGNIFICANT CHANGE UP (ref 8.4–10.5)
CHLORIDE SERPL-SCNC: 102 MMOL/L — SIGNIFICANT CHANGE UP (ref 96–108)
CO2 SERPL-SCNC: 21 MMOL/L — LOW (ref 22–31)
COVID-19 SPIKE DOMAIN AB INTERP: POSITIVE
COVID-19 SPIKE DOMAIN ANTIBODY RESULT: >250 U/ML — HIGH
CREAT SERPL-MCNC: 9.16 MG/DL — HIGH (ref 0.5–1.3)
FERRITIN SERPL-MCNC: 375 NG/ML — SIGNIFICANT CHANGE UP (ref 30–400)
GLUCOSE SERPL-MCNC: 94 MG/DL — SIGNIFICANT CHANGE UP (ref 70–99)
HBV CORE AB SER-ACNC: SIGNIFICANT CHANGE UP
HBV SURFACE AB SER-ACNC: 841.4 MIU/ML — SIGNIFICANT CHANGE UP
HBV SURFACE AB SER-ACNC: REACTIVE
HBV SURFACE AG SER-ACNC: SIGNIFICANT CHANGE UP
HCT VFR BLD CALC: 23.5 % — LOW (ref 39–50)
HCV AB S/CO SERPL IA: 0.09 S/CO — SIGNIFICANT CHANGE UP (ref 0–0.99)
HCV AB SERPL-IMP: SIGNIFICANT CHANGE UP
HGB BLD-MCNC: 7.8 G/DL — LOW (ref 13–17)
IRON SATN MFR SERPL: 29 % — SIGNIFICANT CHANGE UP (ref 16–55)
IRON SATN MFR SERPL: 76 UG/DL — SIGNIFICANT CHANGE UP (ref 45–165)
MCHC RBC-ENTMCNC: 28.8 PG — SIGNIFICANT CHANGE UP (ref 27–34)
MCHC RBC-ENTMCNC: 33.2 GM/DL — SIGNIFICANT CHANGE UP (ref 32–36)
MCV RBC AUTO: 86.7 FL — SIGNIFICANT CHANGE UP (ref 80–100)
NRBC # BLD: 0 /100 WBCS — SIGNIFICANT CHANGE UP (ref 0–0)
PHOSPHATE SERPL-MCNC: 4.7 MG/DL — HIGH (ref 2.5–4.5)
PLATELET # BLD AUTO: 161 K/UL — SIGNIFICANT CHANGE UP (ref 150–400)
POTASSIUM SERPL-MCNC: 4.6 MMOL/L — SIGNIFICANT CHANGE UP (ref 3.5–5.3)
POTASSIUM SERPL-SCNC: 4.6 MMOL/L — SIGNIFICANT CHANGE UP (ref 3.5–5.3)
RBC # BLD: 2.71 M/UL — LOW (ref 4.2–5.8)
RBC # FLD: 12.5 % — SIGNIFICANT CHANGE UP (ref 10.3–14.5)
SARS-COV-2 IGG+IGM SERPL QL IA: >250 U/ML — HIGH
SARS-COV-2 IGG+IGM SERPL QL IA: POSITIVE
SODIUM SERPL-SCNC: 138 MMOL/L — SIGNIFICANT CHANGE UP (ref 135–145)
TIBC SERPL-MCNC: 262 UG/DL — SIGNIFICANT CHANGE UP (ref 220–430)
UIBC SERPL-MCNC: 186 UG/DL — SIGNIFICANT CHANGE UP (ref 110–370)
WBC # BLD: 5.24 K/UL — SIGNIFICANT CHANGE UP (ref 3.8–10.5)
WBC # FLD AUTO: 5.24 K/UL — SIGNIFICANT CHANGE UP (ref 3.8–10.5)

## 2021-08-31 RX ORDER — LOSARTAN POTASSIUM 100 MG/1
50 TABLET, FILM COATED ORAL AT BEDTIME
Refills: 0 | Status: DISCONTINUED | OUTPATIENT
Start: 2021-08-31 | End: 2021-09-01

## 2021-08-31 RX ADMIN — LOSARTAN POTASSIUM 50 MILLIGRAM(S): 100 TABLET, FILM COATED ORAL at 21:11

## 2021-08-31 RX ADMIN — ATORVASTATIN CALCIUM 40 MILLIGRAM(S): 80 TABLET, FILM COATED ORAL at 21:06

## 2021-08-31 RX ADMIN — SEVELAMER CARBONATE 800 MILLIGRAM(S): 2400 POWDER, FOR SUSPENSION ORAL at 17:08

## 2021-08-31 RX ADMIN — Medication 120 MILLIGRAM(S): at 12:04

## 2021-08-31 RX ADMIN — BUDESONIDE AND FORMOTEROL FUMARATE DIHYDRATE 2 PUFF(S): 160; 4.5 AEROSOL RESPIRATORY (INHALATION) at 06:00

## 2021-08-31 RX ADMIN — CHLORHEXIDINE GLUCONATE 1 APPLICATION(S): 213 SOLUTION TOPICAL at 11:52

## 2021-08-31 RX ADMIN — BUDESONIDE AND FORMOTEROL FUMARATE DIHYDRATE 2 PUFF(S): 160; 4.5 AEROSOL RESPIRATORY (INHALATION) at 17:09

## 2021-08-31 RX ADMIN — Medication 100 MILLIGRAM(S): at 21:11

## 2021-08-31 RX ADMIN — Medication 650 MILLIGRAM(S): at 17:38

## 2021-08-31 RX ADMIN — PANTOPRAZOLE SODIUM 40 MILLIGRAM(S): 20 TABLET, DELAYED RELEASE ORAL at 05:57

## 2021-08-31 RX ADMIN — Medication 650 MILLIGRAM(S): at 17:08

## 2021-08-31 RX ADMIN — CHLORHEXIDINE GLUCONATE 1 APPLICATION(S): 213 SOLUTION TOPICAL at 06:07

## 2021-08-31 RX ADMIN — SEVELAMER CARBONATE 800 MILLIGRAM(S): 2400 POWDER, FOR SUSPENSION ORAL at 11:49

## 2021-08-31 RX ADMIN — Medication 100 MILLIGRAM(S): at 12:04

## 2021-08-31 NOTE — PROGRESS NOTE ADULT - PROBLEM SELECTOR PLAN 1
Resolved  Maybe related to degree of hypertension. Currently resolved s/p treatment in ER  -Tylenol PRN  -Reglan PRN  -Hypertension management
Maybe related to degree of hypertension. Currently resolved s/p treatment in ER  -Tylenol PRN  -Reglan PRN  -Hypertension management as below

## 2021-08-31 NOTE — PROGRESS NOTE ADULT - PROBLEM SELECTOR PLAN 3
ESRD: HD as per Renal. Temp catheter has been placed
ESRD: HD as per Renal. Temp catheter has been placed

## 2021-08-31 NOTE — PROGRESS NOTE ADULT - PROBLEM SELECTOR PLAN 5
Pt denies taking any immunosuppression at this point  -F/u nephrology recommendations
Pt denies taking any immunosuppression at this point  -F/u nephrology recommendations

## 2021-08-31 NOTE — CONSULT NOTE ADULT - TIME BILLING
AS above, pt with acute renal failure requires venous access for non-tunneled HD catheter.  Will plan on performing later today.
Review of history, imaging, procedural indications, discussion with primary team

## 2021-08-31 NOTE — CONSULT NOTE ADULT - ATTENDING COMMENTS
Assessment/Plan:   25y Male with IgA nephropathy who presented in hypertensive urgency with headaches. Patient currently undergoing evaluation for renal transplant, found to have Cr of 12. S/p placement of non-tunneled HD catheter by IR on 8/30/21. Team anticipating long-term need for dialysis, requesting tunneled HD catheter.  -- IR will plan to perform tunneled HD catheter conversion 9/1/21.  -- NPO at midnight prior to procedure  -- please maintain COVID PCR within 72 hours of planned procedure.

## 2021-08-31 NOTE — PROGRESS NOTE ADULT - PROBLEM SELECTOR PLAN 2
-Cont. dilatiazem 120mg daily  -Cont. hydralazine 100mg TID  -Trend vital signs
-Cont. dilatiazem 120mg daily  -Cont. hydralazine 100mg TID

## 2021-08-31 NOTE — CONSULT NOTE ADULT - SUBJECTIVE AND OBJECTIVE BOX
[facility] NEPHROLOGY- CONSULTATION NOTE    25y Male with history of below presents with headaches. Nephrology consulted for advanced renal failure.    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 Patient was treated with high dose steroids with no improvement for which medication was ultimately discontinued. Patient subsequently lost to follow up however states he has been seeing alternate nephrologists as an outpatient.    REVIEW OF SYSTEMS:  Gen: no changes in weight, + H/A now resolved  HEENT: no rhinorrhea  Neck: no sore throat  Cards: no chest pain  Resp: no dyspnea  GI: no nausea or vomiting or diarrhea  : no dysuria or hematuria  Vascular: no LE edema  Derm: no rashes  Neuro: no numbness/tingling    Kiwi (Anaphylaxis)  Kiwi (Swelling)  No Known Drug Allergies      Home Medications Reviewed  Hospital Medications:   MEDICATIONS  (STANDING):  atorvastatin 40 milliGRAM(s) Oral at bedtime  budesonide 160 MICROgram(s)/formoterol 4.5 MICROgram(s) Inhaler 2 Puff(s) Inhalation two times a day  diltiazem    milliGRAM(s) Oral daily  hydrALAZINE 100 milliGRAM(s) Oral three times a day  pantoprazole    Tablet 40 milliGRAM(s) Oral before breakfast  sevelamer carbonate 800 milliGRAM(s) Oral three times a day with meals  sodium bicarbonate 650 milliGRAM(s) Oral two times a day      PAST MEDICAL & SURGICAL HISTORY:  Asthma    Renal failure    HTN (hypertension)    IgA nephropathy    No significant past surgical history        FAMILY HISTORY:  No pertinent family history in first degree relatives        SOCIAL HISTORY:  Denies toxic substance use     VITALS:  T(F): 98.8 (21 @ 11:01), Max: 98.8 (21 @ 11:01)  HR: 94 (21 @ 11:01)  BP: 147/79 (21 @ 11:01)  RR: 16 (21 @ 11:01)  SpO2: 99% (21 @ 11:01)  Wt(kg): --     @ 07:01  -   @ 07:00  --------------------------------------------------------  IN: 340 mL / OUT: 0 mL / NET: 340 mL      Height (cm): 188 ( @ 11:08)  Weight (kg): 64.7 ( @ 11:08)  BMI (kg/m2): 18.3 ( @ 11:08)  BSA (m2): 1.88 ( @ 11:08)    PHYSICAL EXAM:  Gen: NAD, calm  HEENT: MMM  Neck: no JVD  Cards: RRR, +S1/S2, no M/G/R  Resp: CTA B/L  GI: soft, NT/ND, NABS  : no CVA tenderness  Vascular: no LE edema B/L  Derm: no rashes  Neuro: non-focal    LABS:      139  |  105  |  101<H>  ----------------------------<  87  4.7   |  17<L>  |  12.07<H>    Ca    9.4      30 Aug 2021 05:11  Mg     2.1         TPro  6.4  /  Alb  4.1  /  TBili  0.3  /  DBili      /  AST  13  /  ALT  18  /  AlkPhos  52      Creatinine Trend: 12.07 <--, 12.21 <--                        7.9    6.74  )-----------( 155      ( 30 Aug 2021 05:11 )             24.7     Urine Studies:  Urinalysis Basic - ( 30 Aug 2021 00:20 )    Color: Colorless / Appearance: Clear / S.010 / pH:   Gluc:  / Ketone: Negative  / Bili: Negative / Urobili: Negative   Blood:  / Protein: 300 mg/dL / Nitrite: Negative   Leuk Esterase: Negative / RBC: 10 /hpf / WBC 1 /HPF   Sq Epi:  / Non Sq Epi: 0 /hpf / Bacteria: Negative          RADIOLOGY & ADDITIONAL STUDIES:    < from: Xray Chest 1 View- PORTABLE-Urgent (Xray Chest 1 View- PORTABLE-Urgent .) (21 @ 01:10) >  FINDINGS:  The lungs are clear.  No pleural effusion. No pneumothorax.  Cardiac size within normal limits.    < end of copied text >  
Interventional Radiology    Evaluate for Procedure:     HPI: 25y Male with IgA nephropathy who presented in hypertensive urgency with headaches. Patient currently undergoing evaluation for renal transplant, found to have Cr of 12. Per discussion with primary team, plan for dialysis today. IR consulted for non-tunneled HD placement.    Allergies: NKDA  Medications (Abx/Cardiac/Anticoagulation/Blood Products)    diltiazem   CD: 120 milliGRAM(s) Oral (08-30 @ 05:21)  hydrALAZINE: 100 milliGRAM(s) Oral (08-30 @ 09:07)    Data:  188  64.7  T(C): 37.1  HR: 94  BP: 147/79  RR: 16  SpO2: 99%    -WBC 6.74 / HgB 7.9 / Hct 24.7 / Plt 155  -Na 139 / Cl 105 /  / Glucose 87  -K 4.7 / CO2 17 / Cr 12.07  -ALT 18 / Alk Phos 52 / T.Bili 0.3  -INR 1.13 / PTT 31.3    Assessment/Plan:   25y Male with IgA nephropathy who presented in hypertensive urgency with headaches. Patient currently undergoing evaluation for renal transplant, found to have Cr of 12. Per discussion with primary team, plan for dialysis today. IR consulted for non-tunneled HD placement.  -- IR will plan to perform non-tunneled dialysis catheter placement 8/30/21.  -- please maintain COVID PCR within 72 hours of planned procedure.  -- please place IR procedure request order under Dr. Sanders
Interventional Radiology    Evaluate for Procedure:     HPI: 25y Male with IgA nephropathy who presented in hypertensive urgency with headaches. Patient currently undergoing evaluation for renal transplant, found to have Cr of 12. S/p placement of non-tunneled HD catheter by IR on 8/30/21. Team anticipating long-term need for dialysis, requesting tunneled HD catheter.      Allergies: NKDA  Medications (Abx/Cardiac/Anticoagulation/Blood Products)    diltiazem   CD: 120 milliGRAM(s) Oral (08-31 @ 12:04)  hydrALAZINE: 100 milliGRAM(s) Oral (08-31 @ 12:04)  losartan: 25 milliGRAM(s) Oral (08-30 @ 21:35)    Data:  188  68  T(C): 36.9  HR: 94  BP: 155/106  RR: 18  SpO2: 98%    -WBC 5.24 / HgB 7.8 / Hct 23.5 / Plt 161  -Na 138 / Cl 102 / BUN 68 / Glucose 94  -K 4.6 / CO2 21 / Cr 9.16  -ALT -- / Alk Phos -- / T.Bili --  -INR 1.13 / PTT 31.3      Assessment/Plan:   25y Male with IgA nephropathy who presented in hypertensive urgency with headaches. Patient currently undergoing evaluation for renal transplant, found to have Cr of 12. S/p placement of non-tunneled HD catheter by IR on 8/30/21. Team anticipating long-term need for dialysis, requesting tunneled HD catheter.  -- IR will plan to perform tunneled HD catheter conversion 9/1/21.  -- NPO at midnight prior to procedure  -- please maintain COVID PCR within 72 hours of planned procedure.  -- please place IR procedure request order under Dr. Johnson

## 2021-09-01 ENCOUNTER — TRANSCRIPTION ENCOUNTER (OUTPATIENT)
Age: 26
End: 2021-09-01

## 2021-09-01 VITALS
TEMPERATURE: 99 F | DIASTOLIC BLOOD PRESSURE: 76 MMHG | SYSTOLIC BLOOD PRESSURE: 123 MMHG | OXYGEN SATURATION: 98 % | RESPIRATION RATE: 18 BRPM | HEART RATE: 98 BPM

## 2021-09-01 LAB
ANION GAP SERPL CALC-SCNC: 14 MMOL/L — SIGNIFICANT CHANGE UP (ref 5–17)
BUN SERPL-MCNC: 51 MG/DL — HIGH (ref 7–23)
CALCIUM SERPL-MCNC: 10 MG/DL — SIGNIFICANT CHANGE UP (ref 8.4–10.5)
CHLORIDE SERPL-SCNC: 101 MMOL/L — SIGNIFICANT CHANGE UP (ref 96–108)
CO2 SERPL-SCNC: 22 MMOL/L — SIGNIFICANT CHANGE UP (ref 22–31)
CREAT SERPL-MCNC: 7.96 MG/DL — HIGH (ref 0.5–1.3)
GLUCOSE SERPL-MCNC: 91 MG/DL — SIGNIFICANT CHANGE UP (ref 70–99)
HCT VFR BLD CALC: 25.9 % — LOW (ref 39–50)
HGB BLD-MCNC: 8.5 G/DL — LOW (ref 13–17)
MCHC RBC-ENTMCNC: 29.1 PG — SIGNIFICANT CHANGE UP (ref 27–34)
MCHC RBC-ENTMCNC: 32.8 GM/DL — SIGNIFICANT CHANGE UP (ref 32–36)
MCV RBC AUTO: 88.7 FL — SIGNIFICANT CHANGE UP (ref 80–100)
NRBC # BLD: 0 /100 WBCS — SIGNIFICANT CHANGE UP (ref 0–0)
PLATELET # BLD AUTO: 163 K/UL — SIGNIFICANT CHANGE UP (ref 150–400)
POTASSIUM SERPL-MCNC: 4.5 MMOL/L — SIGNIFICANT CHANGE UP (ref 3.5–5.3)
POTASSIUM SERPL-SCNC: 4.5 MMOL/L — SIGNIFICANT CHANGE UP (ref 3.5–5.3)
RBC # BLD: 2.92 M/UL — LOW (ref 4.2–5.8)
RBC # FLD: 12.2 % — SIGNIFICANT CHANGE UP (ref 10.3–14.5)
SODIUM SERPL-SCNC: 137 MMOL/L — SIGNIFICANT CHANGE UP (ref 135–145)
WBC # BLD: 5.27 K/UL — SIGNIFICANT CHANGE UP (ref 3.8–10.5)
WBC # FLD AUTO: 5.27 K/UL — SIGNIFICANT CHANGE UP (ref 3.8–10.5)

## 2021-09-01 PROCEDURE — 83540 ASSAY OF IRON: CPT

## 2021-09-01 PROCEDURE — 85610 PROTHROMBIN TIME: CPT

## 2021-09-01 PROCEDURE — 84100 ASSAY OF PHOSPHORUS: CPT

## 2021-09-01 PROCEDURE — 87635 SARS-COV-2 COVID-19 AMP PRB: CPT

## 2021-09-01 PROCEDURE — 85027 COMPLETE CBC AUTOMATED: CPT

## 2021-09-01 PROCEDURE — 81001 URINALYSIS AUTO W/SCOPE: CPT

## 2021-09-01 PROCEDURE — 82803 BLOOD GASES ANY COMBINATION: CPT

## 2021-09-01 PROCEDURE — 96374 THER/PROPH/DIAG INJ IV PUSH: CPT

## 2021-09-01 PROCEDURE — 77001 FLUOROGUIDE FOR VEIN DEVICE: CPT

## 2021-09-01 PROCEDURE — 84132 ASSAY OF SERUM POTASSIUM: CPT

## 2021-09-01 PROCEDURE — 36566 INSERT TUNNELED CV CATH: CPT

## 2021-09-01 PROCEDURE — 84295 ASSAY OF SERUM SODIUM: CPT

## 2021-09-01 PROCEDURE — C1769: CPT

## 2021-09-01 PROCEDURE — 87070 CULTURE OTHR SPECIMN AEROBIC: CPT

## 2021-09-01 PROCEDURE — 80053 COMPREHEN METABOLIC PANEL: CPT

## 2021-09-01 PROCEDURE — 94640 AIRWAY INHALATION TREATMENT: CPT

## 2021-09-01 PROCEDURE — 87340 HEPATITIS B SURFACE AG IA: CPT

## 2021-09-01 PROCEDURE — 83550 IRON BINDING TEST: CPT

## 2021-09-01 PROCEDURE — 99291 CRITICAL CARE FIRST HOUR: CPT

## 2021-09-01 PROCEDURE — 82728 ASSAY OF FERRITIN: CPT

## 2021-09-01 PROCEDURE — 71045 X-RAY EXAM CHEST 1 VIEW: CPT

## 2021-09-01 PROCEDURE — 80048 BASIC METABOLIC PNL TOTAL CA: CPT

## 2021-09-01 PROCEDURE — 77001 FLUOROGUIDE FOR VEIN DEVICE: CPT | Mod: 26

## 2021-09-01 PROCEDURE — 82947 ASSAY GLUCOSE BLOOD QUANT: CPT

## 2021-09-01 PROCEDURE — 36558 INSERT TUNNELED CV CATH: CPT

## 2021-09-01 PROCEDURE — 76937 US GUIDE VASCULAR ACCESS: CPT

## 2021-09-01 PROCEDURE — 85025 COMPLETE CBC W/AUTO DIFF WBC: CPT

## 2021-09-01 PROCEDURE — 86704 HEP B CORE ANTIBODY TOTAL: CPT

## 2021-09-01 PROCEDURE — C1752: CPT

## 2021-09-01 PROCEDURE — 36556 INSERT NON-TUNNEL CV CATH: CPT

## 2021-09-01 PROCEDURE — 82330 ASSAY OF CALCIUM: CPT

## 2021-09-01 PROCEDURE — 36558 INSERT TUNNELED CV CATH: CPT | Mod: 79

## 2021-09-01 PROCEDURE — 99261: CPT

## 2021-09-01 PROCEDURE — 86803 HEPATITIS C AB TEST: CPT

## 2021-09-01 PROCEDURE — 86706 HEP B SURFACE ANTIBODY: CPT

## 2021-09-01 PROCEDURE — 82435 ASSAY OF BLOOD CHLORIDE: CPT

## 2021-09-01 PROCEDURE — C1750: CPT

## 2021-09-01 PROCEDURE — 86769 SARS-COV-2 COVID-19 ANTIBODY: CPT

## 2021-09-01 PROCEDURE — 83690 ASSAY OF LIPASE: CPT

## 2021-09-01 PROCEDURE — 83605 ASSAY OF LACTIC ACID: CPT

## 2021-09-01 PROCEDURE — 85018 HEMOGLOBIN: CPT

## 2021-09-01 PROCEDURE — 80074 ACUTE HEPATITIS PANEL: CPT

## 2021-09-01 PROCEDURE — 85730 THROMBOPLASTIN TIME PARTIAL: CPT

## 2021-09-01 PROCEDURE — 85014 HEMATOCRIT: CPT

## 2021-09-01 PROCEDURE — 76937 US GUIDE VASCULAR ACCESS: CPT | Mod: 26

## 2021-09-01 PROCEDURE — 83735 ASSAY OF MAGNESIUM: CPT

## 2021-09-01 RX ORDER — SODIUM BICARBONATE 1 MEQ/ML
2 SYRINGE (ML) INTRAVENOUS
Qty: 0 | Refills: 0 | DISCHARGE

## 2021-09-01 RX ORDER — LOSARTAN POTASSIUM 100 MG/1
1 TABLET, FILM COATED ORAL
Qty: 30 | Refills: 0
Start: 2021-09-01 | End: 2021-09-30

## 2021-09-01 RX ORDER — DILTIAZEM HCL 120 MG
1 CAPSULE, EXT RELEASE 24 HR ORAL
Qty: 30 | Refills: 0
Start: 2021-09-01 | End: 2021-09-30

## 2021-09-01 RX ORDER — DILTIAZEM HCL 120 MG
1 CAPSULE, EXT RELEASE 24 HR ORAL
Qty: 0 | Refills: 0 | DISCHARGE

## 2021-09-01 RX ORDER — LOSARTAN POTASSIUM 100 MG/1
1 TABLET, FILM COATED ORAL
Qty: 0 | Refills: 0 | DISCHARGE
Start: 2021-09-01

## 2021-09-01 RX ADMIN — Medication 100 MILLIGRAM(S): at 15:53

## 2021-09-01 RX ADMIN — Medication 120 MILLIGRAM(S): at 05:54

## 2021-09-01 RX ADMIN — Medication 100 MILLIGRAM(S): at 05:53

## 2021-09-01 RX ADMIN — CHLORHEXIDINE GLUCONATE 1 APPLICATION(S): 213 SOLUTION TOPICAL at 05:56

## 2021-09-01 RX ADMIN — BUDESONIDE AND FORMOTEROL FUMARATE DIHYDRATE 2 PUFF(S): 160; 4.5 AEROSOL RESPIRATORY (INHALATION) at 05:54

## 2021-09-01 RX ADMIN — SEVELAMER CARBONATE 800 MILLIGRAM(S): 2400 POWDER, FOR SUSPENSION ORAL at 17:25

## 2021-09-01 RX ADMIN — Medication 650 MILLIGRAM(S): at 17:25

## 2021-09-01 RX ADMIN — BUDESONIDE AND FORMOTEROL FUMARATE DIHYDRATE 2 PUFF(S): 160; 4.5 AEROSOL RESPIRATORY (INHALATION) at 17:26

## 2021-09-01 NOTE — PROGRESS NOTE ADULT - SUBJECTIVE AND OBJECTIVE BOX
Patient is a 25y old  Male who presents with a chief complaint of Headache, nausea and vomiting (30 Aug 2021 15:42)      HPI:  CKD has progressed to ESRD, HD is being initiated      PAST MEDICAL & SURGICAL HISTORY:  Asthma    Renal failure    HTN (hypertension)    IgA nephropathy    No significant past surgical history        Review of Systems:   CONSTITUTIONAL: No fever, weight loss, or fatigue  EYES: No eye pain, visual disturbances, or discharge  ENMT:  No difficulty hearing, tinnitus, vertigo; No sinus or throat pain  NECK: No pain or stiffness  BREASTS: No pain, masses, or nipple discharge  RESPIRATORY: No cough, wheezing, chills or hemoptysis; No shortness of breath  CARDIOVASCULAR: No chest pain, palpitations, dizziness, or leg swelling  GASTROINTESTINAL: No abdominal or epigastric pain. No nausea, vomiting, or hematemesis; No diarrhea or constipation. No melena or hematochezia.  GENITOURINARY: No dysuria, frequency, hematuria, or incontinence  NEUROLOGICAL: No headaches, memory loss, loss of strength, numbness, or tremors  SKIN: No itching, burning, rashes, or lesions   LYMPH NODES: No enlarged glands  ENDOCRINE: No heat or cold intolerance; No hair loss  MUSCULOSKELETAL: No joint pain or swelling; No muscle, back, or extremity pain  PSYCHIATRIC: No depression, anxiety, mood swings, or difficulty sleeping  HEME/LYMPH: No easy bruising, or bleeding gums  ALLERY AND IMMUNOLOGIC: No hives or eczema    Allergies    Kiwi (Anaphylaxis)  Kiwi (Swelling)  No Known Drug Allergies    Intolerances        Social History:     FAMILY HISTORY:  No pertinent family history in first degree relatives        MEDICATIONS  (STANDING):  atorvastatin 40 milliGRAM(s) Oral at bedtime  budesonide 160 MICROgram(s)/formoterol 4.5 MICROgram(s) Inhaler 2 Puff(s) Inhalation two times a day  chlorhexidine 2% Cloths 1 Application(s) Topical daily  chlorhexidine 4% Liquid 1 Application(s) Topical <User Schedule>  diltiazem    milliGRAM(s) Oral daily  epoetin keshav-epbx (RETACRIT) Injectable 6000 Unit(s) IV Push <User Schedule>  hydrALAZINE 100 milliGRAM(s) Oral three times a day  losartan 25 milliGRAM(s) Oral at bedtime  pantoprazole    Tablet 40 milliGRAM(s) Oral before breakfast  sevelamer carbonate 800 milliGRAM(s) Oral three times a day with meals    MEDICATIONS  (PRN):  acetaminophen   Tablet .. 650 milliGRAM(s) Oral every 6 hours PRN Temp greater or equal to 38.5C (101.3F), Mild Pain (1 - 3)  melatonin 3 milliGRAM(s) Oral at bedtime PRN Insomnia  ondansetron Injectable 4 milliGRAM(s) IV Push every 8 hours PRN Nausea and/or Vomiting  sodium chloride 0.9% lock flush 10 milliLiter(s) IV Push every 1 hour PRN Pre/post blood products, medications, blood draw, and to maintain line patency        CAPILLARY BLOOD GLUCOSE        I&O's Summary    29 Aug 2021 07:01  -  30 Aug 2021 07:00  --------------------------------------------------------  IN: 340 mL / OUT: 0 mL / NET: 340 mL        PHYSICAL EXAM:  Vital Signs Last 24 Hrs  T(C): 36.7 (30 Aug 2021 18:45), Max: 37.1 (30 Aug 2021 11:01)  T(F): 98.1 (30 Aug 2021 18:45), Max: 98.8 (30 Aug 2021 11:01)  HR: 77 (30 Aug 2021 18:45) (66 - 95)  BP: 169/109 (30 Aug 2021 18:45) (131/81 - 169/109)  BP(mean): 119 (30 Aug 2021 00:00) (119 - 128)  RR: 18 (30 Aug 2021 18:45) (14 - 18)  SpO2: 98% (30 Aug 2021 18:45) (98% - 100%)    GENERAL: NAD, well-developed  HEAD:  Atraumatic, Normocephalic  EYES: EOMI, PERRLA, conjunctiva and sclera clear  NECK: Supple, No JVD  CHEST/LUNG: Clear to auscultation bilaterally; No wheeze  HEART: Regular rate and rhythm; No murmurs, rubs, or gallops  ABDOMEN: Soft, Nontender, Nondistended; Bowel sounds present  EXTREMITIES:  2+ Peripheral Pulses, No clubbing, cyanosis, or edema  PSYCH: AAOx3  NEUROLOGY: non-focal  SKIN: No rashes or lesions    LABS:                        7.9    6.74  )-----------( 155      ( 30 Aug 2021 05:11 )             24.7         139  |  105  |  101<H>  ----------------------------<  87  4.7   |  17<L>  |  12.07<H>    Ca    9.4      30 Aug 2021 05:11  Mg     2.1         TPro  6.4  /  Alb  4.1  /  TBili  0.3  /  DBili  x   /  AST  13  /  ALT  18  /  AlkPhos  52      PT/INR - ( 30 Aug 2021 05:11 )   PT: 13.5 sec;   INR: 1.13 ratio         PTT - ( 30 Aug 2021 05:11 )  PTT:31.3 sec      Urinalysis Basic - ( 30 Aug 2021 00:20 )    Color: Colorless / Appearance: Clear / S.010 / pH: x  Gluc: x / Ketone: Negative  / Bili: Negative / Urobili: Negative   Blood: x / Protein: 300 mg/dL / Nitrite: Negative   Leuk Esterase: Negative / RBC: 10 /hpf / WBC 1 /HPF   Sq Epi: x / Non Sq Epi: 0 /hpf / Bacteria: Negative        RADIOLOGY & ADDITIONAL TESTS:    Imaging Personally Reviewed:    Consultant(s) Notes Reviewed:      Care Discussed with Consultants/Other Providers:  
Interventional Radiology    HPI: 25y Male with acute renal failure for temporary HD catheter.    Allergies:   Medications (Abx/Cardiac/Anticoagulation/Blood Products)  diltiazem   CD: 120 milliGRAM(s) Oral (08-30 @ 05:21)  hydrALAZINE: 100 milliGRAM(s) Oral (08-30 @ 09:07)    Data:  188  68  T(C): 36.8  HR: 88  BP: 159/109  RR: 16  SpO2: 98%    Exam  General: No acute distress  Chest: Non labored breathing  Abdomen: Non-distended  Extremities: No swelling, warm    -WBC 6.74 / HgB 7.9 / Hct 24.7 / Plt 155  -Na 139 / Cl 105 /  / Glucose 87  -K 4.7 / CO2 17 / Cr 12.07  -ALT 18 / Alk Phos 52 / T.Bili 0.3  -INR1.13    Imaging:     Plan: 25y Male presents for HD catheter.  -Risks/Benefits/alternatives explained with the patient and/or healthcare proxy and witnessed informed consent obtained.   
[facility] NEPHROLOGY- PROGRESS NOTE    25y Male with history of HTN presents with headaches. Nephrology consulted for advanced renal failure.    REVIEW OF SYSTEMS:  Gen: no changes in weight  Cards: no chest pain  Resp: no dyspnea  GI: no nausea or vomiting or diarrhea  Vascular: no LE edema    Kiwi (Anaphylaxis)  Kiwi (Swelling)  No Known Drug Allergies      Hospital Medications: Medications reviewed    VITALS:  T(F): 98.4 (21 @ 10:57), Max: 98.8 (21 @ 20:45)  HR: 94 (21 @ 10:57)  BP: 155/106 (21 @ 10:57)  RR: 18 (21 @ 10:57)  SpO2: 98% (21 @ 10:57)  Wt(kg): --  Height (cm): 188 ( @ 15:11)  Weight (kg): 68 ( @ 15:11)  BMI (kg/m2): 19.2 ( 15:11)  BSA (m2): 1.92 ( @ 15:11)     @ 07:01  -   @ 07:00  --------------------------------------------------------  IN: 0 mL / OUT: 1025 mL / NET: -1025 mL        PHYSICAL EXAM:    Gen: NAD, calm  Cards: RRR, +S1/S2, no M/G/R  Resp: CTA B/L  GI: soft, NT/ND, NABS  Vascular: no LE edema B/L, + [facility]    LABS:      138  |  102  |  68<H>  ----------------------------<  94  4.6   |  21<L>  |  9.16<H>    Ca    9.7      31 Aug 2021 06:27  Phos  4.7       Mg     2.1         TPro  6.4  /  Alb  4.1  /  TBili  0.3  /  DBili      /  AST  13  /  ALT  18  /  AlkPhos  52      Creatinine Trend: 9.16 <--, 12.07 <--, 12.21 <--                        7.8    5.24  )-----------( 161      ( 31 Aug 2021 06:27 )             23.5     Urine Studies:  Urinalysis Basic - ( 30 Aug 2021 00:20 )    Color: Colorless / Appearance: Clear / S.010 / pH:   Gluc:  / Ketone: Negative  / Bili: Negative / Urobili: Negative   Blood:  / Protein: 300 mg/dL / Nitrite: Negative   Leuk Esterase: Negative / RBC: 10 /hpf / WBC 1 /HPF   Sq Epi:  / Non Sq Epi: 0 /hpf / Bacteria: Negative          RADIOLOGY & ADDITIONAL STUDIES:
[facility] NEPHROLOGY- PROGRESS NOTE    25y Male with history of HTN presents with headaches. Nephrology consulted for advanced renal failure.    REVIEW OF SYSTEMS:  Gen: no changes in weight  Cards: no chest pain  Resp: no dyspnea  GI: no nausea or vomiting or diarrhea  Vascular: no LE edema    Kiwi (Anaphylaxis)  Kiwi (Swelling)  No Known Drug Allergies      Hospital Medications: Medications reviewed    VITALS:  T(F): 98.4 (21 @ 07:49), Max: 98.7 (21 @ 14:35)  HR: 82 (21 @ 10:15)  BP: 136/62 (21 @ 10:15)  RR: 16 (21 @ 10:15)  SpO2: 97% (21 @ 10:15)  Wt(kg): --     @ 07:01  -   @ 07:00  --------------------------------------------------------  IN: 800 mL / OUT: 1900 mL / NET: -1100 mL      Height (cm): 188 ( @ 07:52)  Weight (kg): 59.6 ( @ 07:52)  BMI (kg/m2): 16.9 ( @ 07:52)  BSA (m2): 1.82 ( @ 07:52)      PHYSICAL EXAM:    Gen: NAD, calm  Cards: RRR, +S1/S2, no M/G/R  Resp: CTA B/L  GI: soft, NT/ND, NABS  Vascular: no LE edema B/L, + [facility]      LABS:      137  |  101  |  51<H>  ----------------------------<  91  4.5   |  22  |  7.96<H>    Ca    10.0      01 Sep 2021 06:06  Phos  4.7           Creatinine Trend: 7.96 <--, 9.16 <--, 12.07 <--, 12.21 <--                        8.5    5.27  )-----------( 163      ( 01 Sep 2021 06:06 )             25.9     Urine Studies:  Urinalysis Basic - ( 30 Aug 2021 00:20 )    Color: Colorless / Appearance: Clear / S.010 / pH:   Gluc:  / Ketone: Negative  / Bili: Negative / Urobili: Negative   Blood:  / Protein: 300 mg/dL / Nitrite: Negative   Leuk Esterase: Negative / RBC: 10 /hpf / WBC 1 /HPF   Sq Epi:  / Non Sq Epi: 0 /hpf / Bacteria: Negative        
Patient is a 25y old  Male who presents with a chief complaint of Headache, nausea and vomiting (30 Aug 2021 15:42)    21  HPI:  CKD has progressed to ESRD, HD initiated yesterday. No Nausea reported      PAST MEDICAL & SURGICAL HISTORY:  Asthma    Renal failure    HTN (hypertension)    IgA nephropathy    No significant past surgical history        Review of Systems:   CONSTITUTIONAL: No fever, weight loss, or fatigue  EYES: No eye pain, visual disturbances, or discharge  ENMT:  No difficulty hearing, tinnitus, vertigo; No sinus or throat pain  NECK: No pain or stiffness  BREASTS: No pain, masses, or nipple discharge  RESPIRATORY: No cough, wheezing, chills or hemoptysis; No shortness of breath  CARDIOVASCULAR: No chest pain, palpitations, dizziness, or leg swelling  GASTROINTESTINAL: No abdominal or epigastric pain. No nausea, vomiting, or hematemesis; No diarrhea or constipation. No melena or hematochezia.  GENITOURINARY: No dysuria, frequency, hematuria, or incontinence  NEUROLOGICAL: No headaches, memory loss, loss of strength, numbness, or tremors  SKIN: No itching, burning, rashes, or lesions   LYMPH NODES: No enlarged glands  ENDOCRINE: No heat or cold intolerance; No hair loss  MUSCULOSKELETAL: No joint pain or swelling; No muscle, back, or extremity pain  PSYCHIATRIC: No depression, anxiety, mood swings, or difficulty sleeping  HEME/LYMPH: No easy bruising, or bleeding gums  ALLERY AND IMMUNOLOGIC: No hives or eczema    Allergies    Kiwi (Anaphylaxis)  Kiwi (Swelling)  No Known Drug Allergies    Intolerances        Social History:     FAMILY HISTORY:  No pertinent family history in first degree relatives        MEDICATIONS  (STANDING):  atorvastatin 40 milliGRAM(s) Oral at bedtime  budesonide 160 MICROgram(s)/formoterol 4.5 MICROgram(s) Inhaler 2 Puff(s) Inhalation two times a day  chlorhexidine 2% Cloths 1 Application(s) Topical daily  chlorhexidine 4% Liquid 1 Application(s) Topical <User Schedule>  diltiazem    milliGRAM(s) Oral daily  epoetin keshav-epbx (RETACRIT) Injectable 6000 Unit(s) IV Push <User Schedule>  hydrALAZINE 100 milliGRAM(s) Oral three times a day  losartan 25 milliGRAM(s) Oral at bedtime  pantoprazole    Tablet 40 milliGRAM(s) Oral before breakfast  sevelamer carbonate 800 milliGRAM(s) Oral three times a day with meals    MEDICATIONS  (PRN):  acetaminophen   Tablet .. 650 milliGRAM(s) Oral every 6 hours PRN Temp greater or equal to 38.5C (101.3F), Mild Pain (1 - 3)  melatonin 3 milliGRAM(s) Oral at bedtime PRN Insomnia  ondansetron Injectable 4 milliGRAM(s) IV Push every 8 hours PRN Nausea and/or Vomiting  sodium chloride 0.9% lock flush 10 milliLiter(s) IV Push every 1 hour PRN Pre/post blood products, medications, blood draw, and to maintain line patency        CAPILLARY BLOOD GLUCOSE        I&O's Summary    29 Aug 2021 07:01  -  30 Aug 2021 07:00  --------------------------------------------------------  IN: 340 mL / OUT: 0 mL / NET: 340 mL        PHYSICAL EXAM:  Vital Signs Last 24 Hrs  T(C): 36.7 (30 Aug 2021 18:45), Max: 37.1 (30 Aug 2021 11:01)  T(F): 98.1 (30 Aug 2021 18:45), Max: 98.8 (30 Aug 2021 11:01)  HR: 77 (30 Aug 2021 18:45) (66 - 95)  BP: 169/109 (30 Aug 2021 18:45) (131/81 - 169/109)  BP(mean): 119 (30 Aug 2021 00:00) (119 - 128)  RR: 18 (30 Aug 2021 18:45) (14 - 18)  SpO2: 98% (30 Aug 2021 18:45) (98% - 100%)    GENERAL: NAD, well-developed  HEAD:  Atraumatic, Normocephalic  EYES: EOMI, PERRLA, conjunctiva and sclera clear  NECK: Supple, No JVD  CHEST/LUNG: Clear to auscultation bilaterally; No wheeze  HEART: Regular rate and rhythm; No murmurs, rubs, or gallops  ABDOMEN: Soft, Nontender, Nondistended; Bowel sounds present  EXTREMITIES:  2+ Peripheral Pulses, No clubbing, cyanosis, or edema  PSYCH: AAOx3  NEUROLOGY: non-focal  SKIN: No rashes or lesions    LABS:                        7.9    6.74  )-----------( 155      ( 30 Aug 2021 05:11 )             24.7         139  |  105  |  101<H>  ----------------------------<  87  4.7   |  17<L>  |  12.07<H>    Ca    9.4      30 Aug 2021 05:11  Mg     2.1         TPro  6.4  /  Alb  4.1  /  TBili  0.3  /  DBili  x   /  AST  13  /  ALT  18  /  AlkPhos  52      PT/INR - ( 30 Aug 2021 05:11 )   PT: 13.5 sec;   INR: 1.13 ratio         PTT - ( 30 Aug 2021 05:11 )  PTT:31.3 sec      Urinalysis Basic - ( 30 Aug 2021 00:20 )    Color: Colorless / Appearance: Clear / S.010 / pH: x  Gluc: x / Ketone: Negative  / Bili: Negative / Urobili: Negative   Blood: x / Protein: 300 mg/dL / Nitrite: Negative   Leuk Esterase: Negative / RBC: 10 /hpf / WBC 1 /HPF   Sq Epi: x / Non Sq Epi: 0 /hpf / Bacteria: Negative        RADIOLOGY & ADDITIONAL TESTS:    Imaging Personally Reviewed:    Consultant(s) Notes Reviewed:      Care Discussed with Consultants/Other Providers:

## 2021-09-01 NOTE — DIETITIAN INITIAL EVALUATION ADULT. - ADD RECOMMEND
1. Continue current diet as ordered/tolerated; monitor PO intake for need of liberalization. 2. Add Nepro 2x/day. 3. Add Nephrovite to optimize micronutrient intake. 4. Encourage adequate consumption of meals/supplements to optimize protein-energy intake. 5. Review HD nutrition education as indicated; literature provided. 6. Monitor PO intake/tolerance, weights, labs, hydration status, skin integrity

## 2021-09-01 NOTE — DISCHARGE NOTE NURSING/CASE MANAGEMENT/SOCIAL WORK - PATIENT PORTAL LINK FT
You can access the FollowMyHealth Patient Portal offered by [facility] by registering at the following website: [email or web address]. By joining [employer]’s FollowMyHealth portal, you will also be able to view your health information using other applications (apps) compatible with our system.

## 2021-09-01 NOTE — DIETITIAN INITIAL EVALUATION ADULT. - REASON INDICATOR FOR ASSESSMENT
LMOM- patient needs to schedule a f/u with Dr Noriega in clinic as she has not seen her yet, before any procedures are done.   
Patient called today with regards to the following request:    Reason and/or symptom caller is requesting the order: Patient calling to schedule an injection in her back. Please advise on putting in order.       For additional information, or if you need to contact the patient, please call patient at the following number:    Contact Phone Number:   [employer] [phone]         
Assessment warranted for BMI < 19  Source: EMR, Pt interview

## 2021-09-01 NOTE — DISCHARGE NOTE PROVIDER - NSDCMRMEDTOKEN_GEN_ALL_CORE_FT
atorvastatin 40 mg oral tablet: 1 tab(s) orally once a day  calcitriol 0.25 mcg oral capsule: 1 cap(s) orally 3 times a week  dilTIAZem 120 mg/24 hours oral capsule, extended release: 1 cap(s) orally once a day  hydrALAZINE 100 mg oral tablet: 1 tab(s) orally 3 times a day  losartan 50 mg oral tablet: 1 tab(s) orally once a day (at bedtime)  pantoprazole 40 mg oral delayed release tablet: 1 tab(s) orally once a day  sevelamer carbonate 800 mg oral tablet: 1 tab(s) orally 3 times a day (with meals)  Symbicort 160 mcg-4.5 mcg/inh inhalation aerosol: 2 puff(s) inhaled 2 times a day

## 2021-09-01 NOTE — DIETITIAN INITIAL EVALUATION ADULT. - ETIOLOGY
decreased ability to consume adequate protein-energy in setting of increased physiological demand for nutrients lack of prior HD nutrition related education

## 2021-09-01 NOTE — DISCHARGE NOTE PROVIDER - NSDCCPCAREPLAN_GEN_ALL_CORE_FT
PRINCIPAL DISCHARGE DIAGNOSIS  Diagnosis: Acute on chronic renal failure  Assessment and Plan of Treatment: Started on HD  HD scheduled for Friday 9/3/21 at [facility]   Follow-up with your Nephrologist for further monitoring and treatment      SECONDARY DISCHARGE DIAGNOSES  Diagnosis: HTN (hypertension)  Assessment and Plan of Treatment: Low salt diet  Activity as tolerated.  Take all medication as prescribed.  Follow up with your medical doctor for routine blood pressure monitoring at your next visit.  Notify your doctor if you have any of the following symptoms:   Dizziness, Lightheadedness, Blurry vision, Headache, Chest pain, Shortness of breath     normal...

## 2021-09-01 NOTE — DIETITIAN INITIAL EVALUATION ADULT. - SIGNS/SYMPTOMS
Predicted <75% of estimated needs x >1 month, 18% weight loss x 5 months, moderate muscle/fat loss Pt new to HD (08/30), Pt with questions

## 2021-09-01 NOTE — PROGRESS NOTE ADULT - REASON FOR ADMISSION
Headache, nausea and vomiting

## 2021-09-01 NOTE — DISCHARGE NOTE NURSING/CASE MANAGEMENT/SOCIAL WORK - NSDCPEFALRISK_GEN_ALL_CORE
For information on Fall & injury Prevention, visit [email or web address]

## 2021-09-01 NOTE — DIETITIAN INITIAL EVALUATION ADULT. - PERTINENT MEDS FT
MEDICATIONS  (STANDING):  atorvastatin 40 milliGRAM(s) Oral at bedtime  budesonide 160 MICROgram(s)/formoterol 4.5 MICROgram(s) Inhaler 2 Puff(s) Inhalation two times a day  chlorhexidine 2% Cloths 1 Application(s) Topical daily  chlorhexidine 4% Liquid 1 Application(s) Topical <User Schedule>  diltiazem    milliGRAM(s) Oral daily  epoetin keshav-epbx (RETACRIT) Injectable 6000 Unit(s) IV Push <User Schedule>  hydrALAZINE 100 milliGRAM(s) Oral three times a day  losartan 50 milliGRAM(s) Oral at bedtime  pantoprazole    Tablet 40 milliGRAM(s) Oral before breakfast  sevelamer carbonate 800 milliGRAM(s) Oral three times a day with meals    MEDICATIONS  (PRN):  acetaminophen   Tablet .. 650 milliGRAM(s) Oral every 6 hours PRN Temp greater or equal to 38.5C (101.3F), Mild Pain (1 - 3)  melatonin 3 milliGRAM(s) Oral at bedtime PRN Insomnia  ondansetron Injectable 4 milliGRAM(s) IV Push every 8 hours PRN Nausea and/or Vomiting  sodium chloride 0.9% lock flush 10 milliLiter(s) IV Push every 1 hour PRN Pre/post blood products, medications, blood draw, and to maintain line patency

## 2021-09-01 NOTE — PROCEDURE NOTE - PROCEDURE FINDINGS AND DETAILS
14 Portuguese 15 cm Schon XL placed using US and fluoro guidance.  Tip in SVC.  May use immediately.  Full report to follow.
!9 cm 14 Gabonese Kane split catheter placed using fluoro guidance.  Tip in SVC.  May use immediately.  Full report to follow.

## 2021-09-01 NOTE — DISCHARGE NOTE PROVIDER - HOSPITAL COURSE
25y Male with history of HTN presents with headaches that's  now resolved; Nephrology consulted for advanced renal failure; IR ha inserted and now converted to PermCath 9/1/21; Pt had 3 hd sessions inpatient.    Per nephrology  hold off on AVF creation as patient active at [facility] with potential living donor.       Patient is now medically cleared for discharge home by Dr. Segovia and - renal with follow-up as advised.   HD scheduled for Friday at [facility]

## 2021-09-01 NOTE — DIETITIAN INITIAL EVALUATION ADULT. - ORAL INTAKE PTA/DIET HISTORY
Pt denies changes in appetite/PO intake PTA. Endorses eating 2-3x/day. Diet recall declined at this time secondary to Pt lethargic during RD visit. Food allergies: Kiwi (anaphylaxis). No micronutrient supplementation. Endorses drinking Nepro PTA 1x/day (occasional).

## 2021-09-01 NOTE — DIETITIAN INITIAL EVALUATION ADULT. - OTHER INFO
Pt reports good appetite/PO intake in-house, estimates eating ~75% of meals provided. Denies N/V, No difficulty chewing/swallowing. Pt amenable to receive Nepro 2x/day to support PO intake in setting of increased needs related to HD therapy.     Last BM was 1-2 days ago per Pt (08/29 per nursing documentation), denies diarrhea or constipation. No bowel regimen ordered currently.     UBW of 143 pounds per Pt x unknown time, has not observed any recent weight changes. Current admission weights of 131.4 pounds (09/01), 163.1 pounds (08/31), 165.1 pounds (08/30). [facility]: 161.4 [facility] ([facility]/2021), 143 pounds (06/2021). In-house weight changes likely secondary to fluid shifts (Pt started HD 08/30). Overall, Pt weight trending down 19% x 5 months, 8% x 2 months; clinically significant. Continue to monitor.

## 2021-09-01 NOTE — DIETITIAN NUTRITION RISK NOTIFICATION - TREATMENT: THE FOLLOWING DIET HAS BEEN RECOMMENDED
Diet, Regular:   For patients receiving Renal Replacement - No Protein Restr, No Conc K, No Conc Phos, Low Sodium (RENAL) (08-30-21 @ 01:48) [Active]

## 2021-09-01 NOTE — DIETITIAN INITIAL EVALUATION ADULT. - PERSON TAUGHT/METHOD
Provided education on renal diet for HD. Provided education on increased demand for kcal and protein intake to help prevent muscle/weight loss, reviewed foods high in potassium, phosphorus, and sodium, discussed foods recommended within therapeutic diet and protein portion sizing./verbal instruction/written material/individual instruction/patient instructed/teach back - (Patient repeats in own words)

## 2021-09-01 NOTE — DIETITIAN INITIAL EVALUATION ADULT. - CHIEF COMPLAINT
Per chart "currently undergoing evaluation for renal transplant, found to have Cr of 12. S/p placement of non-tunneled HD catheter by IR on 8/30/21. Team anticipating long-term need for dialysis, requesting tunneled HD catheter."

## 2021-09-01 NOTE — PRE PROCEDURE NOTE - ATTENDING COMMENTS
Pt well-known to me from prior intervention now for conversion to tunneled HD catheter.  Consent obtained.

## 2021-09-01 NOTE — PRE-ANESTHESIA EVALUATION ADULT - NSANTHPMHFT_GEN_ALL_CORE
25M w/ hx of acute renal failure due to IgA nephropathy, HTN presented with hypertensive crisis, s/p temporary HD catheter 8/30 now for tunneled catheter

## 2021-09-01 NOTE — PRE PROCEDURE NOTE - PRE PROCEDURE EVALUATION
HPI: 25y Male with IgA nephropathy and ESRD requiring dialysis.    Allergies:   Medications (Abx/Cardiac/Anticoagulation/Blood Products)  diltiazem   CD: 120 milliGRAM(s) Oral (09-01 @ 05:54)  hydrALAZINE: 100 milliGRAM(s) Oral (09-01 @ 05:53)  losartan: 50 milliGRAM(s) Oral (08-31 @ 21:11)  losartan: 25 milliGRAM(s) Oral (08-30 @ 21:35)    Data:  188  59.6  T(C): 36.9  HR: 87  BP: 147/98  RR: 16  SpO2: 99%    Exam  General: NAD. Resting comfortably.  Chest: Non-labored breathing. Nontunneled dialysis catheter site c/d/i.  Extremities: WWP    -WBC 5.27 / HgB 8.5 / Hct 25.9 / Plt 163  -Na 137 / Cl 101 / BUN 51 / Glucose 91  -K 4.5 / CO2 22 / Cr 7.96  -ALT -- / Alk Phos -- / T.Bili --  -INR1.13    Imaging: Reviewed    Plan:   -25y Male presents for nontunneled to tunneled dialysis catheter conversion  -Risks/Benefits/alternatives explained with the patient and/or healthcare proxy and witnessed informed consent obtained.

## 2021-09-01 NOTE — PROGRESS NOTE ADULT - ASSESSMENT
25y Male with history of HTN presents with headaches. Nephrology consulted for advanced renal failure.    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 Patient now ESRD and initiated on HD on 8/30 tolerated well with 0.5L removed. Plan for additional HD today. Would contact IR for conversion of shiley to TDC on 9/1 with plans for subsequent HD post-procedure. Will discuss with CM initiating outpatient HD placement at [facility] transitional care unit. Monitor electrolytes.    2) HTN with ESRD: BP uncontrolled. Increase losartan to 50 mg QHS to protect residual renal function. Monitor BP.    3) Anemia of renal disease: Hb low with adequate iron stores. Continue with Epo 6K with HD. Monitor Hb.    4) Hyperphosphatemia: Phosphorus acceptable. Continue with renvela 1 tab with meals. Monitor serum calcium and phosphorus.      [facility] NEPHROLOGY  Justin Mckenzie M.D.  Chad Reich D.O.  Beth Massey M.D.  Shayy Navarro, MSN, ANP-C    Telephone: [phone]  Facsimile: [phone]    [address] [place] [address]  
25y Male with history of HTN presents with headaches. Nephrology consulted for advanced renal failure.    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 Patient now ESRD and initiated on HD on 8/30 with last HD on 8/31 tolerated well with 0.5L removed. Plan for additional HD today post-permcath placement. Hold off on AVF creation as patient active at [facility] with potential living donor. Pending outpatient HD placement at [facility] transitional care unit. Monitor electrolytes.    2) HTN with ESRD: BP improving. Continue with current medications. Monitor BP.    3) Anemia of renal disease: Hb low with adequate iron stores. Continue with Epo 6K with HD. Monitor Hb.    4) Hyperphosphatemia: Phosphorus acceptable. Continue with renvela 1 tab with meals. Monitor serum calcium and phosphorus.      [facility] NEPHROLOGY  Justin Mckenzie M.D.  Chad Reich D.O.  Beth Massey M.D.  Shayy Navarro, ISABELLA, ANP-C    Telephone: [phone]  Facsimile: [phone]    [address] [place] [address]  
25M w/ hx of acute renal failure due to IgA nephropathy, HTN p/w headache, nausea and vomiting found to have CIARRA on CKD and hypertensive urgency
25M w/ hx of acute renal failure due to IgA nephropathy, HTN p/w headache, nausea and vomiting found to have CIARRA on CKD and hypertensive urgency

## 2021-09-01 NOTE — DIETITIAN INITIAL EVALUATION ADULT. - OTHER CALCULATIONS
Fluid needs deferred to team in setting of HD. Estimated needs calculated using IBW 86.1 kg; adjusted for weight, age, Malnutrition, HD

## 2021-09-01 NOTE — DIETITIAN INITIAL EVALUATION ADULT. - PERTINENT LABORATORY DATA
09-01 Na 137 mmol/L Glu 91 mg/dL K+ 4.5 mmol/L Cr 7.96 mg/dL<H> BUN 51 mg/dL<H>  Hgb 8.5 g/dL<L> Hct 25.9 %<L>    Glucose, Serum: 91 mg/dL    08-31 Phos <H> 4.7

## 2021-09-01 NOTE — DISCHARGE NOTE PROVIDER - CARE PROVIDER_API CALL
Chad Reich (DO)  Internal Medicine  [address] [place] [id number]  Phone: [phone]  Fax: [phone]  Follow Up Time:

## 2021-09-03 LAB
CULTURE RESULTS: SIGNIFICANT CHANGE UP
SPECIMEN SOURCE: SIGNIFICANT CHANGE UP

## 2021-12-29 ENCOUNTER — APPOINTMENT (OUTPATIENT)
Dept: INFECTIOUS DISEASE | Facility: CLINIC | Age: 26
End: 2021-12-29

## 2021-12-29 ENCOUNTER — APPOINTMENT (OUTPATIENT)
Dept: INFECTIOUS DISEASE | Facility: CLINIC | Age: 26
End: 2021-12-29
Payer: COMMERCIAL

## 2021-12-29 PROBLEM — N18.6 END STAGE RENAL DISEASE: Chronic | Status: ACTIVE | Noted: 2021-08-29

## 2021-12-29 PROBLEM — N02.8 RECURRENT AND PERSISTENT HEMATURIA WITH OTHER MORPHOLOGIC CHANGES: Chronic | Status: ACTIVE | Noted: 2021-08-30

## 2021-12-29 PROBLEM — I10 ESSENTIAL (PRIMARY) HYPERTENSION: Chronic | Status: ACTIVE | Noted: 2021-08-29

## 2021-12-29 PROCEDURE — 90472 IMMUNIZATION ADMIN EACH ADD: CPT

## 2021-12-29 PROCEDURE — 90715 TDAP VACCINE 7 YRS/> IM: CPT

## 2021-12-29 PROCEDURE — G0009: CPT | Mod: 59

## 2021-12-29 PROCEDURE — 90732 PPSV23 VACC 2 YRS+ SUBQ/IM: CPT

## 2022-02-01 ENCOUNTER — APPOINTMENT (OUTPATIENT)
Dept: INFECTIOUS DISEASE | Facility: CLINIC | Age: 27
End: 2022-02-01
Payer: COMMERCIAL

## 2022-02-01 VITALS
TEMPERATURE: 98 F | WEIGHT: 136 LBS | DIASTOLIC BLOOD PRESSURE: 84 MMHG | BODY MASS INDEX: 18.02 KG/M2 | OXYGEN SATURATION: 100 % | HEIGHT: 73 IN | SYSTOLIC BLOOD PRESSURE: 138 MMHG | HEART RATE: 81 BPM

## 2022-02-01 PROCEDURE — 99072 ADDL SUPL MATRL&STAF TM PHE: CPT

## 2022-02-01 PROCEDURE — 99213 OFFICE O/P EST LOW 20 MIN: CPT

## 2022-02-03 NOTE — ASSESSMENT
[FreeTextEntry1] : 25 year old male PMH IgA Nephropathy (previously on Prednisone), CKD who presents to ID office for pre-renal transplant evaluation. \par \par Of note, Quantiferon gold checked in 3/2021 which was indeterminate. \par No prior positive latent TB testing or known exposures\par No high risk epidemiology\par \par #Pre-Renal Transplant Evaluation, Indeterminate Quantiferon Gold\par Repeat Quantiferon gold is negative. Patient without high risk epidemiology. Suspect initial indeterminate value due to corticosteroids utilized in 3/2021. At this point no absolute ID contraindications for renal transplant. \par \par #Encounter to Vaccinate Patient\par COVID19 Pfizer Vaccine dose 2 on 4/17/21. I advised patient to pursue Booster.\par --Prevnar 7/13/21. Will administer Pneumovax today\par --Dose 2 of HAV Vaccine today\par --Completed HBV Series at dialysis. Will check HBV Vaccine\par --Tdap 12/29/21\par \par Followup: PRN

## 2022-02-03 NOTE — HISTORY OF PRESENT ILLNESS
[FreeTextEntry1] : 26 year old male PMH IgA Nephropathy (previously on Prednisone), CKD who presents to ID office for pre-renal transplant evaluation. Of note, Quantiferon gold checked in 3/2021 which was indeterminate. Denies history of positive latent TB testing in the past. Denies tuberculosis exposure or family members with MBT. Denies cough or SOB. Denies chills, fevers or night sweats.\par \par PMH: Asthma, Not on HD\par PSH: No PSH\par FH: No autoimmune disease\par All: no allergies\par Soc: no smoking, social etoh use. \par  [] : No [de-identified] : Born in [place] [de-identified] : No travel outside of the [place], 4-5 [place] in 2018/2019, Vacation to [place].  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

## 2022-02-04 NOTE — PROGRESS NOTE ADULT - PROBLEM SELECTOR PLAN 4
Pt has DM since ? 2007  Sees Ophthalmologist once a year  change to Janumet 50/1000 mg bid . Pt stopped taking it as it expensive. He started taking again as insurance is covering it.   Taking glipizide ER 2.5 mg twice a day  Advised patient to check the blood sugars regularly mildly elevated LFTs   hepatitis panel negative  LFTs trending down, monitor  CT abdomen and pelvis showed no acute findings.

## 2022-02-10 ENCOUNTER — APPOINTMENT (OUTPATIENT)
Dept: TRANSPLANT | Facility: CLINIC | Age: 27
End: 2022-02-10
Payer: COMMERCIAL

## 2022-02-10 PROCEDURE — 99001T: CUSTOM

## 2022-02-17 DIAGNOSIS — B78.9 STRONGYLOIDIASIS, UNSPECIFIED: ICD-10-CM

## 2022-02-17 LAB
HBV SURFACE AB SER QL: REACTIVE
STRONGYLOIDES AB SER IA-ACNC: POSITIVE

## 2022-02-22 ENCOUNTER — NON-APPOINTMENT (OUTPATIENT)
Age: 27
End: 2022-02-22

## 2022-02-23 ENCOUNTER — NON-APPOINTMENT (OUTPATIENT)
Age: 27
End: 2022-02-23

## 2022-02-24 ENCOUNTER — NON-APPOINTMENT (OUTPATIENT)
Age: 27
End: 2022-02-24

## 2022-03-16 ENCOUNTER — APPOINTMENT (OUTPATIENT)
Dept: NEPHROLOGY | Facility: CLINIC | Age: 27
End: 2022-03-16
Payer: COMMERCIAL

## 2022-03-16 ENCOUNTER — LABORATORY RESULT (OUTPATIENT)
Age: 27
End: 2022-03-16

## 2022-03-16 VITALS
BODY MASS INDEX: 25.68 KG/M2 | SYSTOLIC BLOOD PRESSURE: 139 MMHG | TEMPERATURE: 97.5 F | OXYGEN SATURATION: 100 % | HEIGHT: 61 IN | WEIGHT: 136 LBS | DIASTOLIC BLOOD PRESSURE: 83 MMHG | RESPIRATION RATE: 14 BRPM | HEART RATE: 98 BPM

## 2022-03-16 LAB
ABO + RH PNL BLD: NORMAL
ALBUMIN SERPL ELPH-MCNC: 5.2 G/DL
ALP BLD-CCNC: 93 U/L
ALT SERPL-CCNC: 21 U/L
ANION GAP SERPL CALC-SCNC: 14 MMOL/L
APPEARANCE: CLEAR
AST SERPL-CCNC: 19 U/L
BASOPHILS # BLD AUTO: 0.06 K/UL
BASOPHILS NFR BLD AUTO: 1.2 %
BILIRUB SERPL-MCNC: 0.5 MG/DL
BILIRUBIN URINE: NEGATIVE
BLOOD URINE: NEGATIVE
BUN SERPL-MCNC: 19 MG/DL
C PEPTIDE SERPL-MCNC: 11.1 NG/ML
CALCIUM SERPL-MCNC: 10 MG/DL
CHLORIDE SERPL-SCNC: 95 MMOL/L
CHOLEST SERPL-MCNC: 132 MG/DL
CK SERPL-CCNC: 69 U/L
CO2 SERPL-SCNC: 31 MMOL/L
COLOR: NORMAL
COVID-19 SPIKE DOMAIN ANTIBODY INTERPRETATION: POSITIVE
CREAT SERPL-MCNC: 5.11 MG/DL
CREAT SPEC-SCNC: 54 MG/DL
CREAT/PROT UR: 2.3 RATIO
CRP SERPL-MCNC: <3 MG/L
EGFR: 15 ML/MIN/1.73M2
EOSINOPHIL # BLD AUTO: 0.39 K/UL
EOSINOPHIL NFR BLD AUTO: 8 %
ERYTHROCYTE [SEDIMENTATION RATE] IN BLOOD BY WESTERGREN METHOD: 6 MM/HR
GLUCOSE QUALITATIVE U: NEGATIVE
GLUCOSE SERPL-MCNC: 115 MG/DL
HCT VFR BLD CALC: 42.4 %
HDLC SERPL-MCNC: 72 MG/DL
HGB BLD-MCNC: 13.4 G/DL
IMM GRANULOCYTES NFR BLD AUTO: 0.2 %
KETONES URINE: NEGATIVE
LDLC SERPL CALC-MCNC: 36 MG/DL
LEUKOCYTE ESTERASE URINE: NEGATIVE
LYMPHOCYTES # BLD AUTO: 1.95 K/UL
LYMPHOCYTES NFR BLD AUTO: 40 %
MAGNESIUM SERPL-MCNC: 2.2 MG/DL
MAN DIFF?: NORMAL
MCHC RBC-ENTMCNC: 29.3 PG
MCHC RBC-ENTMCNC: 31.6 GM/DL
MCV RBC AUTO: 92.6 FL
MONOCYTES # BLD AUTO: 0.29 K/UL
MONOCYTES NFR BLD AUTO: 6 %
NEUTROPHILS # BLD AUTO: 2.17 K/UL
NEUTROPHILS NFR BLD AUTO: 44.6 %
NITRITE URINE: NEGATIVE
NONHDLC SERPL-MCNC: 61 MG/DL
PH URINE: 8.5
PHOSPHATE SERPL-MCNC: 2.7 MG/DL
PLATELET # BLD AUTO: 183 K/UL
POTASSIUM SERPL-SCNC: 4.3 MMOL/L
PROT SERPL-MCNC: 7.7 G/DL
PROT UR-MCNC: 124 MG/DL
PROTEIN URINE: ABNORMAL
PSA SERPL-MCNC: 0.31 NG/ML
RBC # BLD: 4.58 M/UL
RBC # FLD: 12.4 %
SARS-COV-2 AB SERPL IA-ACNC: >250 U/ML
SODIUM SERPL-SCNC: 140 MMOL/L
SPECIFIC GRAVITY URINE: 1.01
T3 SERPL-MCNC: 97 NG/DL
T4 FREE SERPL-MCNC: 1.4 NG/DL
TRIGL SERPL-MCNC: 123 MG/DL
TSH SERPL-ACNC: 3.95 UIU/ML
URATE SERPL-MCNC: 2.2 MG/DL
UROBILINOGEN URINE: NORMAL
WBC # FLD AUTO: 4.87 K/UL

## 2022-03-16 PROCEDURE — 99072 ADDL SUPL MATRL&STAF TM PHE: CPT

## 2022-03-16 PROCEDURE — 99215 OFFICE O/P EST HI 40 MIN: CPT

## 2022-03-16 NOTE — PLAN
[FreeTextEntry1] : 1.ESRD due to IgA nephropathy -  Mr. MARTHA MARTINEZ  Is an excellent candidate for kidney transplantation and would benefit from transplantation . will discuss with family today  regarding potential living donors. \par 2.Cardiac risk: echo from  2021 reviewed and wnl. \par 3. ID: update Serologies for acute and chronic viral infections. Screening for latent TB. Strongyloids antibody positive, treated with Ivermectin  \par 4. Imaging: reviewed \par 7.Hypertension- controlled. follows with nephrology \par \par I have personally discussed the risks and benefits of transplantation and patient attended transplant education class where the following was disclosed:\par  \par Reviewed factors affecting survival and morbidity while on dialysis, the transplant wait list and reviewed sam-operative and long-term risk factors affecting outcome in kidney transplantation. \par  \par One year SRTR outcomes for national and [facility] were discussed in regards to patient survival and graft survival after transplantation. \par  \par Details of transplant surgery, including complications were discussed.\par Immunosuppression and complications including infection including life threatening sepsis and opportunistic infections, malignancy and new onset diabetes were discussed. \par  \par Benefits of live donor transplantation as well as variability in wait times across regions and multiple listing were discussed. \par KDPI >85% and PHS high risk criteria donors were discussed. \par HCV kidney transplantation was discussed.\par  \par Will proceed with completing/ updating work up and listing for transplant/ live donor transplant once work up is reviewed and found to be acceptable by multidisciplinary listing committee.\par

## 2022-03-16 NOTE — HISTORY OF PRESENT ILLNESS
[TextBox_42] : MARTHA MARTINEZ is a 26 year old male who presents for kidney transplant evaluation. \par Cause of ESRD : IgA nephropathy. He has had no medical issues , untill last month ( 2021) when he was admitted in [facility] with c/o headache and nausea and found to have elevated BP and advanced CKD. Renal biopsy done at the time revealed IgA nephropathy. 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 47% global glomerulosclerosis, 20% segmental glomerulosclerosis , 50% IFTA and mild arterial and arteriolar sclerosis.\par Started dialysis in 2021. HD unit- [facility]. \par Nephrologist: Dr Beth De Paz. \par  \par Other PMH :\par Cardiac - HTN since . No MI , CHF, CAD\par Pulmonary- has Asthma, controlled. \par Infections- no h/o TB, HIV, Hepatitis \par Heme- no h/o malignancy or bleeding disorders.No DVT/PE\par Endo- no h/o diabetes, no Thyroid disease\par Neuro- no h/o CVA or TIA \par Psych- no suicidal ideation, depression or anxiety. \par Sensitization events- no previous blood transfusions or previous transplant\par Surgical h/o : none \par Functional status: excellent functional status , works out and plays sports. \par Social history: works full time ( import and export) Is a non smoker, no alcohol or illicit use.\par Family history: no family h/o kidney disease. has 3 older siblings who are all healthy. \par Most recent hospital admission was in 2021 when he started dialysis. \par \par cause of renal failure is IgA Nephropathy. \par \par Care Team\par SILVIO DA SILVA,SHEBA	Primary Care Provider	\par \par Most recent testing\par Cardaic\par Saw Dr Varela 2021 and cleared for transplant with being capable of >4 METS\par EC2021 sinus 79 bpm, early repolarization abnormality \par Echo: 2021 mild concentric LVH, LVEF >55% \par \par Radiology \par Chest Xray 21 no consolidation no pleural effusion. \par Abd CT 21 unremarkable organs no evidence of obstructive uropathy vessels WNL.\par Brain CT 21 no acute hemorrhage or midline shift. \par \par Kidney Bx 3/3/21 IgA Nephropathy, mesangial and sclerosing form, with features of collapsing glomerulopathy. acute tubular injury and focal interstitial inflammation. \par  \par \par

## 2022-03-24 LAB
CMV IGG SERPL QL: >10 U/ML
CMV IGG SERPL-IMP: POSITIVE
EBV EA AB SER IA-ACNC: 11.5 U/ML
EBV EA AB TITR SER IF: POSITIVE
EBV EA IGG SER QL IA: 565 U/ML
EBV EA IGG SER-ACNC: POSITIVE
EBV EA IGM SER IA-ACNC: NEGATIVE
EBV PATRN SPEC IB-IMP: NORMAL
EBV VCA IGG SER IA-ACNC: >750 U/ML
EBV VCA IGM SER QL IA: 28.9 U/ML
EPSTEIN-BARR VIRUS CAPSID ANTIGEN IGG: POSITIVE
ESTIMATED AVERAGE GLUCOSE: 88 MG/DL
HAV IGM SER QL: NONREACTIVE
HBA1C MFR BLD HPLC: 4.7 %
HBV CORE IGG+IGM SER QL: NONREACTIVE
HBV SURFACE AB SER QL: REACTIVE
HBV SURFACE AB SERPL IA-ACNC: 237.9 MIU/ML
HBV SURFACE AG SER QL: NONREACTIVE
HCV AB SER QL: NONREACTIVE
HCV S/CO RATIO: 0.18 S/CO
HEPATITIS A IGG ANTIBODY: REACTIVE
HIV1+2 AB SPEC QL IA.RAPID: NONREACTIVE
HSV 1+2 IGG SER IA-IMP: NEGATIVE
HSV 1+2 IGG SER IA-IMP: POSITIVE
HSV 1+2 IGG SER IA-IMP: POSITIVE
HSV1 IGG SER QL: 22.8 INDEX
HSV1 IGG SER QL: 22.8 INDEX
HSV1 IGM SER QL: NEGATIVE
HSV2 AB FLD-ACNC: NEGATIVE
HSV2 IGG SER QL: 0.37 INDEX
ISOAGGLUTININ TITER, ANTI-A, IGG: 16
ISOAGGLUTININ TITER, ANTI-A, IGM: 16
M TB IFN-G BLD-IMP: NEGATIVE
QUANTIFERON TB PLUS MITOGEN MINUS NIL: 9.99 IU/ML
QUANTIFERON TB PLUS NIL: 0.01 IU/ML
QUANTIFERON TB PLUS TB1 MINUS NIL: 0.01 IU/ML
QUANTIFERON TB PLUS TB2 MINUS NIL: 0 IU/ML
RUBV IGG FLD-ACNC: 4.2 INDEX
RUBV IGG SER-IMP: POSITIVE
SARS-COV-2 N GENE NPH QL NAA+PROBE: NOT DETECTED
T GONDII AB SER-IMP: NEGATIVE
T GONDII IGG SER QL: <3 IU/ML
T PALLIDUM AB SER QL IA: NEGATIVE
VZV AB TITR SER: POSITIVE
VZV IGG SER IF-ACNC: 2658 INDEX

## 2022-05-09 ENCOUNTER — APPOINTMENT (OUTPATIENT)
Dept: CARDIOLOGY | Facility: CLINIC | Age: 27
End: 2022-05-09
Payer: COMMERCIAL

## 2022-05-09 ENCOUNTER — NON-APPOINTMENT (OUTPATIENT)
Age: 27
End: 2022-05-09

## 2022-05-09 VITALS
HEIGHT: 61 IN | OXYGEN SATURATION: 100 % | DIASTOLIC BLOOD PRESSURE: 74 MMHG | WEIGHT: 138 LBS | BODY MASS INDEX: 26.06 KG/M2 | SYSTOLIC BLOOD PRESSURE: 136 MMHG | HEART RATE: 95 BPM

## 2022-05-09 PROCEDURE — 93000 ELECTROCARDIOGRAM COMPLETE: CPT | Mod: NC

## 2022-05-09 PROCEDURE — 99072 ADDL SUPL MATRL&STAF TM PHE: CPT

## 2022-05-09 PROCEDURE — 99215 OFFICE O/P EST HI 40 MIN: CPT

## 2022-05-09 NOTE — REASON FOR VISIT
[Other: ____] : [unfilled] [FreeTextEntry1] : May 2022 - Patient returns today for follow-up.\par He has now progressed to ESRD on HD via right brachial AV fistula (Ychamg-[id number]-Wvvddx).\par He exercises by running, lifting weights, and playing basketball. Sometimes, he feels that he fatigues easily, and his heart races, but then he rests and returns to normal.

## 2022-05-09 NOTE — DISCUSSION/SUMMARY
[FreeTextEntry1] : Patient is a 26 year-old gentleman who presents for cardiac evaluation prior to possible renal transplant.\par Echocardiogram showed a structurally normal heart with LVH.\par He is capable of > 4 METS at baseline, and he is low risk for ischemic heart disease.\par He is a preemptive candidate for transplant.\par \par We discussed the importance of aerobic conditioning in his work-outs.\par We also discussed the importance of hydration while exercising (this can be difficult to balance with dialysis).\par \par Plan for repeat TTE.\par \par \par

## 2022-05-09 NOTE — CARDIOLOGY SUMMARY
[de-identified] : 6/16/2021 sinus 79 bpm, early repolarization abnormality [de-identified] : 2/24/2021 mild concentric LVH, LVEF >55%

## 2022-05-09 NOTE — PHYSICAL EXAM
[Well Developed] : well developed [Well Nourished] : well nourished [No Acute Distress] : no acute distress [Normal Conjunctiva] : normal conjunctiva [Normal Venous Pressure] : normal venous pressure [No Carotid Bruit] : no carotid bruit [Normal S1, S2] : normal S1, S2 [No Murmur] : no murmur [No Rub] : no rub [No Gallop] : no gallop [Clear Lung Fields] : clear lung fields [Good Air Entry] : good air entry [No Respiratory Distress] : no respiratory distress  [Soft] : abdomen soft [Non Tender] : non-tender [No Masses/organomegaly] : no masses/organomegaly [Normal Bowel Sounds] : normal bowel sounds [Normal Gait] : normal gait [No Edema] : no edema [No Cyanosis] : no cyanosis [No Clubbing] : no clubbing [No Varicosities] : no varicosities [No Rash] : no rash [No Skin Lesions] : no skin lesions [Moves all extremities] : moves all extremities [No Focal Deficits] : no focal deficits [Normal Speech] : normal speech [Alert and Oriented] : alert and oriented [Normal memory] : normal memory [de-identified] : right brachial AV fistula, +thrill

## 2022-06-01 ENCOUNTER — APPOINTMENT (OUTPATIENT)
Dept: CARDIOLOGY | Facility: CLINIC | Age: 27
End: 2022-06-01
Payer: COMMERCIAL

## 2022-06-01 PROCEDURE — 99072 ADDL SUPL MATRL&STAF TM PHE: CPT

## 2022-06-01 PROCEDURE — 93306 TTE W/DOPPLER COMPLETE: CPT

## 2022-06-16 ENCOUNTER — APPOINTMENT (OUTPATIENT)
Dept: TRANSPLANT | Facility: CLINIC | Age: 27
End: 2022-06-16

## 2022-06-20 ENCOUNTER — NON-APPOINTMENT (OUTPATIENT)
Age: 27
End: 2022-06-20

## 2022-06-21 ENCOUNTER — APPOINTMENT (OUTPATIENT)
Dept: TRANSPLANT | Facility: CLINIC | Age: 27
End: 2022-06-21

## 2022-06-30 ENCOUNTER — OUTPATIENT (OUTPATIENT)
Dept: OUTPATIENT SERVICES | Facility: HOSPITAL | Age: 27
LOS: 1 days | End: 2022-06-30
Payer: COMMERCIAL

## 2022-06-30 ENCOUNTER — APPOINTMENT (OUTPATIENT)
Dept: MRI IMAGING | Facility: CLINIC | Age: 27
End: 2022-06-30

## 2022-06-30 DIAGNOSIS — Z00.8 ENCOUNTER FOR OTHER GENERAL EXAMINATION: ICD-10-CM

## 2022-06-30 DIAGNOSIS — I42.8 OTHER CARDIOMYOPATHIES: ICD-10-CM

## 2022-06-30 PROCEDURE — 75561 CARDIAC MRI FOR MORPH W/DYE: CPT | Mod: 26

## 2022-06-30 PROCEDURE — 75561 CARDIAC MRI FOR MORPH W/DYE: CPT

## 2022-06-30 PROCEDURE — A9585: CPT

## 2022-08-18 NOTE — ASU PREOP CHECKLIST - INTERNAL PROSTHESES
RITA Farrell, [facility] Nurse Ms Pool  Colonoscopy on 9/9/22, [facility], Dr. Carrizales, RX items and Covid test ordered.  Prep sent to patient via [facility] and mailed on 8/18/2022.      no

## 2022-09-16 ENCOUNTER — NON-APPOINTMENT (OUTPATIENT)
Age: 27
End: 2022-09-16

## 2022-09-21 ENCOUNTER — NON-APPOINTMENT (OUTPATIENT)
Age: 27
End: 2022-09-21

## 2022-10-12 ENCOUNTER — APPOINTMENT (OUTPATIENT)
Dept: CARDIOLOGY | Facility: CLINIC | Age: 27
End: 2022-10-12

## 2022-10-12 ENCOUNTER — NON-APPOINTMENT (OUTPATIENT)
Age: 27
End: 2022-10-12

## 2022-10-12 VITALS
DIASTOLIC BLOOD PRESSURE: 91 MMHG | OXYGEN SATURATION: 97 % | SYSTOLIC BLOOD PRESSURE: 148 MMHG | WEIGHT: 139 LBS | BODY MASS INDEX: 26.26 KG/M2 | HEART RATE: 85 BPM

## 2022-10-12 PROCEDURE — 93000 ELECTROCARDIOGRAM COMPLETE: CPT | Mod: NC

## 2022-10-12 PROCEDURE — 99215 OFFICE O/P EST HI 40 MIN: CPT

## 2022-10-12 RX ORDER — DILTIAZEM HYDROCHLORIDE 120 MG/1
120 CAPSULE, EXTENDED RELEASE ORAL DAILY
Qty: 90 | Refills: 0 | Status: DISCONTINUED | COMMUNITY
Start: 2021-06-16 | End: 2022-10-12

## 2022-10-12 NOTE — DISCUSSION/SUMMARY
[EKG obtained to assist in diagnosis and management of assessed problem(s)] : EKG obtained to assist in diagnosis and management of assessed problem(s) [FreeTextEntry1] : Patient is a 27 year-old gentleman who presents for cardiac evaluation prior to possible renal transplant.\par Echocardiogram in 2021 showed a structurally normal heart with LVH. Repeat echo in 2022 showed a mildly reduced LVEF and he was sent for cardiac MRI. MRI showed low normal LVEF of 53% but raised the possibility of noncompaction cardiomyopathy. \par He is capable of > 4 METS at baseline, and he is low risk for ischemic heart disease.\par \par We discussed the importance of aerobic conditioning in his work-outs.\par We also discussed the importance of hydration while exercising (this can be difficult to balance with dialysis).\par \par In light of reduced LVEF, will discontinue diltiazem and start carvedilol.

## 2022-10-12 NOTE — CARDIOLOGY SUMMARY
[de-identified] : 6/16/2021 sinus 79 bpm, early repolarization abnormality [de-identified] : 2/24/2021 mild concentric LVH, LVEF >55%\par 6/1/2022 [de-identified] : 6/30/2022

## 2022-10-12 NOTE — PHYSICAL EXAM
[Well Developed] : well developed [Well Nourished] : well nourished [No Acute Distress] : no acute distress [Normal Conjunctiva] : normal conjunctiva [Normal Venous Pressure] : normal venous pressure [No Carotid Bruit] : no carotid bruit [Normal S1, S2] : normal S1, S2 [No Murmur] : no murmur [No Rub] : no rub [No Gallop] : no gallop [Clear Lung Fields] : clear lung fields [Good Air Entry] : good air entry [No Respiratory Distress] : no respiratory distress  [Soft] : abdomen soft [Non Tender] : non-tender [No Masses/organomegaly] : no masses/organomegaly [Normal Bowel Sounds] : normal bowel sounds [Normal Gait] : normal gait [No Edema] : no edema [No Cyanosis] : no cyanosis [No Clubbing] : no clubbing [No Varicosities] : no varicosities [No Rash] : no rash [No Skin Lesions] : no skin lesions [Moves all extremities] : moves all extremities [No Focal Deficits] : no focal deficits [Normal Speech] : normal speech [Alert and Oriented] : alert and oriented [Normal memory] : normal memory [de-identified] : right brachial AV fistula, +thrill

## 2022-10-12 NOTE — HISTORY OF PRESENT ILLNESS
[FreeTextEntry1] : Patient is a 27 year-old with no significant past medical history until February, when he presented with hypertensive emergency and was diagnosed with IgA nephropathy, in February 2021.\par Echocardiogram during that admission showed concentric LVH, but normal LV systolic function, and no evidence of structural heart disease.\par He tries to exercise, but he finds he gets tired more easily than he used to. He runs, uses a bike, and does jump-rope. He fatigues, but he does not get chest pain or shortness of breath (out of proportion to his exertion). \par He received both doses of Pfizer's Covid-19 vaccine.\par \par May 2022 - Patient returns today for follow-up.\par He has now progressed to ESRD on HD via right brachial AV fistula ([id number]-Qohtcclec-Fhvzqq).\par He exercises by running, lifting weights, and playing basketball. Sometimes, he feels that he fatigues easily, and his heart races, but then he rests and returns to normal.

## 2022-10-12 NOTE — REASON FOR VISIT
[Other: ____] : [unfilled] [FreeTextEntry1] : October 2022 - Patient returns today for follow-up in his usual state of health. He continues to have ESRD on HD via right brachial AV fistula (Jjhoxg-Guywvkein-Txzojv).\par In June 2022, an echocardiogram showed LVEF 40-45%, and he was referred for a cardiac MRI to better assess ventricular function. He was seen to have a low normal LVEF of 53% by cMR. There was no late gadolinium enhancement, but there was some increased trabeculation, at the borderline for noncompaction cardiomyopathy.

## 2022-11-17 ENCOUNTER — APPOINTMENT (OUTPATIENT)
Dept: CARDIOLOGY | Facility: CLINIC | Age: 27
End: 2022-11-17
Payer: COMMERCIAL

## 2022-11-17 ENCOUNTER — NON-APPOINTMENT (OUTPATIENT)
Age: 27
End: 2022-11-17

## 2022-11-17 VITALS
RESPIRATION RATE: 17 BRPM | DIASTOLIC BLOOD PRESSURE: 77 MMHG | BODY MASS INDEX: 17.71 KG/M2 | HEART RATE: 94 BPM | OXYGEN SATURATION: 97 % | HEIGHT: 74 IN | SYSTOLIC BLOOD PRESSURE: 115 MMHG | WEIGHT: 138 LBS

## 2022-11-17 PROCEDURE — 99215 OFFICE O/P EST HI 40 MIN: CPT

## 2022-11-17 PROCEDURE — 93242 EXT ECG>48HR<7D RECORDING: CPT | Mod: 59

## 2022-11-17 PROCEDURE — 93000 ELECTROCARDIOGRAM COMPLETE: CPT

## 2022-11-17 NOTE — CARDIOLOGY SUMMARY
[de-identified] : 6/16/2021 sinus 79 bpm, early repolarization abnormality [de-identified] : 2/24/2021 mild concentric LVH, LVEF >55%\par 6/1/2022 [de-identified] : 6/30/2022 cardiac MRI - normal LV size with low-normal EF of 53%. No LGE. Slight increase in LV trabeculation on borderline criteria for LV noncompaction cardiomyopathy

## 2022-11-17 NOTE — PHYSICAL EXAM
[Well Developed] : well developed [Well Nourished] : well nourished [No Acute Distress] : no acute distress [Normal Conjunctiva] : normal conjunctiva [Normal Venous Pressure] : normal venous pressure [No Carotid Bruit] : no carotid bruit [Normal S1, S2] : normal S1, S2 [No Murmur] : no murmur [No Rub] : no rub [No Gallop] : no gallop [Clear Lung Fields] : clear lung fields [Good Air Entry] : good air entry [No Respiratory Distress] : no respiratory distress  [Soft] : abdomen soft [Non Tender] : non-tender [No Masses/organomegaly] : no masses/organomegaly [Normal Bowel Sounds] : normal bowel sounds [Normal Gait] : normal gait [No Edema] : no edema [No Cyanosis] : no cyanosis [No Clubbing] : no clubbing [No Varicosities] : no varicosities [No Rash] : no rash [No Skin Lesions] : no skin lesions [Moves all extremities] : moves all extremities [No Focal Deficits] : no focal deficits [Normal Speech] : normal speech [Alert and Oriented] : alert and oriented [Normal memory] : normal memory [de-identified] : right brachial AV fistula, +thrill

## 2022-11-17 NOTE — DISCUSSION/SUMMARY
[EKG obtained to assist in diagnosis and management of assessed problem(s)] : EKG obtained to assist in diagnosis and management of assessed problem(s) [FreeTextEntry1] : Patient is a 27 year-old gentleman who presents for cardiac evaluation prior to possible renal transplant.\par Echocardiogram in 2021 showed a structurally normal heart with LVH. Repeat echo in 2022 showed a mildly reduced LVEF and he was sent for cardiac MRI. MRI showed low normal LVEF of 53% but raised the possibility of noncompaction cardiomyopathy. \par He is capable of > 4 METS at baseline, and he is low risk for ischemic heart disease.\par \par We discussed the importance of aerobic conditioning in his work-outs.\par We also discussed the importance of hydration while exercising (this can be difficult to balance with dialysis).\par \par In light of reduced LVEF, continue carvedilol. He should not be taking diltiazem. \par Will check extended holter monitor for three days to assess for arrhythmia.\par Repeat TTE to monitor cardiomyopathy.

## 2022-11-17 NOTE — HISTORY OF PRESENT ILLNESS
[FreeTextEntry1] : Patient is a 27 year-old with no significant past medical history until February, when he presented with hypertensive emergency and was diagnosed with IgA nephropathy, in February 2021.\par Echocardiogram during that admission showed concentric LVH, but normal LV systolic function, and no evidence of structural heart disease.\par He tries to exercise, but he finds he gets tired more easily than he used to. He runs, uses a bike, and does jump-rope. He fatigues, but he does not get chest pain or shortness of breath (out of proportion to his exertion). \par He received both doses of Pfizer's Covid-19 vaccine.\par \par May 2022 - Patient returns today for follow-up.\par He has now progressed to ESRD on HD via right brachial AV fistula (Rgtjvm-Lalywfncw-Hhxbcy).\par He exercises by running, lifting weights, and playing basketball. Sometimes, he feels that he fatigues easily, and his heart races, but then he rests and returns to normal. \par \par October 2022 - Patient returns today for follow-up in his usual state of health. He continues to have ESRD on HD via right brachial AV fistula (Gvpibn-Yztfpymrl-Qzrqjl).\par In June 2022, an echocardiogram showed LVEF 40-45%, and he was referred for a cardiac MRI to better assess ventricular function. He was seen to have a low normal LVEF of 53% by cMR. There was no late gadolinium enhancement, but there was some increased trabeculation, at the borderline for noncompaction cardiomyopathy. He reports one remote episode of syncope, more than 10 years ago, while, as a teenager, he was standing over a hot stove and just felt too warm and felt he was going to pass out. It was a witnessed syncope, and he recovered quickly. There is no family history of known cardiomyopathy or unexplained sudden cardiac death.

## 2022-11-17 NOTE — REASON FOR VISIT
[Other: ____] : [unfilled] [FreeTextEntry1] : November 2022 - Patient returns today for follow-up in his usual state of health. \par He remains on HD via right brachial AV fistula (Lwydkko-Dwajgqvj-Efqcswae).\par He has no new cardiovascular complaints. \par He has received the bivalent Covid-19 vaccine.

## 2022-12-09 ENCOUNTER — APPOINTMENT (OUTPATIENT)
Dept: CARDIOLOGY | Facility: CLINIC | Age: 27
End: 2022-12-09

## 2022-12-09 PROCEDURE — 93306 TTE W/DOPPLER COMPLETE: CPT

## 2022-12-23 ENCOUNTER — APPOINTMENT (OUTPATIENT)
Dept: TRANSPLANT | Facility: CLINIC | Age: 27
End: 2022-12-23

## 2023-01-27 ENCOUNTER — APPOINTMENT (OUTPATIENT)
Dept: NEPHROLOGY | Facility: CLINIC | Age: 28
End: 2023-01-27
Payer: COMMERCIAL

## 2023-01-27 VITALS
HEIGHT: 74 IN | HEART RATE: 83 BPM | BODY MASS INDEX: 18.02 KG/M2 | WEIGHT: 140.43 LBS | RESPIRATION RATE: 17 BRPM | SYSTOLIC BLOOD PRESSURE: 129 MMHG | TEMPERATURE: 98.6 F | DIASTOLIC BLOOD PRESSURE: 86 MMHG | OXYGEN SATURATION: 98 %

## 2023-01-27 PROCEDURE — 99214 OFFICE O/P EST MOD 30 MIN: CPT

## 2023-01-27 PROCEDURE — 99072 ADDL SUPL MATRL&STAF TM PHE: CPT

## 2023-01-27 RX ORDER — IVERMECTIN 3 MG/1
3 TABLET ORAL DAILY
Qty: 8 | Refills: 0 | Status: DISCONTINUED | COMMUNITY
Start: 2022-02-17 | End: 2023-01-27

## 2023-01-27 RX ORDER — SODIUM BICARBONATE 650 MG/1
650 TABLET ORAL TWICE DAILY
Qty: 360 | Refills: 0 | Status: DISCONTINUED | COMMUNITY
Start: 2021-06-16 | End: 2023-01-27

## 2023-01-27 NOTE — HISTORY OF PRESENT ILLNESS
[TextBox_42] : MARTHA MARTINEZ is a 27 year old male who presents for follow up while listed for kidney transplantation.  \par Cause of ESRD : IgA nephropathy. He has had no medical issues , until last month ( 2021) when he was admitted in [facility] with c/o headache and nausea and found to have elevated BP and advanced CKD. Renal biopsy done at the time revealed IgA nephropathy. 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 47% global glomerulosclerosis, 20% segmental glomerulosclerosis , 50% IFTA and mild arterial and arteriolar sclerosis.\par Started dialysis via Right AVF in 2021. HD unit- [facility]. Days are --. \par Nephrologist: Dr Peyman Rizo  \par  \par Functional status: excellent functional status , works out and plays sports. 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 \par \par cause of renal failure is IgA Nephropathy. \par \par Care Team\par SILVIO DA SILVA,[facility]	Primary Care Provider	\par \par Since last visit no hospitalizations or procedures. \par \par Most recent testing\par Cardiac\par Saw Dr Varela 2021 and cleared for transplant with being capable of >4 METS\par EC2021 sinus 79 bpm, early repolarization abnormality \par Echo: 2021 mild concentric LVH, LVEF >55% \par \par Radiology \par Chest Xray 21 no consolidation no pleural effusion. \par Abd CT 21 unremarkable organs no evidence of obstructive uropathy vessels WNL.\par Brain CT 21 no acute hemorrhage or midline shift. \par \par Kidney Bx 3/3/21 IgA Nephropathy, mesangial and sclerosing form, with features of collapsing glomerulopathy. acute tubular injury and focal interstitial inflammation. \par  \par \par

## 2023-01-27 NOTE — PLAN
[FreeTextEntry1] : 1.ESRD due to IgA nephropathy -  Mr. MARTHA MARTINEZ  Is an excellent candidate for kidney transplantation.  He has a friend who is interested in kidney donation.  She will call for an evaluation.  \par 2.Cardiac risk: echo from  2021 reviewed and wnl. \par 3. ID: update Serologies for acute and chronic viral infections. Screening for latent TB. Strongyloids antibody positive, treated with Ivermectin  \par 4. Imaging: reviewed \par 5.Hypertension- controlled. follows with nephrology \par \par I have personally discussed the risks and benefits of transplantation and patient attended transplant education class where the following was disclosed:\par  \par Reviewed factors affecting survival and morbidity while on dialysis, the transplant wait list and reviewed sam-operative and long-term risk factors affecting outcome in kidney transplantation. \par  \par One year SRTR outcomes for national and [facility] [place] were discussed in regards to patient survival and graft survival after transplantation. \par  \par Details of transplant surgery, including complications were discussed.\par Immunosuppression and complications including infection including life threatening sepsis and opportunistic infections, malignancy and new onset diabetes were discussed. \par  \par Benefits of live donor transplantation as well as variability in wait times across regions and multiple listing were discussed. \par KDPI >85% and PHS high risk criteria donors were discussed. \par HCV kidney transplantation was discussed.\par  \par

## 2023-01-30 ENCOUNTER — LABORATORY RESULT (OUTPATIENT)
Age: 28
End: 2023-01-30

## 2023-01-30 LAB
ABO + RH PNL BLD: NORMAL
ALBUMIN SERPL ELPH-MCNC: 5.2 G/DL
ALP BLD-CCNC: 128 U/L
ALT SERPL-CCNC: 26 U/L
ANION GAP SERPL CALC-SCNC: 12 MMOL/L
APPEARANCE: CLEAR
AST SERPL-CCNC: 36 U/L
BACTERIA: NEGATIVE
BASOPHILS # BLD AUTO: 0.04 K/UL
BASOPHILS NFR BLD AUTO: 0.6 %
BILIRUB SERPL-MCNC: 0.6 MG/DL
BILIRUBIN URINE: NEGATIVE
BLOOD URINE: NEGATIVE
BUN SERPL-MCNC: 26 MG/DL
C PEPTIDE SERPL-MCNC: 2.2 NG/ML
CALCIUM SERPL-MCNC: 10.4 MG/DL
CHLORIDE SERPL-SCNC: 96 MMOL/L
CMV IGG SERPL QL: 8.9 U/ML
CMV IGG SERPL-IMP: POSITIVE
CO2 SERPL-SCNC: 32 MMOL/L
COLOR: YELLOW
COVID-19 SPIKE DOMAIN ANTIBODY INTERPRETATION: POSITIVE
CREAT SERPL-MCNC: 5.53 MG/DL
CREAT SPEC-SCNC: 51 MG/DL
CREAT/PROT UR: 2 RATIO
EBV DNA SERPL NAA+PROBE-ACNC: NOT DETECTED IU/ML
EBV EA AB SER IA-ACNC: 8.7 U/ML
EBV EA AB TITR SER IF: POSITIVE
EBV EA IGG SER QL IA: 482 U/ML
EBV EA IGG SER-ACNC: NEGATIVE
EBV EA IGM SER IA-ACNC: NEGATIVE
EBV PATRN SPEC IB-IMP: NORMAL
EBV VCA IGG SER IA-ACNC: >750 U/ML
EBV VCA IGM SER QL IA: <10 U/ML
EBVPCR LOG: NOT DETECTED LOG10IU/ML
EGFR: 14 ML/MIN/1.73M2
EOSINOPHIL # BLD AUTO: 0.22 K/UL
EOSINOPHIL NFR BLD AUTO: 3.5 %
EPSTEIN-BARR VIRUS CAPSID ANTIGEN IGG: POSITIVE
ESTIMATED AVERAGE GLUCOSE: 80 MG/DL
GLUCOSE QUALITATIVE U: NEGATIVE
GLUCOSE SERPL-MCNC: 89 MG/DL
HAV IGM SER QL: NONREACTIVE
HBA1C MFR BLD HPLC: 4.4 %
HBV CORE IGG+IGM SER QL: NONREACTIVE
HBV SURFACE AB SER QL: REACTIVE
HBV SURFACE AB SERPL IA-ACNC: 120.1 MIU/ML
HBV SURFACE AG SER QL: NONREACTIVE
HCT VFR BLD CALC: 35.9 %
HCV AB SER QL: NONREACTIVE
HCV S/CO RATIO: 0.08 S/CO
HGB BLD-MCNC: 11.9 G/DL
HIV1+2 AB SPEC QL IA.RAPID: NONREACTIVE
HSV 1+2 IGG SER IA-IMP: NEGATIVE
HSV 1+2 IGG SER IA-IMP: POSITIVE
HSV1 IGG SER QL: 28.6 INDEX
HSV2 IGG SER QL: 0.36 INDEX
HYALINE CASTS: 0 /LPF
IMM GRANULOCYTES NFR BLD AUTO: 0.3 %
KETONES URINE: NEGATIVE
LEUKOCYTE ESTERASE URINE: NEGATIVE
LYMPHOCYTES # BLD AUTO: 1.44 K/UL
LYMPHOCYTES NFR BLD AUTO: 22.8 %
MAGNESIUM SERPL-MCNC: 2 MG/DL
MAN DIFF?: NORMAL
MCHC RBC-ENTMCNC: 31.6 PG
MCHC RBC-ENTMCNC: 33.1 GM/DL
MCV RBC AUTO: 95.2 FL
MICROSCOPIC-UA: NORMAL
MONOCYTES # BLD AUTO: 0.37 K/UL
MONOCYTES NFR BLD AUTO: 5.9 %
NEUTROPHILS # BLD AUTO: 4.22 K/UL
NEUTROPHILS NFR BLD AUTO: 66.9 %
NITRITE URINE: NEGATIVE
PH URINE: 8.5
PHOSPHATE SERPL-MCNC: 3.2 MG/DL
PLATELET # BLD AUTO: 142 K/UL
POTASSIUM SERPL-SCNC: 4.4 MMOL/L
PROT SERPL-MCNC: 7.5 G/DL
PROT UR-MCNC: 103 MG/DL
PROTEIN URINE: ABNORMAL
RBC # BLD: 3.77 M/UL
RBC # FLD: 11.9 %
RED BLOOD CELLS URINE: 3 /HPF
RUBV IGG FLD-ACNC: 5.5 INDEX
RUBV IGG SER-IMP: POSITIVE
SARS-COV-2 AB SERPL IA-ACNC: >250 U/ML
SODIUM SERPL-SCNC: 139 MMOL/L
SPECIFIC GRAVITY URINE: 1.01
SQUAMOUS EPITHELIAL CELLS: 0 /HPF
T GONDII AB SER-IMP: NEGATIVE
T GONDII IGG SER QL: <3 IU/ML
T PALLIDUM AB SER QL IA: NEGATIVE
URATE SERPL-MCNC: 2.5 MG/DL
UROBILINOGEN URINE: NORMAL
VZV AB TITR SER: POSITIVE
VZV IGG SER IF-ACNC: >4000 INDEX
WBC # FLD AUTO: 6.31 K/UL
WHITE BLOOD CELLS URINE: 1 /HPF

## 2023-01-31 LAB
ISOAGGLUTININ TITER, ANTI-A, IGG: NORMAL
ISOAGGLUTININ TITER, ANTI-A, IGM: NORMAL

## 2023-02-20 ENCOUNTER — APPOINTMENT (OUTPATIENT)
Dept: ULTRASOUND IMAGING | Facility: IMAGING CENTER | Age: 28
End: 2023-02-20
Payer: COMMERCIAL

## 2023-02-20 ENCOUNTER — OUTPATIENT (OUTPATIENT)
Dept: OUTPATIENT SERVICES | Facility: HOSPITAL | Age: 28
LOS: 1 days | End: 2023-02-20
Payer: COMMERCIAL

## 2023-02-20 ENCOUNTER — APPOINTMENT (OUTPATIENT)
Dept: RADIOLOGY | Facility: IMAGING CENTER | Age: 28
End: 2023-02-20
Payer: COMMERCIAL

## 2023-02-20 DIAGNOSIS — Z00.8 ENCOUNTER FOR OTHER GENERAL EXAMINATION: ICD-10-CM

## 2023-02-20 DIAGNOSIS — Z01.818 ENCOUNTER FOR OTHER PREPROCEDURAL EXAMINATION: ICD-10-CM

## 2023-02-20 PROCEDURE — 71046 X-RAY EXAM CHEST 2 VIEWS: CPT | Mod: 26

## 2023-02-20 PROCEDURE — 76700 US EXAM ABDOM COMPLETE: CPT | Mod: 26,59

## 2023-02-20 PROCEDURE — 93975 VASCULAR STUDY: CPT | Mod: 26

## 2023-02-20 PROCEDURE — 71046 X-RAY EXAM CHEST 2 VIEWS: CPT

## 2023-02-20 PROCEDURE — 93975 VASCULAR STUDY: CPT

## 2023-02-20 PROCEDURE — 76700 US EXAM ABDOM COMPLETE: CPT

## 2023-02-23 ENCOUNTER — APPOINTMENT (OUTPATIENT)
Dept: CARDIOLOGY | Facility: CLINIC | Age: 28
End: 2023-02-23

## 2023-02-28 ENCOUNTER — APPOINTMENT (OUTPATIENT)
Dept: CARDIOLOGY | Facility: CLINIC | Age: 28
End: 2023-02-28
Payer: COMMERCIAL

## 2023-02-28 ENCOUNTER — NON-APPOINTMENT (OUTPATIENT)
Age: 28
End: 2023-02-28

## 2023-02-28 VITALS
DIASTOLIC BLOOD PRESSURE: 74 MMHG | OXYGEN SATURATION: 100 % | SYSTOLIC BLOOD PRESSURE: 110 MMHG | HEART RATE: 64 BPM | BODY MASS INDEX: 17.85 KG/M2 | WEIGHT: 139 LBS

## 2023-02-28 PROCEDURE — 99215 OFFICE O/P EST HI 40 MIN: CPT

## 2023-02-28 PROCEDURE — 99072 ADDL SUPL MATRL&STAF TM PHE: CPT

## 2023-02-28 NOTE — HISTORY OF PRESENT ILLNESS
[FreeTextEntry1] : Patient is a 27 year-old with no significant past medical history until February, when he presented with hypertensive emergency and was diagnosed with IgA nephropathy, in February 2021.\par Echocardiogram during that admission showed concentric LVH, but normal LV systolic function, and no evidence of structural heart disease.\par He tries to exercise, but he finds he gets tired more easily than he used to. He runs, uses a bike, and does jump-rope. He fatigues, but he does not get chest pain or shortness of breath (out of proportion to his exertion). \par He received both doses of Pfizer's Covid-19 vaccine.\par \par May 2022 - Patient returns today for follow-up.\par He has now progressed to ESRD on HD via right brachial AV fistula (Mepmhd-Cxoowumyq-Kpjndi).\par He exercises by running, lifting weights, and playing basketball. Sometimes, he feels that he fatigues easily, and his heart races, but then he rests and returns to normal. \par \par October 2022 - Patient returns today for follow-up in his usual state of health. He continues to have ESRD on HD via right brachial AV fistula (Tnarvt-Gkffrqbnn-Gekfhl).\par In June 2022, an echocardiogram showed LVEF 40-45%, and he was referred for a cardiac MRI to better assess ventricular function. He was seen to have a low normal LVEF of 53% by cMR. There was no late gadolinium enhancement, but there was some increased trabeculation, at the borderline for noncompaction cardiomyopathy. He reports one remote episode of syncope, more than 10 years ago, while, as a teenager, he was standing over a hot stove and just felt too warm and felt he was going to pass out. It was a witnessed syncope, and he recovered quickly. There is no family history of known cardiomyopathy or unexplained sudden cardiac death.\par \par November 2022 - Patient returns today for follow-up in his usual state of health. \par He remains on HD via right brachial AV fistula (Vhsaliv-Tbzqbqxw-Mddrqtnw).\par He has no new cardiovascular complaints. \par He has received the bivalent Covid-19 vaccine.

## 2023-02-28 NOTE — PHYSICAL EXAM
[Well Developed] : well developed [Well Nourished] : well nourished [No Acute Distress] : no acute distress [Normal Conjunctiva] : normal conjunctiva [Normal Venous Pressure] : normal venous pressure [No Carotid Bruit] : no carotid bruit [Normal S1, S2] : normal S1, S2 [No Murmur] : no murmur [No Rub] : no rub [No Gallop] : no gallop [Clear Lung Fields] : clear lung fields [Good Air Entry] : good air entry [No Respiratory Distress] : no respiratory distress  [Soft] : abdomen soft [Non Tender] : non-tender [No Masses/organomegaly] : no masses/organomegaly [Normal Bowel Sounds] : normal bowel sounds [Normal Gait] : normal gait [No Edema] : no edema [No Cyanosis] : no cyanosis [No Clubbing] : no clubbing [No Varicosities] : no varicosities [No Rash] : no rash [No Skin Lesions] : no skin lesions [Moves all extremities] : moves all extremities [No Focal Deficits] : no focal deficits [Normal Speech] : normal speech [Alert and Oriented] : alert and oriented [Normal memory] : normal memory [de-identified] : right brachial AV fistula, +thrill

## 2023-02-28 NOTE — DISCUSSION/SUMMARY
[FreeTextEntry1] : Patient is a 27 year-old gentleman who presents for cardiac evaluation prior to possible renal transplant.\par Echocardiogram in 2021 showed a structurally normal heart with LVH. Repeat echo in 2022 showed a mildly reduced LVEF and he was sent for cardiac MRI. MRI showed low normal LVEF of 53% but raised the possibility of noncompaction cardiomyopathy. \par Repeat TTE in December 2022 shows normal LV systolic function.\par He is capable of > 4 METS at baseline, and he is low risk for ischemic heart disease.\par \par We discussed the importance of aerobic conditioning in his work-outs.\par We also discussed the importance of hydration while exercising (this can be difficult to balance with dialysis).\par \par No further cardiovascular testing is necessary prior to consideration for renal transplant.  [EKG obtained to assist in diagnosis and management of assessed problem(s)] : EKG obtained to assist in diagnosis and management of assessed problem(s)

## 2023-02-28 NOTE — CARDIOLOGY SUMMARY
[de-identified] : 6/16/2021 sinus 79 bpm, early repolarization abnormality [de-identified] : 11/20/2022, Zio monitor for 3 days showing no significant arrhythmia [de-identified] : 2/24/2021 mild concentric LVH, LVEF >55%\par 6/1/2022 moderate global LV dysfunction, LVEF 40-45%\par 12/9/2022, normal LA, normal LV systolic function, LVEF 50-55% [de-identified] : 6/30/2022 cardiac MRI - normal LV size with low-normal EF of 53%. No LGE. Slight increase in LV trabeculation on borderline criteria for LV noncompaction cardiomyopathy

## 2023-02-28 NOTE — REASON FOR VISIT
[Other: ____] : [unfilled] [FreeTextEntry1] : February 2023 - Patient erturns today for follow-up in his usual state of health.\par He continues to have ESRD on HD via right brachial AV fistula (Ciiutp-Inarpoefp-Lxduij).

## 2023-03-12 ENCOUNTER — RX RENEWAL (OUTPATIENT)
Age: 28
End: 2023-03-12

## 2023-03-21 ENCOUNTER — NON-APPOINTMENT (OUTPATIENT)
Age: 28
End: 2023-03-21

## 2023-03-24 ENCOUNTER — APPOINTMENT (OUTPATIENT)
Dept: TRANSPLANT | Facility: CLINIC | Age: 28
End: 2023-03-24

## 2023-05-25 ENCOUNTER — APPOINTMENT (OUTPATIENT)
Dept: TRANSPLANT | Facility: CLINIC | Age: 28
End: 2023-05-25

## 2023-07-28 NOTE — ASU PREOP CHECKLIST - SIDE RAILS UP
done Assistance with ambulation/Assistance OOB with selected safe patient handling equipment/Communicate Risk of Fall with Harm to all staff/Reinforce activity limits and safety measures with patient and family/Tailored Fall Risk Interventions/Use of alarms - bed, chair and/or voice tab/Visual Cue: Yellow wristband and red socks/Bed in lowest position, wheels locked, appropriate side rails in place/Call bell, personal items and telephone in reach/Instruct patient to call for assistance before getting out of bed or chair/Non-slip footwear when patient is out of bed/[place] to call system/Physically safe environment - no spills, clutter or unnecessary equipment/Purposeful Proactive Rounding/Room/bathroom lighting operational, light cord in reach

## 2023-09-14 ENCOUNTER — APPOINTMENT (OUTPATIENT)
Dept: CARDIOLOGY | Facility: CLINIC | Age: 28
End: 2023-09-14
Payer: COMMERCIAL

## 2023-09-14 ENCOUNTER — NON-APPOINTMENT (OUTPATIENT)
Age: 28
End: 2023-09-14

## 2023-09-14 VITALS
WEIGHT: 139 LBS | BODY MASS INDEX: 17.85 KG/M2 | OXYGEN SATURATION: 97 % | DIASTOLIC BLOOD PRESSURE: 82 MMHG | HEART RATE: 68 BPM | SYSTOLIC BLOOD PRESSURE: 150 MMHG

## 2023-09-14 PROCEDURE — 99215 OFFICE O/P EST HI 40 MIN: CPT

## 2023-09-14 PROCEDURE — 93000 ELECTROCARDIOGRAM COMPLETE: CPT | Mod: NC

## 2024-01-02 ENCOUNTER — NON-APPOINTMENT (OUTPATIENT)
Age: 29
End: 2024-01-02

## 2024-01-11 ENCOUNTER — APPOINTMENT (OUTPATIENT)
Dept: CARDIOLOGY | Facility: CLINIC | Age: 29
End: 2024-01-11
Payer: COMMERCIAL

## 2024-01-11 ENCOUNTER — NON-APPOINTMENT (OUTPATIENT)
Age: 29
End: 2024-01-11

## 2024-01-11 VITALS
SYSTOLIC BLOOD PRESSURE: 146 MMHG | OXYGEN SATURATION: 100 % | BODY MASS INDEX: 17.98 KG/M2 | DIASTOLIC BLOOD PRESSURE: 91 MMHG | WEIGHT: 140 LBS | HEART RATE: 70 BPM

## 2024-01-11 PROCEDURE — 93000 ELECTROCARDIOGRAM COMPLETE: CPT | Mod: NC

## 2024-01-11 PROCEDURE — 99215 OFFICE O/P EST HI 40 MIN: CPT | Mod: 25

## 2024-01-11 PROCEDURE — G2211 COMPLEX E/M VISIT ADD ON: CPT | Mod: NC,1L

## 2024-01-11 PROCEDURE — 93306 TTE W/DOPPLER COMPLETE: CPT

## 2024-01-11 NOTE — PHYSICAL EXAM
[Well Developed] : well developed [Well Nourished] : well nourished [No Acute Distress] : no acute distress [Normal Conjunctiva] : normal conjunctiva [Normal Venous Pressure] : normal venous pressure [No Carotid Bruit] : no carotid bruit [Normal S1, S2] : normal S1, S2 [No Murmur] : no murmur [No Rub] : no rub [No Gallop] : no gallop [Clear Lung Fields] : clear lung fields [Good Air Entry] : good air entry [No Respiratory Distress] : no respiratory distress  [Soft] : abdomen soft [Non Tender] : non-tender [No Masses/organomegaly] : no masses/organomegaly [Normal Bowel Sounds] : normal bowel sounds [Normal Gait] : normal gait [No Edema] : no edema [No Cyanosis] : no cyanosis [No Clubbing] : no clubbing [No Varicosities] : no varicosities [No Rash] : no rash [No Skin Lesions] : no skin lesions [Moves all extremities] : moves all extremities [No Focal Deficits] : no focal deficits [Normal Speech] : normal speech [Alert and Oriented] : alert and oriented [Normal memory] : normal memory [de-identified] : right brachial AV fistula, +thrill

## 2024-01-11 NOTE — CARDIOLOGY SUMMARY
[de-identified] : 6/16/2021 sinus 79 bpm, early repolarization abnormality [de-identified] : 11/20/2022, Zio monitor for 3 days showing no significant arrhythmia [de-identified] : 2/24/2021 mild concentric LVH, LVEF >55% 6/1/2022 moderate global LV dysfunction, LVEF 40-45% 12/9/2022, normal LA, normal LV systolic function, LVEF 50-55% 1/11/2024, normal LA, normal LV systolic function, LVEF 52% [de-identified] : 6/30/2022 cardiac MRI - normal LV size with low-normal EF of 53%. No LGE. Slight increase in LV trabeculation on borderline criteria for LV noncompaction cardiomyopathy

## 2024-01-11 NOTE — DISCUSSION/SUMMARY
[EKG obtained to assist in diagnosis and management of assessed problem(s)] : EKG obtained to assist in diagnosis and management of assessed problem(s) [FreeTextEntry1] : Patient is a 28 year-old gentleman who presents for cardiac evaluation prior to possible renal transplant. Echocardiogram in 2021 showed a structurally normal heart with LVH. Repeat echo in 2022 showed a mildly reduced LVEF and he was sent for cardiac MRI. MRI showed low normal LVEF of 53% but raised the possibility of noncompaction cardiomyopathy.  Repeat TTE in January 2024 shows low-normal LV systolic function. He is capable of > 4 METS at baseline, and he is low risk for ischemic heart disease.  We discussed the importance of aerobic conditioning in his work-outs. We also discussed the importance of hydration while exercising (this can be difficult to balance with dialysis).  Will refer to EP for evaluation for arrhythmia given abnormal MRI findings.  No further cardiovascular testing is necessary prior to consideration for renal transplant.

## 2024-01-11 NOTE — HISTORY OF PRESENT ILLNESS
[FreeTextEntry1] : Patient is a 28 year-old with no significant past medical history until February, when he presented with hypertensive emergency and was diagnosed with IgA nephropathy, in February 2021. Echocardiogram during that admission showed concentric LVH, but normal LV systolic function, and no evidence of structural heart disease. He tries to exercise, but he finds he gets tired more easily than he used to. He runs, uses a bike, and does jump-rope. He fatigues, but he does not get chest pain or shortness of breath (out of proportion to his exertion).  He received both doses of Pfizer's Covid-19 vaccine.  May 2022 - Patient returns today for follow-up. He has now progressed to ESRD on HD via right brachial AV fistula (Egfkvv-Wiktupgqj-Rvwgom). He exercises by running, lifting weights, and playing basketball. Sometimes, he feels that he fatigues easily, and his heart races, but then he rests and returns to normal.   October 2022 - Patient returns today for follow-up in his usual state of health. He continues to have ESRD on HD via right brachial AV fistula (Glqnti-Jqudlczqn-Jhyljd). In June 2022, an echocardiogram showed LVEF 40-45%, and he was referred for a cardiac MRI to better assess ventricular function. He was seen to have a low normal LVEF of 53% by cMR. There was no late gadolinium enhancement, but there was some increased trabeculation, at the borderline for noncompaction cardiomyopathy. He reports one remote episode of syncope, more than 10 years ago, while, as a teenager, he was standing over a hot stove and just felt too warm and felt he was going to pass out. It was a witnessed syncope, and he recovered quickly. There is no family history of known cardiomyopathy or unexplained sudden cardiac death.  November 2022 - Patient returns today for follow-up in his usual state of health.  He remains on HD via right brachial AV fistula (Rsqebqz-Gyjluftv-Idamomoi). He has no new cardiovascular complaints.  He has received the bivalent Covid-19 vaccine.   September 2023 - Patient returns today for follow-up in his usual state of health. He continues to have ESRD on HD via right brachial AV fistula (Euzgbc-Gspcywohp-Gcwbca). He exercises regularly at the gym, including bicycle, jump rope, and weights.

## 2024-01-11 NOTE — REASON FOR VISIT
[Other: ____] : [unfilled] [FreeTextEntry1] : January 2024 - Patient returns today for follow-up and echocardiogram in his usual state of health. He continues to have ESRD on HD via right brachial AV fistula (Kxlscs-Aqnyqycnv-Buxkhu). He exercises regularly at the gym, including bicycle, jump rope, and weights. He has no palpitations, syncope, or presyncopal symptoms.

## 2024-01-24 PROBLEM — I42.8 NONISCHEMIC CARDIOMYOPATHY: Status: ACTIVE | Noted: 2022-06-14

## 2024-01-24 NOTE — CARDIOLOGY SUMMARY
[de-identified] : ECG from 1/25/2024:  [de-identified] : Allo XT from 11/17/2022-11/20/2022: min HR 52, max HR: 146, mean HR: 83, the lone patient triggered event was for sinus rhythm, APCs < 1%, PVCs < 1% [de-identified] : cMRI from 6/30/2022: normal LV size with low-normal EF of 53%. No LGE. Slight increase in LV trabeculation on borderline criteria for LV noncompaction cardiomyopathy, normal LA, no significant MS or MR, no pericardial effusion [de-identified] : TTE from 1/11/2024: EF: 52%, normal RV size and systolic function, normal LA (SAVANAH: 32), normal RA, trace MR with anterior leaflet prolapse, mild TR, normal PA pressures, minimal trabeculation of the apical and lateral LV myocardium, no pericardial effusion  TTE from 12/9/2022: EF: 50-55%  TTE from 6/1/2022: EF: 40-45% (Visual estimate), eccentric LVH

## 2024-01-24 NOTE — DISCUSSION/SUMMARY
[FreeTextEntry1] :  Given his relatively normal electrocardiogram (nonspecific ST changes), left ventricular ejection fraction greater than 50%, lack of late gadolinium enhancement and lack of a family history of left ventricular non-compaction my impression is that the hypertrabeculation that was visualized on his cardiac MRI is likely physiologic. As such no further workup is necessary at this time. The patient will continue to follow with Dr. Varela and will see me again as needed.

## 2024-01-24 NOTE — HISTORY OF PRESENT ILLNESS
[FreeTextEntry1] : Mr. Adrián Anthony is a 28-year-old man with end-stage renal disease due to IgA nephropathy (on hemodialysis via a right brachial AV fistula). He presents today for an initial evaluation based on the results of a cardiac MRI from June of 2022, which demonstrated increased trabeculation without late gadolinium enhancement.  The cardiac MRI was ordered following an echocardiogram from June of 202 that demonstrated a left ventricular systolic function of 40-45%. The MRI was read as "slight increase in the left ventricular trabeculation on borderline criteria for noncompaction cardiomyopathy." There was no late gadolinium enhancement detected. The left ventricular ejection fraction was 53%. Subsequent transthoracic echocardiograms have also demonstrated similar left ventricular systolic function. The patient does not have a family history of sudden cardiac death or family members with a cardiomyopathy.   He reports symptoms of ____.

## 2024-01-25 ENCOUNTER — APPOINTMENT (OUTPATIENT)
Dept: ELECTROPHYSIOLOGY | Facility: CLINIC | Age: 29
End: 2024-01-25
Payer: COMMERCIAL

## 2024-01-25 ENCOUNTER — NON-APPOINTMENT (OUTPATIENT)
Age: 29
End: 2024-01-25

## 2024-01-25 VITALS
SYSTOLIC BLOOD PRESSURE: 151 MMHG | BODY MASS INDEX: 17.85 KG/M2 | WEIGHT: 139 LBS | HEART RATE: 66 BPM | DIASTOLIC BLOOD PRESSURE: 86 MMHG | OXYGEN SATURATION: 100 %

## 2024-01-25 DIAGNOSIS — I42.8 OTHER CARDIOMYOPATHIES: ICD-10-CM

## 2024-01-25 PROCEDURE — 99204 OFFICE O/P NEW MOD 45 MIN: CPT | Mod: 25

## 2024-01-25 PROCEDURE — 93000 ELECTROCARDIOGRAM COMPLETE: CPT

## 2024-01-26 ENCOUNTER — LABORATORY RESULT (OUTPATIENT)
Age: 29
End: 2024-01-26

## 2024-01-26 ENCOUNTER — APPOINTMENT (OUTPATIENT)
Dept: NEPHROLOGY | Facility: CLINIC | Age: 29
End: 2024-01-26
Payer: COMMERCIAL

## 2024-01-26 VITALS
HEART RATE: 89 BPM | SYSTOLIC BLOOD PRESSURE: 134 MMHG | BODY MASS INDEX: 17.71 KG/M2 | WEIGHT: 138 LBS | DIASTOLIC BLOOD PRESSURE: 76 MMHG | HEIGHT: 74 IN | TEMPERATURE: 96.7 F | RESPIRATION RATE: 17 BRPM | OXYGEN SATURATION: 99 %

## 2024-01-26 DIAGNOSIS — Z01.818 ENCOUNTER FOR OTHER PREPROCEDURAL EXAMINATION: ICD-10-CM

## 2024-01-26 LAB
ABO + RH PNL BLD: NORMAL
ALBUMIN SERPL ELPH-MCNC: 5 G/DL
ALP BLD-CCNC: 120 U/L
ALT SERPL-CCNC: 19 U/L
ANION GAP SERPL CALC-SCNC: 15 MMOL/L
APPEARANCE: CLEAR
AST SERPL-CCNC: 21 U/L
BILIRUB SERPL-MCNC: 0.4 MG/DL
BILIRUBIN URINE: NEGATIVE
BLOOD URINE: NEGATIVE
BUN SERPL-MCNC: 38 MG/DL
C PEPTIDE SERPL-MCNC: 8.2 NG/ML
CALCIUM SERPL-MCNC: 9.5 MG/DL
CHLORIDE SERPL-SCNC: 97 MMOL/L
CHOLEST SERPL-MCNC: 104 MG/DL
CK SERPL-CCNC: 66 U/L
CO2 SERPL-SCNC: 24 MMOL/L
COLOR: YELLOW
COVID-19 SPIKE DOMAIN ANTIBODY INTERPRETATION: POSITIVE
CREAT SERPL-MCNC: 6.53 MG/DL
CRP SERPL-MCNC: <3 MG/L
EGFR: 11 ML/MIN/1.73M2
ERYTHROCYTE [SEDIMENTATION RATE] IN BLOOD BY WESTERGREN METHOD: 4 MM/HR
ESTIMATED AVERAGE GLUCOSE: 85 MG/DL
GLUCOSE QUALITATIVE U: NEGATIVE MG/DL
GLUCOSE SERPL-MCNC: 128 MG/DL
HBA1C MFR BLD HPLC: 4.6 %
HCT VFR BLD CALC: 41 %
HDLC SERPL-MCNC: 63 MG/DL
HGB BLD-MCNC: 13.2 G/DL
ISOAGGLUTININ TITER, ANTI-A, IGG: 64
ISOAGGLUTININ TITER, ANTI-A, IGM: 128
KETONES URINE: NEGATIVE MG/DL
LDLC SERPL CALC-MCNC: 22 MG/DL
LEUKOCYTE ESTERASE URINE: NEGATIVE
MAGNESIUM SERPL-MCNC: 2.1 MG/DL
MCHC RBC-ENTMCNC: 28.1 PG
MCHC RBC-ENTMCNC: 32.2 GM/DL
MCV RBC AUTO: 87.2 FL
NITRITE URINE: NEGATIVE
NONHDLC SERPL-MCNC: 40 MG/DL
PH URINE: 7.5
PHOSPHATE SERPL-MCNC: 3.2 MG/DL
PLATELET # BLD AUTO: 111 K/UL
POTASSIUM SERPL-SCNC: 4.4 MMOL/L
PROT SERPL-MCNC: 7.3 G/DL
PROTEIN URINE: 100 MG/DL
PSA SERPL-MCNC: 0.33 NG/ML
RBC # BLD: 4.7 M/UL
RBC # FLD: 14.3 %
SARS-COV-2 AB SERPL IA-ACNC: >250 U/ML
SODIUM SERPL-SCNC: 136 MMOL/L
SPECIFIC GRAVITY URINE: 1.01
T3 SERPL-MCNC: 84 NG/DL
T4 FREE SERPL-MCNC: 1.4 NG/DL
TRIGL SERPL-MCNC: 99 MG/DL
TSH SERPL-ACNC: 2.38 UIU/ML
URATE SERPL-MCNC: 2.6 MG/DL
UROBILINOGEN URINE: 0.2 MG/DL
WBC # FLD AUTO: 4.82 K/UL

## 2024-01-26 PROCEDURE — 99215 OFFICE O/P EST HI 40 MIN: CPT

## 2024-01-26 NOTE — CONSULT LETTER
[Dear  ___] : Dear  [unfilled], [Courtesy Letter:] : I had the pleasure of seeing your patient, [unfilled], in my office today. [Please see my note below.] : Please see my note below. [Consult Closing:] : Thank you very much for allowing me to participate in the care of this patient.  If you have any questions, please do not hesitate to contact me. [Sincerely,] : Sincerely, [FreeTextEntry3] : Terrell Valle, DO

## 2024-01-26 NOTE — HISTORY OF PRESENT ILLNESS
[TextBox_42] : INTERVAL EVENTS SINCE LAST VIVIST- none. No hospitalizations , no blood transfusions.   MARTHA MARTINEZ is a 28 year old male who presents for follow up while listed for kidney transplantation.   Cause of ESRD : IgA nephropathy. He has had no medical issues , until last month ( 2021) when he was admitted in [facility] with c/o headache and nausea and found to have elevated BP and advanced CKD. Renal biopsy done at the time revealed IgA nephropathy. 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 47% global glomerulosclerosis, 20% segmental glomerulosclerosis , 50% IFTA and mild arterial and arteriolar sclerosis. Started dialysis via Right AVF in 2021. HD unit- [facility]. Days are --.  Nephrologist: Dr Peyman Rizo     Functional status: excellent functional status , works out and plays sports.  Social history: works full time ( import and export) Is a non smoker, no alcohol or illicit use. Most recent hospital admission was in 2021 when he started dialysis.   Last Seen 2023 Listed 2021 Dialysis 2021 ABO B PRA 0%  Most recent testing Cardiology - Dr Varela EC2021 sinus 79 bpm, early repolarization abnormality Remote/Ambulatory Rhythm Monitorin2022, Zio monitor for 3 days showing no significant arrhythmia Echo: 2024, normal LA, normal LV systolic function, LVEF 52%  CT/MRI: 2022 cardiac MRI - normal LV size with low-normal EF of 53%. No LGE. Slight increase in LV trabeculation on borderline criteria for LV noncompaction cardiomyopathy  Dr Blackmon cardiac MRI assessment 24 Given his relatively normal electrocardiogram (nonspecific TW changes), left ventricular ejection fraction greater than 50%, lack of late gadolinium enhancement and lack of a family history of left ventricular non-compaction, my impression is that the hypertrabeculation that was visualized on his cardiac MRI is likely physiologic. As such no further workup is necessary at this time. The patient will continue to follow with Dr. Varela and will see me again as needed.  Radiology Chest Xray 23 No radiographic evidence of active chest disease.. CT abd and pelvis no contrast 21 organs and vessels WNL no evidence of obstructive uropathy.

## 2024-01-26 NOTE — PHYSICAL EXAM
[General Appearance - Alert] : alert [General Appearance - In No Acute Distress] : in no acute distress [Sclera] : the sclera and conjunctiva were normal [PERRL With Normal Accommodation] : pupils were equal in size, round, and reactive to light [Extraocular Movements] : extraocular movements were intact [Outer Ear] : the ears and nose were normal in appearance [Oropharynx] : the oropharynx was normal [Neck Appearance] : the appearance of the neck was normal [Neck Cervical Mass (___cm)] : no neck mass was observed [Jugular Venous Distention Increased] : there was no jugular-venous distention [Thyroid Diffuse Enlargement] : the thyroid was not enlarged [Thyroid Nodule] : there were no palpable thyroid nodules [Auscultation Breath Sounds / Voice Sounds] : lungs were clear to auscultation bilaterally [Heart Rate And Rhythm] : heart rate was normal and rhythm regular [Heart Sounds] : normal S1 and S2 [Heart Sounds Gallop] : no gallops [Murmurs] : no murmurs [Full Pulse] : the pedal pulses are present [Heart Sounds Pericardial Friction Rub] : no pericardial rub [Edema] : there was no peripheral edema [Abdomen Soft] : soft [Bowel Sounds] : normal bowel sounds [Abdomen Tenderness] : non-tender [Abdomen Mass (___ Cm)] : no abdominal mass palpated [No CVA Tenderness] : no ~M costovertebral angle tenderness [No Spinal Tenderness] : no spinal tenderness [Abnormal Walk] : normal gait [Nail Clubbing] : no clubbing  or cyanosis of the fingernails [Musculoskeletal - Swelling] : no joint swelling seen [Motor Tone] : muscle strength and tone were normal [Skin Turgor] : normal skin turgor [Skin Color & Pigmentation] : normal skin color and pigmentation [] : no rash [Normal] : normal [Deep Tendon Reflexes (DTR)] : deep tendon reflexes were 2+ and symmetric [Sensation] : the sensory exam was normal to light touch and pinprick [No Focal Deficits] : no focal deficits [Oriented To Time, Place, And Person] : oriented to person, place, and time [Impaired Insight] : insight and judgment were intact [Affect] : the affect was normal

## 2024-01-26 NOTE — PLAN
[FreeTextEntry1] : 1.ESRD due to IgA nephropathy -  Mr. MARTHA MARTINEZ  Is an excellent candidate for kidney transplantation.   2.Cardiac risk: echo and cardiac MRI results reviewed.  3. ID: update Serologies for acute and chronic viral infections. Screening for latent TB. Strongyloids antibody positive, treated with Ivermectin   4. Imaging: update imaging   I have personally discussed the risks and benefits of transplantation and patient attended transplant education class where the following was disclosed:   Reviewed factors affecting survival and morbidity while on dialysis, the transplant wait list and reviewed sam-operative and long-term risk factors affecting outcome in kidney transplantation.    One year SRTR outcomes for national and [facility] were discussed in regards to patient survival and graft survival after transplantation.    Details of transplant surgery, including complications were discussed. Immunosuppression and complications including infection including life threatening sepsis and opportunistic infections, malignancy and new onset diabetes were discussed.    Benefits of live donor transplantation as well as variability in wait times across regions and multiple listing were discussed.  KDPI >85% and PHS high risk criteria donors were discussed.  HCV kidney transplantation was discussed.

## 2024-02-13 LAB
HAV IGM SER QL: NONREACTIVE
HBV CORE IGG+IGM SER QL: NONREACTIVE
HBV SURFACE AB SER QL: REACTIVE
HBV SURFACE AB SERPL IA-ACNC: 117.5 MIU/ML
HBV SURFACE AG SER QL: NONREACTIVE
HCV AB SER QL: NONREACTIVE
HCV S/CO RATIO: 0.13 S/CO
HIV1+2 AB SPEC QL IA.RAPID: NONREACTIVE
HSV 1+2 IGG SER IA-IMP: NEGATIVE
HSV 1+2 IGG SER IA-IMP: POSITIVE
HSV1 IGG SER QL: 42.4 INDEX
HSV2 IGG SER QL: 0.28 INDEX
M TB IFN-G BLD-IMP: NEGATIVE
QUANTIFERON TB PLUS MITOGEN MINUS NIL: >10 IU/ML
QUANTIFERON TB PLUS NIL: 0.01 IU/ML
QUANTIFERON TB PLUS TB1 MINUS NIL: 0 IU/ML
QUANTIFERON TB PLUS TB2 MINUS NIL: 0 IU/ML
RUBV IGG FLD-ACNC: 7 INDEX
RUBV IGG SER-IMP: POSITIVE
T PALLIDUM AB SER QL IA: NEGATIVE
VZV AB TITR SER: POSITIVE
VZV IGG SER IF-ACNC: >4000 INDEX

## 2024-03-20 ENCOUNTER — APPOINTMENT (OUTPATIENT)
Dept: VASCULAR SURGERY | Facility: CLINIC | Age: 29
End: 2024-03-20
Payer: COMMERCIAL

## 2024-03-20 DIAGNOSIS — Z99.2 DEPENDENCE ON RENAL DIALYSIS: ICD-10-CM

## 2024-03-20 DIAGNOSIS — I77.0 ARTERIOVENOUS FISTULA, ACQUIRED: ICD-10-CM

## 2024-03-20 PROCEDURE — G0506: CPT

## 2024-03-20 PROCEDURE — 99204 OFFICE O/P NEW MOD 45 MIN: CPT | Mod: 25

## 2024-03-20 PROCEDURE — 93990 DOPPLER FLOW TESTING: CPT

## 2024-03-20 NOTE — PHYSICAL EXAM
[Normal Breath Sounds] : Normal breath sounds [Normal Rate and Rhythm] : normal rate and rhythm [Abdominal Aorta] : Normal abdominal aorta [2+] : right 2+ [Abdomen Masses] : No abdominal masses [No Rash or Lesion] : No rash or lesion [Alert] : alert [de-identified] : appears well, no acute distress [Calm] : calm [FreeTextEntry1] : Right brachiocephalic fistula with excellent thrill. 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 [de-identified] : Intact

## 2024-03-20 NOTE — ASSESSMENT
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  Aneurysmal segment cover almost the entire right upper arm and are slightly tense to compression.  Duplex demonstrates moderate stenosis of the mid to distal upper arm and cephalic-subclavian junction.  Recommend reduction of AV fistula aneurysm.  Patient understand risks and benefits of procedure and would like to proceed.   [Arterial/Venous Disease] : arterial/venous disease [Medication Management] : medication management

## 2024-03-20 NOTE — CONSULT LETTER
[Dear  ___] : Dear  [unfilled], [Please see my note below.] : Please see my note below. [Consult Letter:] : I had the pleasure of evaluating your patient, [unfilled]. [Sincerely,] : Sincerely, [Consult Closing:] : Thank you very much for allowing me to participate in the care of this patient.  If you have any questions, please do not hesitate to contact me. [FreeTextEntry3] : Annelise Rosenberg, DNP, APRN, RNFA, AGACNP-BC, FNP-BC, CNOR

## 2024-03-20 NOTE — HISTORY OF PRESENT ILLNESS
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 Patient expresses the aneurysm has become quite large and would like to have it reduced. Patient is currently undergoing work up for renal transplant. Patient denies any present issues with dialysis.  Denies rest pain or claudication symptoms.  Denies tissue loss. No history of MI or CVA. No history of diabetes. No significant smoking history.

## 2024-03-25 ENCOUNTER — NON-APPOINTMENT (OUTPATIENT)
Age: 29
End: 2024-03-25

## 2024-03-28 ENCOUNTER — OUTPATIENT (OUTPATIENT)
Dept: OUTPATIENT SERVICES | Facility: HOSPITAL | Age: 29
LOS: 1 days | End: 2024-03-28
Payer: COMMERCIAL

## 2024-03-28 VITALS
DIASTOLIC BLOOD PRESSURE: 72 MMHG | WEIGHT: 139.99 LBS | RESPIRATION RATE: 16 BRPM | HEIGHT: 74 IN | OXYGEN SATURATION: 99 % | TEMPERATURE: 98 F | SYSTOLIC BLOOD PRESSURE: 118 MMHG | HEART RATE: 77 BPM

## 2024-03-28 DIAGNOSIS — I10 ESSENTIAL (PRIMARY) HYPERTENSION: ICD-10-CM

## 2024-03-28 DIAGNOSIS — J45.909 UNSPECIFIED ASTHMA, UNCOMPLICATED: ICD-10-CM

## 2024-03-28 DIAGNOSIS — T82.898A OTHER SPECIFIED COMPLICATION OF VASCULAR PROSTHETIC DEVICES, IMPLANTS AND GRAFTS, INITIAL ENCOUNTER: ICD-10-CM

## 2024-03-28 DIAGNOSIS — Z01.818 ENCOUNTER FOR OTHER PREPROCEDURAL EXAMINATION: ICD-10-CM

## 2024-03-28 DIAGNOSIS — N18.6 END STAGE RENAL DISEASE: ICD-10-CM

## 2024-03-28 DIAGNOSIS — Z98.890 OTHER SPECIFIED POSTPROCEDURAL STATES: Chronic | ICD-10-CM

## 2024-03-28 RX ORDER — SODIUM CHLORIDE 9 MG/ML
3 INJECTION INTRAMUSCULAR; INTRAVENOUS; SUBCUTANEOUS EVERY 8 HOURS
Refills: 0 | Status: DISCONTINUED | OUTPATIENT
Start: 2024-04-04 | End: 2024-04-18

## 2024-03-28 RX ORDER — PANTOPRAZOLE SODIUM 20 MG/1
1 TABLET, DELAYED RELEASE ORAL
Qty: 0 | Refills: 0 | DISCHARGE

## 2024-03-28 NOTE — H&P PST ADULT - PROBLEM SELECTOR PLAN 3
scheduled for resection of right av fistula aneurysm.      Pt aware to be NPO the night before and the morning of surgery except meds. provided with chlorhexidene 4% solution to wash for 3 days including the morning of surgery. Written instructions given and verbalized understanding. Escort required.     DEJA 2

## 2024-03-28 NOTE — H&P PST ADULT - HISTORY OF PRESENT ILLNESS
28 yr old male with history of IgA nephropathy s/p right AV fistula 2021 started HD (M W F) at [facility] in [place] [phone] Nephrologist Dr Rizo [phone]. History of HTN HLD pt is on the kidney transplant list. Pt in PST for surgery Resection of right av fistula aneurysm on 4/4/2024. Pt seen by PCP Dr Holman : labs and EKG performed awaiting clearance.

## 2024-03-28 NOTE — H&P PST ADULT - ASSESSMENT
28 yr old male with history of IgA nephropathy s/p right AV fistula 2021 started HD (M W F) at [facility] in [place] [phone] Nephrologist Dr Rizo [phone]. History of HTN HLD pt is on the kidney transplant list. Pt in PST for surgery Resection of right av fistula aneurysm on 4/4/2024. Pt seen by PCP Dr Holman : labs and EKG performed awaiting clearance.

## 2024-03-28 NOTE — H&P PST ADULT - NSICDXPASTMEDICALHX_GEN_ALL_CORE_FT
PAST MEDICAL HISTORY:  Asthma     HTN (hypertension)     IgA nephropathy     Other specified complication of vascular prosthetic devices, implants and grafts, initial encounter     Renal failure

## 2024-03-28 NOTE — H&P PST ADULT - NSANTHOBSERVEDRD_ENT_A_CORE
Patient with mom   Cough started yesterday, inhaler makes cough worse   Body aches   Backache   Fever 100.7    Tylenol taken 11:00 AM       No

## 2024-03-28 NOTE — H&P PST ADULT - NSICDXFAMILYHX_GEN_ALL_CORE_FT
FAMILY HISTORY:  Father  Still living? Unknown  Known health problems: none, Age at diagnosis: Age Unknown

## 2024-03-29 PROCEDURE — G0463: CPT

## 2024-04-03 RX ORDER — CHLORHEXIDINE GLUCONATE 213 G/1000ML
1 SOLUTION TOPICAL ONCE
Refills: 0 | Status: DISCONTINUED | OUTPATIENT
Start: 2024-04-04 | End: 2024-04-18

## 2024-04-04 ENCOUNTER — OUTPATIENT (OUTPATIENT)
Dept: OUTPATIENT SERVICES | Facility: HOSPITAL | Age: 29
LOS: 1 days | End: 2024-04-04
Payer: COMMERCIAL

## 2024-04-04 ENCOUNTER — APPOINTMENT (OUTPATIENT)
Dept: VASCULAR SURGERY | Facility: HOSPITAL | Age: 29
End: 2024-04-04

## 2024-04-04 VITALS
HEART RATE: 73 BPM | SYSTOLIC BLOOD PRESSURE: 134 MMHG | RESPIRATION RATE: 16 BRPM | WEIGHT: 139.99 LBS | OXYGEN SATURATION: 100 % | HEIGHT: 74 IN | DIASTOLIC BLOOD PRESSURE: 74 MMHG | TEMPERATURE: 98 F

## 2024-04-04 DIAGNOSIS — T82.898A OTHER SPECIFIED COMPLICATION OF VASCULAR PROSTHETIC DEVICES, IMPLANTS AND GRAFTS, INITIAL ENCOUNTER: ICD-10-CM

## 2024-04-04 DIAGNOSIS — Z98.890 OTHER SPECIFIED POSTPROCEDURAL STATES: Chronic | ICD-10-CM

## 2024-04-04 DIAGNOSIS — Z01.818 ENCOUNTER FOR OTHER PREPROCEDURAL EXAMINATION: ICD-10-CM

## 2024-04-04 DIAGNOSIS — N18.6 END STAGE RENAL DISEASE: ICD-10-CM

## 2024-04-04 LAB
POTASSIUM SERPL-MCNC: 5.4 MMOL/L — HIGH (ref 3.5–5.3)
POTASSIUM SERPL-SCNC: 5.4 MMOL/L — HIGH (ref 3.5–5.3)

## 2024-04-04 PROCEDURE — 84132 ASSAY OF SERUM POTASSIUM: CPT

## 2024-04-04 PROCEDURE — 36415 COLL VENOUS BLD VENIPUNCTURE: CPT

## 2024-04-04 RX ORDER — LOSARTAN POTASSIUM 100 MG/1
1 TABLET, FILM COATED ORAL
Refills: 0 | DISCHARGE

## 2024-04-04 RX ORDER — ATORVASTATIN CALCIUM 80 MG/1
1 TABLET, FILM COATED ORAL
Qty: 0 | Refills: 0 | DISCHARGE

## 2024-04-04 RX ORDER — SEVELAMER CARBONATE 2400 MG/1
1 POWDER, FOR SUSPENSION ORAL
Qty: 0 | Refills: 0 | DISCHARGE

## 2024-04-04 RX ORDER — BUDESONIDE AND FORMOTEROL FUMARATE DIHYDRATE 160; 4.5 UG/1; UG/1
2 AEROSOL RESPIRATORY (INHALATION)
Qty: 0 | Refills: 0 | DISCHARGE

## 2024-04-04 RX ORDER — CARVEDILOL PHOSPHATE 80 MG/1
1 CAPSULE, EXTENDED RELEASE ORAL
Refills: 0 | DISCHARGE

## 2024-04-04 RX ORDER — CALCITRIOL 0.5 UG/1
1 CAPSULE ORAL
Qty: 0 | Refills: 0 | DISCHARGE

## 2024-04-04 RX ORDER — HYDRALAZINE HCL 50 MG
1 TABLET ORAL
Qty: 0 | Refills: 0 | DISCHARGE

## 2024-04-04 NOTE — ASU PATIENT PROFILE, ADULT - VISION (WITH CORRECTIVE LENSES IF THE PATIENT USUALLY WEARS THEM):
Form completed  
From: Lizzie Forbes  To: Antwan Barnett  Sent: 8/13/2021 8:09 AM CDT  Subject: Other    This message is being sent by Brannon Forbes on behalf of Lizzie Forbes    I faxed a sports physical form over but still have not gotten it back. I faxed it again this morning just in case. 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 Thanks for the help.  
LOV 
Patient will come to [facility] office to : form.   Patient was advised of location and hours: Yes.   Patient was advised to bring photo identification: Yes.   Patient elects another party to  item: yes, name: , Mother.    
Normal vision: sees adequately in most situations; can see medication labels, newsprint

## 2024-04-04 NOTE — ASU PATIENT PROFILE, ADULT - FALL HARM RISK - UNIVERSAL INTERVENTIONS
Bed in lowest position, wheels locked, appropriate side rails in place/Call bell, personal items and telephone in reach/Instruct patient to call for assistance before getting out of bed or chair/Non-slip footwear when patient is out of bed/[place] to call system/Physically safe environment - no spills, clutter or unnecessary equipment/Purposeful Proactive Rounding/Room/bathroom lighting operational, light cord in reach

## 2024-04-05 PROBLEM — T82.898A OTHER SPECIFIED COMPLICATION OF VASCULAR PROSTHETIC DEVICES, IMPLANTS AND GRAFTS, INITIAL ENCOUNTER: Chronic | Status: ACTIVE | Noted: 2024-03-28

## 2024-04-16 ENCOUNTER — NON-APPOINTMENT (OUTPATIENT)
Age: 29
End: 2024-04-16

## 2024-04-16 PROBLEM — N18.6 ESRD (END STAGE RENAL DISEASE) ON DIALYSIS: Status: ACTIVE | Noted: 2023-01-27

## 2024-04-16 NOTE — PROCEDURE
[FreeTextEntry1] : Left fistula, fistulogram, angioplasty  [Resume diet] : resume diet [Site check for bleeding/hematoma/thrill/bruit] : Site check for bleeding/hematoma/thrill/bruit [Vital signs on admission the q 15 mins x2] : Vital signs on admission the q 15 mins x2

## 2024-04-16 NOTE — ASSESSMENT
[Other: _____] : [unfilled] [FreeTextEntry1] : 28-year-old male with history ESRD secondary to IgA nephropathy currently on hemodialysis via right upper extremity AV fistula since 2021.  Aneurysmal segment cover almost the entire right upper arm and are slightly tense to compression.  Duplex demonstrates moderate stenosis of the mid to distal upper arm and cephalic-subclavian junction.  Recommend reduction of AV fistula aneurysm.

## 2024-04-16 NOTE — PAST MEDICAL HISTORY
[No therapy indicated for cases scheduled for less than one hour] : No therapy indicated for cases scheduled for less than one hour. [FreeTextEntry1] : Malignant Hyperthermia Screening Tool and Risk of Bleeding Assessment   MARTHA MARTINEZ  denies family of unexpected death following Anesthesia or Exercise. Denies family history of Malignant Hyperthermia, Muscle or Neuromuscular disorder and High Temperature following exercise.   MARTHA MARTINEZ Patient denies history of Muscle Spasm, Dark or Chocolate- Colored and unanticipated fever immediately following anesthesia or serious exercise.   MARTHA MARTINEZ Patient also denies bleeding tendencies/risks of bleeding.

## 2024-04-16 NOTE — HISTORY OF PRESENT ILLNESS
[] : right brachiocephalic fistula [FreeTextEntry1] : 2021 [FreeTextEntry5] : yesterday at  [FreeTextEntry6] : Dr Salcedo

## 2024-04-17 ENCOUNTER — RESULT REVIEW (OUTPATIENT)
Age: 29
End: 2024-04-17

## 2024-04-17 ENCOUNTER — APPOINTMENT (OUTPATIENT)
Dept: ENDOVASCULAR SURGERY | Facility: CLINIC | Age: 29
End: 2024-04-17
Payer: COMMERCIAL

## 2024-04-17 VITALS
DIASTOLIC BLOOD PRESSURE: 89 MMHG | TEMPERATURE: 98.7 F | HEART RATE: 77 BPM | RESPIRATION RATE: 16 BRPM | OXYGEN SATURATION: 99 % | WEIGHT: 138 LBS | HEIGHT: 74 IN | BODY MASS INDEX: 17.71 KG/M2 | SYSTOLIC BLOOD PRESSURE: 138 MMHG

## 2024-04-17 DIAGNOSIS — T82.898A OTHER SPECIFIED COMPLICATION OF VASCULAR PROSTHETIC DEVICES, IMPLANTS AND GRAFTS, INITIAL ENCOUNTER: ICD-10-CM

## 2024-04-17 DIAGNOSIS — N18.6 END STAGE RENAL DISEASE: ICD-10-CM

## 2024-04-17 DIAGNOSIS — Z99.2 END STAGE RENAL DISEASE: ICD-10-CM

## 2024-04-17 DIAGNOSIS — J45.909 UNSPECIFIED ASTHMA, UNCOMPLICATED: ICD-10-CM

## 2024-04-17 PROCEDURE — 36903Z: CUSTOM

## 2024-04-17 PROCEDURE — 36907Z: CUSTOM | Mod: 59

## 2024-04-17 RX ORDER — PANTOPRAZOLE SODIUM 40 MG/1
40 GRANULE, DELAYED RELEASE ORAL
Qty: 90 | Refills: 1 | Status: DISCONTINUED | COMMUNITY
Start: 2021-06-16 | End: 2024-04-17

## 2024-04-17 RX ORDER — BUDESONIDE AND FORMOTEROL FUMARATE DIHYDRATE 160; 4.5 UG/1; UG/1
AEROSOL RESPIRATORY (INHALATION)
Refills: 0 | Status: ACTIVE | COMMUNITY

## 2024-04-17 RX ORDER — CALCITRIOL 0.25 UG/1
0.25 CAPSULE, LIQUID FILLED ORAL
Qty: 30 | Refills: 0 | Status: DISCONTINUED | COMMUNITY
Start: 2021-06-16 | End: 2024-04-17

## 2024-04-22 ENCOUNTER — APPOINTMENT (OUTPATIENT)
Dept: VASCULAR SURGERY | Facility: CLINIC | Age: 29
End: 2024-04-22

## 2024-06-03 ENCOUNTER — APPOINTMENT (OUTPATIENT)
Dept: NEPHROLOGY | Facility: CLINIC | Age: 29
End: 2024-06-03
Payer: COMMERCIAL

## 2024-06-03 VITALS
HEIGHT: 74 IN | TEMPERATURE: 98 F | WEIGHT: 137 LBS | HEART RATE: 90 BPM | RESPIRATION RATE: 16 BRPM | OXYGEN SATURATION: 100 % | BODY MASS INDEX: 17.58 KG/M2 | DIASTOLIC BLOOD PRESSURE: 79 MMHG | SYSTOLIC BLOOD PRESSURE: 122 MMHG

## 2024-06-03 PROCEDURE — 99215 OFFICE O/P EST HI 40 MIN: CPT

## 2024-06-03 NOTE — ASSESSMENT
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    1. s/p DDRT on 4/20/24( at [place], [place]) - Allograft function initially with DGF ( required 2 sessions of IHD in the first week post txp) Now with good UOP and Cr reportedly down to 1's . will f/u labs today  2.Immunosuppression meds- On Balatacept ( next infusion is due this week , infusion center has not been set up yet, he is working with his txp coordinator from [facility] to help set him up in a center near home here) next Rebecca dose is this week, i advised him if the center is not set up by then , ideally he should get it at least this time in [place] so that he does not miss the dose.   On Myfortic 540 mg po bid and Prednisone 10 mg po daily.  3.Ppx- Mepron ,Valcyte and Clotrimazole.  GI ppx with  Famotidine  4.Hypertension -BP wnl, not on meds  Other meds- Allopurinol, Atorvastatin and Magnesium

## 2024-06-03 NOTE — PLAN
[FreeTextEntry1] : f/u in a week need to obtain medical records from [facility] and will fax them today's blood work

## 2024-06-03 NOTE — HISTORY OF PRESENT ILLNESS
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  Primary Nephrologist: Dr Peyman Rizo  He did not bring medical records today.  Reports that his most recent Cr was from 5/22/24 done at [place] and it was around 1 He reports he needed 2 sessions of IHD after transplant, but by the end of the first week, he started having good UOP and Cr was coming down.   Denies any fever, chills, dysuria, LE edema, cough, sob, chest pain , nausea, vomiting , diarrhea, constipation or any other active complaints.

## 2024-06-04 LAB
ALBUMIN SERPL ELPH-MCNC: 4.9 G/DL
ALP BLD-CCNC: 127 U/L
ALT SERPL-CCNC: 13 U/L
ANION GAP SERPL CALC-SCNC: 13 MMOL/L
APPEARANCE: CLEAR
AST SERPL-CCNC: 16 U/L
BACTERIA: NEGATIVE /HPF
BILIRUB SERPL-MCNC: 0.4 MG/DL
BILIRUBIN URINE: NEGATIVE
BLOOD URINE: NEGATIVE
BUN SERPL-MCNC: 21 MG/DL
CALCIUM SERPL-MCNC: 9.9 MG/DL
CALCIUM SERPL-MCNC: 9.9 MG/DL
CAST: 0 /LPF
CHLORIDE SERPL-SCNC: 105 MMOL/L
CO2 SERPL-SCNC: 20 MMOL/L
COLOR: YELLOW
CREAT SERPL-MCNC: 1.13 MG/DL
CREAT SPEC-SCNC: 138 MG/DL
CREAT/PROT UR: 0.1 RATIO
EGFR: 90 ML/MIN/1.73M2
EPITHELIAL CELLS: 1 /HPF
GLUCOSE QUALITATIVE U: NEGATIVE MG/DL
GLUCOSE SERPL-MCNC: 90 MG/DL
HCT VFR BLD CALC: 39.4 %
HGB BLD-MCNC: 12.9 G/DL
KETONES URINE: NEGATIVE MG/DL
LEUKOCYTE ESTERASE URINE: NEGATIVE
MAGNESIUM SERPL-MCNC: 2 MG/DL
MCHC RBC-ENTMCNC: 29.6 PG
MCHC RBC-ENTMCNC: 32.7 GM/DL
MCV RBC AUTO: 90.4 FL
MICROSCOPIC-UA: NORMAL
NITRITE URINE: NEGATIVE
PARATHYROID HORMONE INTACT: 134 PG/ML
PH URINE: 6
PHOSPHATE SERPL-MCNC: 2.8 MG/DL
PLATELET # BLD AUTO: 146 K/UL
POTASSIUM SERPL-SCNC: 4.5 MMOL/L
PROT SERPL-MCNC: 7.2 G/DL
PROT UR-MCNC: 14 MG/DL
PROTEIN URINE: NEGATIVE MG/DL
RBC # BLD: 4.36 M/UL
RBC # FLD: 14.2 %
RED BLOOD CELLS URINE: 2 /HPF
SODIUM SERPL-SCNC: 138 MMOL/L
SPECIFIC GRAVITY URINE: 1.02
TACROLIMUS SERPL-MCNC: <2 NG/ML
URATE SERPL-MCNC: 4.8 MG/DL
UROBILINOGEN URINE: 0.2 MG/DL
WBC # FLD AUTO: 3.97 K/UL
WHITE BLOOD CELLS URINE: 2 /HPF

## 2024-06-05 LAB — BKV DNA SPEC QL NAA+PROBE: 108 IU/ML

## 2024-06-05 RX ORDER — MYCOPHENILIC ACID 360 MG/1
360 TABLET, DELAYED RELEASE ORAL TWICE DAILY
Qty: 60 | Refills: 0 | Status: ACTIVE | COMMUNITY
Start: 2024-06-05

## 2024-06-18 ENCOUNTER — APPOINTMENT (OUTPATIENT)
Dept: NEPHROLOGY | Facility: CLINIC | Age: 29
End: 2024-06-18
Payer: COMMERCIAL

## 2024-06-18 ENCOUNTER — LABORATORY RESULT (OUTPATIENT)
Age: 29
End: 2024-06-18

## 2024-06-18 ENCOUNTER — NON-APPOINTMENT (OUTPATIENT)
Age: 29
End: 2024-06-18

## 2024-06-18 VITALS
RESPIRATION RATE: 16 BRPM | OXYGEN SATURATION: 96 % | TEMPERATURE: 98 F | HEART RATE: 91 BPM | SYSTOLIC BLOOD PRESSURE: 128 MMHG | DIASTOLIC BLOOD PRESSURE: 81 MMHG | HEIGHT: 74 IN | BODY MASS INDEX: 17.97 KG/M2 | WEIGHT: 140 LBS

## 2024-06-18 DIAGNOSIS — Z79.899 OTHER LONG TERM (CURRENT) DRUG THERAPY: ICD-10-CM

## 2024-06-18 DIAGNOSIS — I15.1 HYPERTENSION SECONDARY TO OTHER RENAL DISORDERS: ICD-10-CM

## 2024-06-18 DIAGNOSIS — Z94.0 OTHER LONG TERM (CURRENT) DRUG THERAPY: ICD-10-CM

## 2024-06-18 DIAGNOSIS — Z94.0 KIDNEY TRANSPLANT STATUS: ICD-10-CM

## 2024-06-18 PROCEDURE — 99214 OFFICE O/P EST MOD 30 MIN: CPT

## 2024-06-18 RX ORDER — VALGANCICLOVIR HYDROCHLORIDE 450 MG/1
450 TABLET ORAL
Qty: 30 | Refills: 5 | Status: ACTIVE | COMMUNITY
Start: 2024-06-18 | End: 1900-01-01

## 2024-06-18 RX ORDER — ATOVAQUONE 750 MG/5ML
750 SUSPENSION ORAL DAILY
Qty: 1 | Refills: 11 | Status: ACTIVE | COMMUNITY
Start: 2024-06-18 | End: 1900-01-01

## 2024-06-18 RX ORDER — CARVEDILOL 6.25 MG/1
6.25 TABLET, FILM COATED ORAL
Qty: 180 | Refills: 1 | Status: DISCONTINUED | COMMUNITY
Start: 2022-10-12 | End: 2024-06-18

## 2024-06-18 RX ORDER — SEVELAMER CARBONATE 800 MG/1
800 TABLET, FILM COATED ORAL
Qty: 120 | Refills: 0 | Status: DISCONTINUED | COMMUNITY
Start: 2021-06-16 | End: 2024-06-18

## 2024-06-18 RX ORDER — FAMOTIDINE 20 MG/1
20 TABLET, FILM COATED ORAL
Qty: 30 | Refills: 11 | Status: ACTIVE | COMMUNITY
Start: 2024-06-18 | End: 1900-01-01

## 2024-06-18 RX ORDER — HYDRALAZINE HYDROCHLORIDE 50 MG/1
50 TABLET ORAL 3 TIMES DAILY
Qty: 90 | Refills: 2 | Status: DISCONTINUED | COMMUNITY
Start: 2021-06-16 | End: 2024-06-18

## 2024-06-18 RX ORDER — PREDNISONE 10 MG/1
10 TABLET ORAL
Qty: 30 | Refills: 11 | Status: ACTIVE | COMMUNITY
Start: 2024-06-18 | End: 1900-01-01

## 2024-06-18 RX ORDER — MAGNESIUM OXIDE 241.3 MG/1000MG
400 TABLET ORAL
Qty: 120 | Refills: 3 | Status: ACTIVE | COMMUNITY
Start: 2024-06-18 | End: 1900-01-01

## 2024-06-18 RX ORDER — ALLOPURINOL 100 MG/1
100 TABLET ORAL DAILY
Qty: 15 | Refills: 0 | Status: ACTIVE | COMMUNITY
Start: 2024-06-18 | End: 1900-01-01

## 2024-06-18 RX ORDER — LOSARTAN POTASSIUM 100 MG/1
TABLET, FILM COATED ORAL
Refills: 0 | Status: DISCONTINUED | COMMUNITY
End: 2024-06-18

## 2024-06-18 NOTE — HISTORY OF PRESENT ILLNESS
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  Primary Nephrologist: Dr Peyman Rizo  Reports that his most recent Cr was from 5/22/24 done at [place] and it was around 1 He reports he needed 2 sessions of IHD after transplant, but by the end of the first week, he started having good UOP and Cr was coming down.   Denies any fever, chills, dysuria, LE edema, cough, sob, chest pain , nausea, vomiting , diarrhea, constipation or any other active complaints.

## 2024-06-18 NOTE — ASSESSMENT
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    1. s/p DDRT on 4/20/24( at [place], [place]) - Allograft function initially with DGF ( required 2 sessions of IHD in the first week post txp) Now with good UOP and Cr reportedly down to 1's . will f/u labs today  2.Immunosuppression meds- On Balatacept , Myfortic 540 mg po bid and Prednisone 10 mg po daily.  3.Ppx- Mepron ,Valcyte and Clotrimazole.  GI ppx with  Famotidine  4.Hypertension -BP wnl, not on meds  Other meds- Allopurinol, Atorvastatin and Magnesium

## 2024-06-18 NOTE — PHYSICAL EXAM
[General Appearance - Alert] : alert [General Appearance - In No Acute Distress] : in no acute distress [Sclera] : the sclera and conjunctiva were normal [PERRL With Normal Accommodation] : pupils were equal in size, round, and reactive to light [Extraocular Movements] : extraocular movements were intact [Outer Ear] : the ears and nose were normal in appearance [Oropharynx] : the oropharynx was normal [Neck Appearance] : the appearance of the neck was normal [Neck Cervical Mass (___cm)] : no neck mass was observed [Jugular Venous Distention Increased] : there was no jugular-venous distention [Thyroid Diffuse Enlargement] : the thyroid was not enlarged [Thyroid Nodule] : there were no palpable thyroid nodules [Heart Rate And Rhythm] : heart rate was normal and rhythm regular [Auscultation Breath Sounds / Voice Sounds] : lungs were clear to auscultation bilaterally [Heart Sounds] : normal S1 and S2 [Heart Sounds Gallop] : no gallops [Murmurs] : no murmurs [Heart Sounds Pericardial Friction Rub] : no pericardial rub [Full Pulse] : the pedal pulses are present [Edema] : there was no peripheral edema [Bowel Sounds] : normal bowel sounds [Abdomen Soft] : soft [Abdomen Tenderness] : non-tender [Abdomen Mass (___ Cm)] : no abdominal mass palpated [Abnormal Walk] : normal gait [Nail Clubbing] : no clubbing  or cyanosis of the fingernails [Musculoskeletal - Swelling] : no joint swelling seen [Motor Tone] : muscle strength and tone were normal [Skin Color & Pigmentation] : normal skin color and pigmentation [Skin Turgor] : normal skin turgor [] : no rash [Normal] : normal [Sensation] : the sensory exam was normal to light touch and pinprick [Deep Tendon Reflexes (DTR)] : deep tendon reflexes were 2+ and symmetric [No Focal Deficits] : no focal deficits [Oriented To Time, Place, And Person] : oriented to person, place, and time [Impaired Insight] : insight and judgment were intact [Affect] : the affect was normal

## 2024-06-19 LAB
ALBUMIN SERPL ELPH-MCNC: 5.1 G/DL
ALP BLD-CCNC: 109 U/L
ALT SERPL-CCNC: 12 U/L
ANION GAP SERPL CALC-SCNC: 10 MMOL/L
APPEARANCE: CLEAR
AST SERPL-CCNC: 17 U/L
BACTERIA: NEGATIVE /HPF
BASOPHILS # BLD AUTO: 0 K/UL
BASOPHILS NFR BLD AUTO: 0 %
BILIRUB SERPL-MCNC: 0.5 MG/DL
BILIRUBIN URINE: NEGATIVE
BLOOD URINE: NEGATIVE
BUN SERPL-MCNC: 22 MG/DL
CALCIUM SERPL-MCNC: 10.4 MG/DL
CAST: 0 /LPF
CHLORIDE SERPL-SCNC: 103 MMOL/L
CO2 SERPL-SCNC: 21 MMOL/L
COLOR: YELLOW
CREAT SERPL-MCNC: 1.26 MG/DL
CREAT SPEC-SCNC: 97 MG/DL
CREAT/PROT UR: 0.1 RATIO
EGFR: 79 ML/MIN/1.73M2
EOSINOPHIL # BLD AUTO: 0 K/UL
EOSINOPHIL NFR BLD AUTO: 0 %
EPITHELIAL CELLS: 1 /HPF
GLUCOSE QUALITATIVE U: NEGATIVE MG/DL
GLUCOSE SERPL-MCNC: 113 MG/DL
HCT VFR BLD CALC: 44.8 %
HGB BLD-MCNC: 14.5 G/DL
KETONES URINE: NEGATIVE MG/DL
LEUKOCYTE ESTERASE URINE: NEGATIVE
LYMPHOCYTES # BLD AUTO: 0.16 K/UL
LYMPHOCYTES NFR BLD AUTO: 3.5 %
MAGNESIUM SERPL-MCNC: 2.1 MG/DL
MAN DIFF?: NORMAL
MCHC RBC-ENTMCNC: 29.2 PG
MCHC RBC-ENTMCNC: 32.4 GM/DL
MCV RBC AUTO: 90.1 FL
MICROSCOPIC-UA: NORMAL
MONOCYTES # BLD AUTO: 0.16 K/UL
MONOCYTES NFR BLD AUTO: 3.5 %
NEUTROPHILS # BLD AUTO: 3.89 K/UL
NEUTROPHILS NFR BLD AUTO: 83.3 %
NITRITE URINE: NEGATIVE
PH URINE: 6.5
PHOSPHATE SERPL-MCNC: 2.7 MG/DL
PLATELET # BLD AUTO: 177 K/UL
POTASSIUM SERPL-SCNC: 5.6 MMOL/L
PROT SERPL-MCNC: 7.5 G/DL
PROT UR-MCNC: 8 MG/DL
PROTEIN URINE: NEGATIVE MG/DL
RBC # BLD: 4.97 M/UL
RBC # FLD: 14.5 %
RED BLOOD CELLS URINE: 1 /HPF
SODIUM SERPL-SCNC: 135 MMOL/L
SPECIFIC GRAVITY URINE: 1.02
TACROLIMUS SERPL-MCNC: <2 NG/ML
URATE SERPL-MCNC: 5.5 MG/DL
UROBILINOGEN URINE: 0.2 MG/DL
WBC # FLD AUTO: 4.43 K/UL
WHITE BLOOD CELLS URINE: 0 /HPF

## 2024-06-20 LAB — BKV DNA SPEC QL NAA+PROBE: 82 IU/ML

## 2024-07-05 ENCOUNTER — LABORATORY RESULT (OUTPATIENT)
Age: 29
End: 2024-07-05

## 2024-07-05 ENCOUNTER — NON-APPOINTMENT (OUTPATIENT)
Age: 29
End: 2024-07-05

## 2024-07-05 ENCOUNTER — APPOINTMENT (OUTPATIENT)
Dept: TRANSPLANT | Facility: CLINIC | Age: 29
End: 2024-07-05

## 2024-07-05 LAB
ALBUMIN SERPL ELPH-MCNC: 4.5 G/DL
ALP BLD-CCNC: 88 U/L
ALT SERPL-CCNC: 10 U/L
ANION GAP SERPL CALC-SCNC: 11 MMOL/L
AST SERPL-CCNC: 14 U/L
BASOPHILS # BLD AUTO: 0 K/UL
BASOPHILS NFR BLD AUTO: 0 %
BILIRUB SERPL-MCNC: 0.5 MG/DL
BUN SERPL-MCNC: 22 MG/DL
CALCIUM SERPL-MCNC: 9.5 MG/DL
CALCIUM SERPL-MCNC: 9.5 MG/DL
CHLORIDE SERPL-SCNC: 106 MMOL/L
CMV DNA SPEC QL NAA+PROBE: NOT DETECTED IU/ML
CMVPCR LOG: NOT DETECTED LOG10IU/ML
CO2 SERPL-SCNC: 21 MMOL/L
CREAT SERPL-MCNC: 1.14 MG/DL
CREAT SPEC-SCNC: 134 MG/DL
CREAT/PROT UR: 0.1 RATIO
EGFR: 89 ML/MIN/1.73M2
EOSINOPHIL # BLD AUTO: 0 K/UL
EOSINOPHIL NFR BLD AUTO: 0 %
GLUCOSE SERPL-MCNC: 95 MG/DL
HCT VFR BLD CALC: 42.3 %
HGB BLD-MCNC: 13.7 G/DL
LYMPHOCYTES # BLD AUTO: 0.1 K/UL
LYMPHOCYTES NFR BLD AUTO: 3.5 %
MAGNESIUM SERPL-MCNC: 2 MG/DL
MAN DIFF?: NORMAL
MCHC RBC-ENTMCNC: 29 PG
MCHC RBC-ENTMCNC: 32.4 GM/DL
MCV RBC AUTO: 89.6 FL
MONOCYTES # BLD AUTO: 0.05 K/UL
MONOCYTES NFR BLD AUTO: 1.7 %
NEUTROPHILS # BLD AUTO: 2.77 K/UL
NEUTROPHILS NFR BLD AUTO: 87 %
PARATHYROID HORMONE INTACT: 73 PG/ML
PHOSPHATE SERPL-MCNC: 3 MG/DL
PLATELET # BLD AUTO: 151 K/UL
POTASSIUM SERPL-SCNC: 4.1 MMOL/L
PROT SERPL-MCNC: 6.9 G/DL
PROT UR-MCNC: 13 MG/DL
RBC # BLD: 4.72 M/UL
RBC # FLD: 13.7 %
SODIUM SERPL-SCNC: 138 MMOL/L
TACROLIMUS SERPL-MCNC: <2 NG/ML
WBC # FLD AUTO: 2.98 K/UL

## 2024-07-11 ENCOUNTER — APPOINTMENT (OUTPATIENT)
Dept: CARDIOLOGY | Facility: CLINIC | Age: 29
End: 2024-07-11

## 2024-07-18 LAB
APPEARANCE: CLEAR
BACTERIA: NEGATIVE /HPF
BILIRUBIN URINE: NEGATIVE
BKV DNA SPEC QL NAA+PROBE: 221 IU/ML
BLOOD URINE: NEGATIVE
CAST: 0 /LPF
COLOR: YELLOW
EPITHELIAL CELLS: 1 /HPF
GLUCOSE QUALITATIVE U: NEGATIVE MG/DL
KETONES URINE: NEGATIVE MG/DL
LEUKOCYTE ESTERASE URINE: NEGATIVE
MICROSCOPIC-UA: NORMAL
NITRITE URINE: NEGATIVE
PH URINE: 6
PROTEIN URINE: NORMAL MG/DL
RED BLOOD CELLS URINE: 0 /HPF
SPECIFIC GRAVITY URINE: 1.02
UROBILINOGEN URINE: 0.2 MG/DL
WHITE BLOOD CELLS URINE: 2 /HPF

## 2024-07-31 ENCOUNTER — NON-APPOINTMENT (OUTPATIENT)
Age: 29
End: 2024-07-31

## 2024-07-31 ENCOUNTER — LABORATORY RESULT (OUTPATIENT)
Age: 29
End: 2024-07-31

## 2024-07-31 ENCOUNTER — APPOINTMENT (OUTPATIENT)
Dept: NEPHROLOGY | Facility: CLINIC | Age: 29
End: 2024-07-31
Payer: COMMERCIAL

## 2024-07-31 VITALS
WEIGHT: 143 LBS | TEMPERATURE: 98.3 F | HEIGHT: 74 IN | RESPIRATION RATE: 16 BRPM | HEART RATE: 78 BPM | BODY MASS INDEX: 18.35 KG/M2 | OXYGEN SATURATION: 97 % | SYSTOLIC BLOOD PRESSURE: 143 MMHG | DIASTOLIC BLOOD PRESSURE: 83 MMHG

## 2024-07-31 DIAGNOSIS — Z94.0 KIDNEY TRANSPLANT STATUS: ICD-10-CM

## 2024-07-31 DIAGNOSIS — I15.1 HYPERTENSION SECONDARY TO OTHER RENAL DISORDERS: ICD-10-CM

## 2024-07-31 DIAGNOSIS — Z94.0 OTHER LONG TERM (CURRENT) DRUG THERAPY: ICD-10-CM

## 2024-07-31 DIAGNOSIS — Z79.899 OTHER LONG TERM (CURRENT) DRUG THERAPY: ICD-10-CM

## 2024-07-31 PROCEDURE — 99214 OFFICE O/P EST MOD 30 MIN: CPT

## 2024-07-31 NOTE — HISTORY OF PRESENT ILLNESS
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  Primary Nephrologist: Dr Peyman Rizo  Reports that his most recent Cr was from 5/22/24 done at [facility] and it was around 1 He reports he needed 2 sessions of IHD after transplant, but by the end of the first week, he started having good UOP and Cr was coming down.   Denies any fever, chills, dysuria, LE edema, cough, sob, chest pain , nausea, vomiting , diarrhea, constipation or any other active complaints.

## 2024-07-31 NOTE — ASSESSMENT
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    1. s/p DDRT on 4/20/24( at [place], [place]) - Allograft function initially with DGF ( required 2 sessions of IHD in the first week post txp) Now with good UOP and Cr reportedly down to 1's . will f/u labs today  2.Immunosuppression meds- On Belatacept , Myfortic 540 mg po bid and Prednisone 10 mg po daily.  3.Ppx- Mepron ,Valcyte and Clotrimazole.  GI ppx with Famotidine  4.Hypertension -BP wnl, not on meds  Other meds- Allopurinol, Atorvastatin and Magnesium

## 2024-07-31 NOTE — PHYSICAL EXAM
[General Appearance - Alert] : alert [General Appearance - In No Acute Distress] : in no acute distress [Sclera] : the sclera and conjunctiva were normal [PERRL With Normal Accommodation] : pupils were equal in size, round, and reactive to light [Extraocular Movements] : extraocular movements were intact [Outer Ear] : the ears and nose were normal in appearance [Oropharynx] : the oropharynx was normal [Neck Cervical Mass (___cm)] : no neck mass was observed [Neck Appearance] : the appearance of the neck was normal [Jugular Venous Distention Increased] : there was no jugular-venous distention [Thyroid Diffuse Enlargement] : the thyroid was not enlarged [Thyroid Nodule] : there were no palpable thyroid nodules [Auscultation Breath Sounds / Voice Sounds] : lungs were clear to auscultation bilaterally [Heart Rate And Rhythm] : heart rate was normal and rhythm regular [Heart Sounds Gallop] : no gallops [Heart Sounds] : normal S1 and S2 [Murmurs] : no murmurs [Heart Sounds Pericardial Friction Rub] : no pericardial rub [Full Pulse] : the pedal pulses are present [Edema] : there was no peripheral edema [Bowel Sounds] : normal bowel sounds [Abdomen Soft] : soft [Abdomen Tenderness] : non-tender [Abdomen Mass (___ Cm)] : no abdominal mass palpated [Abnormal Walk] : normal gait [Nail Clubbing] : no clubbing  or cyanosis of the fingernails [Musculoskeletal - Swelling] : no joint swelling seen [Motor Tone] : muscle strength and tone were normal [Skin Color & Pigmentation] : normal skin color and pigmentation [Skin Turgor] : normal skin turgor [] : no rash [Normal] : normal [Deep Tendon Reflexes (DTR)] : deep tendon reflexes were 2+ and symmetric [No Focal Deficits] : no focal deficits [Sensation] : the sensory exam was normal to light touch and pinprick [Oriented To Time, Place, And Person] : oriented to person, place, and time [Impaired Insight] : insight and judgment were intact [Affect] : the affect was normal

## 2024-08-01 ENCOUNTER — NON-APPOINTMENT (OUTPATIENT)
Age: 29
End: 2024-08-01

## 2024-08-01 LAB
ALBUMIN SERPL ELPH-MCNC: 4.7 G/DL
ALP BLD-CCNC: 88 U/L
ALT SERPL-CCNC: 9 U/L
ANION GAP SERPL CALC-SCNC: 13 MMOL/L
APPEARANCE: CLEAR
AST SERPL-CCNC: 17 U/L
BACTERIA: NEGATIVE /HPF
BASOPHILS # BLD AUTO: 0.03 K/UL
BASOPHILS NFR BLD AUTO: 0.8 %
BILIRUB SERPL-MCNC: 0.7 MG/DL
BILIRUBIN URINE: NEGATIVE
BKV DNA SPEC QL NAA+PROBE: ABNORMAL IU/ML
BLOOD URINE: NEGATIVE
BUN SERPL-MCNC: 17 MG/DL
CALCIUM SERPL-MCNC: 9.8 MG/DL
CAST: 0 /LPF
CHLORIDE SERPL-SCNC: 105 MMOL/L
CMV DNA SPEC QL NAA+PROBE: NOT DETECTED IU/ML
CMVPCR LOG: NOT DETECTED LOG10IU/ML
CO2 SERPL-SCNC: 21 MMOL/L
COLOR: YELLOW
CREAT SERPL-MCNC: 1.29 MG/DL
CREAT SPEC-SCNC: 136 MG/DL
CREAT/PROT UR: 0.1 RATIO
EGFR: 77 ML/MIN/1.73M2
EOSINOPHIL # BLD AUTO: 0.04 K/UL
EOSINOPHIL NFR BLD AUTO: 1 %
EPITHELIAL CELLS: 1 /HPF
GLUCOSE QUALITATIVE U: NEGATIVE MG/DL
GLUCOSE SERPL-MCNC: 94 MG/DL
HCT VFR BLD CALC: 42.3 %
HGB BLD-MCNC: 13.8 G/DL
IMM GRANULOCYTES NFR BLD AUTO: 1.5 %
KETONES URINE: NEGATIVE MG/DL
LEUKOCYTE ESTERASE URINE: NEGATIVE
LYMPHOCYTES # BLD AUTO: 0.15 K/UL
LYMPHOCYTES NFR BLD AUTO: 3.8 %
MAGNESIUM SERPL-MCNC: 2.1 MG/DL
MAN DIFF?: NORMAL
MCHC RBC-ENTMCNC: 28.9 PG
MCHC RBC-ENTMCNC: 32.6 GM/DL
MCV RBC AUTO: 88.5 FL
MICROSCOPIC-UA: NORMAL
MONOCYTES # BLD AUTO: 0.21 K/UL
MONOCYTES NFR BLD AUTO: 5.4 %
NEUTROPHILS # BLD AUTO: 3.42 K/UL
NEUTROPHILS NFR BLD AUTO: 87.5 %
NITRITE URINE: NEGATIVE
PH URINE: 8
PHOSPHATE SERPL-MCNC: 2.9 MG/DL
PLATELET # BLD AUTO: 145 K/UL
POTASSIUM SERPL-SCNC: 4.7 MMOL/L
PROT SERPL-MCNC: 7.1 G/DL
PROT UR-MCNC: 10 MG/DL
PROTEIN URINE: NEGATIVE MG/DL
RBC # BLD: 4.78 M/UL
RBC # FLD: 13.4 %
RED BLOOD CELLS URINE: 2 /HPF
SODIUM SERPL-SCNC: 138 MMOL/L
SPECIFIC GRAVITY URINE: 1.02
URATE SERPL-MCNC: 6.3 MG/DL
UROBILINOGEN URINE: 0.2 MG/DL
WBC # FLD AUTO: 3.91 K/UL
WHITE BLOOD CELLS URINE: 0 /HPF

## 2024-08-13 DIAGNOSIS — Z94.0 KIDNEY TRANSPLANT STATUS: ICD-10-CM

## 2024-08-14 ENCOUNTER — LABORATORY RESULT (OUTPATIENT)
Age: 29
End: 2024-08-14

## 2024-08-14 ENCOUNTER — APPOINTMENT (OUTPATIENT)
Dept: TRANSPLANT | Facility: CLINIC | Age: 29
End: 2024-08-14

## 2024-08-15 ENCOUNTER — NON-APPOINTMENT (OUTPATIENT)
Age: 29
End: 2024-08-15

## 2024-08-15 LAB
ALBUMIN SERPL ELPH-MCNC: 4.4 G/DL
ALP BLD-CCNC: 75 U/L
ALT SERPL-CCNC: 10 U/L
ANION GAP SERPL CALC-SCNC: 15 MMOL/L
APPEARANCE: CLEAR
AST SERPL-CCNC: 16 U/L
BACTERIA: NEGATIVE /HPF
BASOPHILS # BLD AUTO: 0.01 K/UL
BASOPHILS NFR BLD AUTO: 0.2 %
BILIRUB SERPL-MCNC: 0.6 MG/DL
BILIRUBIN URINE: NEGATIVE
BLOOD URINE: NEGATIVE
BUN SERPL-MCNC: 21 MG/DL
CALCIUM SERPL-MCNC: 9.9 MG/DL
CAST: 0 /LPF
CHLORIDE SERPL-SCNC: 102 MMOL/L
CMV DNA SPEC QL NAA+PROBE: NOT DETECTED IU/ML
CMVPCR LOG: NOT DETECTED LOG10IU/ML
CO2 SERPL-SCNC: 21 MMOL/L
COLOR: YELLOW
CREAT SERPL-MCNC: 1.19 MG/DL
CREAT SPEC-SCNC: 207 MG/DL
CREAT/PROT UR: 0.1 RATIO
EGFR: 85 ML/MIN/1.73M2
EOSINOPHIL # BLD AUTO: 0.14 K/UL
EOSINOPHIL NFR BLD AUTO: 3.3 %
EPITHELIAL CELLS: 1 /HPF
GLUCOSE QUALITATIVE U: NEGATIVE MG/DL
GLUCOSE SERPL-MCNC: 99 MG/DL
HCT VFR BLD CALC: 42.8 %
HGB BLD-MCNC: 13.5 G/DL
IMM GRANULOCYTES NFR BLD AUTO: 0.7 %
KETONES URINE: NEGATIVE MG/DL
LEUKOCYTE ESTERASE URINE: NEGATIVE
LYMPHOCYTES # BLD AUTO: 0.16 K/UL
LYMPHOCYTES NFR BLD AUTO: 3.7 %
MAGNESIUM SERPL-MCNC: 1.9 MG/DL
MAN DIFF?: NORMAL
MCHC RBC-ENTMCNC: 28.2 PG
MCHC RBC-ENTMCNC: 31.5 GM/DL
MCV RBC AUTO: 89.4 FL
MICROSCOPIC-UA: NORMAL
MONOCYTES # BLD AUTO: 0.25 K/UL
MONOCYTES NFR BLD AUTO: 5.8 %
NEUTROPHILS # BLD AUTO: 3.7 K/UL
NEUTROPHILS NFR BLD AUTO: 86.3 %
NITRITE URINE: NEGATIVE
PH URINE: 6
PHOSPHATE SERPL-MCNC: 2.3 MG/DL
PLATELET # BLD AUTO: 144 K/UL
POTASSIUM SERPL-SCNC: 4.2 MMOL/L
PROT SERPL-MCNC: 6.9 G/DL
PROT UR-MCNC: 17 MG/DL
PROTEIN URINE: NORMAL MG/DL
RBC # BLD: 4.79 M/UL
RBC # FLD: 13.3 %
RED BLOOD CELLS URINE: 4 /HPF
SODIUM SERPL-SCNC: 138 MMOL/L
SPECIFIC GRAVITY URINE: 1.03
UROBILINOGEN URINE: 0.2 MG/DL
WBC # FLD AUTO: 4.29 K/UL
WHITE BLOOD CELLS URINE: 1 /HPF

## 2024-08-17 LAB — BKV DNA SPEC QL NAA+PROBE: ABNORMAL IU/ML

## 2024-09-04 ENCOUNTER — LABORATORY RESULT (OUTPATIENT)
Age: 29
End: 2024-09-04

## 2024-09-04 ENCOUNTER — APPOINTMENT (OUTPATIENT)
Dept: NEPHROLOGY | Facility: CLINIC | Age: 29
End: 2024-09-04
Payer: COMMERCIAL

## 2024-09-04 VITALS
WEIGHT: 142 LBS | HEIGHT: 74 IN | DIASTOLIC BLOOD PRESSURE: 84 MMHG | OXYGEN SATURATION: 100 % | SYSTOLIC BLOOD PRESSURE: 136 MMHG | HEART RATE: 65 BPM | RESPIRATION RATE: 15 BRPM | BODY MASS INDEX: 18.22 KG/M2 | TEMPERATURE: 98 F

## 2024-09-04 DIAGNOSIS — Z79.899 OTHER LONG TERM (CURRENT) DRUG THERAPY: ICD-10-CM

## 2024-09-04 DIAGNOSIS — I15.1 HYPERTENSION SECONDARY TO OTHER RENAL DISORDERS: ICD-10-CM

## 2024-09-04 DIAGNOSIS — Z94.0 KIDNEY TRANSPLANT STATUS: ICD-10-CM

## 2024-09-04 DIAGNOSIS — Z94.0 OTHER LONG TERM (CURRENT) DRUG THERAPY: ICD-10-CM

## 2024-09-04 PROCEDURE — 99214 OFFICE O/P EST MOD 30 MIN: CPT

## 2024-09-05 ENCOUNTER — NON-APPOINTMENT (OUTPATIENT)
Age: 29
End: 2024-09-05

## 2024-09-05 LAB
ALBUMIN SERPL ELPH-MCNC: 4.7 G/DL
ALP BLD-CCNC: 84 U/L
ALT SERPL-CCNC: 27 U/L
ANION GAP SERPL CALC-SCNC: 11 MMOL/L
APPEARANCE: CLEAR
AST SERPL-CCNC: 22 U/L
BACTERIA: NEGATIVE /HPF
BASOPHILS # BLD AUTO: 0.04 K/UL
BASOPHILS NFR BLD AUTO: 0.9 %
BILIRUB SERPL-MCNC: 0.6 MG/DL
BILIRUBIN URINE: NEGATIVE
BLOOD URINE: NEGATIVE
BUN SERPL-MCNC: 13 MG/DL
CALCIUM SERPL-MCNC: 10.1 MG/DL
CAST: 0 /LPF
CHLORIDE SERPL-SCNC: 104 MMOL/L
CO2 SERPL-SCNC: 24 MMOL/L
COLOR: YELLOW
CREAT SERPL-MCNC: 1.14 MG/DL
CREAT SPEC-SCNC: 40 MG/DL
CREAT/PROT UR: 0.1 RATIO
EGFR: 89 ML/MIN/1.73M2
EOSINOPHIL # BLD AUTO: 0.07 K/UL
EOSINOPHIL NFR BLD AUTO: 1.6 %
EPITHELIAL CELLS: 0 /HPF
GLUCOSE QUALITATIVE U: NEGATIVE MG/DL
GLUCOSE SERPL-MCNC: 97 MG/DL
HCT VFR BLD CALC: 44.6 %
HGB BLD-MCNC: 14.3 G/DL
IMM GRANULOCYTES NFR BLD AUTO: 0.9 %
KETONES URINE: NEGATIVE MG/DL
LEUKOCYTE ESTERASE URINE: NEGATIVE
LYMPHOCYTES # BLD AUTO: 0.39 K/UL
LYMPHOCYTES NFR BLD AUTO: 8.9 %
MAGNESIUM SERPL-MCNC: 2 MG/DL
MAN DIFF?: NORMAL
MCHC RBC-ENTMCNC: 28.6 PG
MCHC RBC-ENTMCNC: 32.1 GM/DL
MCV RBC AUTO: 89.2 FL
MICROSCOPIC-UA: NORMAL
MONOCYTES # BLD AUTO: 0.24 K/UL
MONOCYTES NFR BLD AUTO: 5.5 %
NEUTROPHILS # BLD AUTO: 3.62 K/UL
NEUTROPHILS NFR BLD AUTO: 82.2 %
NITRITE URINE: NEGATIVE
PH URINE: 7
PHOSPHATE SERPL-MCNC: 2 MG/DL
PLATELET # BLD AUTO: 120 K/UL
POTASSIUM SERPL-SCNC: 4.3 MMOL/L
PROT SERPL-MCNC: 7.2 G/DL
PROT UR-MCNC: 5 MG/DL
PROTEIN URINE: NEGATIVE MG/DL
RBC # BLD: 5 M/UL
RBC # FLD: 13.1 %
RED BLOOD CELLS URINE: 0 /HPF
SODIUM SERPL-SCNC: 138 MMOL/L
SPECIFIC GRAVITY URINE: 1.01
TACROLIMUS SERPL-MCNC: <2 NG/ML
UROBILINOGEN URINE: 0.2 MG/DL
WBC # FLD AUTO: 4.4 K/UL
WHITE BLOOD CELLS URINE: 0 /HPF

## 2024-09-05 NOTE — HISTORY OF PRESENT ILLNESS
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  Primary Nephrologist: Dr Peyman Rizo  Reports that his most recent Cr was from 5/22/24 done at [place] and it was around 1 He reports he needed 2 sessions of IHD after transplant, but by the end of the first week, he started having good UOP and Cr was coming down.   Denies any fever, chills, dysuria, LE edema, cough, sob, chest pain , nausea, vomiting , diarrhea, constipation or any other active complaints.

## 2024-09-05 NOTE — ASSESSMENT
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    1. s/p DDRT on 4/20/24( at [place], [place]) - Allograft function initially with DGF ( required 2 sessions of IHD in the first week post txp) Now with good UOP and Cr reportedly down to 1's . will f/u labs today  2.Immunosuppression meds- On Belatacept , Myfortic 540 mg po bid and Prednisone 10 mg po daily.  3.Ppx- Mepron ,Valcyte and Clotrimazole.  GI ppx with Famotidine  4.Hypertension -BP wnl, not on meds  Other meds- Allopurinol, Atorvastatin and Magnesium

## 2024-09-09 LAB — BKV DNA SPEC QL NAA+PROBE: 319 IU/ML

## 2024-10-09 ENCOUNTER — APPOINTMENT (OUTPATIENT)
Dept: TRANSPLANT | Facility: CLINIC | Age: 29
End: 2024-10-09

## 2024-10-09 DIAGNOSIS — Z94.0 KIDNEY TRANSPLANT STATUS: ICD-10-CM

## 2024-10-14 ENCOUNTER — NON-APPOINTMENT (OUTPATIENT)
Age: 29
End: 2024-10-14

## 2024-10-14 LAB
ALBUMIN SERPL ELPH-MCNC: 4.9 G/DL
ALP BLD-CCNC: 84 U/L
ALT SERPL-CCNC: 8 U/L
ANION GAP SERPL CALC-SCNC: 15 MMOL/L
APPEARANCE: CLEAR
AST SERPL-CCNC: 16 U/L
BACTERIA: NEGATIVE /HPF
BASOPHILS # BLD AUTO: 0.02 K/UL
BASOPHILS NFR BLD AUTO: 0.3 %
BILIRUB SERPL-MCNC: 0.9 MG/DL
BILIRUBIN URINE: NEGATIVE
BKV DNA SPEC QL NAA+PROBE: ABNORMAL IU/ML
BLOOD URINE: NEGATIVE
BUN SERPL-MCNC: 15 MG/DL
CALCIUM SERPL-MCNC: 10 MG/DL
CAST: 0 /LPF
CHLORIDE SERPL-SCNC: 100 MMOL/L
CMV DNA SPEC QL NAA+PROBE: NOT DETECTED IU/ML
CMVPCR LOG: NOT DETECTED LOG10IU/ML
CO2 SERPL-SCNC: 24 MMOL/L
COLOR: YELLOW
CREAT SERPL-MCNC: 1.19 MG/DL
CREAT SPEC-SCNC: 151 MG/DL
CREAT/PROT UR: 0.1 RATIO
EGFR: 85 ML/MIN/1.73M2
EOSINOPHIL # BLD AUTO: 0.04 K/UL
EOSINOPHIL NFR BLD AUTO: 0.7 %
EPITHELIAL CELLS: 0 /HPF
GLUCOSE QUALITATIVE U: NEGATIVE MG/DL
GLUCOSE SERPL-MCNC: 92 MG/DL
HCT VFR BLD CALC: 47.6 %
HGB BLD-MCNC: 15.7 G/DL
IMM GRANULOCYTES NFR BLD AUTO: 0.3 %
KETONES URINE: NEGATIVE MG/DL
LEUKOCYTE ESTERASE URINE: NEGATIVE
LYMPHOCYTES # BLD AUTO: 0.76 K/UL
LYMPHOCYTES NFR BLD AUTO: 12.5 %
MAGNESIUM SERPL-MCNC: 2.1 MG/DL
MAN DIFF?: NORMAL
MCHC RBC-ENTMCNC: 29.3 PG
MCHC RBC-ENTMCNC: 33 GM/DL
MCV RBC AUTO: 88.8 FL
MICROSCOPIC-UA: NORMAL
MONOCYTES # BLD AUTO: 0.2 K/UL
MONOCYTES NFR BLD AUTO: 3.3 %
NEUTROPHILS # BLD AUTO: 5.03 K/UL
NEUTROPHILS NFR BLD AUTO: 82.9 %
NITRITE URINE: NEGATIVE
PH URINE: 8
PHOSPHATE SERPL-MCNC: 2.3 MG/DL
PLATELET # BLD AUTO: 159 K/UL
POTASSIUM SERPL-SCNC: 4.8 MMOL/L
PROT SERPL-MCNC: 7.5 G/DL
PROT UR-MCNC: 12 MG/DL
PROTEIN URINE: NORMAL MG/DL
RBC # BLD: 5.36 M/UL
RBC # FLD: 13.6 %
RED BLOOD CELLS URINE: 2 /HPF
SODIUM SERPL-SCNC: 139 MMOL/L
SPECIFIC GRAVITY URINE: 1.02
UROBILINOGEN URINE: 0.2 MG/DL
WBC # FLD AUTO: 6.07 K/UL
WHITE BLOOD CELLS URINE: 0 /HPF

## 2024-11-03 NOTE — H&P ADULT - NSHPLABSRESULTS_GEN_ALL_CORE
[NL] : regular rate and rhythm, normal S1, S2 audible, no murmurs [FreeTextEntry7] : + r/r/w R>L. No distress I have reviewed the labs, imaging and ekg. EKG with NSR HR 71,QTc 434, TWI V1, V2 CXR w/o focal consolidation or edema

## 2024-11-06 ENCOUNTER — APPOINTMENT (OUTPATIENT)
Dept: NEPHROLOGY | Facility: CLINIC | Age: 29
End: 2024-11-06

## 2024-11-26 ENCOUNTER — APPOINTMENT (OUTPATIENT)
Dept: NEPHROLOGY | Facility: CLINIC | Age: 29
End: 2024-11-26
Payer: COMMERCIAL

## 2024-11-26 VITALS
DIASTOLIC BLOOD PRESSURE: 85 MMHG | BODY MASS INDEX: 18.99 KG/M2 | OXYGEN SATURATION: 97 % | HEIGHT: 74 IN | TEMPERATURE: 98.2 F | RESPIRATION RATE: 15 BRPM | WEIGHT: 148 LBS | HEART RATE: 85 BPM | SYSTOLIC BLOOD PRESSURE: 141 MMHG

## 2024-11-26 DIAGNOSIS — Z94.0 OTHER LONG TERM (CURRENT) DRUG THERAPY: ICD-10-CM

## 2024-11-26 DIAGNOSIS — I15.1 HYPERTENSION SECONDARY TO OTHER RENAL DISORDERS: ICD-10-CM

## 2024-11-26 DIAGNOSIS — Z94.0 KIDNEY TRANSPLANT STATUS: ICD-10-CM

## 2024-11-26 DIAGNOSIS — Z79.899 OTHER LONG TERM (CURRENT) DRUG THERAPY: ICD-10-CM

## 2024-11-26 PROCEDURE — 99214 OFFICE O/P EST MOD 30 MIN: CPT

## 2024-11-27 ENCOUNTER — NON-APPOINTMENT (OUTPATIENT)
Age: 29
End: 2024-11-27

## 2024-11-27 LAB
ALBUMIN SERPL ELPH-MCNC: 4.6 G/DL
ALP BLD-CCNC: 60 U/L
ALT SERPL-CCNC: 13 U/L
ANION GAP SERPL CALC-SCNC: 14 MMOL/L
APPEARANCE: CLEAR
AST SERPL-CCNC: 20 U/L
BASOPHILS # BLD AUTO: 0.03 K/UL
BASOPHILS NFR BLD AUTO: 0.6 %
BILIRUB SERPL-MCNC: 0.9 MG/DL
BILIRUBIN URINE: NEGATIVE
BLOOD URINE: NEGATIVE
BUN SERPL-MCNC: 18 MG/DL
CALCIUM SERPL-MCNC: 9.9 MG/DL
CALCIUM SERPL-MCNC: 9.9 MG/DL
CHLORIDE SERPL-SCNC: 103 MMOL/L
CO2 SERPL-SCNC: 23 MMOL/L
COLOR: YELLOW
CREAT SERPL-MCNC: 1.19 MG/DL
CREAT SPEC-SCNC: 95 MG/DL
CREAT/PROT UR: 0.1 RATIO
EGFR: 84 ML/MIN/1.73M2
EOSINOPHIL # BLD AUTO: 0.1 K/UL
EOSINOPHIL NFR BLD AUTO: 1.9 %
GLUCOSE QUALITATIVE U: NEGATIVE MG/DL
GLUCOSE SERPL-MCNC: 91 MG/DL
HCT VFR BLD CALC: 44.7 %
HGB BLD-MCNC: 14.8 G/DL
IMM GRANULOCYTES NFR BLD AUTO: 0.4 %
KETONES URINE: NEGATIVE MG/DL
LEUKOCYTE ESTERASE URINE: NEGATIVE
LYMPHOCYTES # BLD AUTO: 0.6 K/UL
LYMPHOCYTES NFR BLD AUTO: 11.6 %
MAGNESIUM SERPL-MCNC: 2 MG/DL
MAN DIFF?: NORMAL
MCHC RBC-ENTMCNC: 29.2 PG
MCHC RBC-ENTMCNC: 33.1 G/DL
MCV RBC AUTO: 88.2 FL
MONOCYTES # BLD AUTO: 0.3 K/UL
MONOCYTES NFR BLD AUTO: 5.8 %
NEUTROPHILS # BLD AUTO: 4.11 K/UL
NEUTROPHILS NFR BLD AUTO: 79.7 %
NITRITE URINE: NEGATIVE
PARATHYROID HORMONE INTACT: 44 PG/ML
PH URINE: 6
PHOSPHATE SERPL-MCNC: 3.3 MG/DL
PLATELET # BLD AUTO: 153 K/UL
POTASSIUM SERPL-SCNC: 3.9 MMOL/L
PROT SERPL-MCNC: 7.2 G/DL
PROT UR-MCNC: 9 MG/DL
PROTEIN URINE: NEGATIVE MG/DL
RBC # BLD: 5.07 M/UL
RBC # FLD: 13.1 %
SODIUM SERPL-SCNC: 141 MMOL/L
SPECIFIC GRAVITY URINE: 1.01
UROBILINOGEN URINE: 0.2 MG/DL
WBC # FLD AUTO: 5.16 K/UL

## 2024-11-29 ENCOUNTER — NON-APPOINTMENT (OUTPATIENT)
Age: 29
End: 2024-11-29

## 2024-11-29 LAB — BKV DNA SPEC QL NAA+PROBE: 86 IU/ML

## 2024-12-20 ENCOUNTER — APPOINTMENT (OUTPATIENT)
Dept: TRANSPLANT | Facility: CLINIC | Age: 29
End: 2024-12-20

## 2024-12-20 DIAGNOSIS — Z94.0 KIDNEY TRANSPLANT STATUS: ICD-10-CM

## 2024-12-20 LAB
ALBUMIN SERPL ELPH-MCNC: 4.8 G/DL
ALP BLD-CCNC: 61 U/L
ALT SERPL-CCNC: 14 U/L
ANION GAP SERPL CALC-SCNC: 13 MMOL/L
APPEARANCE: CLEAR
AST SERPL-CCNC: 18 U/L
BACTERIA: NEGATIVE /HPF
BILIRUB SERPL-MCNC: 1 MG/DL
BILIRUBIN URINE: NEGATIVE
BLOOD URINE: NEGATIVE
BUN SERPL-MCNC: 19 MG/DL
CALCIUM SERPL-MCNC: 10.1 MG/DL
CAST: 0 /LPF
CHLORIDE SERPL-SCNC: 103 MMOL/L
CO2 SERPL-SCNC: 24 MMOL/L
COLOR: YELLOW
CREAT SERPL-MCNC: 1.19 MG/DL
CREAT SPEC-SCNC: 218 MG/DL
CREAT/PROT UR: 0.1 RATIO
EGFR: 84 ML/MIN/1.73M2
EPITHELIAL CELLS: 0 /HPF
GLUCOSE QUALITATIVE U: NEGATIVE MG/DL
GLUCOSE SERPL-MCNC: 83 MG/DL
HCT VFR BLD CALC: 45.2 %
HGB BLD-MCNC: 15.1 G/DL
KETONES URINE: NEGATIVE MG/DL
LEUKOCYTE ESTERASE URINE: NEGATIVE
MAGNESIUM SERPL-MCNC: 2 MG/DL
MCHC RBC-ENTMCNC: 29 PG
MCHC RBC-ENTMCNC: 33.4 G/DL
MCV RBC AUTO: 86.9 FL
MICROSCOPIC-UA: NORMAL
NITRITE URINE: NEGATIVE
PH URINE: 5.5
PHOSPHATE SERPL-MCNC: 2.8 MG/DL
PLATELET # BLD AUTO: 153 K/UL
POTASSIUM SERPL-SCNC: 4.2 MMOL/L
PROT SERPL-MCNC: 7.3 G/DL
PROT UR-MCNC: 26 MG/DL
PROTEIN URINE: NORMAL MG/DL
RBC # BLD: 5.2 M/UL
RBC # FLD: 12.8 %
RED BLOOD CELLS URINE: 1 /HPF
SODIUM SERPL-SCNC: 140 MMOL/L
SPECIFIC GRAVITY URINE: 1.03
UROBILINOGEN URINE: 0.2 MG/DL
WBC # FLD AUTO: 5.63 K/UL
WHITE BLOOD CELLS URINE: 0 /HPF

## 2024-12-26 LAB
BKV DNA SPEC QL NAA+PROBE: NOT DETECTED IU/ML
CMV DNA SPEC QL NAA+PROBE: NOT DETECTED IU/ML
CMVPCR LOG: NOT DETECTED LOG10IU/ML

## 2025-01-16 DIAGNOSIS — Z94.0 KIDNEY TRANSPLANT STATUS: ICD-10-CM

## 2025-01-17 ENCOUNTER — APPOINTMENT (OUTPATIENT)
Dept: TRANSPLANT | Facility: CLINIC | Age: 30
End: 2025-01-17

## 2025-01-17 RX ORDER — BELATACEPT 250 MG/1
250 INJECTION, POWDER, LYOPHILIZED, FOR SOLUTION INTRAVENOUS
Qty: 2 | Refills: 11 | Status: ACTIVE | COMMUNITY
Start: 2025-01-17 | End: 1900-01-01

## 2025-01-21 ENCOUNTER — NON-APPOINTMENT (OUTPATIENT)
Age: 30
End: 2025-01-21

## 2025-01-21 LAB
ALBUMIN SERPL ELPH-MCNC: 4.6 G/DL
ALP BLD-CCNC: 68 U/L
ALT SERPL-CCNC: 16 U/L
ANION GAP SERPL CALC-SCNC: 11 MMOL/L
APPEARANCE: CLEAR
AST SERPL-CCNC: 18 U/L
BASOPHILS # BLD AUTO: 0.04 K/UL
BASOPHILS NFR BLD AUTO: 0.8 %
BILIRUB SERPL-MCNC: 0.7 MG/DL
BILIRUBIN URINE: NEGATIVE
BKV DNA SPEC QL NAA+PROBE: NOT DETECTED IU/ML
BLOOD URINE: NEGATIVE
BUN SERPL-MCNC: 18 MG/DL
CALCIUM SERPL-MCNC: 9.6 MG/DL
CHLORIDE SERPL-SCNC: 102 MMOL/L
CMV DNA SPEC QL NAA+PROBE: NOT DETECTED IU/ML
CMVPCR LOG: NOT DETECTED LOG10IU/ML
CO2 SERPL-SCNC: 25 MMOL/L
COLOR: YELLOW
CREAT SERPL-MCNC: 1.19 MG/DL
CREAT SPEC-SCNC: 103 MG/DL
CREAT/PROT UR: 0.1 RATIO
EGFR: 84 ML/MIN/1.73M2
EOSINOPHIL # BLD AUTO: 0.24 K/UL
EOSINOPHIL NFR BLD AUTO: 4.9 %
GLUCOSE QUALITATIVE U: NEGATIVE MG/DL
GLUCOSE SERPL-MCNC: 83 MG/DL
HCT VFR BLD CALC: 44.4 %
HGB BLD-MCNC: 14.8 G/DL
IMM GRANULOCYTES NFR BLD AUTO: 0.2 %
KETONES URINE: NEGATIVE MG/DL
LEUKOCYTE ESTERASE URINE: NEGATIVE
LYMPHOCYTES # BLD AUTO: 0.95 K/UL
LYMPHOCYTES NFR BLD AUTO: 19.3 %
MAGNESIUM SERPL-MCNC: 2.1 MG/DL
MAN DIFF?: NORMAL
MCHC RBC-ENTMCNC: 29.2 PG
MCHC RBC-ENTMCNC: 33.3 G/DL
MCV RBC AUTO: 87.6 FL
MONOCYTES # BLD AUTO: 0.35 K/UL
MONOCYTES NFR BLD AUTO: 7.1 %
NEUTROPHILS # BLD AUTO: 3.32 K/UL
NEUTROPHILS NFR BLD AUTO: 67.7 %
NITRITE URINE: NEGATIVE
PH URINE: 6
PHOSPHATE SERPL-MCNC: 3.4 MG/DL
PLATELET # BLD AUTO: 153 K/UL
POTASSIUM SERPL-SCNC: 4 MMOL/L
PROT SERPL-MCNC: 6.8 G/DL
PROT UR-MCNC: 8 MG/DL
PROTEIN URINE: NEGATIVE MG/DL
RBC # BLD: 5.07 M/UL
RBC # FLD: 13.1 %
SODIUM SERPL-SCNC: 139 MMOL/L
SPECIFIC GRAVITY URINE: 1.02
UROBILINOGEN URINE: 0.2 MG/DL
WBC # FLD AUTO: 4.91 K/UL

## 2025-01-24 ENCOUNTER — APPOINTMENT (OUTPATIENT)
Dept: NEPHROLOGY | Facility: CLINIC | Age: 30
End: 2025-01-24

## 2025-02-12 ENCOUNTER — APPOINTMENT (OUTPATIENT)
Dept: NEPHROLOGY | Facility: CLINIC | Age: 30
End: 2025-02-12
Payer: COMMERCIAL

## 2025-02-12 VITALS
SYSTOLIC BLOOD PRESSURE: 172 MMHG | WEIGHT: 149 LBS | TEMPERATURE: 97.8 F | HEIGHT: 74 IN | OXYGEN SATURATION: 98 % | DIASTOLIC BLOOD PRESSURE: 89 MMHG | HEART RATE: 78 BPM | BODY MASS INDEX: 19.12 KG/M2

## 2025-02-12 DIAGNOSIS — Z94.0 OTHER LONG TERM (CURRENT) DRUG THERAPY: ICD-10-CM

## 2025-02-12 DIAGNOSIS — I15.1 HYPERTENSION SECONDARY TO OTHER RENAL DISORDERS: ICD-10-CM

## 2025-02-12 DIAGNOSIS — Z79.899 OTHER LONG TERM (CURRENT) DRUG THERAPY: ICD-10-CM

## 2025-02-12 DIAGNOSIS — Z94.0 KIDNEY TRANSPLANT STATUS: ICD-10-CM

## 2025-02-12 PROCEDURE — 99214 OFFICE O/P EST MOD 30 MIN: CPT

## 2025-02-13 LAB
ALBUMIN SERPL ELPH-MCNC: 4.7 G/DL
ALP BLD-CCNC: 67 U/L
ALT SERPL-CCNC: 15 U/L
ANION GAP SERPL CALC-SCNC: 13 MMOL/L
APPEARANCE: CLEAR
AST SERPL-CCNC: 17 U/L
BACTERIA: NEGATIVE /HPF
BILIRUB SERPL-MCNC: 1 MG/DL
BILIRUBIN URINE: NEGATIVE
BLOOD URINE: NEGATIVE
BUN SERPL-MCNC: 21 MG/DL
CALCIUM SERPL-MCNC: 9.9 MG/DL
CALCIUM SERPL-MCNC: 9.9 MG/DL
CAST: 0 /LPF
CHLORIDE SERPL-SCNC: 102 MMOL/L
CMV DNA SPEC QL NAA+PROBE: NOT DETECTED IU/ML
CMVPCR LOG: NOT DETECTED LOG10IU/ML
CO2 SERPL-SCNC: 21 MMOL/L
COLOR: YELLOW
CREAT SERPL-MCNC: 1.35 MG/DL
CREAT SPEC-SCNC: 205 MG/DL
CREAT/PROT UR: 0.1 RATIO
EGFR: 72 ML/MIN/1.73M2
EPITHELIAL CELLS: 0 /HPF
GLUCOSE QUALITATIVE U: NEGATIVE MG/DL
GLUCOSE SERPL-MCNC: 96 MG/DL
HCT VFR BLD CALC: 43.6 %
HGB BLD-MCNC: 14.9 G/DL
KETONES URINE: NEGATIVE MG/DL
LEUKOCYTE ESTERASE URINE: NEGATIVE
MAGNESIUM SERPL-MCNC: 1.9 MG/DL
MCHC RBC-ENTMCNC: 29.4 PG
MCHC RBC-ENTMCNC: 34.2 G/DL
MCV RBC AUTO: 86 FL
MICROSCOPIC-UA: NORMAL
NITRITE URINE: NEGATIVE
PARATHYROID HORMONE INTACT: 68 PG/ML
PH URINE: 6
PHOSPHATE SERPL-MCNC: 2.5 MG/DL
PLATELET # BLD AUTO: 153 K/UL
POTASSIUM SERPL-SCNC: 4.3 MMOL/L
PROT SERPL-MCNC: 7.1 G/DL
PROT UR-MCNC: 18 MG/DL
PROTEIN URINE: NORMAL MG/DL
RBC # BLD: 5.07 M/UL
RBC # FLD: 13.2 %
RED BLOOD CELLS URINE: 1 /HPF
SODIUM SERPL-SCNC: 137 MMOL/L
SPECIFIC GRAVITY URINE: 1.03
URATE SERPL-MCNC: 5.5 MG/DL
UROBILINOGEN URINE: 0.2 MG/DL
WBC # FLD AUTO: 6.52 K/UL
WHITE BLOOD CELLS URINE: 1 /HPF

## 2025-02-18 LAB — BKV DNA SPEC QL NAA+PROBE: NOT DETECTED IU/ML

## 2025-02-27 ENCOUNTER — NON-APPOINTMENT (OUTPATIENT)
Age: 30
End: 2025-02-27

## 2025-02-27 ENCOUNTER — APPOINTMENT (OUTPATIENT)
Dept: TRANSPLANT | Facility: CLINIC | Age: 30
End: 2025-02-27

## 2025-02-27 LAB
ALBUMIN SERPL ELPH-MCNC: 4.9 G/DL
ALP BLD-CCNC: 69 U/L
ALT SERPL-CCNC: 14 U/L
ANION GAP SERPL CALC-SCNC: 11 MMOL/L
AST SERPL-CCNC: 17 U/L
BASOPHILS # BLD AUTO: 0.02 K/UL
BASOPHILS NFR BLD AUTO: 0.4 %
BILIRUB SERPL-MCNC: 1.4 MG/DL
BUN SERPL-MCNC: 15 MG/DL
CALCIUM SERPL-MCNC: 10.3 MG/DL
CHLORIDE SERPL-SCNC: 104 MMOL/L
CO2 SERPL-SCNC: 26 MMOL/L
CREAT SERPL-MCNC: 1.25 MG/DL
CREAT SPEC-SCNC: 38 MG/DL
CREAT/PROT UR: NORMAL RATIO
EGFR: 79 ML/MIN/1.73M2
EOSINOPHIL # BLD AUTO: 0.06 K/UL
EOSINOPHIL NFR BLD AUTO: 1.1 %
GLUCOSE SERPL-MCNC: 88 MG/DL
HCT VFR BLD CALC: 46.8 %
HGB BLD-MCNC: 15.2 G/DL
IMM GRANULOCYTES NFR BLD AUTO: 0.2 %
LYMPHOCYTES # BLD AUTO: 0.75 K/UL
LYMPHOCYTES NFR BLD AUTO: 13.9 %
MAGNESIUM SERPL-MCNC: 1.9 MG/DL
MAN DIFF?: NORMAL
MCHC RBC-ENTMCNC: 29.1 PG
MCHC RBC-ENTMCNC: 32.5 G/DL
MCV RBC AUTO: 89.5 FL
MONOCYTES # BLD AUTO: 0.26 K/UL
MONOCYTES NFR BLD AUTO: 4.8 %
NEUTROPHILS # BLD AUTO: 4.31 K/UL
NEUTROPHILS NFR BLD AUTO: 79.6 %
PHOSPHATE SERPL-MCNC: 2.6 MG/DL
PLATELET # BLD AUTO: 148 K/UL
POTASSIUM SERPL-SCNC: 4.9 MMOL/L
PROT SERPL-MCNC: 7.2 G/DL
PROT UR-MCNC: <4 MG/DL
RBC # BLD: 5.23 M/UL
RBC # FLD: 13 %
SODIUM SERPL-SCNC: 141 MMOL/L
WBC # FLD AUTO: 5.41 K/UL

## 2025-03-03 LAB
APPEARANCE: CLEAR
BACTERIA: NEGATIVE /HPF
BILIRUBIN URINE: NEGATIVE
BLOOD URINE: NEGATIVE
CAST: 0 /LPF
COLOR: YELLOW
EPITHELIAL CELLS: 0 /HPF
GLUCOSE QUALITATIVE U: NEGATIVE MG/DL
KETONES URINE: NEGATIVE MG/DL
LEUKOCYTE ESTERASE URINE: NEGATIVE
MICROSCOPIC-UA: NORMAL
NITRITE URINE: NEGATIVE
PH URINE: 7.5
PROTEIN URINE: NEGATIVE MG/DL
RED BLOOD CELLS URINE: 0 /HPF
SPECIFIC GRAVITY URINE: 1.01
UROBILINOGEN URINE: 0.2 MG/DL
WHITE BLOOD CELLS URINE: 0 /HPF

## 2025-03-10 ENCOUNTER — APPOINTMENT (OUTPATIENT)
Dept: VASCULAR SURGERY | Facility: CLINIC | Age: 30
End: 2025-03-10
Payer: COMMERCIAL

## 2025-03-10 ENCOUNTER — NON-APPOINTMENT (OUTPATIENT)
Age: 30
End: 2025-03-10

## 2025-03-10 PROCEDURE — 99214 OFFICE O/P EST MOD 30 MIN: CPT

## 2025-03-10 PROCEDURE — 93931 UPPER EXTREMITY STUDY: CPT

## 2025-03-13 DIAGNOSIS — Z94.0 KIDNEY TRANSPLANT STATUS: ICD-10-CM

## 2025-03-18 ENCOUNTER — APPOINTMENT (OUTPATIENT)
Dept: VASCULAR SURGERY | Facility: CLINIC | Age: 30
End: 2025-03-18
Payer: COMMERCIAL

## 2025-03-18 PROCEDURE — 93990 DOPPLER FLOW TESTING: CPT

## 2025-03-18 PROCEDURE — 99215 OFFICE O/P EST HI 40 MIN: CPT

## 2025-03-19 ENCOUNTER — APPOINTMENT (OUTPATIENT)
Dept: NEPHROLOGY | Facility: CLINIC | Age: 30
End: 2025-03-19

## 2025-03-19 ENCOUNTER — INPATIENT (INPATIENT)
Facility: HOSPITAL | Age: 30
LOS: 2 days | Discharge: HOME | DRG: 252 | End: 2025-03-22
Attending: SURGERY | Admitting: SURGERY
Payer: COMMERCIAL

## 2025-03-19 VITALS
TEMPERATURE: 98 F | SYSTOLIC BLOOD PRESSURE: 153 MMHG | HEART RATE: 82 BPM | DIASTOLIC BLOOD PRESSURE: 97 MMHG | OXYGEN SATURATION: 97 % | WEIGHT: 149.91 LBS | HEIGHT: 74 IN | RESPIRATION RATE: 17 BRPM

## 2025-03-19 DIAGNOSIS — T82.898A OTHER SPECIFIED COMPLICATION OF VASCULAR PROSTHETIC DEVICES, IMPLANTS AND GRAFTS, INITIAL ENCOUNTER: ICD-10-CM

## 2025-03-19 DIAGNOSIS — Z98.890 OTHER SPECIFIED POSTPROCEDURAL STATES: Chronic | ICD-10-CM

## 2025-03-19 LAB
ALBUMIN SERPL ELPH-MCNC: 4.4 G/DL — SIGNIFICANT CHANGE UP (ref 3.3–5)
ALP SERPL-CCNC: 78 U/L — SIGNIFICANT CHANGE UP (ref 40–120)
ALT FLD-CCNC: 22 U/L — SIGNIFICANT CHANGE UP (ref 10–45)
ANION GAP SERPL CALC-SCNC: 12 MMOL/L — SIGNIFICANT CHANGE UP (ref 5–17)
APTT BLD: 34.4 SEC — SIGNIFICANT CHANGE UP (ref 24.5–35.6)
AST SERPL-CCNC: 21 U/L — SIGNIFICANT CHANGE UP (ref 10–40)
BASOPHILS # BLD AUTO: 0.01 K/UL — SIGNIFICANT CHANGE UP (ref 0–0.2)
BASOPHILS NFR BLD AUTO: 0.2 % — SIGNIFICANT CHANGE UP (ref 0–2)
BILIRUB SERPL-MCNC: 0.8 MG/DL — SIGNIFICANT CHANGE UP (ref 0.2–1.2)
BUN SERPL-MCNC: 19 MG/DL — SIGNIFICANT CHANGE UP (ref 7–23)
CALCIUM SERPL-MCNC: 9.7 MG/DL — SIGNIFICANT CHANGE UP (ref 8.4–10.5)
CHLORIDE SERPL-SCNC: 104 MMOL/L — SIGNIFICANT CHANGE UP (ref 96–108)
CO2 SERPL-SCNC: 25 MMOL/L — SIGNIFICANT CHANGE UP (ref 22–31)
CREAT SERPL-MCNC: 1.2 MG/DL — SIGNIFICANT CHANGE UP (ref 0.5–1.3)
EGFR: 83 ML/MIN/1.73M2 — SIGNIFICANT CHANGE UP
EGFR: 83 ML/MIN/1.73M2 — SIGNIFICANT CHANGE UP
EOSINOPHIL # BLD AUTO: 0.16 K/UL — SIGNIFICANT CHANGE UP (ref 0–0.5)
EOSINOPHIL NFR BLD AUTO: 4 % — SIGNIFICANT CHANGE UP (ref 0–6)
GLUCOSE SERPL-MCNC: 81 MG/DL — SIGNIFICANT CHANGE UP (ref 70–99)
HCT VFR BLD CALC: 45 % — SIGNIFICANT CHANGE UP (ref 39–50)
HGB BLD-MCNC: 14.9 G/DL — SIGNIFICANT CHANGE UP (ref 13–17)
IMM GRANULOCYTES NFR BLD AUTO: 0.2 % — SIGNIFICANT CHANGE UP (ref 0–0.9)
INR BLD: 1.24 RATIO — HIGH (ref 0.85–1.16)
LYMPHOCYTES # BLD AUTO: 0.75 K/UL — LOW (ref 1–3.3)
LYMPHOCYTES # BLD AUTO: 18.6 % — SIGNIFICANT CHANGE UP (ref 13–44)
MCHC RBC-ENTMCNC: 29.5 PG — SIGNIFICANT CHANGE UP (ref 27–34)
MCHC RBC-ENTMCNC: 33.1 G/DL — SIGNIFICANT CHANGE UP (ref 32–36)
MCV RBC AUTO: 89.1 FL — SIGNIFICANT CHANGE UP (ref 80–100)
MONOCYTES # BLD AUTO: 0.47 K/UL — SIGNIFICANT CHANGE UP (ref 0–0.9)
MONOCYTES NFR BLD AUTO: 11.6 % — SIGNIFICANT CHANGE UP (ref 2–14)
NEUTROPHILS # BLD AUTO: 2.64 K/UL — SIGNIFICANT CHANGE UP (ref 1.8–7.4)
NEUTROPHILS NFR BLD AUTO: 65.4 % — SIGNIFICANT CHANGE UP (ref 43–77)
NRBC BLD AUTO-RTO: 0 /100 WBCS — SIGNIFICANT CHANGE UP (ref 0–0)
PLATELET # BLD AUTO: 162 K/UL — SIGNIFICANT CHANGE UP (ref 150–400)
POTASSIUM SERPL-MCNC: 3.6 MMOL/L — SIGNIFICANT CHANGE UP (ref 3.5–5.3)
POTASSIUM SERPL-SCNC: 3.6 MMOL/L — SIGNIFICANT CHANGE UP (ref 3.5–5.3)
PROT SERPL-MCNC: 7.2 G/DL — SIGNIFICANT CHANGE UP (ref 6–8.3)
PROTHROM AB SERPL-ACNC: 14.2 SEC — HIGH (ref 9.9–13.4)
RBC # BLD: 5.05 M/UL — SIGNIFICANT CHANGE UP (ref 4.2–5.8)
RBC # FLD: 13.2 % — SIGNIFICANT CHANGE UP (ref 10.3–14.5)
SODIUM SERPL-SCNC: 141 MMOL/L — SIGNIFICANT CHANGE UP (ref 135–145)
WBC # BLD: 4.04 K/UL — SIGNIFICANT CHANGE UP (ref 3.8–10.5)
WBC # FLD AUTO: 4.04 K/UL — SIGNIFICANT CHANGE UP (ref 3.8–10.5)

## 2025-03-19 PROCEDURE — 99285 EMERGENCY DEPT VISIT HI MDM: CPT

## 2025-03-19 PROCEDURE — 99223 1ST HOSP IP/OBS HIGH 75: CPT | Mod: 57

## 2025-03-19 PROCEDURE — 99222 1ST HOSP IP/OBS MODERATE 55: CPT | Mod: GC

## 2025-03-19 PROCEDURE — 71045 X-RAY EXAM CHEST 1 VIEW: CPT | Mod: 26

## 2025-03-19 RX ORDER — MYCOPHENOLATE MOFETIL 500 MG/1
520 TABLET, FILM COATED ORAL
Refills: 0 | Status: DISCONTINUED | OUTPATIENT
Start: 2025-03-19 | End: 2025-03-19

## 2025-03-19 RX ORDER — MYCOPHENOLATE MOFETIL 500 MG/1
540 TABLET, FILM COATED ORAL
Refills: 0 | Status: DISCONTINUED | OUTPATIENT
Start: 2025-03-19 | End: 2025-03-19

## 2025-03-19 RX ORDER — ACETAMINOPHEN 500 MG/5ML
975 LIQUID (ML) ORAL EVERY 6 HOURS
Refills: 0 | Status: DISCONTINUED | OUTPATIENT
Start: 2025-03-19 | End: 2025-03-20

## 2025-03-19 RX ORDER — PREDNISONE 20 MG/1
5 TABLET ORAL DAILY
Refills: 0 | Status: DISCONTINUED | OUTPATIENT
Start: 2025-03-19 | End: 2025-03-20

## 2025-03-19 RX ORDER — BELATACEPT 250 MG/1
5 INJECTION, POWDER, LYOPHILIZED, FOR SOLUTION INTRAVENOUS
Refills: 0 | DISCHARGE

## 2025-03-19 RX ORDER — MYCOPHENOLIC ACID 360 MG/1
1 TABLET, DELAYED RELEASE ORAL
Refills: 0 | DISCHARGE

## 2025-03-19 RX ORDER — MAGNESIUM OXIDE 400 MG
1 TABLET ORAL
Refills: 0 | DISCHARGE

## 2025-03-19 RX ORDER — ATOVAQUONE 750 MG/5ML
10 SUSPENSION ORAL
Refills: 0 | DISCHARGE

## 2025-03-19 RX ORDER — ATOVAQUONE 750 MG/5ML
1500 SUSPENSION ORAL DAILY
Refills: 0 | Status: DISCONTINUED | OUTPATIENT
Start: 2025-03-19 | End: 2025-03-20

## 2025-03-19 RX ORDER — HEPARIN SODIUM 1000 [USP'U]/ML
5000 INJECTION INTRAVENOUS; SUBCUTANEOUS EVERY 8 HOURS
Refills: 0 | Status: DISCONTINUED | OUTPATIENT
Start: 2025-03-20 | End: 2025-03-20

## 2025-03-19 RX ORDER — ATOVAQUONE 750 MG/5ML
750 SUSPENSION ORAL DAILY
Refills: 0 | Status: DISCONTINUED | OUTPATIENT
Start: 2025-03-19 | End: 2025-03-19

## 2025-03-19 RX ORDER — PREDNISONE 20 MG/1
1 TABLET ORAL
Refills: 0 | DISCHARGE

## 2025-03-19 RX ORDER — ATORVASTATIN CALCIUM 80 MG/1
40 TABLET, FILM COATED ORAL AT BEDTIME
Refills: 0 | Status: DISCONTINUED | OUTPATIENT
Start: 2025-03-19 | End: 2025-03-20

## 2025-03-19 RX ORDER — MYCOPHENOLIC ACID 360 MG/1
360 TABLET, DELAYED RELEASE ORAL
Refills: 0 | Status: DISCONTINUED | OUTPATIENT
Start: 2025-03-19 | End: 2025-03-19

## 2025-03-19 RX ORDER — MYCOPHENOLIC ACID 360 MG/1
540 TABLET, DELAYED RELEASE ORAL
Refills: 0 | Status: DISCONTINUED | OUTPATIENT
Start: 2025-03-19 | End: 2025-03-20

## 2025-03-19 RX ADMIN — Medication 975 MILLIGRAM(S): at 19:58

## 2025-03-19 RX ADMIN — Medication 975 MILLIGRAM(S): at 20:58

## 2025-03-19 RX ADMIN — MYCOPHENOLIC ACID 540 MILLIGRAM(S): 360 TABLET, DELAYED RELEASE ORAL at 19:58

## 2025-03-20 ENCOUNTER — RESULT REVIEW (OUTPATIENT)
Age: 30
End: 2025-03-20

## 2025-03-20 ENCOUNTER — TRANSCRIPTION ENCOUNTER (OUTPATIENT)
Age: 30
End: 2025-03-20

## 2025-03-20 ENCOUNTER — APPOINTMENT (OUTPATIENT)
Dept: VASCULAR SURGERY | Facility: HOSPITAL | Age: 30
End: 2025-03-20

## 2025-03-20 LAB
ANION GAP SERPL CALC-SCNC: 14 MMOL/L — SIGNIFICANT CHANGE UP (ref 5–17)
APTT BLD: 33.5 SEC — SIGNIFICANT CHANGE UP (ref 24.5–35.6)
BLD GP AB SCN SERPL QL: NEGATIVE — SIGNIFICANT CHANGE UP
BUN SERPL-MCNC: 18 MG/DL — SIGNIFICANT CHANGE UP (ref 7–23)
CALCIUM SERPL-MCNC: 9.2 MG/DL — SIGNIFICANT CHANGE UP (ref 8.4–10.5)
CHLORIDE SERPL-SCNC: 104 MMOL/L — SIGNIFICANT CHANGE UP (ref 96–108)
CO2 SERPL-SCNC: 22 MMOL/L — SIGNIFICANT CHANGE UP (ref 22–31)
CREAT SERPL-MCNC: 1.1 MG/DL — SIGNIFICANT CHANGE UP (ref 0.5–1.3)
EGFR: 93 ML/MIN/1.73M2 — SIGNIFICANT CHANGE UP
EGFR: 93 ML/MIN/1.73M2 — SIGNIFICANT CHANGE UP
GLUCOSE SERPL-MCNC: 86 MG/DL — SIGNIFICANT CHANGE UP (ref 70–99)
HCT VFR BLD CALC: 41.7 % — SIGNIFICANT CHANGE UP (ref 39–50)
HGB BLD-MCNC: 13.6 G/DL — SIGNIFICANT CHANGE UP (ref 13–17)
INR BLD: 1.18 RATIO — HIGH (ref 0.85–1.16)
MAGNESIUM SERPL-MCNC: 1.9 MG/DL — SIGNIFICANT CHANGE UP (ref 1.6–2.6)
MCHC RBC-ENTMCNC: 28.8 PG — SIGNIFICANT CHANGE UP (ref 27–34)
MCHC RBC-ENTMCNC: 32.6 G/DL — SIGNIFICANT CHANGE UP (ref 32–36)
MCV RBC AUTO: 88.3 FL — SIGNIFICANT CHANGE UP (ref 80–100)
NRBC BLD AUTO-RTO: 0 /100 WBCS — SIGNIFICANT CHANGE UP (ref 0–0)
PHOSPHATE SERPL-MCNC: 3.5 MG/DL — SIGNIFICANT CHANGE UP (ref 2.5–4.5)
PLATELET # BLD AUTO: 127 K/UL — LOW (ref 150–400)
POTASSIUM SERPL-MCNC: 3.8 MMOL/L — SIGNIFICANT CHANGE UP (ref 3.5–5.3)
POTASSIUM SERPL-SCNC: 3.8 MMOL/L — SIGNIFICANT CHANGE UP (ref 3.5–5.3)
PROTHROM AB SERPL-ACNC: 13.4 SEC — SIGNIFICANT CHANGE UP (ref 9.9–13.4)
RBC # BLD: 4.72 M/UL — SIGNIFICANT CHANGE UP (ref 4.2–5.8)
RBC # FLD: 13 % — SIGNIFICANT CHANGE UP (ref 10.3–14.5)
RH IG SCN BLD-IMP: POSITIVE — SIGNIFICANT CHANGE UP
SODIUM SERPL-SCNC: 140 MMOL/L — SIGNIFICANT CHANGE UP (ref 135–145)
WBC # BLD: 4.13 K/UL — SIGNIFICANT CHANGE UP (ref 3.8–10.5)
WBC # FLD AUTO: 4.13 K/UL — SIGNIFICANT CHANGE UP (ref 3.8–10.5)

## 2025-03-20 PROCEDURE — 88304 TISSUE EXAM BY PATHOLOGIST: CPT | Mod: 26

## 2025-03-20 PROCEDURE — 35011 REPAIR DEFECT OF ARTERY: CPT | Mod: RT

## 2025-03-20 PROCEDURE — 37607 LIG/BANDING ANGIOACS AV FSTL: CPT

## 2025-03-20 DEVICE — SURGIFOAM 8 X 12.5CM X 10MM (100): Type: IMPLANTABLE DEVICE | Site: RIGHT | Status: FUNCTIONAL

## 2025-03-20 DEVICE — CLIP APPLIER COVIDIEN SURGICLIP III 9" SM: Type: IMPLANTABLE DEVICE | Site: RIGHT | Status: FUNCTIONAL

## 2025-03-20 DEVICE — CLIP APPLIER COVIDIEN SURGICLIP II 9.75" MEDIUM: Type: IMPLANTABLE DEVICE | Site: RIGHT | Status: FUNCTIONAL

## 2025-03-20 DEVICE — ARISTA 3GR: Type: IMPLANTABLE DEVICE | Site: RIGHT | Status: FUNCTIONAL

## 2025-03-20 RX ORDER — CEFAZOLIN SODIUM IN 0.9 % NACL 3 G/100 ML
1000 INTRAVENOUS SOLUTION, PIGGYBACK (ML) INTRAVENOUS EVERY 8 HOURS
Refills: 0 | Status: COMPLETED | OUTPATIENT
Start: 2025-03-20 | End: 2025-03-21

## 2025-03-20 RX ORDER — HYDROMORPHONE/SOD CHLOR,ISO/PF 2 MG/10 ML
1 SYRINGE (ML) INJECTION
Refills: 0 | Status: DISCONTINUED | OUTPATIENT
Start: 2025-03-20 | End: 2025-03-20

## 2025-03-20 RX ORDER — ACETAMINOPHEN 500 MG/5ML
975 LIQUID (ML) ORAL EVERY 6 HOURS
Refills: 0 | Status: DISCONTINUED | OUTPATIENT
Start: 2025-03-20 | End: 2025-03-22

## 2025-03-20 RX ORDER — ONDANSETRON HCL/PF 4 MG/2 ML
4 VIAL (ML) INJECTION ONCE
Refills: 0 | Status: COMPLETED | OUTPATIENT
Start: 2025-03-20 | End: 2025-03-20

## 2025-03-20 RX ORDER — OXYCODONE HYDROCHLORIDE 30 MG/1
5 TABLET ORAL EVERY 4 HOURS
Refills: 0 | Status: DISCONTINUED | OUTPATIENT
Start: 2025-03-20 | End: 2025-03-22

## 2025-03-20 RX ORDER — ACETAMINOPHEN 500 MG/5ML
975 LIQUID (ML) ORAL EVERY 6 HOURS
Refills: 0 | Status: DISCONTINUED | OUTPATIENT
Start: 2025-03-20 | End: 2025-03-20

## 2025-03-20 RX ORDER — PREDNISONE 20 MG/1
5 TABLET ORAL DAILY
Refills: 0 | Status: DISCONTINUED | OUTPATIENT
Start: 2025-03-20 | End: 2025-03-22

## 2025-03-20 RX ORDER — ATOVAQUONE 750 MG/5ML
1500 SUSPENSION ORAL DAILY
Refills: 0 | Status: DISCONTINUED | OUTPATIENT
Start: 2025-03-20 | End: 2025-03-22

## 2025-03-20 RX ORDER — ATORVASTATIN CALCIUM 80 MG/1
40 TABLET, FILM COATED ORAL AT BEDTIME
Refills: 0 | Status: DISCONTINUED | OUTPATIENT
Start: 2025-03-20 | End: 2025-03-22

## 2025-03-20 RX ORDER — HEPARIN SODIUM 1000 [USP'U]/ML
5000 INJECTION INTRAVENOUS; SUBCUTANEOUS EVERY 8 HOURS
Refills: 0 | Status: DISCONTINUED | OUTPATIENT
Start: 2025-03-20 | End: 2025-03-22

## 2025-03-20 RX ORDER — MYCOPHENOLIC ACID 360 MG/1
540 TABLET, DELAYED RELEASE ORAL
Refills: 0 | Status: DISCONTINUED | OUTPATIENT
Start: 2025-03-20 | End: 2025-03-22

## 2025-03-20 RX ORDER — ONDANSETRON HCL/PF 4 MG/2 ML
4 VIAL (ML) INJECTION ONCE
Refills: 0 | Status: DISCONTINUED | OUTPATIENT
Start: 2025-03-20 | End: 2025-03-20

## 2025-03-20 RX ORDER — HYDROMORPHONE/SOD CHLOR,ISO/PF 2 MG/10 ML
0.5 SYRINGE (ML) INJECTION
Refills: 0 | Status: DISCONTINUED | OUTPATIENT
Start: 2025-03-20 | End: 2025-03-20

## 2025-03-20 RX ORDER — SODIUM CHLORIDE 9 G/1000ML
1000 INJECTION, SOLUTION INTRAVENOUS
Refills: 0 | Status: DISCONTINUED | OUTPATIENT
Start: 2025-03-20 | End: 2025-03-20

## 2025-03-20 RX ORDER — HYDROMORPHONE/SOD CHLOR,ISO/PF 2 MG/10 ML
0.5 SYRINGE (ML) INJECTION EVERY 4 HOURS
Refills: 0 | Status: DISCONTINUED | OUTPATIENT
Start: 2025-03-20 | End: 2025-03-22

## 2025-03-20 RX ORDER — OXYCODONE HYDROCHLORIDE 30 MG/1
10 TABLET ORAL EVERY 4 HOURS
Refills: 0 | Status: DISCONTINUED | OUTPATIENT
Start: 2025-03-20 | End: 2025-03-22

## 2025-03-20 RX ADMIN — SODIUM CHLORIDE 75 MILLILITER(S): 9 INJECTION, SOLUTION INTRAVENOUS at 05:48

## 2025-03-20 RX ADMIN — Medication 4 MILLIGRAM(S): at 16:14

## 2025-03-20 RX ADMIN — Medication 20 MILLIGRAM(S): at 14:12

## 2025-03-20 RX ADMIN — HEPARIN SODIUM 5000 UNIT(S): 1000 INJECTION INTRAVENOUS; SUBCUTANEOUS at 14:11

## 2025-03-20 RX ADMIN — MYCOPHENOLIC ACID 540 MILLIGRAM(S): 360 TABLET, DELAYED RELEASE ORAL at 09:45

## 2025-03-20 RX ADMIN — Medication 20 MILLIGRAM(S): at 09:44

## 2025-03-20 RX ADMIN — Medication 1 MILLIGRAM(S): at 13:15

## 2025-03-20 RX ADMIN — Medication 975 MILLIGRAM(S): at 05:05

## 2025-03-20 RX ADMIN — Medication 975 MILLIGRAM(S): at 19:12

## 2025-03-20 RX ADMIN — Medication 100 MILLIGRAM(S): at 19:08

## 2025-03-20 RX ADMIN — Medication 975 MILLIGRAM(S): at 06:05

## 2025-03-20 RX ADMIN — PREDNISONE 5 MILLIGRAM(S): 20 TABLET ORAL at 09:45

## 2025-03-20 RX ADMIN — OXYCODONE HYDROCHLORIDE 10 MILLIGRAM(S): 30 TABLET ORAL at 19:12

## 2025-03-20 RX ADMIN — HEPARIN SODIUM 5000 UNIT(S): 1000 INJECTION INTRAVENOUS; SUBCUTANEOUS at 05:57

## 2025-03-20 RX ADMIN — Medication 0.5 MILLIGRAM(S): at 16:14

## 2025-03-20 RX ADMIN — HEPARIN SODIUM 5000 UNIT(S): 1000 INJECTION INTRAVENOUS; SUBCUTANEOUS at 20:21

## 2025-03-20 RX ADMIN — Medication 1 MILLIGRAM(S): at 12:30

## 2025-03-20 RX ADMIN — ATORVASTATIN CALCIUM 40 MILLIGRAM(S): 80 TABLET, FILM COATED ORAL at 09:44

## 2025-03-20 RX ADMIN — ATOVAQUONE 1500 MILLIGRAM(S): 750 SUSPENSION ORAL at 09:44

## 2025-03-20 RX ADMIN — Medication 100 MILLIEQUIVALENT(S): at 09:50

## 2025-03-20 RX ADMIN — Medication 1 MILLIGRAM(S): at 13:00

## 2025-03-20 RX ADMIN — Medication 1 MILLIGRAM(S): at 12:15

## 2025-03-20 RX ADMIN — MYCOPHENOLIC ACID 540 MILLIGRAM(S): 360 TABLET, DELAYED RELEASE ORAL at 20:18

## 2025-03-21 LAB
ANION GAP SERPL CALC-SCNC: 15 MMOL/L — SIGNIFICANT CHANGE UP (ref 5–17)
BUN SERPL-MCNC: 15 MG/DL — SIGNIFICANT CHANGE UP (ref 7–23)
CALCIUM SERPL-MCNC: 9.6 MG/DL — SIGNIFICANT CHANGE UP (ref 8.4–10.5)
CHLORIDE SERPL-SCNC: 98 MMOL/L — SIGNIFICANT CHANGE UP (ref 96–108)
CO2 SERPL-SCNC: 23 MMOL/L — SIGNIFICANT CHANGE UP (ref 22–31)
CREAT SERPL-MCNC: 1.13 MG/DL — SIGNIFICANT CHANGE UP (ref 0.5–1.3)
EGFR: 90 ML/MIN/1.73M2 — SIGNIFICANT CHANGE UP
EGFR: 90 ML/MIN/1.73M2 — SIGNIFICANT CHANGE UP
GLUCOSE SERPL-MCNC: 102 MG/DL — HIGH (ref 70–99)
HCT VFR BLD CALC: 42.7 % — SIGNIFICANT CHANGE UP (ref 39–50)
HGB BLD-MCNC: 13.8 G/DL — SIGNIFICANT CHANGE UP (ref 13–17)
MAGNESIUM SERPL-MCNC: 2 MG/DL — SIGNIFICANT CHANGE UP (ref 1.6–2.6)
MCHC RBC-ENTMCNC: 28.6 PG — SIGNIFICANT CHANGE UP (ref 27–34)
MCHC RBC-ENTMCNC: 32.3 G/DL — SIGNIFICANT CHANGE UP (ref 32–36)
MCV RBC AUTO: 88.4 FL — SIGNIFICANT CHANGE UP (ref 80–100)
NRBC BLD AUTO-RTO: 0 /100 WBCS — SIGNIFICANT CHANGE UP (ref 0–0)
PHOSPHATE SERPL-MCNC: 2.6 MG/DL — SIGNIFICANT CHANGE UP (ref 2.5–4.5)
PLATELET # BLD AUTO: 147 K/UL — LOW (ref 150–400)
POTASSIUM SERPL-MCNC: 3.7 MMOL/L — SIGNIFICANT CHANGE UP (ref 3.5–5.3)
POTASSIUM SERPL-SCNC: 3.7 MMOL/L — SIGNIFICANT CHANGE UP (ref 3.5–5.3)
RBC # BLD: 4.83 M/UL — SIGNIFICANT CHANGE UP (ref 4.2–5.8)
RBC # FLD: 12.9 % — SIGNIFICANT CHANGE UP (ref 10.3–14.5)
SODIUM SERPL-SCNC: 136 MMOL/L — SIGNIFICANT CHANGE UP (ref 135–145)
WBC # BLD: 6.61 K/UL — SIGNIFICANT CHANGE UP (ref 3.8–10.5)
WBC # FLD AUTO: 6.61 K/UL — SIGNIFICANT CHANGE UP (ref 3.8–10.5)

## 2025-03-21 PROCEDURE — 99231 SBSQ HOSP IP/OBS SF/LOW 25: CPT | Mod: GC

## 2025-03-21 RX ORDER — ONDANSETRON HCL/PF 4 MG/2 ML
4 VIAL (ML) INJECTION ONCE
Refills: 0 | Status: COMPLETED | OUTPATIENT
Start: 2025-03-21 | End: 2025-03-21

## 2025-03-21 RX ORDER — POTASSIUM CHLORIDE, DEXTROSE MONOHYDRATE AND SODIUM CHLORIDE 150; 5; 900 MG/100ML; G/100ML; MG/100ML
1000 INJECTION, SOLUTION INTRAVENOUS
Refills: 0 | Status: DISCONTINUED | OUTPATIENT
Start: 2025-03-21 | End: 2025-03-21

## 2025-03-21 RX ORDER — BELATACEPT 250 MG/1
337.5 INJECTION, POWDER, LYOPHILIZED, FOR SOLUTION INTRAVENOUS ONCE
Refills: 0 | Status: COMPLETED | OUTPATIENT
Start: 2025-03-21 | End: 2025-03-21

## 2025-03-21 RX ADMIN — Medication 975 MILLIGRAM(S): at 19:41

## 2025-03-21 RX ADMIN — PREDNISONE 5 MILLIGRAM(S): 20 TABLET ORAL at 08:07

## 2025-03-21 RX ADMIN — Medication 0.5 MILLIGRAM(S): at 02:36

## 2025-03-21 RX ADMIN — ATOVAQUONE 1500 MILLIGRAM(S): 750 SUSPENSION ORAL at 10:44

## 2025-03-21 RX ADMIN — Medication 4 MILLIGRAM(S): at 01:36

## 2025-03-21 RX ADMIN — Medication 975 MILLIGRAM(S): at 20:40

## 2025-03-21 RX ADMIN — Medication 975 MILLIGRAM(S): at 01:16

## 2025-03-21 RX ADMIN — Medication 20 MILLIGRAM(S): at 10:45

## 2025-03-21 RX ADMIN — BELATACEPT 200 MILLIGRAM(S): 250 INJECTION, POWDER, LYOPHILIZED, FOR SOLUTION INTRAVENOUS at 13:45

## 2025-03-21 RX ADMIN — Medication 975 MILLIGRAM(S): at 09:00

## 2025-03-21 RX ADMIN — MYCOPHENOLIC ACID 540 MILLIGRAM(S): 360 TABLET, DELAYED RELEASE ORAL at 19:42

## 2025-03-21 RX ADMIN — Medication 975 MILLIGRAM(S): at 08:08

## 2025-03-21 RX ADMIN — Medication 975 MILLIGRAM(S): at 13:51

## 2025-03-21 RX ADMIN — HEPARIN SODIUM 5000 UNIT(S): 1000 INJECTION INTRAVENOUS; SUBCUTANEOUS at 21:14

## 2025-03-21 RX ADMIN — Medication 100 MILLIGRAM(S): at 10:49

## 2025-03-21 RX ADMIN — HEPARIN SODIUM 5000 UNIT(S): 1000 INJECTION INTRAVENOUS; SUBCUTANEOUS at 13:52

## 2025-03-21 RX ADMIN — POTASSIUM CHLORIDE, DEXTROSE MONOHYDRATE AND SODIUM CHLORIDE 80 MILLILITER(S): 150; 5; 900 INJECTION, SOLUTION INTRAVENOUS at 10:50

## 2025-03-21 RX ADMIN — MYCOPHENOLIC ACID 540 MILLIGRAM(S): 360 TABLET, DELAYED RELEASE ORAL at 08:08

## 2025-03-21 RX ADMIN — Medication 975 MILLIGRAM(S): at 02:16

## 2025-03-21 RX ADMIN — ATORVASTATIN CALCIUM 40 MILLIGRAM(S): 80 TABLET, FILM COATED ORAL at 21:13

## 2025-03-21 RX ADMIN — Medication 3 MILLILITER(S): at 22:05

## 2025-03-21 RX ADMIN — Medication 0.5 MILLIGRAM(S): at 01:36

## 2025-03-21 RX ADMIN — Medication 100 MILLIGRAM(S): at 01:16

## 2025-03-21 RX ADMIN — HEPARIN SODIUM 5000 UNIT(S): 1000 INJECTION INTRAVENOUS; SUBCUTANEOUS at 05:31

## 2025-03-22 ENCOUNTER — TRANSCRIPTION ENCOUNTER (OUTPATIENT)
Age: 30
End: 2025-03-22

## 2025-03-22 VITALS
OXYGEN SATURATION: 100 % | TEMPERATURE: 99 F | DIASTOLIC BLOOD PRESSURE: 84 MMHG | RESPIRATION RATE: 18 BRPM | HEART RATE: 72 BPM | SYSTOLIC BLOOD PRESSURE: 145 MMHG

## 2025-03-22 LAB
ANION GAP SERPL CALC-SCNC: 14 MMOL/L — SIGNIFICANT CHANGE UP (ref 5–17)
BUN SERPL-MCNC: 14 MG/DL — SIGNIFICANT CHANGE UP (ref 7–23)
CALCIUM SERPL-MCNC: 9.6 MG/DL — SIGNIFICANT CHANGE UP (ref 8.4–10.5)
CHLORIDE SERPL-SCNC: 103 MMOL/L — SIGNIFICANT CHANGE UP (ref 96–108)
CO2 SERPL-SCNC: 23 MMOL/L — SIGNIFICANT CHANGE UP (ref 22–31)
CREAT SERPL-MCNC: 1.08 MG/DL — SIGNIFICANT CHANGE UP (ref 0.5–1.3)
EGFR: 95 ML/MIN/1.73M2 — SIGNIFICANT CHANGE UP
EGFR: 95 ML/MIN/1.73M2 — SIGNIFICANT CHANGE UP
GLUCOSE SERPL-MCNC: 81 MG/DL — SIGNIFICANT CHANGE UP (ref 70–99)
HCT VFR BLD CALC: 43.7 % — SIGNIFICANT CHANGE UP (ref 39–50)
HGB BLD-MCNC: 14.1 G/DL — SIGNIFICANT CHANGE UP (ref 13–17)
MAGNESIUM SERPL-MCNC: 1.8 MG/DL — SIGNIFICANT CHANGE UP (ref 1.6–2.6)
MCHC RBC-ENTMCNC: 28.1 PG — SIGNIFICANT CHANGE UP (ref 27–34)
MCHC RBC-ENTMCNC: 32.3 G/DL — SIGNIFICANT CHANGE UP (ref 32–36)
MCV RBC AUTO: 87.1 FL — SIGNIFICANT CHANGE UP (ref 80–100)
NRBC BLD AUTO-RTO: 0 /100 WBCS — SIGNIFICANT CHANGE UP (ref 0–0)
PHOSPHATE SERPL-MCNC: 2.4 MG/DL — LOW (ref 2.5–4.5)
PLATELET # BLD AUTO: 135 K/UL — LOW (ref 150–400)
POTASSIUM SERPL-MCNC: 3.8 MMOL/L — SIGNIFICANT CHANGE UP (ref 3.5–5.3)
POTASSIUM SERPL-SCNC: 3.8 MMOL/L — SIGNIFICANT CHANGE UP (ref 3.5–5.3)
RBC # BLD: 5.02 M/UL — SIGNIFICANT CHANGE UP (ref 4.2–5.8)
RBC # FLD: 13.1 % — SIGNIFICANT CHANGE UP (ref 10.3–14.5)
SODIUM SERPL-SCNC: 140 MMOL/L — SIGNIFICANT CHANGE UP (ref 135–145)
WBC # BLD: 4.28 K/UL — SIGNIFICANT CHANGE UP (ref 3.8–10.5)
WBC # FLD AUTO: 4.28 K/UL — SIGNIFICANT CHANGE UP (ref 3.8–10.5)

## 2025-03-22 PROCEDURE — 85610 PROTHROMBIN TIME: CPT

## 2025-03-22 PROCEDURE — 80048 BASIC METABOLIC PNL TOTAL CA: CPT

## 2025-03-22 PROCEDURE — 86900 BLOOD TYPING SEROLOGIC ABO: CPT

## 2025-03-22 PROCEDURE — 99285 EMERGENCY DEPT VISIT HI MDM: CPT

## 2025-03-22 PROCEDURE — 84100 ASSAY OF PHOSPHORUS: CPT

## 2025-03-22 PROCEDURE — 80053 COMPREHEN METABOLIC PANEL: CPT

## 2025-03-22 PROCEDURE — 85025 COMPLETE CBC W/AUTO DIFF WBC: CPT

## 2025-03-22 PROCEDURE — 86850 RBC ANTIBODY SCREEN: CPT

## 2025-03-22 PROCEDURE — 83735 ASSAY OF MAGNESIUM: CPT

## 2025-03-22 PROCEDURE — 71045 X-RAY EXAM CHEST 1 VIEW: CPT

## 2025-03-22 PROCEDURE — 85730 THROMBOPLASTIN TIME PARTIAL: CPT

## 2025-03-22 PROCEDURE — 88304 TISSUE EXAM BY PATHOLOGIST: CPT

## 2025-03-22 PROCEDURE — 86901 BLOOD TYPING SEROLOGIC RH(D): CPT

## 2025-03-22 PROCEDURE — C1889: CPT

## 2025-03-22 PROCEDURE — 85027 COMPLETE CBC AUTOMATED: CPT

## 2025-03-22 RX ORDER — OXYCODONE HYDROCHLORIDE 30 MG/1
1 TABLET ORAL
Qty: 0 | Refills: 0 | DISCHARGE
Start: 2025-03-22

## 2025-03-22 RX ORDER — LABETALOL HYDROCHLORIDE 200 MG/1
10 TABLET, FILM COATED ORAL ONCE
Refills: 0 | Status: DISCONTINUED | OUTPATIENT
Start: 2025-03-22 | End: 2025-03-22

## 2025-03-22 RX ORDER — OXYCODONE HYDROCHLORIDE 30 MG/1
1 TABLET ORAL
Qty: 8 | Refills: 0
Start: 2025-03-22

## 2025-03-22 RX ADMIN — Medication 10 MILLIGRAM(S): at 02:59

## 2025-03-22 RX ADMIN — PREDNISONE 5 MILLIGRAM(S): 20 TABLET ORAL at 05:17

## 2025-03-22 RX ADMIN — Medication 20 MILLIGRAM(S): at 12:05

## 2025-03-22 RX ADMIN — HEPARIN SODIUM 5000 UNIT(S): 1000 INJECTION INTRAVENOUS; SUBCUTANEOUS at 14:48

## 2025-03-22 RX ADMIN — OXYCODONE HYDROCHLORIDE 5 MILLIGRAM(S): 30 TABLET ORAL at 02:14

## 2025-03-22 RX ADMIN — Medication 975 MILLIGRAM(S): at 09:30

## 2025-03-22 RX ADMIN — Medication 975 MILLIGRAM(S): at 08:33

## 2025-03-22 RX ADMIN — HEPARIN SODIUM 5000 UNIT(S): 1000 INJECTION INTRAVENOUS; SUBCUTANEOUS at 05:17

## 2025-03-22 RX ADMIN — OXYCODONE HYDROCHLORIDE 5 MILLIGRAM(S): 30 TABLET ORAL at 03:14

## 2025-03-22 RX ADMIN — Medication 3 MILLILITER(S): at 05:23

## 2025-03-22 RX ADMIN — Medication 975 MILLIGRAM(S): at 02:14

## 2025-03-22 RX ADMIN — Medication 975 MILLIGRAM(S): at 14:46

## 2025-03-22 RX ADMIN — ATOVAQUONE 1500 MILLIGRAM(S): 750 SUSPENSION ORAL at 12:04

## 2025-03-22 RX ADMIN — Medication 975 MILLIGRAM(S): at 03:14

## 2025-03-22 RX ADMIN — MYCOPHENOLIC ACID 540 MILLIGRAM(S): 360 TABLET, DELAYED RELEASE ORAL at 08:35

## 2025-03-22 RX ADMIN — Medication 975 MILLIGRAM(S): at 15:16

## 2025-03-25 LAB — SURGICAL PATHOLOGY STUDY: SIGNIFICANT CHANGE UP

## 2025-04-04 ENCOUNTER — APPOINTMENT (OUTPATIENT)
Dept: VASCULAR SURGERY | Facility: CLINIC | Age: 30
End: 2025-04-04
Payer: COMMERCIAL

## 2025-04-04 VITALS
HEART RATE: 84 BPM | SYSTOLIC BLOOD PRESSURE: 173 MMHG | OXYGEN SATURATION: 98 % | DIASTOLIC BLOOD PRESSURE: 120 MMHG | BODY MASS INDEX: 19.51 KG/M2 | HEIGHT: 74 IN | WEIGHT: 152 LBS

## 2025-04-04 PROCEDURE — 99024 POSTOP FOLLOW-UP VISIT: CPT

## 2025-04-08 ENCOUNTER — APPOINTMENT (OUTPATIENT)
Dept: TRANSPLANT | Facility: CLINIC | Age: 30
End: 2025-04-08

## 2025-04-09 ENCOUNTER — NON-APPOINTMENT (OUTPATIENT)
Age: 30
End: 2025-04-09

## 2025-04-09 ENCOUNTER — APPOINTMENT (OUTPATIENT)
Dept: NEPHROLOGY | Facility: CLINIC | Age: 30
End: 2025-04-09
Payer: COMMERCIAL

## 2025-04-09 DIAGNOSIS — Z94.0 KIDNEY TRANSPLANT STATUS: ICD-10-CM

## 2025-04-09 DIAGNOSIS — I15.1 HYPERTENSION SECONDARY TO OTHER RENAL DISORDERS: ICD-10-CM

## 2025-04-09 DIAGNOSIS — Z94.0 OTHER LONG TERM (CURRENT) DRUG THERAPY: ICD-10-CM

## 2025-04-09 DIAGNOSIS — Z79.899 OTHER LONG TERM (CURRENT) DRUG THERAPY: ICD-10-CM

## 2025-04-09 PROCEDURE — 99214 OFFICE O/P EST MOD 30 MIN: CPT

## 2025-04-09 RX ORDER — NIFEDIPINE 30 MG/1
30 TABLET, FILM COATED, EXTENDED RELEASE ORAL
Qty: 30 | Refills: 5 | Status: ACTIVE | COMMUNITY
Start: 2025-04-09 | End: 1900-01-01

## 2025-06-25 ENCOUNTER — APPOINTMENT (OUTPATIENT)
Dept: NEPHROLOGY | Facility: CLINIC | Age: 30
End: 2025-06-25
Payer: COMMERCIAL

## 2025-06-25 VITALS
DIASTOLIC BLOOD PRESSURE: 89 MMHG | TEMPERATURE: 98.8 F | WEIGHT: 150 LBS | RESPIRATION RATE: 15 BRPM | OXYGEN SATURATION: 97 % | BODY MASS INDEX: 19.25 KG/M2 | HEIGHT: 74 IN | HEART RATE: 71 BPM | SYSTOLIC BLOOD PRESSURE: 124 MMHG

## 2025-06-25 PROCEDURE — G2211 COMPLEX E/M VISIT ADD ON: CPT | Mod: NC

## 2025-06-25 PROCEDURE — 99214 OFFICE O/P EST MOD 30 MIN: CPT

## 2025-06-26 ENCOUNTER — NON-APPOINTMENT (OUTPATIENT)
Age: 30
End: 2025-06-26

## 2025-06-26 LAB
25(OH)D3 SERPL-MCNC: 25.4 NG/ML
ALBUMIN SERPL ELPH-MCNC: 4.8 G/DL
ALP BLD-CCNC: 78 U/L
ALT SERPL-CCNC: 46 U/L
ANION GAP SERPL CALC-SCNC: 15 MMOL/L
APPEARANCE: CLEAR
AST SERPL-CCNC: 51 U/L
BACTERIA: NEGATIVE /HPF
BASOPHILS # BLD AUTO: 0.03 K/UL
BASOPHILS NFR BLD AUTO: 0.5 %
BILIRUB SERPL-MCNC: 0.6 MG/DL
BILIRUBIN URINE: NEGATIVE
BKV DNA SPEC QL NAA+PROBE: NOT DETECTED IU/ML
BLOOD URINE: NEGATIVE
BUN SERPL-MCNC: 21 MG/DL
CALCIUM SERPL-MCNC: 10.1 MG/DL
CALCIUM SERPL-MCNC: 10.1 MG/DL
CAST: 0 /LPF
CHLORIDE SERPL-SCNC: 102 MMOL/L
CHOLEST SERPL-MCNC: 182 MG/DL
CMV DNA SPEC QL NAA+PROBE: NOT DETECTED IU/ML
CMVPCR LOG: NOT DETECTED LOG10IU/ML
CO2 SERPL-SCNC: 22 MMOL/L
COLOR: YELLOW
CREAT SERPL-MCNC: 1.19 MG/DL
CREAT SPEC-SCNC: 212 MG/DL
CREAT/PROT UR: 0.1 RATIO
EGFRCR SERPLBLD CKD-EPI 2021: 84 ML/MIN/1.73M2
EOSINOPHIL # BLD AUTO: 0.36 K/UL
EOSINOPHIL NFR BLD AUTO: 5.7 %
EPITHELIAL CELLS: 0 /HPF
ESTIMATED AVERAGE GLUCOSE: 114 MG/DL
GLUCOSE QUALITATIVE U: NEGATIVE MG/DL
GLUCOSE SERPL-MCNC: 89 MG/DL
HBA1C MFR BLD HPLC: 5.6 %
HCT VFR BLD CALC: 44.6 %
HDLC SERPL-MCNC: 67 MG/DL
HGB BLD-MCNC: 14.8 G/DL
IMM GRANULOCYTES NFR BLD AUTO: 0.3 %
KETONES URINE: NEGATIVE MG/DL
LDH SERPL-CCNC: 146 U/L
LDLC SERPL-MCNC: 96 MG/DL
LEUKOCYTE ESTERASE URINE: NEGATIVE
LYMPHOCYTES # BLD AUTO: 1.46 K/UL
LYMPHOCYTES NFR BLD AUTO: 23.1 %
MAGNESIUM SERPL-MCNC: 2.1 MG/DL
MAN DIFF?: NORMAL
MCHC RBC-ENTMCNC: 28.5 PG
MCHC RBC-ENTMCNC: 33.2 G/DL
MCV RBC AUTO: 85.8 FL
MICROSCOPIC-UA: NORMAL
MONOCYTES # BLD AUTO: 0.39 K/UL
MONOCYTES NFR BLD AUTO: 6.2 %
NEUTROPHILS # BLD AUTO: 4.07 K/UL
NEUTROPHILS NFR BLD AUTO: 64.2 %
NITRITE URINE: NEGATIVE
NONHDLC SERPL-MCNC: 116 MG/DL
PARATHYROID HORMONE INTACT: 60 PG/ML
PH URINE: 6
PHOSPHATE SERPL-MCNC: 3.8 MG/DL
PLATELET # BLD AUTO: 184 K/UL
POTASSIUM SERPL-SCNC: 4.3 MMOL/L
PROT SERPL-MCNC: 7.1 G/DL
PROT UR-MCNC: 18 MG/DL
PROTEIN URINE: NORMAL MG/DL
RBC # BLD: 5.2 M/UL
RBC # FLD: 12.8 %
RED BLOOD CELLS URINE: 1 /HPF
SODIUM SERPL-SCNC: 139 MMOL/L
SPECIFIC GRAVITY URINE: 1.02
TACROLIMUS SERPL-MCNC: <2 NG/ML
TRIGL SERPL-MCNC: 115 MG/DL
URATE SERPL-MCNC: 6.3 MG/DL
UROBILINOGEN URINE: 0.2 MG/DL
WBC # FLD AUTO: 6.33 K/UL
WHITE BLOOD CELLS URINE: 0 /HPF

## (undated) DEVICE — MEDICATION LABELS W MARKER

## (undated) DEVICE — DRSG OPSITE 13.75 X 4"

## (undated) DEVICE — SUT SOFSILK 2-0 18" TIES

## (undated) DEVICE — DRSG STERISTRIPS 0.5 X 4"

## (undated) DEVICE — CLAMP BULLDOG MINI STRAIGHT (GREEN) DISP

## (undated) DEVICE — PACK AV FISTULA

## (undated) DEVICE — VESSEL LOOP MAXI-BLUE 0.120" X 16"

## (undated) DEVICE — DRSG COBAN 6"

## (undated) DEVICE — SUT SOFSILK 4-0 18" TIES

## (undated) DEVICE — Device

## (undated) DEVICE — PREP CHLORAPREP HI-LITE ORANGE 26ML

## (undated) DEVICE — SUT SOFSILK 3-0 18" TIES

## (undated) DEVICE — SUT SILK 3-0 18" TIES

## (undated) DEVICE — SOL IRR POUR NS 0.9% 1500ML

## (undated) DEVICE — ELCTR GROUNDING PAD ADULT COVIDIEN

## (undated) DEVICE — DRSG TEGADERM 6 X 8"

## (undated) DEVICE — VENODYNE/SCD SLEEVE CALF MEDIUM

## (undated) DEVICE — WARMING BLANKET LOWER ADULT

## (undated) DEVICE — DRAPE LIGHT HANDLE COVER (BLUE)

## (undated) DEVICE — BLADE SCALPEL SAFETYLOCK #11

## (undated) DEVICE — SUT PROLENE 6-0 4-30" BV-1

## (undated) DEVICE — SUCTION YANKAUER NO CONTROL VENT

## (undated) DEVICE — FOR-ESU VALLEYLAB T7E15009DX: Type: DURABLE MEDICAL EQUIPMENT

## (undated) DEVICE — SUT POLYSORB 3-0 30" V-20 UNDYED

## (undated) DEVICE — DRSG KERLIX ROLL 4.5"

## (undated) DEVICE — DRAPE INSTRUMENT POUCH 6.75" X 11"

## (undated) DEVICE — CLAMP BULLDOG MIDI STRAIGHT (YELLOW) DISP

## (undated) DEVICE — SUT BIOSYN 4-0 18" P-12

## (undated) DEVICE — SUT MONOSOF 4-0 18" P-12

## (undated) DEVICE — SUT SURGIPRO II 6-0 30" CV-1 DA

## (undated) DEVICE — STAPLER SKIN VISI-STAT 35 WIDE

## (undated) DEVICE — LAP PAD W RING 18 X 18"

## (undated) DEVICE — CLAMP BULLDOG MIDI 45 DEGREE (GREEN) DISP

## (undated) DEVICE — BLADE SCALPEL SAFETYLOCK #15

## (undated) DEVICE — DRSG KLING 6"

## (undated) DEVICE — POSITIONER FOAM EGG CRATE ULNAR 2PCS (PINK)

## (undated) DEVICE — GOWN XL

## (undated) DEVICE — DRSG TEGADERM 4X4.75"

## (undated) DEVICE — SYR LUER LOK 5CC

## (undated) DEVICE — SOL IRR POUR NS 0.9% 500ML

## (undated) DEVICE — DRAPE TOWEL BLUE 17" X 24"

## (undated) DEVICE — VESSEL LOOP MINI-BLUE 0.075" X 16"

## (undated) DEVICE — SPECIMEN CONTAINER 100ML

## (undated) DEVICE — SOL INJ NS 0.9% 500ML 1-PORT

## (undated) DEVICE — SOL IRR POUR H2O 250ML

## (undated) DEVICE — SOL IRR POUR H2O 1500ML

## (undated) DEVICE — DRSG CURITY GAUZE SPONGE 4 X 4" 12-PLY